# Patient Record
Sex: FEMALE | Race: WHITE | NOT HISPANIC OR LATINO | Employment: OTHER | URBAN - METROPOLITAN AREA
[De-identification: names, ages, dates, MRNs, and addresses within clinical notes are randomized per-mention and may not be internally consistent; named-entity substitution may affect disease eponyms.]

---

## 2017-05-26 ENCOUNTER — HOSPITAL ENCOUNTER (EMERGENCY)
Facility: HOSPITAL | Age: 57
Discharge: HOME | End: 2017-05-27
Attending: EMERGENCY MEDICINE | Admitting: EMERGENCY MEDICINE
Payer: COMMERCIAL

## 2017-05-26 DIAGNOSIS — F32.A DEPRESSION: ICD-10-CM

## 2017-05-26 DIAGNOSIS — F10.929 ALCOHOL INTOXICATION (HCC): Primary | ICD-10-CM

## 2017-05-26 LAB
ALBUMIN SERPL BCP-MCNC: 4.1 G/DL (ref 3.5–5)
ALP SERPL-CCNC: 157 U/L (ref 46–116)
ALT SERPL W P-5'-P-CCNC: 95 U/L (ref 12–78)
AMPHETAMINES SERPL QL SCN: NEGATIVE
ANION GAP SERPL CALCULATED.3IONS-SCNC: 13 MMOL/L (ref 4–13)
AST SERPL W P-5'-P-CCNC: 158 U/L (ref 5–45)
ATRIAL RATE: 108 BPM
BACTERIA UR QL AUTO: ABNORMAL /HPF
BARBITURATES UR QL: NEGATIVE
BASOPHILS # BLD AUTO: 0.06 THOUSANDS/ΜL (ref 0–0.1)
BASOPHILS NFR BLD AUTO: 1 % (ref 0–1)
BENZODIAZ UR QL: NEGATIVE
BILIRUB SERPL-MCNC: 0.63 MG/DL (ref 0.2–1)
BILIRUB UR QL STRIP: NEGATIVE
BUN SERPL-MCNC: 9 MG/DL (ref 5–25)
CALCIUM SERPL-MCNC: 9.5 MG/DL (ref 8.3–10.1)
CHLORIDE SERPL-SCNC: 102 MMOL/L (ref 100–108)
CLARITY UR: CLEAR
CO2 SERPL-SCNC: 27 MMOL/L (ref 21–32)
COCAINE UR QL: NEGATIVE
COLOR UR: YELLOW
COLOR, POC: YELLOW
CREAT SERPL-MCNC: 0.8 MG/DL (ref 0.6–1.3)
EOSINOPHIL # BLD AUTO: 0.03 THOUSAND/ΜL (ref 0–0.61)
EOSINOPHIL NFR BLD AUTO: 1 % (ref 0–6)
ERYTHROCYTE [DISTWIDTH] IN BLOOD BY AUTOMATED COUNT: 13.9 % (ref 11.6–15.1)
ETHANOL EXG-MCNC: NORMAL MG/DL
ETHANOL SERPL-MCNC: 422 MG/DL (ref 0–3)
GFR SERPL CREATININE-BSD FRML MDRD: >60 ML/MIN/1.73SQ M
GLUCOSE SERPL-MCNC: 72 MG/DL (ref 65–140)
GLUCOSE UR STRIP-MCNC: NEGATIVE MG/DL
HCT VFR BLD AUTO: 41.7 % (ref 34.8–46.1)
HGB BLD-MCNC: 14.5 G/DL (ref 11.5–15.4)
HGB UR QL STRIP.AUTO: ABNORMAL
KETONES UR STRIP-MCNC: NEGATIVE MG/DL
LEUKOCYTE ESTERASE UR QL STRIP: ABNORMAL
LYMPHOCYTES # BLD AUTO: 2.01 THOUSANDS/ΜL (ref 0.6–4.47)
LYMPHOCYTES NFR BLD AUTO: 31 % (ref 14–44)
MCH RBC QN AUTO: 34.4 PG (ref 26.8–34.3)
MCHC RBC AUTO-ENTMCNC: 34.8 G/DL (ref 31.4–37.4)
MCV RBC AUTO: 99 FL (ref 82–98)
METHADONE UR QL: NEGATIVE
MONOCYTES # BLD AUTO: 0.25 THOUSAND/ΜL (ref 0.17–1.22)
MONOCYTES NFR BLD AUTO: 4 % (ref 4–12)
NEUTROPHILS # BLD AUTO: 4.08 THOUSANDS/ΜL (ref 1.85–7.62)
NEUTS SEG NFR BLD AUTO: 63 % (ref 43–75)
NITRITE UR QL STRIP: POSITIVE
NON-SQ EPI CELLS URNS QL MICRO: ABNORMAL /HPF
NRBC BLD AUTO-RTO: 0 /100 WBCS
OPIATES UR QL SCN: NEGATIVE
P AXIS: 23 DEGREES
PCP UR QL: NEGATIVE
PH UR STRIP.AUTO: 5.5 [PH] (ref 4.5–8)
PLATELET # BLD AUTO: 246 THOUSANDS/UL (ref 149–390)
PMV BLD AUTO: 9.2 FL (ref 8.9–12.7)
POTASSIUM SERPL-SCNC: 3.4 MMOL/L (ref 3.5–5.3)
PR INTERVAL: 128 MS
PROT SERPL-MCNC: 8.1 G/DL (ref 6.4–8.2)
PROT UR STRIP-MCNC: NEGATIVE MG/DL
QRS AXIS: 34 DEGREES
QRSD INTERVAL: 68 MS
QT INTERVAL: 326 MS
QTC INTERVAL: 436 MS
RBC # BLD AUTO: 4.22 MILLION/UL (ref 3.81–5.12)
RBC #/AREA URNS AUTO: ABNORMAL /HPF
SODIUM SERPL-SCNC: 142 MMOL/L (ref 136–145)
SP GR UR STRIP.AUTO: 1.01 (ref 1–1.03)
T WAVE AXIS: -1 DEGREES
THC UR QL: NEGATIVE
TSH SERPL DL<=0.05 MIU/L-ACNC: 1.23 UIU/ML (ref 0.36–3.74)
UROBILINOGEN UR QL STRIP.AUTO: 0.2 E.U./DL
VENTRICULAR RATE: 108 BPM
WBC # BLD AUTO: 6.43 THOUSAND/UL (ref 4.31–10.16)
WBC #/AREA URNS AUTO: ABNORMAL /HPF

## 2017-05-26 PROCEDURE — 80053 COMPREHEN METABOLIC PANEL: CPT | Performed by: EMERGENCY MEDICINE

## 2017-05-26 PROCEDURE — 81001 URINALYSIS AUTO W/SCOPE: CPT

## 2017-05-26 PROCEDURE — 87077 CULTURE AEROBIC IDENTIFY: CPT | Performed by: EMERGENCY MEDICINE

## 2017-05-26 PROCEDURE — 80320 DRUG SCREEN QUANTALCOHOLS: CPT | Performed by: EMERGENCY MEDICINE

## 2017-05-26 PROCEDURE — 84443 ASSAY THYROID STIM HORMONE: CPT | Performed by: EMERGENCY MEDICINE

## 2017-05-26 PROCEDURE — 87086 URINE CULTURE/COLONY COUNT: CPT | Performed by: EMERGENCY MEDICINE

## 2017-05-26 PROCEDURE — G0480 DRUG TEST DEF 1-7 CLASSES: HCPCS | Performed by: EMERGENCY MEDICINE

## 2017-05-26 PROCEDURE — 85025 COMPLETE CBC W/AUTO DIFF WBC: CPT | Performed by: EMERGENCY MEDICINE

## 2017-05-26 PROCEDURE — 82075 ASSAY OF BREATH ETHANOL: CPT | Performed by: EMERGENCY MEDICINE

## 2017-05-26 PROCEDURE — 87186 SC STD MICRODIL/AGAR DIL: CPT | Performed by: EMERGENCY MEDICINE

## 2017-05-26 PROCEDURE — 80307 DRUG TEST PRSMV CHEM ANLYZR: CPT | Performed by: EMERGENCY MEDICINE

## 2017-05-26 PROCEDURE — 36415 COLL VENOUS BLD VENIPUNCTURE: CPT | Performed by: EMERGENCY MEDICINE

## 2017-05-26 PROCEDURE — 81002 URINALYSIS NONAUTO W/O SCOPE: CPT | Performed by: EMERGENCY MEDICINE

## 2017-05-26 PROCEDURE — A9270 NON-COVERED ITEM OR SERVICE: HCPCS | Performed by: EMERGENCY MEDICINE

## 2017-05-26 PROCEDURE — 93005 ELECTROCARDIOGRAM TRACING: CPT | Performed by: EMERGENCY MEDICINE

## 2017-05-26 RX ORDER — METOPROLOL TARTRATE 50 MG/1
50 TABLET, FILM COATED ORAL ONCE
Status: DISCONTINUED | OUTPATIENT
Start: 2017-05-26 | End: 2017-05-26

## 2017-05-26 RX ORDER — METOPROLOL SUCCINATE 50 MG/1
50 TABLET, EXTENDED RELEASE ORAL DAILY
COMMUNITY
End: 2018-05-09 | Stop reason: SDUPTHER

## 2017-05-26 RX ORDER — LORAZEPAM 1 MG/1
1 TABLET ORAL ONCE
Status: COMPLETED | OUTPATIENT
Start: 2017-05-26 | End: 2017-05-26

## 2017-05-26 RX ORDER — SPIRONOLACTONE 25 MG/1
25 TABLET ORAL DAILY
COMMUNITY
End: 2018-05-09 | Stop reason: SDUPTHER

## 2017-05-26 RX ADMIN — LORAZEPAM 1 MG: 1 TABLET ORAL at 19:31

## 2017-05-27 VITALS
OXYGEN SATURATION: 98 % | HEART RATE: 88 BPM | SYSTOLIC BLOOD PRESSURE: 136 MMHG | RESPIRATION RATE: 16 BRPM | TEMPERATURE: 98 F | DIASTOLIC BLOOD PRESSURE: 82 MMHG | WEIGHT: 176.37 LBS

## 2017-05-27 LAB — ETHANOL EXG-MCNC: 0.05 MG/DL

## 2017-05-27 PROCEDURE — 82075 ASSAY OF BREATH ETHANOL: CPT | Performed by: EMERGENCY MEDICINE

## 2017-05-27 PROCEDURE — 99284 EMERGENCY DEPT VISIT MOD MDM: CPT

## 2017-05-29 LAB — BACTERIA UR CULT: NORMAL

## 2018-05-09 ENCOUNTER — APPOINTMENT (OUTPATIENT)
Dept: LAB | Facility: CLINIC | Age: 58
End: 2018-05-09
Payer: COMMERCIAL

## 2018-05-09 ENCOUNTER — TRANSCRIBE ORDERS (OUTPATIENT)
Dept: LAB | Facility: CLINIC | Age: 58
End: 2018-05-09

## 2018-05-09 ENCOUNTER — OFFICE VISIT (OUTPATIENT)
Dept: FAMILY MEDICINE CLINIC | Facility: CLINIC | Age: 58
End: 2018-05-09
Payer: COMMERCIAL

## 2018-05-09 VITALS
BODY MASS INDEX: 41.43 KG/M2 | SYSTOLIC BLOOD PRESSURE: 140 MMHG | RESPIRATION RATE: 18 BRPM | OXYGEN SATURATION: 97 % | DIASTOLIC BLOOD PRESSURE: 84 MMHG | HEIGHT: 62 IN | TEMPERATURE: 97.3 F | HEART RATE: 116 BPM | WEIGHT: 225.13 LBS

## 2018-05-09 DIAGNOSIS — I10 BENIGN ESSENTIAL HYPERTENSION: ICD-10-CM

## 2018-05-09 DIAGNOSIS — F41.9 ANXIETY: ICD-10-CM

## 2018-05-09 DIAGNOSIS — K70.30 ALCOHOLIC CIRRHOSIS OF LIVER WITHOUT ASCITES (HCC): Primary | ICD-10-CM

## 2018-05-09 DIAGNOSIS — E11.9 TYPE 2 DIABETES MELLITUS WITHOUT COMPLICATION, WITHOUT LONG-TERM CURRENT USE OF INSULIN (HCC): ICD-10-CM

## 2018-05-09 LAB
ALBUMIN SERPL BCP-MCNC: 3.8 G/DL (ref 3.5–5)
ALP SERPL-CCNC: 104 U/L (ref 46–116)
ALT SERPL W P-5'-P-CCNC: 17 U/L (ref 12–78)
ANION GAP SERPL CALCULATED.3IONS-SCNC: 7 MMOL/L (ref 4–13)
AST SERPL W P-5'-P-CCNC: 12 U/L (ref 5–45)
BASOPHILS # BLD AUTO: 0.04 THOUSANDS/ΜL (ref 0–0.1)
BASOPHILS NFR BLD AUTO: 0 % (ref 0–1)
BILIRUB SERPL-MCNC: 1.05 MG/DL (ref 0.2–1)
BUN SERPL-MCNC: 14 MG/DL (ref 5–25)
CALCIUM SERPL-MCNC: 9.4 MG/DL (ref 8.3–10.1)
CHLORIDE SERPL-SCNC: 103 MMOL/L (ref 100–108)
CHOLEST SERPL-MCNC: 202 MG/DL (ref 50–200)
CO2 SERPL-SCNC: 26 MMOL/L (ref 21–32)
CREAT SERPL-MCNC: 0.83 MG/DL (ref 0.6–1.3)
CREAT UR-MCNC: 428 MG/DL
EOSINOPHIL # BLD AUTO: 0.05 THOUSAND/ΜL (ref 0–0.61)
EOSINOPHIL NFR BLD AUTO: 1 % (ref 0–6)
ERYTHROCYTE [DISTWIDTH] IN BLOOD BY AUTOMATED COUNT: 12.9 % (ref 11.6–15.1)
EST. AVERAGE GLUCOSE BLD GHB EST-MCNC: 154 MG/DL
GFR SERPL CREATININE-BSD FRML MDRD: 79 ML/MIN/1.73SQ M
GLUCOSE P FAST SERPL-MCNC: 128 MG/DL (ref 65–99)
HBA1C MFR BLD: 7 % (ref 4.2–6.3)
HCT VFR BLD AUTO: 40.6 % (ref 34.8–46.1)
HDLC SERPL-MCNC: 56 MG/DL (ref 40–60)
HGB BLD-MCNC: 13.1 G/DL (ref 11.5–15.4)
LDLC SERPL CALC-MCNC: 125 MG/DL (ref 0–100)
LYMPHOCYTES # BLD AUTO: 1.77 THOUSANDS/ΜL (ref 0.6–4.47)
LYMPHOCYTES NFR BLD AUTO: 17 % (ref 14–44)
MCH RBC QN AUTO: 28.8 PG (ref 26.8–34.3)
MCHC RBC AUTO-ENTMCNC: 32.3 G/DL (ref 31.4–37.4)
MCV RBC AUTO: 89 FL (ref 82–98)
MICROALBUMIN UR-MCNC: 554 MG/L (ref 0–20)
MICROALBUMIN/CREAT 24H UR: 129 MG/G CREATININE (ref 0–30)
MONOCYTES # BLD AUTO: 0.61 THOUSAND/ΜL (ref 0.17–1.22)
MONOCYTES NFR BLD AUTO: 6 % (ref 4–12)
NEUTROPHILS # BLD AUTO: 8.09 THOUSANDS/ΜL (ref 1.85–7.62)
NEUTS SEG NFR BLD AUTO: 76 % (ref 43–75)
NONHDLC SERPL-MCNC: 146 MG/DL
NRBC BLD AUTO-RTO: 0 /100 WBCS
PLATELET # BLD AUTO: 277 THOUSANDS/UL (ref 149–390)
PMV BLD AUTO: 10.4 FL (ref 8.9–12.7)
POTASSIUM SERPL-SCNC: 4.4 MMOL/L (ref 3.5–5.3)
PROT SERPL-MCNC: 7.1 G/DL (ref 6.4–8.2)
RBC # BLD AUTO: 4.55 MILLION/UL (ref 3.81–5.12)
SODIUM SERPL-SCNC: 136 MMOL/L (ref 136–145)
TRIGL SERPL-MCNC: 103 MG/DL
TSH SERPL DL<=0.05 MIU/L-ACNC: 1.54 UIU/ML (ref 0.36–3.74)
WBC # BLD AUTO: 10.59 THOUSAND/UL (ref 4.31–10.16)

## 2018-05-09 PROCEDURE — 85025 COMPLETE CBC W/AUTO DIFF WBC: CPT | Performed by: FAMILY MEDICINE

## 2018-05-09 PROCEDURE — 84443 ASSAY THYROID STIM HORMONE: CPT | Performed by: FAMILY MEDICINE

## 2018-05-09 PROCEDURE — 83036 HEMOGLOBIN GLYCOSYLATED A1C: CPT | Performed by: FAMILY MEDICINE

## 2018-05-09 PROCEDURE — 82043 UR ALBUMIN QUANTITATIVE: CPT | Performed by: FAMILY MEDICINE

## 2018-05-09 PROCEDURE — 36415 COLL VENOUS BLD VENIPUNCTURE: CPT | Performed by: FAMILY MEDICINE

## 2018-05-09 PROCEDURE — 80061 LIPID PANEL: CPT | Performed by: FAMILY MEDICINE

## 2018-05-09 PROCEDURE — 82570 ASSAY OF URINE CREATININE: CPT | Performed by: FAMILY MEDICINE

## 2018-05-09 PROCEDURE — 3060F POS MICROALBUMINURIA REV: CPT | Performed by: FAMILY MEDICINE

## 2018-05-09 PROCEDURE — 99204 OFFICE O/P NEW MOD 45 MIN: CPT | Performed by: FAMILY MEDICINE

## 2018-05-09 PROCEDURE — 80053 COMPREHEN METABOLIC PANEL: CPT | Performed by: FAMILY MEDICINE

## 2018-05-09 RX ORDER — SPIRONOLACTONE 25 MG/1
25 TABLET ORAL DAILY
Qty: 90 TABLET | Refills: 0 | Status: SHIPPED | OUTPATIENT
Start: 2018-05-09 | End: 2018-08-24 | Stop reason: SDUPTHER

## 2018-05-09 RX ORDER — TRAZODONE HYDROCHLORIDE 150 MG/1
150 TABLET ORAL
Qty: 90 TABLET | Refills: 1 | Status: SHIPPED | OUTPATIENT
Start: 2018-05-09 | End: 2018-07-20 | Stop reason: HOSPADM

## 2018-05-09 RX ORDER — TRAZODONE HYDROCHLORIDE 150 MG/1
TABLET ORAL
COMMUNITY
Start: 2018-03-06 | End: 2018-05-09 | Stop reason: SDUPTHER

## 2018-05-09 RX ORDER — METOPROLOL SUCCINATE 50 MG/1
50 TABLET, EXTENDED RELEASE ORAL DAILY
Qty: 90 TABLET | Refills: 1 | Status: SHIPPED | OUTPATIENT
Start: 2018-05-09 | End: 2018-07-20 | Stop reason: HOSPADM

## 2018-05-09 RX ORDER — BUPROPION HYDROCHLORIDE 75 MG/1
75 TABLET ORAL 2 TIMES DAILY
Qty: 180 TABLET | Refills: 0 | Status: SHIPPED | OUTPATIENT
Start: 2018-05-09 | End: 2018-09-05 | Stop reason: SDUPTHER

## 2018-05-09 NOTE — PROGRESS NOTES
Cirrhosis  Patient presents with abdominal pain  Onset of symptoms was gradual starting a few years ago with stable course since that time  Patient also notes mild, non-existent epigastric discomfort  Symptoms improve with none  Symptoms worsen with pressure and recumbency  There is history of history of alcohol abuse quit 6 years ago   There is no history of alcohol use  Past history includes ascites, cirrhosis, gallstones and pancreatitis  Previous studies include none available at time of encounter  Assessment/Plan:    No problem-specific Assessment & Plan notes found for this encounter  Problem List Items Addressed This Visit        Digestive    Alcoholic cirrhosis ([address] ) - Primary    Relevant Medications    spironolactone (ALDACTONE) 25 mg tablet    Other Relevant Orders    CBC and differential    Comprehensive metabolic panel    Lipid panel    TSH, 3rd generation with T4 reflex    Microalbumin / creatinine urine ratio    Ammonia    HEMOGLOBIN A1C W/ EAG ESTIMATION       Endocrine    Type II diabetes mellitus (HCC)    Relevant Orders    CBC and differential    Comprehensive metabolic panel    Lipid panel    TSH, 3rd generation with T4 reflex    Microalbumin / creatinine urine ratio    Ammonia    HEMOGLOBIN A1C W/ EAG ESTIMATION       Cardiovascular and Mediastinum    Benign essential hypertension    Relevant Medications    spironolactone (ALDACTONE) 25 mg tablet    Other Relevant Orders    CBC and differential    Comprehensive metabolic panel    Lipid panel    TSH, 3rd generation with T4 reflex    Microalbumin / creatinine urine ratio    Ammonia    HEMOGLOBIN A1C W/ EAG ESTIMATION      Other Visit Diagnoses     Anxiety        Relevant Medications    buPROPion (WELLBUTRIN) 75 mg tablet            Subjective:      Patient ID: Daniella Yung is a 66 y o  female  65 yo female with known HTN, DM and acholic cirrhosis diagnosed in 2012 here as a new patient today   Patient states she has not seen a physician since her diagnosis  She currently complains of difficulty falling and staying asleep, feels anxious all the time and epigastric pain described as a cramp anytime she bends down  Over the last month she has been having increasing LE nocturnal cramps  She denies LE edema, change in appetite, constipation, diarrhea, SOB, fatigue  She currently attends a support group at [employer]  Not drinking ETOH          The following portions of the patient's history were reviewed and updated as appropriate:   She  has a past medical history of Cirrhosis of liver ([place] [address] ); Hypertension; and Psychiatric disorder  She   Patient Active Problem List    Diagnosis Date Noted    Pancreatitis 08/24/2015    Alcohol dependence ([place] [address] ) 06/17/2014    Hypothyroidism 58/11/2353    Alcoholic cirrhosis ([place] [address] ) 32/25/6854    Benign essential hypertension 08/13/2012    Hyperlipidemia 08/13/2012    Type II diabetes mellitus ([place] [address] ) 08/13/2012     She  has no past surgical history on file  Her family history includes Diabetes in her brother, father, and mother; Hypertension in her brother  She  reports that she has been smoking Cigarettes  She has been smoking about 1 00 pack per day  She has never used smokeless tobacco  She reports that she drinks alcohol  She reports that she does not use drugs  Current Outpatient Prescriptions   Medication Sig Dispense Refill    buPROPion (WELLBUTRIN) 75 mg tablet Take 1 tablet (75 mg total) by mouth 2 (two) times a day 180 tablet 0    metoprolol succinate (TOPROL-XL) 50 mg 24 hr tablet Take 50 mg by mouth daily      spironolactone (ALDACTONE) 25 mg tablet Take 1 tablet (25 mg total) by mouth daily 90 tablet 0    traZODone (DESYREL) 150 mg tablet        No current facility-administered medications for this visit       Review of Systems   Psychiatric/Behavioral: Positive for sleep disturbance  The patient is nervous/anxious  All other systems reviewed and are negative  Objective:      /84 (BP Location: Right arm, Patient Position: Sitting, Cuff Size: Standard)   Pulse (!) 116   Temp (!) 97 3 °F (36 3 °C) (Tympanic)   Resp 18   Ht 5' 2" (1 575 m)   Wt 102 kg (225 lb 2 oz)   SpO2 97%   BMI 41 18 kg/m²          Physical Exam   Constitutional: She is oriented to person, place, and time  She appears well-developed  HENT:   Head: Normocephalic  Right Ear: External ear normal    Left Ear: External ear normal    Nose: Nose normal    Mouth/Throat: Oropharynx is clear and moist    Eyes: Conjunctivae and EOM are normal  Pupils are equal, round, and reactive to light  Neck: Normal range of motion  Neck supple  No thyromegaly present  Cardiovascular: Normal rate, regular rhythm and normal heart sounds  Pulmonary/Chest: Effort normal and breath sounds normal    Abdominal: Soft  There is no tenderness  There is no rebound and no guarding  Musculoskeletal: Normal range of motion  Neurological: She is alert and oriented to person, place, and time  She has normal reflexes  Skin: Skin is dry  Psychiatric: She has a normal mood and affect  Nursing note and vitals reviewed

## 2018-07-17 ENCOUNTER — HOSPITAL ENCOUNTER (INPATIENT)
Facility: HOSPITAL | Age: 58
LOS: 3 days | Discharge: HOME | DRG: 309 | End: 2018-07-20
Attending: EMERGENCY MEDICINE | Admitting: FAMILY MEDICINE
Payer: COMMERCIAL

## 2018-07-17 ENCOUNTER — APPOINTMENT (EMERGENCY)
Dept: RADIOLOGY | Facility: HOSPITAL | Age: 58
DRG: 309 | End: 2018-07-17
Payer: COMMERCIAL

## 2018-07-17 DIAGNOSIS — I10 BENIGN ESSENTIAL HYPERTENSION: ICD-10-CM

## 2018-07-17 DIAGNOSIS — I48.91 NEW ONSET ATRIAL FIBRILLATION (HCC): ICD-10-CM

## 2018-07-17 DIAGNOSIS — F10.20 ALCOHOL DEPENDENCE (HCC): ICD-10-CM

## 2018-07-17 DIAGNOSIS — I48.91 ATRIAL FIBRILLATION WITH RVR (HCC): Primary | ICD-10-CM

## 2018-07-17 PROBLEM — E87.2 HIGH ANION GAP METABOLIC ACIDOSIS: Status: ACTIVE | Noted: 2018-07-17

## 2018-07-17 PROBLEM — D72.829 LEUKOCYTOSIS: Status: ACTIVE | Noted: 2018-07-17

## 2018-07-17 PROBLEM — I24.89 DEMAND ISCHEMIA OF MYOCARDIUM: Status: ACTIVE | Noted: 2018-07-17

## 2018-07-17 PROBLEM — I24.8 DEMAND ISCHEMIA OF MYOCARDIUM (HCC): Status: ACTIVE | Noted: 2018-07-17

## 2018-07-17 PROBLEM — E87.29 HIGH ANION GAP METABOLIC ACIDOSIS: Status: ACTIVE | Noted: 2018-07-17

## 2018-07-17 LAB
ALBUMIN SERPL BCP-MCNC: 4.4 G/DL (ref 3–5.2)
ALP SERPL-CCNC: 97 U/L (ref 43–122)
ALT SERPL W P-5'-P-CCNC: 31 U/L (ref 9–52)
ANION GAP SERPL CALCULATED.3IONS-SCNC: 21 MMOL/L (ref 5–14)
APTT PPP: 25 SECONDS (ref 23–34)
APTT PPP: 40 SECONDS (ref 23–34)
AST SERPL W P-5'-P-CCNC: 29 U/L (ref 14–36)
ATRIAL RATE: 214 BPM
BASOPHILS # BLD AUTO: 0 THOUSANDS/ΜL (ref 0–0.1)
BASOPHILS NFR BLD AUTO: 0 % (ref 0–1)
BILIRUB SERPL-MCNC: 2.5 MG/DL
BUN SERPL-MCNC: 20 MG/DL (ref 5–25)
CALCIUM SERPL-MCNC: 9.2 MG/DL (ref 8.4–10.2)
CHLORIDE SERPL-SCNC: 100 MMOL/L (ref 97–108)
CK SERPL-CCNC: 54 U/L (ref 30–135)
CK SERPL-CCNC: 58 U/L (ref 30–135)
CO2 SERPL-SCNC: 18 MMOL/L (ref 22–30)
CREAT SERPL-MCNC: 0.91 MG/DL (ref 0.6–1.2)
EOSINOPHIL # BLD AUTO: 0 THOUSAND/ΜL (ref 0–0.4)
EOSINOPHIL NFR BLD AUTO: 0 % (ref 0–6)
ERYTHROCYTE [DISTWIDTH] IN BLOOD BY AUTOMATED COUNT: 13.9 %
ETHANOL SERPL-MCNC: <10 MG/DL (ref 0–10)
GFR SERPL CREATININE-BSD FRML MDRD: 71 ML/MIN/1.73SQ M
GLUCOSE SERPL-MCNC: 139 MG/DL (ref 70–99)
GLUCOSE SERPL-MCNC: 219 MG/DL (ref 70–99)
HCT VFR BLD AUTO: 47.6 % (ref 36–46)
HGB BLD-MCNC: 16 G/DL (ref 12–16)
INR PPP: 1.08 (ref 0.89–1.1)
LACTATE SERPL-SCNC: 1.3 MMOL/L (ref 0.7–2)
LYMPHOCYTES # BLD AUTO: 1 THOUSANDS/ΜL (ref 0.5–4)
LYMPHOCYTES NFR BLD AUTO: 7 % (ref 20–50)
MCH RBC QN AUTO: 29.3 PG (ref 26–34)
MCHC RBC AUTO-ENTMCNC: 33.6 G/DL (ref 31–36)
MCV RBC AUTO: 87 FL (ref 80–100)
MONOCYTES # BLD AUTO: 0.5 THOUSAND/ΜL (ref 0.2–0.9)
MONOCYTES NFR BLD AUTO: 4 % (ref 1–10)
NEUTROPHILS # BLD AUTO: 12.7 THOUSANDS/ΜL (ref 1.8–7.8)
NEUTS SEG NFR BLD AUTO: 89 % (ref 45–65)
PLATELET # BLD AUTO: 323 THOUSANDS/UL (ref 150–450)
PMV BLD AUTO: 8.1 FL (ref 8.9–12.7)
POTASSIUM SERPL-SCNC: 4 MMOL/L (ref 3.6–5)
PROT SERPL-MCNC: 7.7 G/DL (ref 5.9–8.4)
PROTHROMBIN TIME: 11.4 SECONDS (ref 9.5–11.6)
QRS AXIS: 51 DEGREES
QRSD INTERVAL: 68 MS
QT INTERVAL: 326 MS
QTC INTERVAL: 477 MS
RBC # BLD AUTO: 5.45 MILLION/UL (ref 4–5.2)
SALICYLATES SERPL-MCNC: <1 MG/DL (ref 10–30)
SODIUM SERPL-SCNC: 139 MMOL/L (ref 137–147)
T WAVE AXIS: 18 DEGREES
TROPONIN I SERPL-MCNC: 0.07 NG/ML (ref 0–0.03)
TSH SERPL DL<=0.05 MIU/L-ACNC: 2.25 UIU/ML (ref 0.47–4.68)
VENTRICULAR RATE: 129 BPM
WBC # BLD AUTO: 14.3 THOUSAND/UL (ref 4.5–11)

## 2018-07-17 PROCEDURE — C9113 INJ PANTOPRAZOLE SODIUM, VIA: HCPCS | Performed by: FAMILY MEDICINE

## 2018-07-17 PROCEDURE — 36415 COLL VENOUS BLD VENIPUNCTURE: CPT | Performed by: EMERGENCY MEDICINE

## 2018-07-17 PROCEDURE — 82550 ASSAY OF CK (CPK): CPT | Performed by: FAMILY MEDICINE

## 2018-07-17 PROCEDURE — 80053 COMPREHEN METABOLIC PANEL: CPT | Performed by: EMERGENCY MEDICINE

## 2018-07-17 PROCEDURE — 80329 ANALGESICS NON-OPIOID 1 OR 2: CPT | Performed by: FAMILY MEDICINE

## 2018-07-17 PROCEDURE — 83605 ASSAY OF LACTIC ACID: CPT | Performed by: FAMILY MEDICINE

## 2018-07-17 PROCEDURE — G0480 DRUG TEST DEF 1-7 CLASSES: HCPCS | Performed by: FAMILY MEDICINE

## 2018-07-17 PROCEDURE — 93010 ELECTROCARDIOGRAM REPORT: CPT | Performed by: INTERNAL MEDICINE

## 2018-07-17 PROCEDURE — 80320 DRUG SCREEN QUANTALCOHOLS: CPT | Performed by: EMERGENCY MEDICINE

## 2018-07-17 PROCEDURE — 96365 THER/PROPH/DIAG IV INF INIT: CPT

## 2018-07-17 PROCEDURE — 96366 THER/PROPH/DIAG IV INF ADDON: CPT

## 2018-07-17 PROCEDURE — 71045 X-RAY EXAM CHEST 1 VIEW: CPT

## 2018-07-17 PROCEDURE — G0480 DRUG TEST DEF 1-7 CLASSES: HCPCS | Performed by: EMERGENCY MEDICINE

## 2018-07-17 PROCEDURE — 99223 1ST HOSP IP/OBS HIGH 75: CPT | Performed by: FAMILY MEDICINE

## 2018-07-17 PROCEDURE — 85610 PROTHROMBIN TIME: CPT | Performed by: EMERGENCY MEDICINE

## 2018-07-17 PROCEDURE — 85025 COMPLETE CBC W/AUTO DIFF WBC: CPT | Performed by: EMERGENCY MEDICINE

## 2018-07-17 PROCEDURE — 85730 THROMBOPLASTIN TIME PARTIAL: CPT | Performed by: EMERGENCY MEDICINE

## 2018-07-17 PROCEDURE — 93005 ELECTROCARDIOGRAM TRACING: CPT

## 2018-07-17 PROCEDURE — 84484 ASSAY OF TROPONIN QUANT: CPT | Performed by: FAMILY MEDICINE

## 2018-07-17 PROCEDURE — 99285 EMERGENCY DEPT VISIT HI MDM: CPT

## 2018-07-17 PROCEDURE — 84443 ASSAY THYROID STIM HORMONE: CPT | Performed by: EMERGENCY MEDICINE

## 2018-07-17 PROCEDURE — 96374 THER/PROPH/DIAG INJ IV PUSH: CPT

## 2018-07-17 PROCEDURE — 96361 HYDRATE IV INFUSION ADD-ON: CPT

## 2018-07-17 PROCEDURE — 84484 ASSAY OF TROPONIN QUANT: CPT | Performed by: EMERGENCY MEDICINE

## 2018-07-17 PROCEDURE — 82948 REAGENT STRIP/BLOOD GLUCOSE: CPT

## 2018-07-17 RX ORDER — LIDOCAINE 50 MG/G
1 PATCH TOPICAL DAILY
Status: DISCONTINUED | OUTPATIENT
Start: 2018-07-18 | End: 2018-07-17

## 2018-07-17 RX ORDER — HEPARIN SODIUM 5000 [USP'U]/ML
INJECTION, SOLUTION INTRAVENOUS; SUBCUTANEOUS
Status: COMPLETED
Start: 2018-07-17 | End: 2018-07-17

## 2018-07-17 RX ORDER — HEPARIN SODIUM 1000 [USP'U]/ML
4000 INJECTION, SOLUTION INTRAVENOUS; SUBCUTANEOUS AS NEEDED
Status: DISCONTINUED | OUTPATIENT
Start: 2018-07-17 | End: 2018-07-17

## 2018-07-17 RX ORDER — DILTIAZEM HYDROCHLORIDE 5 MG/ML
INJECTION INTRAVENOUS
Status: COMPLETED
Start: 2018-07-17 | End: 2018-07-17

## 2018-07-17 RX ORDER — HEPARIN SODIUM 1000 [USP'U]/ML
4000 INJECTION, SOLUTION INTRAVENOUS; SUBCUTANEOUS ONCE
Status: COMPLETED | OUTPATIENT
Start: 2018-07-17 | End: 2018-07-17

## 2018-07-17 RX ORDER — PANTOPRAZOLE SODIUM 40 MG/1
40 INJECTION, POWDER, FOR SOLUTION INTRAVENOUS
Status: DISCONTINUED | OUTPATIENT
Start: 2018-07-17 | End: 2018-07-19

## 2018-07-17 RX ORDER — HEPARIN SODIUM 10000 [USP'U]/100ML
3-20 INJECTION, SOLUTION INTRAVENOUS
Status: DISCONTINUED | OUTPATIENT
Start: 2018-07-17 | End: 2018-07-17

## 2018-07-17 RX ORDER — NICOTINE 21 MG/24HR
1 PATCH, TRANSDERMAL 24 HOURS TRANSDERMAL DAILY
Status: DISCONTINUED | OUTPATIENT
Start: 2018-07-17 | End: 2018-07-20 | Stop reason: HOSPADM

## 2018-07-17 RX ORDER — BUPROPION HYDROCHLORIDE 75 MG/1
75 TABLET ORAL 2 TIMES DAILY
Status: DISCONTINUED | OUTPATIENT
Start: 2018-07-17 | End: 2018-07-20 | Stop reason: HOSPADM

## 2018-07-17 RX ORDER — HEPARIN SODIUM 1000 [USP'U]/ML
2000 INJECTION, SOLUTION INTRAVENOUS; SUBCUTANEOUS AS NEEDED
Status: DISCONTINUED | OUTPATIENT
Start: 2018-07-17 | End: 2018-07-17

## 2018-07-17 RX ORDER — ACETAMINOPHEN 325 MG/1
650 TABLET ORAL EVERY 6 HOURS PRN
Status: DISCONTINUED | OUTPATIENT
Start: 2018-07-17 | End: 2018-07-20 | Stop reason: HOSPADM

## 2018-07-17 RX ORDER — METOPROLOL TARTRATE 5 MG/5ML
INJECTION INTRAVENOUS
Status: COMPLETED
Start: 2018-07-17 | End: 2018-07-17

## 2018-07-17 RX ORDER — SODIUM CHLORIDE 9 MG/ML
125 INJECTION, SOLUTION INTRAVENOUS CONTINUOUS
Status: DISCONTINUED | OUTPATIENT
Start: 2018-07-17 | End: 2018-07-18 | Stop reason: SDUPTHER

## 2018-07-17 RX ORDER — DILTIAZEM HYDROCHLORIDE 5 MG/ML
10 INJECTION INTRAVENOUS ONCE
Status: COMPLETED | OUTPATIENT
Start: 2018-07-17 | End: 2018-07-17

## 2018-07-17 RX ORDER — HEPARIN SODIUM 10000 [USP'U]/100ML
INJECTION, SOLUTION INTRAVENOUS
Status: COMPLETED
Start: 2018-07-17 | End: 2018-07-17

## 2018-07-17 RX ORDER — SODIUM CHLORIDE 9 MG/ML
125 INJECTION, SOLUTION INTRAVENOUS CONTINUOUS
Status: DISCONTINUED | OUTPATIENT
Start: 2018-07-17 | End: 2018-07-18

## 2018-07-17 RX ORDER — METOPROLOL TARTRATE 5 MG/5ML
5 INJECTION INTRAVENOUS ONCE
Status: COMPLETED | OUTPATIENT
Start: 2018-07-17 | End: 2018-07-17

## 2018-07-17 RX ORDER — LIDOCAINE 50 MG/G
1 PATCH TOPICAL DAILY
Status: DISCONTINUED | OUTPATIENT
Start: 2018-07-17 | End: 2018-07-20 | Stop reason: HOSPADM

## 2018-07-17 RX ORDER — METOPROLOL TARTRATE 50 MG/1
50 TABLET, FILM COATED ORAL EVERY 12 HOURS SCHEDULED
Status: DISCONTINUED | OUTPATIENT
Start: 2018-07-17 | End: 2018-07-19

## 2018-07-17 RX ADMIN — DILTIAZEM HYDROCHLORIDE 10 MG: 5 INJECTION INTRAVENOUS at 10:35

## 2018-07-17 RX ADMIN — DILTIAZEM HYDROCHLORIDE 10 MG: 5 INJECTION INTRAVENOUS at 10:20

## 2018-07-17 RX ADMIN — SODIUM CHLORIDE 125 ML/HR: 9 INJECTION, SOLUTION INTRAVENOUS at 18:29

## 2018-07-17 RX ADMIN — HEPARIN SODIUM 25000 UNITS: 10000 INJECTION, SOLUTION INTRAVENOUS at 13:11

## 2018-07-17 RX ADMIN — HEPARIN SODIUM AND DEXTROSE 25000 UNITS: 10000; 5 INJECTION INTRAVENOUS at 13:11

## 2018-07-17 RX ADMIN — METOPROLOL TARTRATE 50 MG: 50 TABLET ORAL at 15:55

## 2018-07-17 RX ADMIN — DILTIAZEM HYDROCHLORIDE 5 MG/HR: 5 INJECTION INTRAVENOUS at 11:00

## 2018-07-17 RX ADMIN — SODIUM CHLORIDE 125 ML/HR: 9 INJECTION, SOLUTION INTRAVENOUS at 11:04

## 2018-07-17 RX ADMIN — BUPROPION HYDROCHLORIDE 75 MG: 75 TABLET, FILM COATED ORAL at 17:39

## 2018-07-17 RX ADMIN — METOPROLOL TARTRATE 5 MG: 5 INJECTION, SOLUTION INTRAVENOUS at 13:10

## 2018-07-17 RX ADMIN — HEPARIN SODIUM 2000 UNITS: 1000 INJECTION, SOLUTION INTRAVENOUS; SUBCUTANEOUS at 20:56

## 2018-07-17 RX ADMIN — LIDOCAINE 1 PATCH: 50 PATCH CUTANEOUS at 16:52

## 2018-07-17 RX ADMIN — PANTOPRAZOLE SODIUM 40 MG: 40 INJECTION, POWDER, FOR SOLUTION INTRAVENOUS at 16:24

## 2018-07-17 RX ADMIN — HEPARIN SODIUM 4000 UNITS: 5000 INJECTION, SOLUTION INTRAVENOUS; SUBCUTANEOUS at 13:11

## 2018-07-17 RX ADMIN — SODIUM CHLORIDE 500 ML: 9 INJECTION, SOLUTION INTRAVENOUS at 15:41

## 2018-07-17 RX ADMIN — NICOTINE 1 PATCH: 21 PATCH, EXTENDED RELEASE TRANSDERMAL at 15:54

## 2018-07-17 RX ADMIN — Medication 5 MG/HR: at 21:02

## 2018-07-17 RX ADMIN — ACETAMINOPHEN 650 MG: 325 TABLET ORAL at 16:22

## 2018-07-17 RX ADMIN — METOPROLOL TARTRATE 5 MG: 5 INJECTION INTRAVENOUS at 13:10

## 2018-07-17 RX ADMIN — TRAZODONE HYDROCHLORIDE 150 MG: 100 TABLET ORAL at 22:44

## 2018-07-17 RX ADMIN — HEPARIN SODIUM 4000 UNITS: 1000 INJECTION, SOLUTION INTRAVENOUS; SUBCUTANEOUS at 15:34

## 2018-07-17 NOTE — ASSESSMENT & PLAN NOTE
· Patient was previously in sinus rhythm  · Patient did not take her metoprolol today  · She has been drinking alcohol since Friday about a bottle of vodka last drink was yesterday  · Started on heparin drip  · Consult Cardiology discussed case with Dr Jorge Turpin start on the Toprol tartrate 50 mg p o  b i d  titrate down the Cardizem to control her rate also start Eliquis tonight 5 mg p  o  b i d  and heparin drip at the same time  Watch for spontaneous cardioversion    · The UDS is pending  · Patient is dehydrated also placed on normal saline 125 mL/hour  · 2D echo is pending

## 2018-07-17 NOTE — ASSESSMENT & PLAN NOTE
· Will place on vitamin B1 592 mg daily folic acid and MVI  · Last drink yesterday monitor for withdrawals will also add Ativan p r n

## 2018-07-17 NOTE — ED PROVIDER NOTES
History  Chief Complaint   Patient presents with    Rapid Heart Rate     pt c/o palpitations and weakness, cp/sob started this am while walking to bus stop  This is a 61-year-old female presents emergency department with sensation of lightheadedness  EMS was called and noted patient heart rate to be over 200  Patient notes dyspnea on exertion today  Positive lightheadedness  Patient states that she has not had a drink of alcohol in over a year  This weekend she did drink alcohol heavily  This morning noted shortness of breath and lightheadedness  No chest pressure  Symptoms moderate to severe in nature  Worse with exertion  Better with rest   No radiation of symptoms  Differential diagnosis includes arrhythmia, hypotension, ACS, electrolyte abnormality  Prior to Admission Medications   Prescriptions Last Dose Informant Patient Reported? Taking?    buPROPion (WELLBUTRIN) 75 mg tablet 7/16/2018 at Unknown time  No Yes   Sig: Take 1 tablet (75 mg total) by mouth 2 (two) times a day   metoprolol succinate (TOPROL-XL) 50 mg 24 hr tablet 7/16/2018 at Unknown time  No Yes   Sig: Take 1 tablet (50 mg total) by mouth daily   spironolactone (ALDACTONE) 25 mg tablet 7/16/2018 at Unknown time  No Yes   Sig: Take 1 tablet (25 mg total) by mouth daily   traZODone (DESYREL) 150 mg tablet 7/16/2018 at Unknown time  No Yes   Sig: Take 1 tablet (150 mg total) by mouth daily at bedtime      Facility-Administered Medications: None       Past Medical History:   Diagnosis Date    [facility]     [facility]     Disease of thyroid gland     hypothyrodism    HLD (hyperlipidemia)     Hypertension     Psychiatric disorder     Depression    Tobacco dependence        Past Surgical History:   Procedure Laterality Date    APPENDECTOMY      CARPAL TUNNEL RELEASE         Family History   Problem Relation Age of Onset    Diabetes Mother     Diabetes Father     Diabetes Brother    Jeyson Vaughan Hypertension Brother      I have reviewed and agree with the history as documented  Social History   Substance Use Topics    Smoking status: Current Every Day Smoker     Packs/day: 1 00     Types: Cigarettes    Smokeless tobacco: Never Used    Alcohol use Yes      Comment: 1 botttle of vodka over 4 days        Review of Systems   Constitutional: Negative for activity change, appetite change and fever  HENT: Negative for congestion, ear pain, rhinorrhea and sore throat  Eyes: Negative for pain, discharge and redness  Respiratory: Positive for shortness of breath  Negative for cough, chest tightness and wheezing  Cardiovascular: Negative for chest pain and palpitations  Gastrointestinal: Negative for abdominal pain, diarrhea, nausea and vomiting  Endocrine: Negative for polyuria  Genitourinary: Negative for difficulty urinating, dysuria, frequency and urgency  Musculoskeletal: Negative for arthralgias and myalgias  Skin: Negative for color change and rash  Allergic/Immunologic: Negative for immunocompromised state  Neurological: Positive for light-headedness  Negative for dizziness and syncope  Hematological: Does not bruise/bleed easily  Psychiatric/Behavioral: Negative for confusion  All other systems reviewed and are negative  Physical Exam  Physical Exam   Constitutional: She is oriented to person, place, and time  She appears well-developed  No distress  HENT:   Head: Normocephalic and atraumatic  Nose: Nose normal    Eyes: Conjunctivae are normal  No scleral icterus  Neck: Normal range of motion  Neck supple  Cardiovascular: Normal heart sounds and intact distal pulses  Irregularly irregular heart rate, tachycardic  Pulmonary/Chest: Effort normal and breath sounds normal  No stridor  No respiratory distress  She has no wheezes  Abdominal: Soft  She exhibits no distension  There is no tenderness  There is no rebound and no guarding     Musculoskeletal: She exhibits no edema or deformity  Neurological: She is alert and oriented to person, place, and time  Skin: Skin is warm and dry  No rash noted  Psychiatric: She has a normal mood and affect  Thought content normal    Nursing note and vitals reviewed        Vital Signs  ED Triage Vitals   Temperature Pulse Respirations Blood Pressure SpO2   07/17/18 1115 07/17/18 1023 07/17/18 1023 07/17/18 1023 07/17/18 1023   (!) 97 2 °F (36 2 °C) (!) 182 22 125/82 94 %      Temp Source Heart Rate Source Patient Position - Orthostatic VS BP Location FiO2 (%)   07/17/18 1115 07/17/18 1023 07/17/18 1023 07/17/18 1023 --   Oral Monitor Lying Left arm       Pain Score       07/17/18 1508       3           Vitals:    07/20/18 0514 07/20/18 0816 07/20/18 0820 07/20/18 1020   BP: 161/94 (!) 165/103 (!) 165/103 147/93   Pulse:  99 89 80   Patient Position - Orthostatic VS: Lying  Lying        Visual Acuity      ED Medications  Medications   diltiazem (CARDIZEM) 125 mg in sodium chloride 0 9 % 125 mL infusion (0 mg/hr Intravenous Stopped 7/17/18 2116)   diltiazem (CARDIZEM) injection 10 mg (10 mg Intravenous Given 7/17/18 1020)   diltiazem (CARDIZEM) injection 10 mg (10 mg Intravenous Given 7/17/18 1035)   metoprolol (LOPRESSOR) injection 5 mg (5 mg Intravenous Given 7/17/18 1310)   heparin (porcine) injection 4,000 Units (4,000 Units Intravenous Given 7/17/18 1534)   heparin (porcine) 5,000 units/mL subcutaneous injection **AcuDose Override Pull** (4,000 Units  Given 7/17/18 1311)   sodium chloride 0 9 % bolus 500 mL (0 mL Intravenous Stopped 7/17/18 1741)   levalbuterol (XOPENEX) inhalation solution 0 63 mg (0 63 mg Nebulization Given 7/18/18 1431)   oxyCODONE (ROXICODONE) IR tablet 5 mg (5 mg Oral Given 7/18/18 1644)   traMADol (ULTRAM) tablet 50 mg (50 mg Oral Given 7/18/18 2132)   magnesium sulfate 2 g/50 mL IVPB (premix) 2 g (0 g Intravenous Stopped 7/19/18 1047)   potassium chloride (K-DUR,KLOR-CON) CR tablet 40 mEq (40 mEq Oral Given 7/19/18 0848)       Diagnostic Studies  Results Reviewed     Procedure Component Value Units Date/Time    APTT six (6) hours after Heparin bolus/drip initiation or dosing change [[id number]]  (Abnormal) Collected:  07/17/18 1851    Lab Status:  Final result Specimen:  Blood from Arm, Left Updated:  07/17/18 1920     PTT 40 (H) seconds     Narrative:       PTT:      TSH [[id number]]  (Normal) Collected:  07/17/18 1035    Lab Status:  Final result Specimen:  Blood from Arm, Left Updated:  07/17/18 1203     TSH 3RD GENERATON 2 250 uIU/mL     Narrative:         Patients undergoing fluorescein dye angiography may retain small amounts of fluorescein in the body for 48-72 hours post procedure  Samples containing fluorescein can produce falsely depressed TSH values  If the patient had this procedure,a specimen should be resubmitted post fluorescein clearance  The recommended reference ranges for TSH during pregnancy are as follows:  First trimester 0 1 to 2 5 uIU/mL  Second trimester  0 2 to 3 0 uIU/mL  Third trimester 0 3 to 3 0 uIU/m      Comprehensive metabolic panel [[id number]]  (Abnormal) Collected:  07/17/18 1035    Lab Status:  Final result Specimen:  Blood from Arm, Left Updated:  07/17/18 1139     Sodium 139 mmol/L      Potassium 4 0 mmol/L      Chloride 100 mmol/L      CO2 18 (L) mmol/L      Anion Gap 21 (H) mmol/L      BUN 20 mg/dL      Creatinine 0 91 mg/dL      Glucose 219 (H) mg/dL      Calcium 9 2 mg/dL      AST 29 U/L      ALT 31 U/L      Alkaline Phosphatase 97 U/L      Total Protein 7 7 g/dL      Albumin 4 4 g/dL      Total Bilirubin 2 50 (H) mg/dL      eGFR 71 ml/min/1 73sq m     Narrative:       Hemolysis  National Kidney Disease Education Program recommendations are as follows:  GFR calculation is accurate only with a steady state creatinine  Chronic Kidney disease less than 60 ml/min/1 73 sq  meters  Kidney failure less than 15 ml/min/1 73 sq  meters      Troponin I [[id number]]  (Abnormal) Collected:  07/17/18 1035    Lab Status:  Final result Specimen:  Blood from Arm, Left Updated:  07/17/18 1119     Troponin I 0 07 (H) ng/mL     Ethanol [[id number]]  (Normal) Collected:  07/17/18 1035    Lab Status:  Final result Specimen:  Blood from Arm, Left Updated:  07/17/18 1109     Ethanol Lvl <10 mg/dL     APTT [[id number]]  (Normal) Collected:  07/17/18 1035    Lab Status:  Final result Specimen:  Blood from Arm, Left Updated:  07/17/18 1102     PTT 25 seconds     Narrative:       PTT:      Protime-INR [[id number]]  (Normal) Collected:  07/17/18 1035    Lab Status:  Final result Specimen:  Blood from Arm, Left Updated:  07/17/18 1102     Protime 11 4 seconds      INR 1 08    Narrative:       INR:  ,PROTIME:      CBC and differential [[id number]]  (Abnormal) Collected:  07/17/18 1035    Lab Status:  Final result Specimen:  Blood from Arm, Left Updated:  07/17/18 1046     WBC 14 30 (H) Thousand/uL      RBC 5 45 (H) Million/uL      Hemoglobin 16 0 g/dL      Hematocrit 47 6 (H) %      MCV 87 fL      MCH 29 3 pg      MCHC 33 6 g/dL      RDW 13 9 %      MPV 8 1 (L) fL      Platelets 321 Thousands/uL      Neutrophils Relative 89 (H) %      Lymphocytes Relative 7 (L) %      Monocytes Relative 4 %      Eosinophils Relative 0 %      Basophils Relative 0 %      Neutrophils Absolute 12 70 (H) Thousands/µL      Lymphocytes Absolute 1 00 Thousands/µL      Monocytes Absolute 0 50 Thousand/µL      Eosinophils Absolute 0 00 Thousand/µL      Basophils Absolute 0 00 Thousands/µL                  XR chest portable   Final Result by Alyse Macias DO (07/18 1043)      No acute cardiopulmonary disease  Workstation performed: [id number]         US abdomen limited   Final Result by Senia Ochoa MD (07/18 0912)      1  Liver size upper range of normal with somewhat coarsened echotexture in keeping with hepatocellular disease  Portal vein is patent   2  Status post cholecystectomy    No significant biliary ductal dilatation 3  No ascites               Workstation performed: [id number]         XR chest 1 view portable   ED Interpretation by Qi Omer MD (07/17 1224)   Cardiomegaly  No infiltrate  No cephalization  Final Result by Adrian Champagne DO (07/17 1355)   Cardiomegaly without pulmonary edema  No consolidation or effusion              Workstation performed: [id number]                    Procedures  CriticalCare Time  Performed by: Merari Chao by: Constanza Gaming     Critical care provider statement:     Critical care time (minutes):  45    Critical care time was exclusive of:  Separately billable procedures and treating other patients    Critical care was necessary to treat or prevent imminent or life-threatening deterioration of the following conditions:  Cardiac failure    Critical care was time spent personally by me on the following activities:  Obtaining history from patient or surrogate, development of treatment plan with patient or surrogate, discussions with consultants, evaluation of patient's response to treatment, interpretation of cardiac output measurements, ordering and performing treatments and interventions, ordering and review of laboratory studies, ordering and review of radiographic studies, re-evaluation of patient's condition and examination of patient    ECG 12 Lead Documentation  Date/Time: 7/17/2018 10:25 AM  Performed by: Merari Chao by: Constanza Gaming     Indications / Diagnosis:  Tachycardia  ECG reviewed by me, the ED Provider: yes    Patient location:  ED  Rate:     ECG rate assessment: tachycardic    Rhythm:     Rhythm: atrial fibrillation    Ectopy:     Ectopy: none    QRS:     QRS axis:  Normal  Conduction:     Conduction: normal    ST segments:     ST segments:  Non-specific  T waves:     T waves: non-specific             Phone Contacts  ED Phone Contact    ED Course                               MDM  Number of Diagnoses or Management Options  Atrial fibrillation with RVR [facility]):   Diagnosis management comments: Patient with atrial fibrillation with rapid ventricular response  Required Cardizem in the emergency department Cardizem drip  Amount and/or Complexity of Data Reviewed  Clinical lab tests: ordered and reviewed  Tests in the radiology section of CPT®: ordered and reviewed  Discuss the patient with other providers: yes  Independent visualization of images, tracings, or specimens: yes      CritCare Time    Disposition  Final diagnoses:   At[facility] fibrillation [facility] ([facility]     Time reflects when diagnosis was documented in both MDM as applicable and the Disposition within this note     Time User Action Codes Description Comment    7/17/2018 12:32 PM Dallin Nolen Add [I48 91] Atrial fibrillation with RVR ([facility] )     7/20/2018 11:46 AM Carmella Sages Add [F10 20] Alcohol dependence ([facility] )     7/20/2018 11:46 AM Carmella Sages Add [I10] Benign essential hypertension     7/20/2018 11:46 AM Carmella Sages Add [I48 91] New onset atrial fibrillation [facility])       ED Disposition     ED Disposition Condition Comment    Admit  Case was discussed with Dr Tiffany Mast and the patient's admission status was agreed to be Admission Status: inpatient status to the service of Dr Tiffany Mast           Follow-up Information     Follow up With Specialties Details Why Contact Info    [facility]  Follow up HOST Program will contact you regarding arranging out-patient D&A Treatment Program  [address]  [phone]    John President, MD Family Medicine Call 1-2 days [address]      Killian Hinson MD Cardiology Follow up when scheduled [facility] [address]            Discharge Medication List as of 7/20/2018 12:28 PM      START taking these medications    Details   apixaban (ELIQUIS) 5 mg Take 1 tablet (5 mg total) by mouth 2 (two) times a day, Starting Fri 7/20/2018, Print      lisinopril (ZESTRIL) 20 mg tablet Take 1 tablet (20 mg total) by mouth daily, Starting Sat 7/21/2018, Print      metoprolol tartrate (LOPRESSOR) 100 mg tablet Take 1 tablet (100 mg total) by mouth every 12 (twelve) hours, Starting Fri 7/20/2018, Print      thiamine 100 MG tablet Take 1 tablet (100 mg total) by mouth daily, Starting Sat 7/21/2018, Print         CONTINUE these medications which have NOT CHANGED    Details   buPROPion (WELLBUTRIN) 75 mg tablet Take 1 tablet (75 mg total) by mouth 2 (two) times a day, Starting Wed 5/9/2018, Normal      spironolactone (ALDACTONE) 25 mg tablet Take 1 tablet (25 mg total) by mouth daily, Starting Wed 5/9/2018, Normal         STOP taking these medications       metoprolol succinate (TOPROL-XL) 50 mg 24 hr tablet Comments:   Reason for Stopping:         traZODone (DESYREL) 150 mg tablet Comments:   Reason for Stopping:               Outpatient Discharge Orders  Basic metabolic panel   Standing Status: Future Number of Occurrences: 1 Standing Exp   Date: 07/24/19     Discharge Diet         ED Provider  Electronically Signed by           Natasha Barrera MD  07/26/18 4108

## 2018-07-17 NOTE — ASSESSMENT & PLAN NOTE
· Troponins times 2 q 3 hours with a CK MB as well  · Echo  · Hydration and AFib control  · Cardiology consulted

## 2018-07-17 NOTE — ASSESSMENT & PLAN NOTE
· Lab Results   Component Value Date    HGBA1C 7 0 (H) 05/09/2018       No results for input(s): POCGLU in the last 72 hours      Blood Sugar Average: Last 72 hrs:  · Diet controlled will place on sliding scale and Accu-Cheks to monitor here

## 2018-07-17 NOTE — ASSESSMENT & PLAN NOTE
· Secondary to alcohol abuse  · Bicarb is 18 ,hydration with normal saline at 125 mL/hour  · Check lactic acid /salicylate    · Labs in the morning

## 2018-07-17 NOTE — H&P
H&P- Dirk Dean 1960, 66 y o  female MRN: [id number]    Unit/Bed#:  Encounter: [id number]    Primary Care Provider: Cody Teixeira MD   Date and time admitted to hospital: 7/17/2018 10:16 AM        * New onset atrial fibrillation [facility])   Assessment & Plan    · Patient was previously in sinus rhythm  · Patient did not take her metoprolol today  · She has been drinking alcohol since Friday about a bottle of vodka last drink was yesterday  · Started on heparin drip  · Consult Cardiology discussed case with Dr Ham Hubbard start on the Toprol tartrate 50 mg p o  b i d  titrate down the Cardizem to control her rate also start Eliquis tonight 5 mg p  o  b i d  and heparin drip at the same time  Watch for spontaneous cardioversion  · The UDS is pending  · Patient is dehydrated also placed on normal saline 125 mL/hour  · 2D echo is pending        High anion gap metabolic acidosis   Assessment & Plan    · Secondary to alcohol abuse  · Bicarb is 18 ,hydration with normal saline at 125 mL/hour  · Check lactic acid /salicylate  · Labs in the morning        Demand ischemia of myocardium (HCC)   Assessment & Plan    · Troponins times 2 q 3 hours with a CK MB as well  · Echo  · Hydration and AFib control  · Cardiology consulted        Leukocytosis   Assessment & Plan    · Chest x-ray is negative for infiltrate  · Abdomen is benign for infection no gross infection seen  · UA to be obtained  · No antibiotic indication at this time is afebrile  · Repeat labs tomorrow        Alcoholic cirrhosis ([facility] [address] )   Assessment & Plan    · Maddreys score is 1 6 less than 32 need for steroids  · Bilirubin at 2 5 will check ultrasound of the abdomen no pain now  · INR is normal  · Counseled on alcohol cessation        Alcohol dependence ([facility] [address] )   Assessment & Plan    · Will place on vitamin B1 592 mg daily folic acid and MVI  · Last drink yesterday monitor for withdrawals will also add Ativan p r n          Benign essential hypertension   Assessment & Plan    · Uncontrolled  · Will be on the Toprol and also current on Cardizem for atrial fibrillation which will be weaned down monitor blood pressure        Hypothyroidism   Assessment & Plan    · TSH normal is not on any medications        Type II diabetes mellitus ([facility] [address] )   Assessment & Plan    · Lab Results   Component Value Date    HGBA1C 7 0 (H) 05/09/2018       No results for input(s): POCGLU in the last 72 hours  Blood Sugar Average: Last 72 hrs:  · Diet controlled will place on sliding scale and Accu-Cheks to monitor here           Hyperlipidemia   Assessment & Plan    · Not on any medications at home            VTE Prophylaxis: Heparin Drip  / sequential compression device   Code Status: dnr/dni  POLST: There is no POLST form on file for this patient (pre-hospital)  Discussion with family: no    Anticipated Length of Stay:  Patient will be admitted on an Inpatient basis with an anticipated length of stay of   2 midnights  Justification for Hospital Stay:  RVR new onset Atrial fibrillation with    Total Time for Visit, including Counseling / Coordination of Care: 30 minutes  Greater than 50% of this total time spent on direct patient counseling and coordination of care  Chief Complaint:     Dizziness  History of Present Illness:    Dirk Dean is a 66 y o  female who presents with a dizziness and palpitations  She states was hot outside she does she was about to pass out  Apparently EMS came a heart rate was above 200  Patient has a history of alcoholism she stopped drinking a year ago and used to drink heavily not restarted again secondary to depression drink a bottle of vodka over 40  Since Friday  Low drink was yesterday  She had an episode of 1 will be diarrhea today  No chest pain or shortness of breath no abdominal pain no dysuria hematuria urgency or frequency    She denies having a history of atrial fibrillation in the past   She denies using any drugs     Review of Systems:    Review of Systems   Constitutional: Negative for fatigue and fever  HENT: Negative  Negative for ear pain, rhinorrhea and sore throat  Eyes: Negative  Negative for pain and itching  Respiratory: Negative  Negative for cough, chest tightness, shortness of breath and wheezing  Cardiovascular: Positive for palpitations  Negative for chest pain and leg swelling  Gastrointestinal: Positive for diarrhea  Negative for abdominal pain, nausea and vomiting  Endocrine: Negative for polydipsia, polyphagia and polyuria  Genitourinary: Negative for dysuria and flank pain  Musculoskeletal: Negative  Negative for back pain and myalgias  Skin: Negative  Negative for rash and wound  Neurological: Positive for dizziness and light-headedness  Negative for syncope, speech difficulty, weakness, numbness and headaches  Psychiatric/Behavioral: Negative for agitation, confusion and decreased concentration  All other systems reviewed and are negative  Past Medical and Surgical History:     Past Medical History:   Diagnosis Date    Cirrhosis of liver ([facility] )     Diabetes mellitus ([facility] )     Disease of thyroid gland     hypothyrodism    HLD (hyperlipidemia)     Hypertension     Psychiatric disorder     Depression       Past Surgical History:   Procedure Laterality Date    APPENDECTOMY      CARPAL TUNNEL RELEASE         Meds/Allergies:    Prior to Admission medications    Medication Sig Start Date End Date Taking?  Authorizing Provider   buPROPion [facility]) 75 mg tablet Take 1 tablet (75 mg total) by mouth 2 (two) times a day 5/9/18  Yes Rome Bravo MD   metoprolol succinate (TOPROL-XL) 50 mg 24 hr tablet Take 1 tablet (50 mg total) by mouth daily 5/9/18  Yes Rome Bravo MD   spironolactone (ALDACTONE) 25 mg tablet Take 1 tablet (25 mg total) by mouth daily 5/9/18  Yes Rome Bravo MD   traZODone (DESYREL) 150 mg tablet Take 1 tablet (150 mg total) by mouth daily at bedtime 5/9/18  Yes Lorrain Closs, MD     I have reviewed home medications using allscripts  Allergies: No Known Allergies    Social History:     Marital Status: Single   Substance Use History:   History   Alcohol Use    Yes     Comment: 1 botttle of vodka over 4 days     History   Smoking Status    Current Every Day Smoker    Packs/day: 1 00    Types: Cigarettes   Smokeless Tobacco    Never Used     History   Drug Use No       Family History:    Family History   Problem Relation Age of Onset    Diabetes Mother     Diabetes Father     Diabetes Brother     Hypertension Brother        Physical Exam:     Vitals:   Blood Pressure: 166/89 (07/17/18 1554)  Pulse: 92 (07/17/18 1554)  Temperature: 97 6 °F (36 4 °C) (07/17/18 1508)  Temp Source: Temporal (07/17/18 1508)  Respirations: 21 (07/17/18 1508)  Height: 5' 2" (157 5 cm) (07/17/18 1508)  Weight - Scale: 97 6 kg (215 lb 2 7 oz) (07/17/18 1542)  SpO2: 98 % (07/17/18 1508)    Physical Exam   Constitutional: She is oriented to person, place, and time  She appears well-developed and well-nourished  HENT:   Head: Normocephalic and atraumatic  Eyes: EOM are normal  Pupils are equal, round, and reactive to light  Neck: Normal range of motion  Neck supple  Cardiovascular: Normal rate and normal heart sounds  An irregularly irregular rhythm present  Pulses:       Dorsalis pedis pulses are 2+ on the right side, and 2+ on the left side  Posterior tibial pulses are 2+ on the right side, and 2+ on the left side  Pulmonary/Chest: Effort normal and breath sounds normal    Abdominal: Soft  Bowel sounds are normal    Obese   Musculoskeletal: Normal range of motion  Neurological: She is alert and oriented to person, place, and time  She has normal reflexes  No hand tremors   Skin: Skin is warm  Psychiatric: She has a normal mood and affect           Additional Data:     Lab Results: I have personally reviewed pertinent films in PACS      Results from last 7 days  Lab Units 07/17/18  1035   WBC Thousand/uL 14 30*   HEMOGLOBIN g/dL 16 0   HEMATOCRIT % 47 6*   PLATELETS Thousands/uL 323   NEUTROS PCT % 89*   LYMPHS PCT % 7*   MONOS PCT % 4   EOS PCT % 0       Results from last 7 days  Lab Units 07/17/18  1035   SODIUM mmol/L 139   POTASSIUM mmol/L 4 0   CHLORIDE mmol/L 100   CO2 mmol/L 18*   BUN mg/dL 20   CREATININE mg/dL 0 91   CALCIUM mg/dL 9 2   TOTAL PROTEIN g/dL 7 7   BILIRUBIN TOTAL mg/dL 2 50*   ALK PHOS U/L 97   ALT U/L 31   AST U/L 29   GLUCOSE RANDOM mg/dL 219*       Results from last 7 days  Lab Units 07/17/18  1035   INR  1 08               Imaging: I have personally reviewed pertinent films in PACS    XR chest 1 view portable   ED Interpretation by Masood Rosales MD (07/17 1224)   Cardiomegaly  No infiltrate  No cephalization  Final Result by Jack Damon DO (07/17 1355)   Cardiomegaly without pulmonary edema  No consolidation or effusion  Workstation performed: [id number]         US abdomen complete    (Results Pending)       EKG, Pathology, and Other Studies Reviewed on Admission:   · EKG:  Atrial fibrillation    Allscripts / Epic Records Reviewed: Yes     ** Please Note: This note has been constructed using a voice recognition system   **

## 2018-07-17 NOTE — ASSESSMENT & PLAN NOTE
· Chest x-ray is negative for infiltrate  · Abdomen is benign for infection no gross infection seen  · UA to be obtained  · No antibiotic indication at this time is afebrile  · Repeat labs tomorrow

## 2018-07-17 NOTE — ASSESSMENT & PLAN NOTE
· Uncontrolled  · Will be on the Toprol and also current on Cardizem for atrial fibrillation which will be weaned down monitor blood pressure

## 2018-07-17 NOTE — NURSING NOTE
Received pt from ER on stretcher to room 408 without problem, pt alert and oriented x3, Afib on monitor with heparin gtt at 10 ml/hr and cardizem gtt at 15 mg/hr, denies cp or sob, ivf infusing, pt oriented to room and unit, call bell within reach

## 2018-07-18 ENCOUNTER — APPOINTMENT (INPATIENT)
Dept: NON INVASIVE DIAGNOSTICS | Facility: HOSPITAL | Age: 58
DRG: 309 | End: 2018-07-18
Payer: COMMERCIAL

## 2018-07-18 ENCOUNTER — APPOINTMENT (INPATIENT)
Dept: RADIOLOGY | Facility: HOSPITAL | Age: 58
DRG: 309 | End: 2018-07-18
Payer: COMMERCIAL

## 2018-07-18 ENCOUNTER — APPOINTMENT (INPATIENT)
Dept: ULTRASOUND IMAGING | Facility: HOSPITAL | Age: 58
DRG: 309 | End: 2018-07-18
Payer: COMMERCIAL

## 2018-07-18 LAB
ALBUMIN SERPL BCP-MCNC: 3.6 G/DL (ref 3–5.2)
ALP SERPL-CCNC: 82 U/L (ref 43–122)
ALT SERPL W P-5'-P-CCNC: 33 U/L (ref 9–52)
ANION GAP SERPL CALCULATED.3IONS-SCNC: 5 MMOL/L (ref 5–14)
AST SERPL W P-5'-P-CCNC: 35 U/L (ref 14–36)
ATRIAL RATE: 127 BPM
BASOPHILS # BLD AUTO: 0.1 THOUSANDS/ΜL (ref 0–0.1)
BASOPHILS NFR BLD AUTO: 1 % (ref 0–1)
BILIRUB SERPL-MCNC: 2.5 MG/DL
BILIRUB UR QL STRIP: NEGATIVE
BUN SERPL-MCNC: 15 MG/DL (ref 5–25)
CALCIUM SERPL-MCNC: 8.4 MG/DL (ref 8.4–10.2)
CHLORIDE SERPL-SCNC: 105 MMOL/L (ref 97–108)
CLARITY UR: CLEAR
CO2 SERPL-SCNC: 25 MMOL/L (ref 22–30)
COLOR UR: NORMAL
CREAT SERPL-MCNC: 0.65 MG/DL (ref 0.6–1.2)
EOSINOPHIL # BLD AUTO: 0 THOUSAND/ΜL (ref 0–0.4)
EOSINOPHIL NFR BLD AUTO: 0 % (ref 0–6)
ERYTHROCYTE [DISTWIDTH] IN BLOOD BY AUTOMATED COUNT: 14.1 %
GFR SERPL CREATININE-BSD FRML MDRD: 100 ML/MIN/1.73SQ M
GLUCOSE SERPL-MCNC: 113 MG/DL (ref 70–99)
GLUCOSE SERPL-MCNC: 127 MG/DL (ref 70–99)
GLUCOSE SERPL-MCNC: 162 MG/DL (ref 70–99)
GLUCOSE UR STRIP-MCNC: NEGATIVE MG/DL
HCT VFR BLD AUTO: 39.7 % (ref 36–46)
HGB BLD-MCNC: 13.5 G/DL (ref 12–16)
HGB UR QL STRIP.AUTO: NEGATIVE
KETONES UR STRIP-MCNC: NEGATIVE MG/DL
LEUKOCYTE ESTERASE UR QL STRIP: NEGATIVE
LYMPHOCYTES # BLD AUTO: 2.5 THOUSANDS/ΜL (ref 0.5–4)
LYMPHOCYTES NFR BLD AUTO: 25 % (ref 20–50)
MCH RBC QN AUTO: 29.9 PG (ref 26–34)
MCHC RBC AUTO-ENTMCNC: 34.1 G/DL (ref 31–36)
MCV RBC AUTO: 88 FL (ref 80–100)
MONOCYTES # BLD AUTO: 0.7 THOUSAND/ΜL (ref 0.2–0.9)
MONOCYTES NFR BLD AUTO: 7 % (ref 1–10)
NEUTROPHILS # BLD AUTO: 6.4 THOUSANDS/ΜL (ref 1.8–7.8)
NEUTS SEG NFR BLD AUTO: 67 % (ref 45–65)
NITRITE UR QL STRIP: NEGATIVE
PH UR STRIP.AUTO: 6.5 [PH] (ref 4.5–8)
PLATELET # BLD AUTO: 208 THOUSANDS/UL (ref 150–450)
PMV BLD AUTO: 7.7 FL (ref 8.9–12.7)
POTASSIUM SERPL-SCNC: 3.7 MMOL/L (ref 3.6–5)
PROT SERPL-MCNC: 6.6 G/DL (ref 5.9–8.4)
PROT UR STRIP-MCNC: NEGATIVE MG/DL
QRS AXIS: 56 DEGREES
QRSD INTERVAL: 66 MS
QT INTERVAL: 412 MS
QTC INTERVAL: 501 MS
RBC # BLD AUTO: 4.53 MILLION/UL (ref 4–5.2)
SODIUM SERPL-SCNC: 135 MMOL/L (ref 137–147)
SP GR UR STRIP.AUTO: 1.01 (ref 1–1.04)
T WAVE AXIS: 27 DEGREES
UROBILINOGEN UA: NEGATIVE MG/DL
VENTRICULAR RATE: 89 BPM
WBC # BLD AUTO: 9.7 THOUSAND/UL (ref 4.5–11)

## 2018-07-18 PROCEDURE — 99254 IP/OBS CNSLTJ NEW/EST MOD 60: CPT | Performed by: INTERNAL MEDICINE

## 2018-07-18 PROCEDURE — 93010 ELECTROCARDIOGRAM REPORT: CPT | Performed by: INTERNAL MEDICINE

## 2018-07-18 PROCEDURE — 82948 REAGENT STRIP/BLOOD GLUCOSE: CPT

## 2018-07-18 PROCEDURE — 94760 N-INVAS EAR/PLS OXIMETRY 1: CPT

## 2018-07-18 PROCEDURE — 85025 COMPLETE CBC W/AUTO DIFF WBC: CPT | Performed by: FAMILY MEDICINE

## 2018-07-18 PROCEDURE — C9113 INJ PANTOPRAZOLE SODIUM, VIA: HCPCS | Performed by: FAMILY MEDICINE

## 2018-07-18 PROCEDURE — 80053 COMPREHEN METABOLIC PANEL: CPT | Performed by: FAMILY MEDICINE

## 2018-07-18 PROCEDURE — 94664 DEMO&/EVAL PT USE INHALER: CPT

## 2018-07-18 PROCEDURE — 99232 SBSQ HOSP IP/OBS MODERATE 35: CPT | Performed by: FAMILY MEDICINE

## 2018-07-18 PROCEDURE — 94640 AIRWAY INHALATION TREATMENT: CPT

## 2018-07-18 PROCEDURE — 93306 TTE W/DOPPLER COMPLETE: CPT

## 2018-07-18 PROCEDURE — 71045 X-RAY EXAM CHEST 1 VIEW: CPT

## 2018-07-18 PROCEDURE — 93005 ELECTROCARDIOGRAM TRACING: CPT

## 2018-07-18 PROCEDURE — 81003 URINALYSIS AUTO W/O SCOPE: CPT | Performed by: FAMILY MEDICINE

## 2018-07-18 PROCEDURE — 76705 ECHO EXAM OF ABDOMEN: CPT

## 2018-07-18 RX ORDER — TRAMADOL HYDROCHLORIDE 50 MG/1
50 TABLET ORAL ONCE
Status: COMPLETED | OUTPATIENT
Start: 2018-07-18 | End: 2018-07-18

## 2018-07-18 RX ORDER — TRAMADOL HYDROCHLORIDE 50 MG/1
50 TABLET ORAL EVERY 12 HOURS PRN
Status: DISCONTINUED | OUTPATIENT
Start: 2018-07-18 | End: 2018-07-18

## 2018-07-18 RX ORDER — SPIRONOLACTONE 25 MG/1
25 TABLET ORAL DAILY
Status: DISCONTINUED | OUTPATIENT
Start: 2018-07-18 | End: 2018-07-20 | Stop reason: HOSPADM

## 2018-07-18 RX ORDER — FOLIC ACID 1 MG/1
1 TABLET ORAL DAILY
Status: DISCONTINUED | OUTPATIENT
Start: 2018-07-18 | End: 2018-07-20 | Stop reason: HOSPADM

## 2018-07-18 RX ORDER — THIAMINE MONONITRATE (VIT B1) 100 MG
100 TABLET ORAL DAILY
Status: DISCONTINUED | OUTPATIENT
Start: 2018-07-18 | End: 2018-07-20 | Stop reason: HOSPADM

## 2018-07-18 RX ORDER — LEVALBUTEROL 1.25 MG/.5ML
0.63 SOLUTION, CONCENTRATE RESPIRATORY (INHALATION) ONCE
Status: COMPLETED | OUTPATIENT
Start: 2018-07-18 | End: 2018-07-18

## 2018-07-18 RX ORDER — OXYCODONE HYDROCHLORIDE 5 MG/1
5 TABLET ORAL ONCE
Status: COMPLETED | OUTPATIENT
Start: 2018-07-18 | End: 2018-07-18

## 2018-07-18 RX ADMIN — SODIUM CHLORIDE 125 ML/HR: 9 INJECTION, SOLUTION INTRAVENOUS at 02:29

## 2018-07-18 RX ADMIN — OXYCODONE HYDROCHLORIDE 5 MG: 5 TABLET ORAL at 16:44

## 2018-07-18 RX ADMIN — ACETAMINOPHEN 650 MG: 325 TABLET ORAL at 08:38

## 2018-07-18 RX ADMIN — ACETAMINOPHEN 650 MG: 325 TABLET ORAL at 00:08

## 2018-07-18 RX ADMIN — APIXABAN 5 MG: 5 TABLET, FILM COATED ORAL at 21:33

## 2018-07-18 RX ADMIN — FOLIC ACID 1 MG: 1 TABLET ORAL at 11:12

## 2018-07-18 RX ADMIN — APIXABAN 5 MG: 5 TABLET, FILM COATED ORAL at 00:07

## 2018-07-18 RX ADMIN — APIXABAN 5 MG: 5 TABLET, FILM COATED ORAL at 08:19

## 2018-07-18 RX ADMIN — LEVALBUTEROL HYDROCHLORIDE 0.63 MG: 1.25 SOLUTION, CONCENTRATE RESPIRATORY (INHALATION) at 14:31

## 2018-07-18 RX ADMIN — BUPROPION HYDROCHLORIDE 75 MG: 75 TABLET, FILM COATED ORAL at 08:19

## 2018-07-18 RX ADMIN — SPIRONOLACTONE 25 MG: 25 TABLET ORAL at 11:12

## 2018-07-18 RX ADMIN — TRAMADOL HYDROCHLORIDE 50 MG: 50 TABLET, COATED ORAL at 21:34

## 2018-07-18 RX ADMIN — METOPROLOL TARTRATE 50 MG: 50 TABLET ORAL at 08:19

## 2018-07-18 RX ADMIN — Medication 100 MG: at 11:11

## 2018-07-18 RX ADMIN — Medication 1 TABLET: at 11:11

## 2018-07-18 RX ADMIN — LIDOCAINE 1 PATCH: 50 PATCH CUTANEOUS at 08:26

## 2018-07-18 RX ADMIN — PANTOPRAZOLE SODIUM 40 MG: 40 INJECTION, POWDER, FOR SOLUTION INTRAVENOUS at 08:19

## 2018-07-18 RX ADMIN — NICOTINE 1 PATCH: 21 PATCH, EXTENDED RELEASE TRANSDERMAL at 08:22

## 2018-07-18 RX ADMIN — METOPROLOL TARTRATE 50 MG: 50 TABLET ORAL at 22:17

## 2018-07-18 RX ADMIN — BUPROPION HYDROCHLORIDE 75 MG: 75 TABLET, FILM COATED ORAL at 17:23

## 2018-07-18 NOTE — ASSESSMENT & PLAN NOTE
· Will place on vitamin B1 100 mg daily folic acid and MVI  · Last drink yesterday monitor for withdrawals will also add Ativan p r n-not currently in withdrawal

## 2018-07-18 NOTE — NURSING NOTE
Eventually settled down and slept, snores with sleeping  No acute change, heparin drip off, cardizem drip continues at 5mg/hr

## 2018-07-18 NOTE — NURSING NOTE
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

## 2018-07-18 NOTE — PROGRESS NOTES
Progress Note - Sada Reaves 1960, 66 y o  female MRN: [id number]    Unit/Bed#:  Encounter: [id number]    Primary Care Provider: Dima Torres MD   Date and time admitted to hospital: 7/17/2018 10:16 AM        * New onset atrial fibrillation [facility])   Assessment & Plan    · Patient was previously in sinus rhythm  · Rate has improved Cardizem drip has resolved this morning just recheck the rate at 70s metoprolol tartrate was increased to 50 mg p  o  b i d  will continue now     Has been cardioverted yet   · She has been drinking alcohol since Friday about a bottle of vodka last drink was yesterday    · The UDS is pending  · DC normal saline as elevated blood pressure dehydration has resolved she is eating well  · 2D echo is pending  · Discussed case with Cardiology yesterday  · TSH is normal  · Marisakelsy Florian is 3 for now to evaluation of the systolic diastolic dysfunction  Some wheezing in the lungs expiratory check portable chest x-ray  IV fluids I will restart her aldactone if needed will give Lasix  · Continue Eliquis 5 mg p o  b i d  High anion gap metabolic acidosis   Assessment & Plan    · Secondary to alcohol abuse-resolved DC fluids  · Check lactic acid /salicylate -negative        Demand ischemia of myocardium (HCC)   Assessment & Plan    · Troponins times 2 q 3 hours with a CK MB as well-peek 0 07  · Echo  · Hydration and AFib control  · Cardiology consulted        Leukocytosis   Assessment & Plan    · Chest x-ray is negative for infiltrate  · Abdomen is benign for infection no gross infection seen  · UA to be obtained-negative  · No antibiotic indication at this time is afebrile  · Normal white blood cell count        Alcoholic cirrhosis (HCC)   Assessment & Plan    · Maddreys score is 1 6 less than 32 need for steroids  · Bilirubin at 2 5 will check ultrasound of the abdomen no pain now-ultrasound evaluated there is no biliary obstruction there is no ascites    · INR is normal  · Counseled on alcohol cessation        Alcohol dependence ([facility] )   Assessment & Plan    · Will place on vitamin B1 884 mg daily folic acid and MVI  · Last drink yesterday monitor for withdrawals will also add Ativan p r n-not currently in withdrawal         Benign essential hypertension   Assessment & Plan    · Uncontrolled  · DC IV fluids post DC IV fluids were checked as BP is 136  · DC Cardizem drip continue metoprolol tartrate 50 mg p o  b i d  Hypothyroidism   Assessment & Plan    · TSH normal is not on any medications        Type II diabetes mellitus ([facility] )   Assessment & Plan    Lab Results   Component Value Date    HGBA1C 7 0 (H) 2018       Recent Labs      18   1557  18   0654   POCGLU  139*  127*       Blood Sugar Average: Last 72 hrs:  · Diet controlled -DC Accu-Cheks and sliding scales  (P) 133        Hyperlipidemia   Assessment & Plan    · Not on any medications at home          VTE Pharmacologic Prophylaxis:   Pharmacologic: Apixaban (Eliquis)  Mechanical VTE Prophylaxis in Place: Yes    Patient Centered Rounds: I have performed bedside rounds with nursing staff today  Discussions with Specialists or Other Care Team Provider: yes    Education and Discussions with Family / Patient: yes    Time Spent for Care: 30 minutes  More than 50% of total time spent on counseling and coordination of care as described above  Current Length of Stay: 1 day(s)    Current Patient Status: Inpatient   Certification Statement: The patient will continue to require additional inpatient hospital stay due to Better rate controlled    Discharge Plan:   As aboveDischarge Plan:    Code Status: Level 3 - DNAR and DNI      Subjective:   Patient is seen and examined 1 CT of the still feel a little dizzy but as per nursing the blood pressure does not drop  Denies any palpitation chest pain or shortness of breath no abdominal pain eating      Objective:     Vitals:   Temp (24hrs), Av 6 °F (36 4 °C), Min:97 2 °F (36 2 °C), Max:98 4 °F (36 9 °C)    HR:  [1-182] 88  Resp:  [15-32] 24  BP: (112-172)/() 146/93  SpO2:  [91 %-100 %] 97 %  Body mass index is 40 04 kg/m²  Input and Output Summary (last 24 hours): Intake/Output Summary (Last 24 hours) at 18 0944  Last data filed at 18 0901   Gross per 24 hour   Intake          4657 57 ml   Output              830 ml   Net          3827 57 ml       Physical Exam:     Physical Exam   Constitutional: She is oriented to person, place, and time  She appears well-developed and well-nourished  HENT:   Head: Normocephalic and atraumatic  Eyes: EOM are normal  Pupils are equal, round, and reactive to light  Neck: Normal range of motion  Cardiovascular: Normal rate and normal heart sounds  An irregularly irregular rhythm present  Pulmonary/Chest: Effort normal  She has wheezes (Bilateral  mild)  Abdominal: Soft  Bowel sounds are normal    Musculoskeletal: Normal range of motion  Neurological: She is alert and oriented to person, place, and time  She has normal reflexes  No tremors   Skin: Skin is warm  Psychiatric: She has a normal mood and affect         Additional Data:     Labs:      Results from last 7 days  Lab Units 18  0508   WBC Thousand/uL 9 70   HEMOGLOBIN g/dL 13 5   HEMATOCRIT % 39 7   PLATELETS Thousands/uL 208   NEUTROS PCT % 67*   LYMPHS PCT % 25   MONOS PCT % 7   EOS PCT % 0       Results from last 7 days  Lab Units 18  0508   SODIUM mmol/L 135*   POTASSIUM mmol/L 3 7   CHLORIDE mmol/L 105   CO2 mmol/L 25   BUN mg/dL 15   CREATININE mg/dL 0 65   CALCIUM mg/dL 8 4   TOTAL PROTEIN g/dL 6 6   BILIRUBIN TOTAL mg/dL 2 50*   ALK PHOS U/L 82   ALT U/L 33   AST U/L 35   GLUCOSE RANDOM mg/dL 113*       Results from last 7 days  Lab Units 18  1035   INR  1 08       Results from last 7 days  Lab Units 18  0654 18  1557   POC GLUCOSE mg/dl 127* 139*             * I Have Reviewed All Lab Data Listed Above  * Additional Pertinent Lab Tests Reviewed: Romaineingaxel 66 Admission Reviewed    Imaging:    Imaging Reports Reviewed Today Include: yes  Imaging Personally Reviewed by Myself Includes:  no    Recent Cultures (last 7 days):           Last 24 Hours Medication List:     Current Facility-Administered Medications:  acetaminophen 650 mg Oral Q6H PRN Tomeka Bradley MD    apixaban 5 mg Oral BID Tomeka Bradley MD    buPROPion 75 mg Oral BID Tomeka Bradley MD    diltiazem 1-15 mg/hr Intravenous Titrated Tomeka Bradley MD Last Rate: Stopped (52/52/59 7195)   folic acid 352 mcg Oral Daily Tomeka Bradley MD    lidocaine 1 patch Transdermal Daily Tomeka Bradley MD    metoprolol tartrate 50 mg Oral Q12H [address] Tomeka Bradley MD    multivitamin-minerals 1 tablet Oral Daily Tomeka Bradley MD    nicotine 1 patch Transdermal Daily Tomeka Bradley MD    pantoprazole 40 mg Intravenous Q24H [address] Tomeka Bradley MD    spironolactone 25 mg Oral Daily Tomeka Bradley MD    thiamine 100 mg Oral Daily Tomeka Bradley MD         Today, Patient Was Seen By: Tomeka Bradley MD    ** Please Note: Dictation voice to text software may have been used in the creation of this document   **

## 2018-07-18 NOTE — ASSESSMENT & PLAN NOTE
Lab Results   Component Value Date    HGBA1C 7 0 (H) 05/09/2018       Recent Labs      07/17/18   1557  07/18/18   0654   POCGLU  139*  127*       Blood Sugar Average: Last 72 hrs:  · Diet controlled -DC Accu-Cheks and sliding scales  (P) 133

## 2018-07-18 NOTE — PLAN OF CARE
CARDIOVASCULAR - ADULT     Maintains optimal cardiac output and hemodynamic stability Progressing     Absence of cardiac dysrhythmias or at baseline rhythm Progressing        DISCHARGE PLANNING     Discharge to home or other facility with appropriate resources Progressing        INFECTION - ADULT     Absence or prevention of progression during hospitalization Progressing     Absence of fever/infection during neutropenic period Progressing        Knowledge Deficit     Patient/family/caregiver demonstrates understanding of disease process, treatment plan, medications, and discharge instructions Progressing        METABOLIC, FLUID AND ELECTROLYTES - ADULT     Glucose maintained within target range Progressing        PAIN - ADULT     Verbalizes/displays adequate comfort level or baseline comfort level Progressing        Potential for Falls     Patient will remain free of falls Progressing        Prexisting or High Potential for Compromised Skin Integrity     Skin integrity is maintained or improved Progressing        RESPIRATORY - ADULT     Achieves optimal ventilation and oxygenation Progressing        SAFETY ADULT     Maintain or return to baseline ADL function Progressing     Maintain or return mobility status to optimal level Progressing        SKIN/TISSUE INTEGRITY - ADULT     Skin integrity remains intact Progressing

## 2018-07-18 NOTE — ASSESSMENT & PLAN NOTE
· Patient was previously in sinus rhythm  · Rate has improved Cardizem drip has resolved this morning just recheck the rate at 70s metoprolol tartrate was increased to 50 mg p  o  b i d  will continue now     Has been cardioverted yet   · She has been drinking alcohol since Friday about a bottle of vodka last drink was yesterday    · The UDS is pending  · DC normal saline as elevated blood pressure dehydration has resolved she is eating well  · 2D echo is pending  · Discussed case with Cardiology yesterday  · TSH is normal  · Krysten David is 3 for now to evaluation of the systolic diastolic dysfunction  Some wheezing in the lungs expiratory check portable chest x-ray  IV fluids I will restart her aldactone if needed will give Lasix  · Continue Eliquis 5 mg p o  b i d

## 2018-07-18 NOTE — ASSESSMENT & PLAN NOTE
· Troponins times 2 q 3 hours with a CK MB as well-peek 0 07  · Echo  · Hydration and AFib control  · Cardiology consulted

## 2018-07-18 NOTE — ASSESSMENT & PLAN NOTE
· Maddreys score is 1 6 less than 32 need for steroids  · Bilirubin at 2 5 will check ultrasound of the abdomen no pain now-ultrasound evaluated there is no biliary obstruction there is no ascites    · INR is normal  · Counseled on alcohol cessation

## 2018-07-18 NOTE — SOCIAL WORK
Cm met with pt who was admitted in AF w/ RVR- admits to being on parole for 2nd DUI- is [employer] at [employer] ([phone])- CM called P O  In presence of pt as per her verbal consent leaving  for return call as well as giving notification of pt's admission  ETOH level negative on admission however admits to drinking Vodka day PTA- states had been sober for almost 1 yr prior to that point  Pt states she had previously graduated from [employer] and would be interested in re-enrolling- explained HOST program to her and that a referral was made which she was thankful for  While no IP behavioral health admissions reported, it was stated she had been seen at [facility] for Behavioral Health issues (Depression) however she had stopped going due to not being able to understand the physician has recently changed her PCP to Dr Henry Fournier and obtains all  medications through her  Pt uses [phone]  at [employer] and would like new prescriptions sent on d/c  Pt currently on Eliquis with no prior authorization required per [facility] medication formulary-discount coupons given to patient  Pt lives alone in an apartment previously independent with all care/household chores using public transportation or obtaining rides from brother/S-I-L as pt does not drive  States brother and S-I-L are main source of support  Per pt,her brother or S-I-L will transport home when medically cleared     Will continue to follow for d/c needs

## 2018-07-18 NOTE — SOCIAL WORK
CM received call from Rosa Maria Liang who stated pt was to report on Monday for an appointment and didn't show- she was then to report yesterday however was admitted to hospital   He has requested that a call be made to him when pt is discharged so that he can follow up ([phone]) as well as informing the pt of her need to follow up on d/c to be seen  CM relayed information to pt

## 2018-07-18 NOTE — ASSESSMENT & PLAN NOTE
· Uncontrolled  · DC IV fluids post DC IV fluids were checked as BP is 136  · DC Cardizem drip continue metoprolol tartrate 50 mg p o  b i d

## 2018-07-18 NOTE — NURSING NOTE
No change in assessment from earlier today, remains afib on monitor, pt asking "is there anything stronger for my back, the tylenol and lidoderm patch not helping", Dr Dasia Morse paged with pt request, will continue to monitor pt

## 2018-07-18 NOTE — CONSULTS
Consultation - Cardiology   Rodolfo Hussein 66 y o  female MRN: [id number]  Unit/Bed#:  Encounter: [id number]    Assessment/Plan     Physician Requesting Consult: Maurice Marie MD  Reason for Consult / Principal Problem:  Atrial fibrillation    Inpatient consult to Cardiology  Consult performed by: Orlin Kilpatrick ordered by: Reymundo Moreira      Assessment:  1  Atrial fibrillation with a rapid ventricular response  2  History of alcoholism  3  Hypertension    Plan:  1  Continue metoprolol 50 mg b i d  May need to increase dose  2  Continue full anticoagulation, Eliquis 5 mg b i d  adequate  3  Will arrange to follow patient in office following discharge  4  Will arrange electrical cardioversion in 4-6 weeks  5  Control blood pressure      History of Present Illness:  Patient with no prior history of atrial fibrillation presented with new onset atrial fibrillation with a rapid ventricular response following excess alcoholic intake  Initially patient's rate was controlled with IV Cardizem  She now appears to be reasonably well controlled with metoprolol 50 mg b i d  Echocardiogram to be performed today  Review of Systems   Constitutional: Negative  HENT: Negative  Respiratory: Negative for chest tightness and shortness of breath  Cardiovascular: Negative for chest pain and leg swelling  Gastrointestinal: Negative  Endocrine: Negative  Genitourinary: Negative  Musculoskeletal: Negative  Skin: Negative  Allergic/Immunologic: Negative  Neurological: Negative  Hematological: Negative  Psychiatric/Behavioral: Negative          Historical Information   Past Medical History:   Diagnosis Date    [facility]     Diabetes [facility]     Disease of thyroid gland     hypothyrodism    HLD (hyperlipidemia)     Hypertension     Psychiatric disorder     Depression    Tobacco dependence      Past Surgical History:   Procedure Laterality Date  APPENDECTOMY      CARPAL TUNNEL RELEASE       History   Alcohol Use    Yes     Comment: 1 botttle of vodka over 4 days     History   Drug Use No     History   Smoking Status    Current Every Day Smoker    Packs/day: 1 00    Types: Cigarettes   Smokeless Tobacco    Never Used     Family History:   Family History   Problem Relation Age of Onset    Diabetes Mother     Diabetes Father     Diabetes Brother     Hypertension Brother        Meds/Allergies   current meds:   Current Facility-Administered Medications   Medication Dose Route Frequency    acetaminophen (TYLENOL) tablet 650 mg  650 mg Oral Q6H PRN    apixaban (ELIQUIS) tablet 5 mg  5 mg Oral BID    buPROPion (WELLBUTRIN) tablet 75 mg  75 mg Oral BID    diltiazem (CARDIZEM) 125 mg in sodium chloride 0 9 % 125 mL infusion  1-15 mg/hr Intravenous Titrated    folic acid (FOLVITE) tablet 1 mg  1 mg Oral Daily    lidocaine (LIDODERM) 5 % patch 1 patch  1 patch Transdermal Daily    metoprolol tartrate (LOPRESSOR) tablet 50 mg  50 mg Oral Q12H [address]    multivitamin-minerals (CENTRUM) tablet 1 tablet  1 tablet Oral Daily    nicotine (NICODERM CQ) 21 mg/24 hr TD 24 hr patch 1 patch  1 patch Transdermal Daily    pantoprazole (PROTONIX) injection 40 mg  40 mg Intravenous Q24H ERICK    spironolactone (ALDACTONE) tablet 25 mg  25 mg Oral Daily    thiamine (VITAMIN B1) tablet 100 mg  100 mg Oral Daily     No Known Allergies    Objective   Vitals: Blood pressure 132/85, pulse 78, temperature (!) 97 3 °F (36 3 °C), temperature source Temporal, resp  rate 17, height 5' 2" (1 575 m), weight 99 3 kg (218 lb 14 7 oz), SpO2 96 %    Orthostatic Blood Pressures      Most Recent Value   Blood Pressure  132/85 filed at 07/18/2018 1200   Patient Position - Orthostatic VS  Sitting filed at 07/18/2018 1200            Intake/Output Summary (Last 24 hours) at 07/18/18 1219  Last data filed at 07/18/18 0901   Gross per 24 hour   Intake          3177 57 ml   Output 830 ml   Net          2347 57 ml       Invasive Devices     Peripheral Intravenous Line            Peripheral IV 07/17/18 Left Antecubital 1 day                Physical Exam   Constitutional: She is oriented to person, place, and time  She appears well-developed and well-nourished  Morbidly obese  HENT:   Head: Normocephalic and atraumatic  Eyes: Conjunctivae and EOM are normal  Pupils are equal, round, and reactive to light  Neck: Normal range of motion  Neck supple  No JVD present  No tracheal deviation present  No thyromegaly present  Cardiovascular: Normal rate, normal heart sounds and intact distal pulses  Exam reveals no gallop and no friction rub  No murmur heard  Pulmonary/Chest: Effort normal  No respiratory distress  She has no rales  Abdominal: Soft  Bowel sounds are normal    Musculoskeletal: Normal range of motion  She exhibits no edema  Neurological: She is alert and oriented to person, place, and time  Skin: Skin is warm and dry  Psychiatric: She has a normal mood and affect   Her behavior is normal        Lab Results:   CBC with diff:   Results from last 7 days  Lab Units 07/18/18  0508   WBC Thousand/uL 9 70   RBC Million/uL 4 53   HEMOGLOBIN g/dL 13 5   HEMATOCRIT % 39 7   MCV fL 88   MCH pg 29 9   MCHC g/dL 34 1   RDW % 14 1   MPV fL 7 7*   PLATELETS Thousands/uL 208     CMP:   Results from last 7 days  Lab Units 07/18/18  0508   SODIUM mmol/L 135*   POTASSIUM mmol/L 3 7   CHLORIDE mmol/L 105   CO2 mmol/L 25   ANION GAP mmol/L 5   BUN mg/dL 15   CREATININE mg/dL 0 65   GLUCOSE RANDOM mg/dL 113*   CALCIUM mg/dL 8 4   AST U/L 35   ALT U/L 33   ALK PHOS U/L 82   TOTAL PROTEIN g/dL 6 6   BILIRUBIN TOTAL mg/dL 2 50*   EGFR ml/min/1 73sq m 100     Troponin:   0  Lab Value Date/Time   TROPONINI 0 07 (H) 07/17/2018 1851   TROPONINI 0 07 (H) 07/17/2018 1607   TROPONINI 0 07 (H) 07/17/2018 1035     BNP:   Results from last 7 days  Lab Units 07/18/18  0508   SODIUM mmol/L 135* POTASSIUM mmol/L 3 7   CHLORIDE mmol/L 105   CO2 mmol/L 25   ANION GAP mmol/L 5   BUN mg/dL 15   CREATININE mg/dL 0 65   GLUCOSE RANDOM mg/dL 113*   CALCIUM mg/dL 8 4   EGFR ml/min/1 73sq m 100     Coags:   Results from last 7 days  Lab Units 07/17/18  1851 07/17/18  1035   PTT seconds 40* 25   INR   --  1 08     TSH:   Results from last 7 days  Lab Units 07/17/18  1035   TSH 3RD GENERATON uIU/mL 2 250     Magnesium:     Lipid Profile:     Imaging: I have personally reviewed pertinent reports  EKG:  Atrial fibrillation with a tendency towards a rapid ventricular response        Code Status: Level 3 - DNAR and DNI  Advance Directive and Living Will:      Power of :    POLST:

## 2018-07-18 NOTE — NURSING NOTE
Pt bathed herself with minimal assistance, no change in assessment from earlier today, remains in Afib, pt dangling at bedside without problem

## 2018-07-18 NOTE — ASSESSMENT & PLAN NOTE
· Chest x-ray is negative for infiltrate  · Abdomen is benign for infection no gross infection seen  · UA to be obtained-negative  · No antibiotic indication at this time is afebrile  · Normal white blood cell count

## 2018-07-18 NOTE — NURSING NOTE
Pt remains in afib on monitor, denies cp or sob, cardizem gtt continues at 5 mg/hr, states she still has her chronic back pain 3/10, assisted oob to commode, voided then back to bed, pt stated she still felt a little dizzy with oob, once back to bed ultrasound tech here for us abd, will continue to monitor pt

## 2018-07-18 NOTE — NURSING NOTE
Resting in bed comfortably and watching TV, no complaints except for chronic lower back pain, lidoderm patch on and patient states, "doing better, now 2/10"  Refused a bath when offered, "am good, will get it in the morning"   Toasts given, per request

## 2018-07-18 NOTE — NURSING NOTE
Spoke with Dr Mj Menon regarding giving Eliquis and patient on heparin drip  Order received to discontinue heparin drip and give the first dose of Eliquis three hours after which will be at midnight  Heparin drip stopped at 2100

## 2018-07-18 NOTE — PROGRESS NOTES
In addition to the plantar previous note patient does have QT prolongation will 500 today I will stop her trazodone  Keep her on a monitor and repeat EKG tomorrow no other medication cause QT prolongation

## 2018-07-18 NOTE — CASE MANAGEMENT
Initial Clinical Review    Admission: Date/Time/Statement: Inpatient 7/17/18 @ 1306     Orders Placed This Encounter   Procedures    Inpatient Admission (expected length of stay for this patient is greater than two midnights)     Standing Status:   Standing     Number of Occurrences:   1     Order Specific Question:   Admitting Physician     Answer:   Fanta Ashwin [[id number]]     Order Specific Question:   Level of Care     Answer:   Level 1 Stepdown [13]     Order Specific Question:   Estimated length of stay     Answer:   More than 2 Midnights     Order Specific Question:   Certification     Answer:   I certify that inpatient services are medically necessary for this patient for a duration of greater than two midnights  See H&P and MD Progress Notes for additional information about the patient's course of treatment  ED: Date/Time/Mode of Arrival:   ED Arrival Information     Expected Arrival Acuity Means of Arrival Escorted By Service Admission Type    - 7/17/2018 10:15 Emergent [facility] Emergency    Arrival Complaint    rapid heartbeat          Chief Complaint:   Chief Complaint   Patient presents with    Rapid Heart Rate     pt c/o palpitations and weakness, cp/sob started this am while walking to bus stop  History of Illness: 66 yr old female pt c/o palpitations and weakness, cp/sob started this am while walking to bus stop  ED Vital Signs:   07/17/18 1115 07/17/18 1023 07/17/18 1023 07/17/18 1023 07/17/18 1023   (!) 97 2  (!) 182 22 125/82 94 %    Wt Readings from Last 1 Encounters:   07/18/18 99 3 kg (218 lb 14 7 oz)       Abnormal Labs/Diagnostic Test Results:   EKG Atrial fibrillation with rapid ventricular response with premature ventricular or aberrantly conducted complexes  Abnormal ECG     troponin 0 07 > 0 07 > 0 07   Wbc 14 30   T bili 2 50   CO2 18   Alcohol < 10     7/18 EKG Atrial fibrillation  Nonspecific ST-t wave changes  Prolonged QT  Abnormal ECG  When compared with ECG of 17-JUL-2018 10:25,  Nonspecific T wave abnormality now evident in Anterior leads    ED Treatment:      Date/Time Order Dose Route Action     07/17/2018 1254 diltiazem (CARDIZEM) 125 mg in sodium chloride 0 9 % 125 mL infusion 15 mg/hr Intravenous Rate/Dose Change     07/17/2018 1215 diltiazem (CARDIZEM) 125 mg in sodium chloride 0 9 % 125 mL infusion 12 5 mg/hr Intravenous Rate/Dose Change     07/17/2018 1203 diltiazem (CARDIZEM) 125 mg in sodium chloride 0 9 % 125 mL infusion 10 mg/hr Intravenous Rate/Dose Change     07/17/2018 1130 diltiazem (CARDIZEM) 125 mg in sodium chloride 0 9 % 125 mL infusion 7 5 mg/hr Intravenous Rate/Dose Change     07/17/2018 1100 diltiazem (CARDIZEM) 125 mg in sodium chloride 0 9 % 125 mL infusion 5 mg/hr Intravenous New Bag     07/17/2018 1020 diltiazem (CARDIZEM) injection 10 mg 10 mg Intravenous Given     07/17/2018 1104 sodium chloride 0 9 % infusion 125 mL/hr Intravenous New Bag     07/17/2018 1310 metoprolol (LOPRESSOR) injection 5 mg 5 mg Intravenous Given     07/17/2018 1311 heparin (porcine) 25,000 units in 250 mL infusion (premix) 25,000 Units Intravenous New Bag          Past Medical/Surgical History: Active Ambulatory Problems     Diagnosis Date Noted    Alcohol dependence ([place] [address] ) 92/81/3518    Alcoholic cirrhosis ([place] [address] ) 05/55/0024    Benign essential hypertension 08/13/2012    Hyperlipidemia 08/13/2012    Hypothyroidism 08/17/2012    Pancreatitis 08/24/2015    Type II diabetes mellitus ([facility] ) 08/13/2012       Admitting Diagnosis: Rapid heart rate [R00 0]  Atrial fibrillation with RVR (HCC) [I48 91]    Age/Sex: 66 y o  female    Assessment/Plan New onset Afib  Rate has improved  Cardizem drip dc'd this am  She has been drinking alcohol since Friday, 7/13  Approx 1 bottle of vodka q day  Demand ischemia -Troponins times 2 q 3 hours  Troponin peak 0 07  Echo  Hydration and AFib control   Cardiology consulted       Admission Orders:  Scheduled Meds:   Current Facility-Administered Medications:  acetaminophen 650 mg Oral Q6H PRN   apixaban 5 mg Oral BID   buPROPion 75 mg Oral BID   folic acid 1 mg Oral Daily   lidocaine 1 patch Transdermal Daily   metoprolol tartrate 50 mg Oral Q12H [address]   multivitamin-minerals 1 tablet Oral Daily   nicotine 1 patch Transdermal Daily   pantoprazole 40 mg Intravenous Q24H [address]   spironolactone 25 mg Oral Daily   thiamine 100 mg Oral Daily   Consult cardiology   Echocardiogram   Telemetry   Cardiac enzymes q 6 hrs x 3   BMP, CBC in am   EKG in am   I's&O's   Daily weight     Per cardiology Atrial fibrillation with a rapid ventricular response  History of alcoholism  Hypertension   Plan: Continue metoprolol 50 mg b i d  May need to increase dose  Continue full anticoagulation, Eliquis 5 mg b i d  Adequate  Will arrange to follow patient in office following discharge  Will arrange electrical cardioversion in 4-6 weeks  Control blood pressure  Patient with no prior history of atrial fibrillation presented with new onset atrial fibrillation with a rapid ventricular response following excess alcoholic intake  Initially patient's rate was controlled with IV Cardizem  She now appears to be reasonably well controlled with metoprolol 50 mg b i d  Echocardiogram to be performed today

## 2018-07-19 PROBLEM — R94.31 QT PROLONGATION: Status: ACTIVE | Noted: 2018-07-19

## 2018-07-19 LAB
ANION GAP SERPL CALCULATED.3IONS-SCNC: 5 MMOL/L (ref 5–14)
ATRIAL RATE: 187 BPM
ATRIAL RATE: 288 BPM
BUN SERPL-MCNC: 12 MG/DL (ref 5–25)
CALCIUM SERPL-MCNC: 8.9 MG/DL (ref 8.4–10.2)
CHLORIDE SERPL-SCNC: 102 MMOL/L (ref 97–108)
CO2 SERPL-SCNC: 28 MMOL/L (ref 22–30)
CREAT SERPL-MCNC: 0.62 MG/DL (ref 0.6–1.2)
GFR SERPL CREATININE-BSD FRML MDRD: 101 ML/MIN/1.73SQ M
GLUCOSE SERPL-MCNC: 118 MG/DL (ref 70–99)
MAGNESIUM SERPL-MCNC: 1.4 MG/DL (ref 1.6–2.3)
POTASSIUM SERPL-SCNC: 3.6 MMOL/L (ref 3.6–5)
QRS AXIS: 60 DEGREES
QRS AXIS: 63 DEGREES
QRSD INTERVAL: 62 MS
QRSD INTERVAL: 70 MS
QT INTERVAL: 374 MS
QT INTERVAL: 412 MS
QTC INTERVAL: 472 MS
QTC INTERVAL: 474 MS
SODIUM SERPL-SCNC: 135 MMOL/L (ref 137–147)
T WAVE AXIS: 33 DEGREES
T WAVE AXIS: 34 DEGREES
VENTRICULAR RATE: 79 BPM
VENTRICULAR RATE: 97 BPM

## 2018-07-19 PROCEDURE — 80048 BASIC METABOLIC PNL TOTAL CA: CPT | Performed by: FAMILY MEDICINE

## 2018-07-19 PROCEDURE — 93306 TTE W/DOPPLER COMPLETE: CPT | Performed by: INTERNAL MEDICINE

## 2018-07-19 PROCEDURE — 99232 SBSQ HOSP IP/OBS MODERATE 35: CPT | Performed by: FAMILY MEDICINE

## 2018-07-19 PROCEDURE — 93005 ELECTROCARDIOGRAM TRACING: CPT

## 2018-07-19 PROCEDURE — 93010 ELECTROCARDIOGRAM REPORT: CPT | Performed by: INTERNAL MEDICINE

## 2018-07-19 PROCEDURE — 83735 ASSAY OF MAGNESIUM: CPT | Performed by: FAMILY MEDICINE

## 2018-07-19 RX ORDER — LISINOPRIL 20 MG/1
20 TABLET ORAL DAILY
Status: DISCONTINUED | OUTPATIENT
Start: 2018-07-19 | End: 2018-07-20 | Stop reason: HOSPADM

## 2018-07-19 RX ORDER — PANTOPRAZOLE SODIUM 40 MG/1
40 TABLET, DELAYED RELEASE ORAL
Status: DISCONTINUED | OUTPATIENT
Start: 2018-07-19 | End: 2018-07-20 | Stop reason: HOSPADM

## 2018-07-19 RX ORDER — TRAZODONE HYDROCHLORIDE 50 MG/1
50 TABLET ORAL
Status: DISCONTINUED | OUTPATIENT
Start: 2018-07-19 | End: 2018-07-20 | Stop reason: HOSPADM

## 2018-07-19 RX ORDER — POTASSIUM CHLORIDE 750 MG/1
40 TABLET, EXTENDED RELEASE ORAL ONCE
Status: COMPLETED | OUTPATIENT
Start: 2018-07-19 | End: 2018-07-19

## 2018-07-19 RX ORDER — MAGNESIUM SULFATE HEPTAHYDRATE 40 MG/ML
2 INJECTION, SOLUTION INTRAVENOUS ONCE
Status: COMPLETED | OUTPATIENT
Start: 2018-07-19 | End: 2018-07-19

## 2018-07-19 RX ORDER — LORAZEPAM 0.5 MG/1
0.5 TABLET ORAL EVERY 12 HOURS PRN
Status: DISCONTINUED | OUTPATIENT
Start: 2018-07-19 | End: 2018-07-20 | Stop reason: HOSPADM

## 2018-07-19 RX ADMIN — TRAZODONE HYDROCHLORIDE 50 MG: 50 TABLET ORAL at 22:04

## 2018-07-19 RX ADMIN — SPIRONOLACTONE 25 MG: 25 TABLET ORAL at 08:49

## 2018-07-19 RX ADMIN — NICOTINE 1 PATCH: 21 PATCH, EXTENDED RELEASE TRANSDERMAL at 08:56

## 2018-07-19 RX ADMIN — FOLIC ACID 1 MG: 1 TABLET ORAL at 08:48

## 2018-07-19 RX ADMIN — PANTOPRAZOLE SODIUM 40 MG: 40 TABLET, DELAYED RELEASE ORAL at 08:49

## 2018-07-19 RX ADMIN — MAGNESIUM SULFATE HEPTAHYDRATE 2 G: 40 INJECTION, SOLUTION INTRAVENOUS at 08:47

## 2018-07-19 RX ADMIN — METOPROLOL TARTRATE 75 MG: 25 TABLET, FILM COATED ORAL at 22:04

## 2018-07-19 RX ADMIN — Medication 100 MG: at 08:48

## 2018-07-19 RX ADMIN — ACETAMINOPHEN 650 MG: 325 TABLET ORAL at 07:07

## 2018-07-19 RX ADMIN — LISINOPRIL 20 MG: 20 TABLET ORAL at 10:50

## 2018-07-19 RX ADMIN — APIXABAN 5 MG: 5 TABLET, FILM COATED ORAL at 22:03

## 2018-07-19 RX ADMIN — APIXABAN 5 MG: 5 TABLET, FILM COATED ORAL at 08:48

## 2018-07-19 RX ADMIN — LORAZEPAM 0.5 MG: 0.5 TABLET ORAL at 15:47

## 2018-07-19 RX ADMIN — LIDOCAINE 1 PATCH: 50 PATCH CUTANEOUS at 08:47

## 2018-07-19 RX ADMIN — METOPROLOL TARTRATE 75 MG: 25 TABLET, FILM COATED ORAL at 08:48

## 2018-07-19 RX ADMIN — Medication 1 TABLET: at 08:48

## 2018-07-19 RX ADMIN — BUPROPION HYDROCHLORIDE 75 MG: 75 TABLET, FILM COATED ORAL at 17:52

## 2018-07-19 RX ADMIN — BUPROPION HYDROCHLORIDE 75 MG: 75 TABLET, FILM COATED ORAL at 08:48

## 2018-07-19 RX ADMIN — POTASSIUM CHLORIDE 40 MEQ: 10 TABLET, EXTENDED RELEASE ORAL at 08:48

## 2018-07-19 NOTE — ASSESSMENT & PLAN NOTE
· Troponins times 2 q 3 hours with a CK MB as well-peek 0 07  · Echo-pending  ·  AFib control  · Cardiology consulted-discussed and reviewed consult

## 2018-07-19 NOTE — ASSESSMENT & PLAN NOTE
· Will continue on vitamin B1 064 mg daily folic acid and MVI  · Last drink yesterday monitor for withdrawals will also add Ativan p r n-not currently in withdrawal

## 2018-07-19 NOTE — PROGRESS NOTES
Progress Note - Ayala Faulkner 1960, 66 y o  female MRN: [id number]    Unit/Bed#:  Encounter: [id number]    Primary Care Provider: Leroy Llanos MD   Date and time admitted to hospital: 7/17/2018 10:16 AM        * New onset atrial fibrillation [facility])   Assessment & Plan    · Patient was previously in sinus rhythm  · Patient is not cardioverted and will be assess full external cardioversion as an outpatient  · Rate still uncontrolled with blood pressure as well will increase metoprolol tartrate 75 mg p o  b i d  and observe on telemetry monitor after the adjustment  · She has been drinking alcohol since Friday about a bottle of vodka last drink was yesterday-not visual    · The UDS was never done    · 2D echo is pending  · Discussed case with Cardiology   · TSH is normal  · Continue Eliquis 5 mg p o  b i d  High anion gap metabolic acidosis   Assessment & Plan    · Secondary to alcohol abuse-resolved fluids were DC'd on 07/18  · Check lactic acid /salicylate -negative        Demand ischemia of myocardium (HCC)   Assessment & Plan    · Troponins times 2 q 3 hours with a CK MB as well-peek 0 07  · Echo-pending  ·  AFib control  · Cardiology consulted-discussed and reviewed consult        Leukocytosis   Assessment & Plan    · Chest x-ray is negative for infiltrate  · Abdomen is benign for infection no gross infection seen  · UA to be obtained-negative  · No antibiotic indication at this time is afebrile  · Normal white blood cell count  · Resolved         Alcoholic cirrhosis (HCC)   Assessment & Plan    · Maddreys score is 1 6 less than 32 need for steroids  · Bilirubin at 2 5 will check ultrasound of the abdomen no pain now-ultrasound evaluated there is no biliary obstruction there is no ascites    · INR is normal  · Counseled on alcohol cessation  · Restart her outpatient aldactone         Al[facility] dependence ([facility] )   Assessment & Plan    · Will continue on vitamin B1 028 mg daily folic acid and MVI  · Last drink yesterday monitor for withdrawals will also add Ativan p r n-not currently in withdrawal         Benign essential hypertension   Assessment & Plan    · Uncontrolled  · Increased metoprolol tartrate 75 mg p o  b i d  Hypothyroidism   Assessment & Plan    · TSH normal is not on any medications        Type II diabetes mellitus ([facility] )   Assessment & Plan    Lab Results   Component Value Date    HGBA1C 7 0 (H) 2018       Recent Labs      18   1557  18   0654  18   1116   POCGLU  139*  127*  162*       Blood Sugar Average: Last 72 hrs:  · Diet controlled -without any need of sliding scale or diabetic medications  (P) 122 [id number]        Hyperlipidemia   Assessment & Plan    · Not on any medications at home            VTE Pharmacologic Prophylaxis:   Pharmacologic: Apixaban (Eliquis)  Mechanical VTE Prophylaxis in Place: Yes    Patient Centered Rounds: I have performed bedside rounds with nursing staff today  Discussions with Specialists or Other Care Team Provider:   [facility] Care Team Provider:yes    Education and Discussions with Family / Patient: yes    Time Spent for Care: 30 minutes  More than 50% of total time spent on counseling and coordination of care as described above  Current Length of Stay: 2 day(s)    Current Patient Status: Inpatient   Certification Statement: The patient will continue to require additional inpatient hospital stay due to Heart rate and blood pressure control    Discharge Plan:  when blood pressure and heart rate is more controlled  Code Status: Level 3 - DNAR and DNI      Subjective:     patient seen and examined complaining of a headache also short of breath when she is ambulatory  Apparently she was supposed to see her  today and she does not want ago  No chest pain she coughed up some sputum yesterday  No abdominal pain and nausea vomiting diarrhea        Objective:     Vitals:   Temp (24hrs), Av 5 °F (36 4 °C), Min:97 3 °F (36 3 °C), Max:97 8 °F (36 6 °C)    HR:  [68-97] 91  Resp:  [14-24] 21  BP: (132-173)/() 173/120  SpO2:  [92 %-99 %] 96 %  Body mass index is 40 16 kg/m²  Input and Output Summary (last 24 hours): Intake/Output Summary (Last 24 hours) at 07/19/18 0818  Last data filed at 07/19/18 0401   Gross per 24 hour   Intake          1153 08 ml   Output             1500 ml   Net          -346 92 ml       Physical Exam:     Physical Exam   Constitutional: She is oriented to person, place, and time  She appears well-developed and well-nourished  HENT:   Head: Normocephalic and atraumatic  Eyes: EOM are normal  Pupils are equal, round, and reactive to light  Neck: Normal range of motion  Cardiovascular: Normal rate and normal heart sounds  An irregularly irregular rhythm present  Pulmonary/Chest: Effort normal and breath sounds normal    Abdominal: Soft  Bowel sounds are normal    Musculoskeletal: Normal range of motion  Neurological: She is alert and oriented to person, place, and time  She has normal reflexes  Skin: Skin is warm  Psychiatric: She has a normal mood and affect           Additional Data:     Labs:      Results from last 7 days  Lab Units 07/18/18  0508   WBC Thousand/uL 9 70   HEMOGLOBIN g/dL 13 5   HEMATOCRIT % 39 7   PLATELETS Thousands/uL 208   NEUTROS PCT % 67*   LYMPHS PCT % 25   MONOS PCT % 7   EOS PCT % 0       Results from last 7 days  Lab Units 07/19/18  0516 07/18/18  0508   SODIUM mmol/L 135* 135*   POTASSIUM mmol/L 3 6 3 7   CHLORIDE mmol/L 102 105   CO2 mmol/L 28 25   BUN mg/dL 12 15   CREATININE mg/dL 0 62 0 65   CALCIUM mg/dL 8 9 8 4   TOTAL PROTEIN g/dL  --  6 6   BILIRUBIN TOTAL mg/dL  --  2 50*   ALK PHOS U/L  --  82   ALT U/L  --  33   AST U/L  --  35   GLUCOSE RANDOM mg/dL 118* 113*       Results from last 7 days  Lab Units 07/17/18  1035   INR  1 08       Results from last 7 days  Lab Units 07/18/18  1116 07/18/18  0654 07/17/18  1557   POC GLUCOSE mg/dl 162* 127* 139*             * I Have Reviewed All Lab Data Listed Above  * Additional Pertinent Lab Tests Reviewed: All Labs Within Last 24 Hours Reviewed    Imaging:    Imaging Reports Reviewed Today Include: yes  Imaging Personally Reviewed by Myself Includes:  no    Recent Cultures (last 7 days):           Last 24 Hours Medication List:     Current Facility-Administered Medications:  acetaminophen 650 mg Oral Q6H PRN Freeman Whitney MD   apixaban 5 mg Oral BID Freeman Whitney MD   buPROPion 75 mg Oral BID Freeman Whitney MD   folic acid 1 mg Oral Daily Freeman Whitney MD   lidocaine 1 patch Transdermal Daily Freeman Whitney MD   magnesium sulfate 2 g Intravenous Once Freeman Whitney MD   metoprolol tartrate 75 mg Oral Q12H [address] Freeman Whitney MD   multivitamin-minerals 1 tablet Oral Daily Freeman Whitney MD   nicotine 1 patch Transdermal Daily Freeman Whitney MD   pantoprazole 40 mg Oral Early Morning Freeman Whitney MD   potassium chloride 40 mEq Oral Once Freeman Whitney MD   spironolactone 25 mg Oral Daily Freeman Whitney MD   thiamine 100 mg Oral Daily Freeman Whitney MD        Today, Patient Was Seen By: Freeman Whitney MD    ** Please Note: Dictation voice to text software may have been used in the creation of this document   **

## 2018-07-19 NOTE — NURSING NOTE
Slept well, better than the previous night, had no complaints  She said she is not looking forward to leaving today and hoping she could spend another night, "don't want to see the "

## 2018-07-19 NOTE — ASSESSMENT & PLAN NOTE
· Patient was previously in sinus rhythm  · Patient is not cardioverted and will be assess full external cardioversion as an outpatient  · Rate still uncontrolled with blood pressure as well will increase metoprolol tartrate 75 mg p o  b i d  and observe on telemetry monitor after the adjustment  · She has been drinking alcohol since Friday about a bottle of vodka last drink was yesterday-not visual    · The UDS was never done    · 2D echo is pending  · Discussed case with Cardiology   · TSH is normal  · Continue Eliquis 5 mg p o  b i d

## 2018-07-19 NOTE — PROGRESS NOTES
Progress Note - Cardiology   Casimir Holter 66 y o  female MRN: [id number]  Unit/Bed#:  Encounter: [id number]    Assessment:  1  Atrial fibrillation with a controlled ventricular response  2  Hypertension  3  History of alcoholism  4  Anticoagulation on Eliquis    Plan:  1  Begin lisinopril 20 mg daily for better blood pressure control  2   Following discharge will arrange for patient to see me in the office  When she is 4-6 weeks post onset of anticoagulation, will arrange for electrical cardioversion  Vitals: /95 (BP Location: Right arm)   Pulse 86   Temp (!) 96 4 °F (35 8 °C) (Temporal)   Resp 19   Ht 5' 2" (1 575 m)   Wt 99 6 kg (219 lb 9 3 oz)   SpO2 96%   BMI 40 16 kg/m²   Vitals:    07/18/18 0600 07/19/18 0600   Weight: 99 3 kg (218 lb 14 7 oz) 99 6 kg (219 lb 9 3 oz)     Orthostatic Blood Pressures      Most Recent Value   Blood Pressure  150/95 filed at 07/19/2018 1043   Patient Position - Orthostatic VS  Lying filed at 07/19/2018 1043            Intake/Output Summary (Last 24 hours) at 07/19/18 1143  Last data filed at 07/19/18 1047   Gross per 24 hour   Intake              760 ml   Output             1500 ml   Net             -740 ml       Invasive Devices     Peripheral Intravenous Line            Peripheral IV 07/17/18 Left Antecubital 2 days                Review of Systems   Constitutional: Negative  HENT: Negative  Respiratory: Negative for chest tightness and shortness of breath  Cardiovascular: Negative for chest pain and leg swelling  Gastrointestinal: Negative  Endocrine: Negative  Genitourinary: Negative  Musculoskeletal: Negative  Skin: Negative  Allergic/Immunologic: Negative  Neurological: Negative  Hematological: Negative  Psychiatric/Behavioral: Negative  Physical Exam   Constitutional: She is oriented to person, place, and time  She appears well-developed and well-nourished     HENT:   Head: Normocephalic and atraumatic  Eyes: Conjunctivae and EOM are normal  Pupils are equal, round, and reactive to light  Neck: Normal range of motion  Neck supple  No JVD present  No tracheal deviation present  No thyromegaly present  Cardiovascular: Normal rate, regular rhythm, normal heart sounds and intact distal pulses  Exam reveals no gallop and no friction rub  No murmur heard  Pulmonary/Chest: Effort normal  No respiratory distress  She has no rales  Abdominal: Soft  Bowel sounds are normal    Musculoskeletal: Normal range of motion  She exhibits no edema  Neurological: She is alert and oriented to person, place, and time  Skin: Skin is warm and dry  Psychiatric: She has a normal mood and affect  Her behavior is normal          Lab Results: I have personally reviewed pertinent lab results  Imaging: I have personally reviewed pertinent reports

## 2018-07-19 NOTE — ASSESSMENT & PLAN NOTE
· Secondary to alcohol abuse-resolved fluids were DC'd on 07/18  · Check lactic acid /salicylate -negative

## 2018-07-19 NOTE — NURSING NOTE
Pt requesting antianxiety medicaiton, Dr Lana Thorpe aware of same and ativan 0 5 mg po given at [phone] per request, pt remains in afib on monitor, pt laying in bed, watching tv, pleasant, will continue to monitor pt

## 2018-07-19 NOTE — ASSESSMENT & PLAN NOTE
Lab Results   Component Value Date    HGBA1C 7 0 (H) 05/09/2018       Recent Labs      07/17/18   1557  07/18/18   0654  07/18/18   1116   POCGLU  139*  127*  162*       Blood Sugar Average: Last 72 hrs:  · Diet controlled -without any need of sliding scale or diabetic medications  (P) [phone]

## 2018-07-19 NOTE — ASSESSMENT & PLAN NOTE
· Her trazodone was stopped also replace magnesium q 12 to and give her potassium keep above 4 check Mag and BMP tomorrow

## 2018-07-19 NOTE — ASSESSMENT & PLAN NOTE
· Chest x-ray is negative for infiltrate  · Abdomen is benign for infection no gross infection seen  · UA to be obtained-negative  · No antibiotic indication at this time is afebrile  · Normal white blood cell count  · Resolved

## 2018-07-19 NOTE — SOCIAL WORK
Pt remains in ICU today continuing treatment for afib  Pt not yet cleared for discharge, but seen by HOST this afternoon  HOST asking when/if pt is cleared and informed she is not yet cleared  SW to follow along for discharge plans

## 2018-07-19 NOTE — ASSESSMENT & PLAN NOTE
· Maddreys score is 1 6 less than 32 need for steroids  · Bilirubin at 2 5 will check ultrasound of the abdomen no pain now-ultrasound evaluated there is no biliary obstruction there is no ascites    · INR is normal  · Counseled on alcohol cessation  · Restart her outpatient aldactone

## 2018-07-19 NOTE — NURSING NOTE
Pt remains in afib rhythm, still c/o of headache but lessen in strength of pain, will continue to monitor pt, Dr Darryle Herrlich here and aware of elevated bp and am lab results, pt states, "I think I am stressed today"

## 2018-07-20 VITALS
WEIGHT: 215.61 LBS | HEART RATE: 80 BPM | RESPIRATION RATE: 55 BRPM | SYSTOLIC BLOOD PRESSURE: 147 MMHG | TEMPERATURE: 97.8 F | DIASTOLIC BLOOD PRESSURE: 93 MMHG | OXYGEN SATURATION: 97 % | HEIGHT: 62 IN | BODY MASS INDEX: 39.68 KG/M2

## 2018-07-20 PROBLEM — E87.29 HIGH ANION GAP METABOLIC ACIDOSIS: Status: RESOLVED | Noted: 2018-07-17 | Resolved: 2018-07-20

## 2018-07-20 PROBLEM — D72.829 LEUKOCYTOSIS: Status: RESOLVED | Noted: 2018-07-17 | Resolved: 2018-07-20

## 2018-07-20 PROBLEM — E87.2 HIGH ANION GAP METABOLIC ACIDOSIS: Status: RESOLVED | Noted: 2018-07-17 | Resolved: 2018-07-20

## 2018-07-20 LAB
ANION GAP SERPL CALCULATED.3IONS-SCNC: 4 MMOL/L (ref 5–14)
ATRIAL RATE: 277 BPM
BUN SERPL-MCNC: 15 MG/DL (ref 5–25)
CALCIUM SERPL-MCNC: 9.3 MG/DL (ref 8.4–10.2)
CHLORIDE SERPL-SCNC: 101 MMOL/L (ref 97–108)
CO2 SERPL-SCNC: 29 MMOL/L (ref 22–30)
CREAT SERPL-MCNC: 0.77 MG/DL (ref 0.6–1.2)
GFR SERPL CREATININE-BSD FRML MDRD: 87 ML/MIN/1.73SQ M
GLUCOSE SERPL-MCNC: 126 MG/DL (ref 70–99)
MAGNESIUM SERPL-MCNC: 1.7 MG/DL (ref 1.6–2.3)
POTASSIUM SERPL-SCNC: 4.3 MMOL/L (ref 3.6–5)
QRS AXIS: 51 DEGREES
QRSD INTERVAL: 68 MS
QT INTERVAL: 374 MS
QTC INTERVAL: 489 MS
SODIUM SERPL-SCNC: 134 MMOL/L (ref 137–147)
T WAVE AXIS: 26 DEGREES
VENTRICULAR RATE: 103 BPM

## 2018-07-20 PROCEDURE — 83735 ASSAY OF MAGNESIUM: CPT | Performed by: FAMILY MEDICINE

## 2018-07-20 PROCEDURE — 97162 PT EVAL MOD COMPLEX 30 MIN: CPT

## 2018-07-20 PROCEDURE — G8982 BODY POS GOAL STATUS: HCPCS

## 2018-07-20 PROCEDURE — 93005 ELECTROCARDIOGRAM TRACING: CPT

## 2018-07-20 PROCEDURE — 97165 OT EVAL LOW COMPLEX 30 MIN: CPT

## 2018-07-20 PROCEDURE — G8989 SELF CARE D/C STATUS: HCPCS

## 2018-07-20 PROCEDURE — 80048 BASIC METABOLIC PNL TOTAL CA: CPT | Performed by: FAMILY MEDICINE

## 2018-07-20 PROCEDURE — 93010 ELECTROCARDIOGRAM REPORT: CPT | Performed by: INTERNAL MEDICINE

## 2018-07-20 PROCEDURE — 99239 HOSP IP/OBS DSCHRG MGMT >30: CPT | Performed by: FAMILY MEDICINE

## 2018-07-20 PROCEDURE — G8981 BODY POS CURRENT STATUS: HCPCS

## 2018-07-20 PROCEDURE — G8988 SELF CARE GOAL STATUS: HCPCS

## 2018-07-20 PROCEDURE — G8987 SELF CARE CURRENT STATUS: HCPCS

## 2018-07-20 RX ORDER — LANOLIN ALCOHOL/MO/W.PET/CERES
100 CREAM (GRAM) TOPICAL DAILY
Qty: 30 TABLET | Refills: 0 | Status: SHIPPED | OUTPATIENT
Start: 2018-07-21 | End: 2018-08-23 | Stop reason: ALTCHOICE

## 2018-07-20 RX ORDER — LISINOPRIL 20 MG/1
20 TABLET ORAL DAILY
Qty: 30 TABLET | Refills: 0 | Status: SHIPPED | OUTPATIENT
Start: 2018-07-21 | End: 2018-08-15 | Stop reason: SDUPTHER

## 2018-07-20 RX ORDER — METOPROLOL TARTRATE 50 MG/1
100 TABLET, FILM COATED ORAL EVERY 12 HOURS SCHEDULED
Status: DISCONTINUED | OUTPATIENT
Start: 2018-07-20 | End: 2018-07-20 | Stop reason: HOSPADM

## 2018-07-20 RX ORDER — METOPROLOL TARTRATE 100 MG/1
100 TABLET ORAL EVERY 12 HOURS SCHEDULED
Qty: 60 TABLET | Refills: 0 | Status: SHIPPED | OUTPATIENT
Start: 2018-07-20 | End: 2018-08-15 | Stop reason: SDUPTHER

## 2018-07-20 RX ADMIN — FOLIC ACID 1 MG: 1 TABLET ORAL at 08:16

## 2018-07-20 RX ADMIN — PANTOPRAZOLE SODIUM 40 MG: 40 TABLET, DELAYED RELEASE ORAL at 06:19

## 2018-07-20 RX ADMIN — SPIRONOLACTONE 25 MG: 25 TABLET ORAL at 08:16

## 2018-07-20 RX ADMIN — BUPROPION HYDROCHLORIDE 75 MG: 75 TABLET, FILM COATED ORAL at 08:16

## 2018-07-20 RX ADMIN — Medication 100 MG: at 08:16

## 2018-07-20 RX ADMIN — LORAZEPAM 0.5 MG: 0.5 TABLET ORAL at 05:32

## 2018-07-20 RX ADMIN — METOPROLOL TARTRATE 100 MG: 50 TABLET ORAL at 08:16

## 2018-07-20 RX ADMIN — APIXABAN 5 MG: 5 TABLET, FILM COATED ORAL at 08:16

## 2018-07-20 RX ADMIN — NICOTINE 1 PATCH: 21 PATCH, EXTENDED RELEASE TRANSDERMAL at 08:17

## 2018-07-20 RX ADMIN — Medication 1 TABLET: at 08:16

## 2018-07-20 RX ADMIN — LISINOPRIL 20 MG: 20 TABLET ORAL at 08:16

## 2018-07-20 NOTE — DISCHARGE SUMMARY
Discharge- Cherylene Flor 1960, 66 y o  female MRN: [id number]    Unit/Bed#:  Encounter: [id number]    Primary Care Provider: Juanjose Gorman MD   Date and time admitted to hospital: 7/17/2018 10:16 AM        * New onset atrial fibrillation [facility])   Assessment & Plan    · Patient was previously in sinus rhythm  · Patient is not cardioverted and will be assess full external cardioversion as an outpatient  · Rate controlled the blood pressure is uncontrolled actually increased her metoprolol tartrate 100 mg p o  B i d  Mars Maricarmen Also lisinopril has been started 20 mg p  o  daily yesterday by Cardiology  Discharge blood pressure has improved  · She has been drinking alcohol since Friday about a bottle of vodka last drink was yesterday-not visual    · The UDS was never done    · 2D echo is EF 91% and diastolic dysfunction was not assessed but there is some mild left ventricular hypertrophy  Systolic function is normal   · Discussed case with Cardiology   · TSH is normal  · Continue Eliquis 5 mg p o  b i d  -checked by social Work is covered by insurance  · Has appointment Dr eBlla Dumont outpatient for cardioversion about 4-6 weeks  Demand ischemia of myocardium (HCC)   Assessment & Plan    · Troponins times 2 q 3 hours with a CK MB as well-peek 0 07  · Echo-normal EF  ·  AFib control  · Cardiology consulted-discussed and reviewed consult        Alcoholic cirrhosis ([facility] )   Assessment & Plan    · Maddreys score is 1 6 less than 32 need for steroids  · Bilirubin at 2 5 will check ultrasound of the abdomen no pain now-ultrasound evaluated there is no biliary obstruction there is no ascites    · INR is normal  · Counseled on alcohol cessation  · Restart her outpatient aldactone         Alcohol dependence ([facility] )   Assessment & Plan    · Will continue on vitamin B1 974 mg daily folic acid and MVI  · Last drink yesterday monitor for withdrawals will also add Ativan p r n-not currently in withdrawal   · She has been counseled of 4 drinking cessation as she is going to be on Eliquis and if she falls could increase her chance of intracranial bleeding or bruising  Benign essential hypertension   Assessment & Plan    · Uncontrolled  · The Toprol has been increased to 100 mg p  o  b i d  and lisinopril 20 mg p o  daily was added today prior to discharge her blood pressure after the medications have improved to systolic of 401         Hypothyroidism   Assessment & Plan    · TSH normal is not on any medications        Type II diabetes mellitus ([facility] )   Assessment & Plan    Lab Results   Component Value Date    HGBA1C 7 0 (H) 05/09/2018       Recent Labs      07/17/18   1557  07/18/18   0654  07/18/18   1116   POCGLU  139*  127*  162*       Blood Sugar Average: Last 72 hrs:  · Diet controlled -without any need of sliding scale or diabetic medications  (P) 102 [id number]        Hyperlipidemia   Assessment & Plan    · Not on any medications at home        QT prolongation   Assessment & Plan    · Her trazodone was stopped also replace magnesium q 12 to and give her potassium keep above 4 check Mag and BMP tomorrow-it is QTC is 480s but less than 500 transfer has been decreased  Will follow up with Cardiology                Discharging Physician / Practitioner: Marce Camejo MD  PCP: Bi Aquino MD  Admission Date:   Admission Orders     Ordered        07/17/18 1307  Inpatient Admission (expected length of stay for this patient is greater than two midnights)  Once             Discharge Date: 07/20/18    Resolved Problems  Date Reviewed: 7/20/2018          Resolved    High anion gap metabolic acidosis 8/00/3650     Resolved by  Marce Camejo MD    Leukocytosis 7/20/2018     Resolved by  Marce Camejo MD          Consultations During Hospital Stay:  · Cardiology    Procedures Performed:     · None    Significant Findings / Test Results:     · Chest x-ray done on 07/17 revealed cardiomegaly but no consolidation no pleural effusion  · Ultrasound of the abdomen revealed liver size upper range of normal with somewhat coarse echotexture in keeping with hepatocellular disease  Portal vein is patent  Status post cholecystectomy  No significant biliary ductal dilatation no ascites  · Chest x-ray repeated on 07/18 no acute cardiopulmonary disease  · EKG is remained in atrial fibrillation  Also revealed a QTC at 1 point greater than 500 on day of discharge it has improved to 484  · She had a 2D echo done on 07/18 which revealed EF estimated to be 60-65%  Normal left ventricular cavity size  Mild concentric left ventricular hypertrophy  Normal left ventricular wall motion without regional wall motion abnormalities  Left ventricular diastolic disc function was not assessed  Right ventricle normal systolic function and cavity size  Mild pulmonary hypertension a pulmonary systolic pressure 41 mm hg     Incidental Findings:   · None     Test Results Pending at Discharge (will require follow up): · None     Outpatient Tests Requested:  · None    Complications:  None    Reason for Admission:  Dizziness    Hospital Course:     Adrienne Perez is a 66 y o  female patient who originally presented to the hospital on 7/17/2018 due to dizziness and palpitations  She was found to have new onset atrial fibrillation with a rapid ventricular response  Started on Cardizem drip  Admitted for evaluation by Cardiology 2D echo cardiac enzymes  She was also started on Eliquis  Her chads Vasc was 3  Her symptoms has improved  Cardiology has evaluated the patient she was switched to metoprolol her metoprolol was adjusted to 100 mg p  o  b i d  lisinopril was also added for blood pressure control  Her rate became more controlled  But she did not cardiovert in 48 hours being here she will follow up with Cardiology for electrical cardioversion in 4-6 weeks  She will continue Eliquis which is covered by her insurance    Echo revealed normal systolic function 0-97% a diastolic dysfunction of the left was not assessed right ventricle has no dysfunction there is mild pulmonary hypertension  Diabetes was diet controlled  The rest of the labs were normal   She was evaluated by PT clear to go home per them  Patient the medical her to be discharged home to follow up with Dr Mitchel Morton and her PCP  Her blood pressure improved to 965 systolic  Please see above list of diagnoses and related plan for additional information  Condition at Discharge: stable     Discharge Day Visit / Exam:     Subjective:  Patient seen and examined denies any chest pain or shortness of breath feeling better no palpitations  Asking to go home med of to note that when she is going to have a ride  Vitals: Blood Pressure: 147/93 (07/20/18 1020)  Pulse: 80 (07/20/18 1020)  Temperature: 97 8 °F (36 6 °C) (07/20/18 0820)  Temp Source: Temporal (07/20/18 0820)  Respirations: (!) 55 (07/20/18 1020)  Height: 5' 2" (157 5 cm) (07/17/18 1508)  Weight - Scale: 97 8 kg (215 lb 9 8 oz) (07/20/18 0531)  SpO2: 97 % (07/20/18 0830)  Exam:   Physical Exam   Constitutional: She is oriented to person, place, and time  She appears well-developed and well-nourished  HENT:   Head: Normocephalic and atraumatic  Eyes: EOM are normal  Pupils are equal, round, and reactive to light  Neck: Normal range of motion  Cardiovascular: Normal rate and normal heart sounds  An irregularly irregular rhythm present  Pulmonary/Chest: Effort normal and breath sounds normal    Abdominal: Soft  Bowel sounds are normal    Musculoskeletal: Normal range of motion  Neurological: She is alert and oriented to person, place, and time  She has normal reflexes  Skin: Skin is warm  Psychiatric: She has a normal mood and affect  Discussion with Family: no    Discharge instructions/Information to patient and family:   See after visit summary for information provided to patient and family        Provisions for Follow-Up Care:  See after visit summary for information related to follow-up care and any pertinent home health orders  Disposition:     Home    For Discharges to [facility] SNF:   · Not Applicable to this Patient - Not Applicable to this Patient    Planned Readmission: no     Discharge Statement:  I spent greater than 35 minutes discharging the patient  This time was spent on the day of discharge  I had direct contact with the patient on the day of discharge  Greater than 50% of the total time was spent examining patient, answering all patient questions, arranging and discussing plan of care with patient as well as directly providing post-discharge instructions  Additional time then spent on discharge activities  Discharge Medications:  See after visit summary for reconciled discharge medications provided to patient and family        ** Please Note: This note has been constructed using a voice recognition system **

## 2018-07-20 NOTE — OCCUPATIONAL THERAPY NOTE
Occupational Therapy Evaluation      Elena Butt    7/20/2018    Patient Active Problem List   Diagnosis    Alcohol dependence ([facility] )    Alcoholic cirrhosis ([place] [address] )    Benign essential hypertension    Hyperlipidemia    Hypothyroidism    Pancreatitis    Type II diabetes mellitus ([place] [address] )    New onset atrial fibrillation (HCC)    High anion gap metabolic acidosis    Demand ischemia of myocardium (HCC)    Leukocytosis    QT prolongation       Past Medical History:   Diagnosis Date    Cirrhosis of liver ([facility] )     Diabetes mellitus ([place] [address] )     Disease of thyroid gland     hypothyrodism    HLD (hyperlipidemia)     Hypertension     Psychiatric disorder     Depression    Tobacco dependence        Past Surgical History:   Procedure Laterality Date    APPENDECTOMY      CARPAL TUNNEL RELEASE          07/20/18 1002   Note Type   Note type Eval only   Pain Assessment   Pain Assessment No/denies pain   Pain Score No Pain   Home Living   Type of Home Apartment  (4 steps up and 5 steps down with hand rails )   Home Layout Performs ADLs on one level   Bathroom Shower/Tub Tub/shower unit   Bathroom Toilet Standard   Bathroom Equipment (none)   Prior Function   Level of [place] Independent with ADLs and functional mobility   Lives With Alone   ADL Assistance Independent   IADLs Independent   Falls in the last 6 months 0   Vocational Retired   Lifestyle   Autonomy Pt reports being independent for self care, home mgmt and functional mobility  Pt does not drive    Reciprocal Relationships friends that are able to help if needs, brother and sister in law    Service to Others Retired CNA    Intrinsic Gratification going on walks, playing computer games      ADL   Where Assessed Edge of bed   Eating Assistance 7  Independent   Grooming Assistance 7  Independent   LB Dressing Assistance 7  Independent   Toileting Assistance  7  Independent   Bed Mobility   Supine to Sit 7  Independent   Sit to Supine 7  Independent Transfers   Sit to Stand 7  Independent   Stand to Sit 7  Independent   Stand pivot 7  Independent   Toilet transfer 7  Independent   Additional items Commode   Functional Mobility   Functional Mobility 7  Independent   Balance   Static Sitting Good   Dynamic Sitting Good   Static Standing Good   Dynamic Standing Good   Ambulatory Fair +   Activity Tolerance   Activity Tolerance Patient tolerated treatment well   RUE Assessment   RUE Assessment WNL   RUE Strength   RUE Overall Strength Within Functional Limits - strength 5/5   LUE Assessment   LUE Assessment WNL   LUE Strength   LUE Overall Strength Within Functional Limits - strength 5/5   Sensation   Additional Comments pt notes she has numbness at times in hands; had carpal tunnel in past     Vision-Basic Assessment   Current Vision Wears glasses only for reading   Cognition   Overall Cognitive Status [facility]   Arousal/Participation Alert; Cooperative   Attention Within functional limits   Orientation Level Oriented X4   Following Commands Follows multistep commands without difficulty   Assessment   Prognosis Good   Assessment Vitals: BP seated /93m HR 80bpm   Pt seen for OT evaluation  Pt appears to be functioning at her baseline level of independence at this time  no further OT required  if any changes occur please reconsult      Goals   Patient Goals " get my drivers license back; find a part time job"    Recommendation   OT Discharge Recommendation Home independent   OT - OK to Discharge Yes   Jose Armando Bryson OT  7/20/2018

## 2018-07-20 NOTE — ASSESSMENT & PLAN NOTE
· Troponins times 2 q 3 hours with a CK MB as well-peek 0 07  · Echo-normal EF    ·  AFib control  · Cardiology consulted-discussed and reviewed consult

## 2018-07-20 NOTE — ASSESSMENT & PLAN NOTE
· Her trazodone was stopped also replace magnesium q 12 to and give her potassium keep above 4 check Mag and BMP tomorrow-it is QTC is 480s but less than 500 transfer has been decreased  Will follow up with Cardiology

## 2018-07-20 NOTE — NURSING NOTE
Patient appears to be resting comfortably in bed on her right side; eyes closed and chest movements noted  Patient woke easily for vital signs and had no complaints of pain or discomfort at this time  Patient is calm, pleasant, and cooperative with care  Patient is awake and oriented times four  Vital signs remain stable with the exception of a continued elevated blood pressure, doctor made aware  She continues in A  Fib on the monitor  Call bell in reach and bed in low position  Assessment other wise unchanged at this time, will continue to monitor

## 2018-07-20 NOTE — NURSING NOTE
Patient appears to be resting comfortably in bed in semi-velazquez's position; eyes closed and chest movements noted  Patient woke easily for vital signs and had no complaints of pain or discomfort at this time  Patient is calm, pleasant, and cooperative with care  Patient is awake and oriented times four  Vital signs remain stable and continues to be in A  Fib on the monitor  Call bell in reach and bed in low position  Assessment other wise unchanged at this time, will continue to monitor

## 2018-07-20 NOTE — ASSESSMENT & PLAN NOTE
Lab Results   Component Value Date    HGBA1C 7 0 (H) 05/09/2018       Recent Labs      07/17/18   1557  07/18/18   0654  07/18/18   1116   POCGLU  139*  127*  162*       Blood Sugar Average: Last 72 hrs:  · Diet controlled -without any need of sliding scale or diabetic medications  (P) 756 [id number]

## 2018-07-20 NOTE — PLAN OF CARE
CARDIOVASCULAR - ADULT     Maintains optimal cardiac output and hemodynamic stability Adequate for Discharge     Absence of cardiac dysrhythmias or at baseline rhythm Adequate for Discharge        DISCHARGE PLANNING     Discharge to home or other facility with appropriate resources Adequate for Discharge        INFECTION - ADULT     Absence or prevention of progression during hospitalization Adequate for Discharge     Absence of fever/infection during neutropenic period Adequate for Discharge        Knowledge Deficit     Patient/family/caregiver demonstrates understanding of disease process, treatment plan, medications, and discharge instructions Adequate for Discharge        METABOLIC, FLUID AND ELECTROLYTES - ADULT     Glucose maintained within target range Adequate for Discharge        PAIN - ADULT     Verbalizes/displays adequate comfort level or baseline comfort level Adequate for Discharge        Potential for Falls     Patient will remain free of falls Adequate for Discharge        Prexisting or High Potential for Compromised Skin Integrity     Skin integrity is maintained or improved Adequate for Discharge        RESPIRATORY - ADULT     Achieves optimal ventilation and oxygenation Adequate for Discharge        SAFETY ADULT     Maintain or return to baseline ADL function Adequate for Discharge     Maintain or return mobility status to optimal level Adequate for Discharge     Patient will remain free of falls Adequate for Discharge        SKIN/TISSUE INTEGRITY - ADULT     Skin integrity remains intact Adequate for Discharge

## 2018-07-20 NOTE — ASSESSMENT & PLAN NOTE
· Will continue on vitamin B1 619 mg daily folic acid and MVI  · Last drink yesterday monitor for withdrawals will also add Ativan p r n-not currently in withdrawal   · She has been counseled of 4 drinking cessation as she is going to be on Eliquis and if she falls could increase her chance of intracranial bleeding or bruising

## 2018-07-20 NOTE — NURSING NOTE
Patient given discharge instructions and scripts  Patient stated her sister was waiting outside and volunteer walked out  the patient  Patient verbalized understanding  Patient encouraged to call her PO officer and patient answered she will  I did call Tito Chaney at  and left a voice message about the patient being discharge  No call back when this note is entered

## 2018-07-20 NOTE — DISCHARGE INSTRUCTIONS
A-fib (Atrial Fibrillation)   WHAT YOU NEED TO KNOW:   A-fib may come and go, or it may be a long-term condition  A-fib can cause blood clots, stroke, or heart failure  These conditions may become life-threatening  It is important to treat and manage a-fib to help prevent a blood clot, stroke, or heart failure  DISCHARGE INSTRUCTIONS:   Call 911 for any of the following:   · You have any of the following signs of a heart attack:      ¨ Squeezing, pressure, or pain in your chest that lasts longer than 5 minutes or returns    ¨ Discomfort or pain in your back, neck, jaw, stomach, or arm     ¨ Trouble breathing    ¨ Nausea or vomiting    ¨ Lightheadedness or a sudden cold sweat, especially with chest pain or trouble breathing    · You have any of the following signs of a stroke:      ¨ Numbness or drooping on one side of your face     ¨ Weakness in an arm or leg    ¨ Confusion or difficulty speaking    ¨ Dizziness, a severe headache, or vision loss  Seek care immediately if:  You have any of the following signs of a blood clot:  · You feel lightheaded, are short of breath, and have chest pain  · You cough up blood  · You have swelling, redness, pain, or warmth in your arm or leg  Contact your cardiologist or healthcare provider if:   · Your heart rate is higher than your healthcare provider said it should be  · You have new or worsening swelling in your legs, feet, ankles, or abdomen  · You are short of breath, even at rest      · You have questions or concerns about your condition or care  Medicines: You may need any of the following:  · Heart medicines  help control your heart rate and rhythm  You may need more than one medicine to treat your symptoms  · Blood thinners    help prevent blood clots  Examples of blood thinners include heparin and warfarin  Clots can cause strokes, heart attacks, and death   The following are general safety guidelines to follow while you are taking a blood thinner:    ¨ Watch for bleeding and bruising while you take blood thinners  Watch for bleeding from your gums or nose  Watch for blood in your urine and bowel movements  Use a soft washcloth on your skin, and a soft toothbrush to brush your teeth  This can keep your skin and gums from bleeding  If you shave, use an electric shaver  Do not play contact sports  ¨ Tell your dentist and other healthcare providers that you take anticoagulants  Wear a bracelet or necklace that says you take this medicine  ¨ Do not start or stop any medicines unless your healthcare provider tells you to  Many medicines cannot be used with blood thinners  ¨ Tell your healthcare provider right away if you forget to take the medicine, or if you take too much  ¨ Warfarin  is a blood thinner that you may need to take  The following are things you should be aware of if you take warfarin  § Foods and medicines can affect the amount of warfarin in your blood  Do not make major changes to your diet while you take warfarin  Warfarin works best when you eat about the same amount of vitamin K every day  Vitamin K is found in green leafy vegetables and certain other foods  Ask for more information about what to eat when you are taking warfarin  § You will need to see your healthcare provider for follow-up visits when you are on warfarin  You will need regular blood tests  These tests are used to decide how much medicine you need  · Antiplatelets , such as aspirin, help prevent blood clots  Take your antiplatelet medicine exactly as directed  These medicines make it more likely for you to bleed or bruise  If you are told to take aspirin, do not take acetaminophen or ibuprofen instead  · Take your medicine as directed  Contact your healthcare provider if you think your medicine is not helping or if you have side effects  Tell him or her if you are allergic to any medicine   Keep a list of the medicines, vitamins, and herbs you take  Include the amounts, and when and why you take them  Bring the list or the pill bottles to follow-up visits  Carry your medicine list with you in case of an emergency  Follow up with your cardiologist as directed: You will need regular blood tests and monitoring  Write down your questions so you remember to ask them during your visits  Manage A-fib:   · Know your target heart rate  Learn how to take your pulse and monitor your heart rate  · Manage other health conditions  This includes high blood pressure, sleep apnea, thyroid disease, diabetes, and other heart conditions  Take medicine as directed and follow your treatment plan  · Limit or do not drink alcohol  Alcohol can make a-fib hard to manage  Ask your healthcare provider if it is safe for you to drink alcohol  A drink of alcohol is 12 ounces of beer, 5 ounces of wine, or 1½ ounces of liquor  · Do not smoke  Nicotine and other chemicals in cigarettes and cigars can cause heart and lung damage  Ask your healthcare provider for information if you currently smoke and need help to quit  E-cigarettes or smokeless tobacco still contain nicotine  Talk to your healthcare provider before you use these products  · Eat heart-healthy foods  Heart healthy foods will help keep your cholesterol low  These include fruits, vegetables, whole-grain breads, low-fat dairy products, beans, lean meats, and fish  Replace butter and margarine with heart-healthy oils such as olive oil and canola oil  · Maintain a healthy weight  Ask your healthcare provider how much you should weigh  Ask him to help you create a weight loss plan if you are overweight  · Exercise for 30 minutes  most days of the week  Ask your healthcare provider about the best exercise plan for you  © 2017 [address] [employer] [address] Information is for End User's use only and may not be sold, redistributed or otherwise used for commercial purposes   All illustrations and images included in CareNotes® are the copyrighted property of [employer]  or [employer]  The above information is an  only  It is not intended as medical advice for individual conditions or treatments  Talk to your doctor, nurse or pharmacist before following any medical regimen to see if it is safe and effective for you  Apixaban (By mouth)   Apixaban (a-PIX-a-ban)  Treats and prevents blood clots  This medicine is a blood thinner  Brand Name(s): Eliquis   There may be other brand names for this medicine  When This Medicine Should Not Be Used: This medicine is not right for everyone  Do not use it if you had an allergic reaction to apixaban or you have active bleeding  How to Use This Medicine:   Tablet  · Your doctor will tell you how much medicine to use  Do not use more than directed  · If you are not able to swallow the tablets whole, they may be crushed and mixed in water, 5% dextrose in water (D5W), apple juice, or applesauce  The crushed tablets may be mixed with 60 mL of water or D5W dose and given through a nasogastric tube (NGT)  · This medicine should come with a Medication Guide  Ask your pharmacist for a copy if you do not have one  · Missed dose: Take a dose as soon as you remember  If it is almost time for your next dose, wait until then and take a regular dose  Do not take extra medicine to make up for a missed dose  · Store the medicine in a closed container at room temperature, away from heat, moisture, and direct light  Drugs and Foods to Avoid:   Ask your doctor or pharmacist before using any other medicine, including over-the-counter medicines, vitamins, and herbal products  · Some medicines can affect how apixaban works   Tell your doctor if you are using any of the following:   ¨ Carbamazepine, clarithromycin, itraconazole, ketoconazole, phenytoin, rifampin, ritonavir, Janis's wort  ¨ Blood thinner (including clopidogrel, heparin, prasugrel, warfarin)  ¨ Medicine to treat depression  ¨ NSAID pain or arthritis medicine (including aspirin, celecoxib, diclofenac, ibuprofen, naproxen)  Warnings While Using This Medicine:   · Tell your doctor if you are pregnant or breastfeeding, or if you have kidney disease, liver disease, bleeding problems, or an artificial heart valve  · Do not stop using this medicine suddenly without asking your doctor  You might have a higher risk of stroke for a short time after you stop using this medicine  · This medicine increases your risk for bleeding that can become serious if not controlled  You may also bruise easily, and it may take longer than usual for bleeding to stop  · This medicine may increase your risk for blood clots in your spine or back if you undergo an epidural or spinal puncture  This could lead to paralysis  Tell your doctor if you ever had spine problems or back surgery  · Tell any doctor or dentist who treats you that you are using this medicine  With your doctor's supervision, you may need to stop using this medicine several days before you have surgery or medical tests  · Your doctor will do lab tests at regular visits to check on the effects of this medicine  Keep all appointments  · Keep all medicine out of the reach of children  Never share your medicine with anyone    Possible Side Effects While Using This Medicine:   Call your doctor right away if you notice any of these side effects:  · Allergic reaction: Itching or hives, swelling in your face or hands, swelling or tingling in your mouth or throat, chest tightness, trouble breathing  · Change in how much or how often you urinate, red or pink urine  · Chest pain, trouble breathing  · Coughing up blood, vomiting blood or material that looks like coffee grounds  · Numbness, tingling, or muscle weakness in your legs or feet  · Red or black, tarry stools  · Unusual bleeding, bruising, or weakness  If you notice other side effects that you think are caused by this medicine, tell your doctor  Call your doctor for medical advice about side effects  You may report side effects to FDA at 2-393-FDA-9608  © 2017 [address] [employer] Information is for End User's use only and may not be sold, redistributed or otherwise used for commercial purposes  The above information is an  only  It is not intended as medical advice for individual conditions or treatments  Talk to your doctor, nurse or pharmacist before following any medical regimen to see if it is safe and effective for you  Cardioversion   AMBULATORY CARE:   What you need to know about cardioversion:  Cardioversion is a procedure that uses medicine or electrical shocks to correct arrhythmias  An arrhythmias is a heartbeat that is too slow, too fast, or irregular  It may prevent your body from getting the blood and oxygen it needs  Your heart has 4 chambers, called the atria and ventricles  The atria are at the top of your heart, and the ventricles are at the bottom of your heart  Most arrhythmias that need cardioversion start in the atria  How to prepare for cardioversion:  You may need a transesophageal echocardiogram (HELADIO) before your cardioversion  A HELADIO is an ultrasound to check for clots in your heart  You may need to take blood thinner medicine for several weeks before your cardioversion  This will help prevent blood clots  Your healthcare provider will talk to you about how to prepare for cardioversion  He may tell you not to eat or drink anything after midnight on the day of your cardioversion  He will tell you what medicines to take or not take on the day of your cardioversion  Arrange for someone to drive you home and stay with you for 24 hours  This person can call 911 if you have problems after your cardioversion  What will happen during a chemical cardioversion:  You will be placed on a heart monitor   A heart monitor is an EKG that records your heart's electrical activity  Your healthcare provider will inject 1 or more medicines into your IV  The medicines will help change your heartbeat to a normal rhythm  You may feel light headed, dizzy, or nauseous  You may have pain or pressure in your chest, jaw, shoulders, or arms  These symptoms are normal, and usually last for a minute or less  You may need electrical cardioversion if chemical cardioversion does not change your heartbeat to a normal rhythm  What will happen during an electrical cardioversion:  You will be given medicine through your IV to keep you asleep and free from pain  You will be placed on a heart monitor  A heart monitor is an EKG that records your heart's electrical activity  A healthcare provider will also monitor your blood pressure and oxygen levels during the procedure  You may get oxygen through a mask placed over your nose and mouth or through small tubes placed in your nostrils  · In external cardioversion  your healthcare provider will place sticky pads on your chest and back  Your heart will be shocked with electricity through the pads  Your heart may be shocked more than once to help it return to its normal rhythm  · In internal cardioversion  your healthcare provider will insert a catheter through a vein and into your heart  Your heart will be shocked through the catheter  Your heart may be shocked more than once to help it return to its normal rhythm  What will happen after cardioversion:  Healthcare providers will monitor your heartbeat, blood pressure, and oxygen levels  You may feel drowsy from the medicine you were given during the procedure  Your chest may be red or sore where the pads were placed  This should go away in a few days  You may go home when they say it is okay or you may need to stay in the hospital    Risks of cardioversion:  Your skin may be burned from the electrical shocks   Cardioversion may cause a blood clot to travel to your heart or brain and cause a heart attack or stroke  You may develop a life-threatening arrhythmia or low blood pressure during cardioversion  You may need medicine or more electric shocks to treat this  Even with cardioversion, your heartbeat may not change or may not stay regular  Call 911 for any of the following:   · You have any of the following signs of a heart attack:      ¨ Squeezing, pressure, or pain in your chest that lasts longer than 5 minutes or returns    ¨ Discomfort or pain in your back, neck, jaw, stomach, or arm     ¨ Trouble breathing    ¨ Nausea or vomiting    ¨ Lightheadedness or a sudden cold sweat, especially with chest pain or trouble breathing    · You have any of the following signs of a stroke:      ¨ Numbness or drooping on one side of your face     ¨ Weakness in an arm or leg    ¨ Confusion or difficulty speaking    ¨ Dizziness, a severe headache, or vision loss    · You feel lightheaded, short of breath, and have chest pain  · You cough up blood  · You have trouble breathing  Seek care immediately if:   · You feel your heart beating fast or fluttering  · You feel weak or faint  · Your leg or arm is larger than usual, painful, and warm  Contact your healthcare provider if:   · Your skin is itchy, swollen, or you have a rash  · You have questions or concerns about your condition or care  Medicines: You may need any of the following:  · Heart medicines  help control your heart rate and rhythm  · Blood thinners    help prevent blood clots  Examples of blood thinners include heparin and warfarin  Clots can cause strokes, heart attacks, and death  The following are general safety guidelines to follow while you are taking a blood thinner:    ¨ Watch for bleeding and bruising while you take blood thinners  Watch for bleeding from your gums or nose  Watch for blood in your urine and bowel movements  Use a soft washcloth on your skin, and a soft toothbrush to brush your teeth   This can keep your skin and gums from bleeding  If you shave, use an electric shaver  Do not play contact sports  ¨ Tell your dentist and other healthcare providers that you take anticoagulants  Wear a bracelet or necklace that says you take this medicine  ¨ Do not start or stop any medicines unless your healthcare provider tells you to  Many medicines cannot be used with blood thinners  ¨ Tell your healthcare provider right away if you forget to take the medicine, or if you take too much  ¨ Warfarin  is a blood thinner that you may need to take  The following are things you should be aware of if you take warfarin  § Foods and medicines can affect the amount of warfarin in your blood  Do not make major changes to your diet while you take warfarin  Warfarin works best when you eat about the same amount of vitamin K every day  Vitamin K is found in green leafy vegetables and certain other foods  Ask for more information about what to eat when you are taking warfarin  § You will need to see your healthcare provider for follow-up visits when you are on warfarin  You will need regular blood tests  These tests are used to decide how much medicine you need  · Take your medicine as directed  Contact your healthcare provider if you think your medicine is not helping or if you have side effects  Tell him or her if you are allergic to any medicine  Keep a list of the medicines, vitamins, and herbs you take  Include the amounts, and when and why you take them  Bring the list or the pill bottles to follow-up visits  Carry your medicine list with you in case of an emergency  Self-care:   · Rest as directed  Do not drive for at least 24 hours  Ask your healthcare provider when you can return to your normal activities  · Check your heart rate and blood pressure as directed  Ask your healthcare provider what your heart rate and blood pressure should be  · Do not smoke    Nicotine and other chemicals in cigarettes and cigars can cause heart and lung damage  They can also increase your risk for another arrhythmia  Ask your healthcare provider for information if you currently smoke and need help to quit  E-cigarettes or smokeless tobacco still contain nicotine  Talk to your healthcare provider before you use these products  · Eat heart healthy foods  These include fruits, vegetables, whole-grain breads, low-fat dairy products, beans, lean meats, and fish  Replace butter and margarine with heart-healthy oils such as olive oil and canola oil  · Maintain a healthy weight  Ask your healthcare provider how much you should weigh  Ask him to help you create a weight loss plan if you are overweight  Follow up with your healthcare provider as directed:  Write down your questions so you remember to ask them during your visits  © 2017 [employer]  Information is for End User's use only and may not be sold, redistributed or otherwise used for commercial purposes  All illustrations and images included in CareNotes® are the copyrighted property of [employer]  or [address]  The above information is an  only  It is not intended as medical advice for individual conditions or treatments  Talk to your doctor, nurse or pharmacist before following any medical regimen to see if it is safe and effective for you  Cigarette Smoking and Your Health   AMBULATORY CARE:   Risks to your health if you smoke:  Nicotine and other chemicals found in tobacco damage every cell in your body  Even if you are a light smoker, you have an increased risk for cancer, heart disease, and lung disease  If you are pregnant or have diabetes, smoking increases your risk for complications  Benefits to your health if you stop smoking:   · You decrease respiratory symptoms such as coughing, wheezing, and shortness of breath       · You reduce your risk for cancers of the lung, mouth, throat, kidney, bladder, pancreas, stomach, and cervix  If you already have cancer, you increase the benefits of chemotherapy  You also reduce your risk for cancer returning or a second cancer from developing  · You reduce your risk for heart disease, blood clots, heart attack, and stroke  · You reduce your risk for lung infections, and diseases such as pneumonia, asthma, chronic bronchitis, and emphysema  · Your circulation improves  More oxygen can be delivered to your body  If you have diabetes, you lower your risk for complications, such as kidney, artery, and eye diseases  You also lower your risk for nerve damage  Nerve damage can lead to amputations, poor vision, and blindness  · You improve your body's ability to heal and to fight infections  Benefits to the health of others if you stop smoking:  Tobacco is harmful to nonsmokers who breathe in your secondhand smoke  The following are ways the health of others around you may improve when you stop smoking:  · You lower the risks for lung cancer and heart disease in nonsmoking adults  · If you are pregnant, you lower the risk for miscarriage, early delivery, low birth weight, and stillbirth  You also lower your baby's risk for SIDS, obesity, developmental delay, and neurobehavioral problems, such as ADHD  · If you have children, you lower their risk for ear infections, colds, pneumonia, bronchitis, and asthma  For more information and support to stop smoking:   · Smoke[employer]  gov  Phone: 0- 715 - 856-9854  Web Address: www [employer]  gov  Follow up with your healthcare provider as directed:  Write down your questions so you remember to ask them during your visits  © 2017 [address] Information is for End User's use only and may not be sold, redistributed or otherwise used for commercial purposes  All illustrations and images included in CareNotes® are the copyrighted property of [employer]  or [employer]    The above information is an  only  It is not intended as medical advice for individual conditions or treatments  Talk to your doctor, nurse or pharmacist before following any medical regimen to see if it is safe and effective for you  How to Stop Smoking   WHAT YOU NEED TO KNOW:   You will improve your health and the health of others around you if you stop smoking  Your risk for heart and lung disease, cancer, stroke, heart attack, and vision problems will also decrease  You can benefit from quitting no matter how long you have smoked  DISCHARGE INSTRUCTIONS:   Prepare to stop smoking:  Nicotine is a highly addictive drug found in cigarettes  Withdrawal symptoms can happen when you stop smoking and make it hard to quit  These include anxiety, depression, irritability, trouble sleeping, and increased appetite  You increase your chances of success if you prepare to quit  · Set a quit date  [place] a date that is within the next 2 weeks  Do not pick a day that you think may be stressful or busy  Write down the day or [place] it on your calender  · Tell friends and family that you plan to quit  Explain that you may have withdrawal symptoms when you try to quit  Ask them to support you  They may be able to encourage you and help reduce your stress to make it easier for you to quit  · Make a list of your reasons for quitting  Put the list somewhere you will see it every day, such as your refrigerator  You can look at the list when you have a craving  · Remove all tobacco and nicotine products from your home, car, and workplace  Also, remove anything else that will tempt you to smoke, such as lighters, matches, or ashtrays  Clean your car, home, and places at work that smell like smoke  The smell of smoke can trigger a craving  · Identify triggers that make you want to smoke  This may include activities, feelings, or people  Also write down 1 way you can deal with each of your triggers   For example, if you want to smoke as soon as you wake up, plan another activity during this time, such as exercise  · Make a plan for how you will quit  Learn about the tools that can help you quit, such as medicine, counseling, or nicotine replacement therapy  Choose at least 2 options to help you quit  Tools to help you stop smoking:   · Counseling  from a trained healthcare provider can provide you with support and skills to quit smoking  The provider will also teach you to manage your withdrawal symptoms and cravings  You may receive counseling from one counselor, in group therapy, or through phone therapy called a quit line  · Nicotine replacement therapy (NRT)  such as nicotine patches, gum, or lozenges may help reduce your nicotine cravings  You may get these without a doctor's order  Do not use e-cigarettes or smokeless tobacco in place of cigarettes or to help you quit  They still contain nicotine  · Prescription medicines  such as nasal sprays or nicotine inhalers may help reduce your withdrawal symptoms  Other medicines may also be used to reduce your urge to smoke  Ask your healthcare provider about these medicines  You may need to start certain medicines 2 weeks before your quit date for them to work well  · Hypnosis  is a practice that helps guide you through thoughts and feelings  Hypnosis may help decrease your cravings and make you more willing to quit  · Acupuncture therapy  uses very thin needles to balance energy channels in the body  This is thought to help decrease cravings and symptoms of nicotine withdrawal      · Support groups  let you talk to others who are trying to quit or have already quit  It may be helpful to speak with others about how they quit  Manage your cravings:   · Avoid situations, people, and places that tempt you to smoke  Go to nonsmoking places, such as libraries or restaurants  Understand what tempts you and try to avoid these things      · Keep your hands busy  Hold things such as a stress ball or pen  · Put candy or toothpicks in your mouth  Keep lollipops, sugarless gum, or toothpicks with you at all times  · Do not have alcohol or caffeine  These drinks may tempt you to smoke  Drink healthy liquids such as water or juice instead  · Reward yourself when you resist your cravings  Rewards will motivate you and help you stay positive  · Do an activity that distracts you from your craving  Examples include going for a walk, exercising, or cleaning  Prevent weight gain after you quit:  You may gain a few pounds after you quit smoking  It is healthier for you to gain a few pounds than to continue to smoke  The following can help you prevent weight gain:  · Eat healthy foods  These include fruits, vegetables, whole-grain breads, low-fat dairy products, beans, lean meats, and fish  Eat healthy snacks, such as low-fat yogurt, if you get hungry between meals  · Drink water before, during, and between meals  This will make your stomach feel full and help prevent you from overeating  Ask your healthcare provider how much liquid to drink each day and which liquids are best for you  · Exercise  Take a walk or do some kind of exercise every day  Ask your healthcare provider what exercise is right for you  This may help reduce your cravings and reduce stress  For support and more information:   · Smokefree  gov  Phone: 8- 242 - 979-7768  Web Address: www smokefree  gov  © 2017 [address] Information is for End User's use only and may not be sold, redistributed or otherwise used for commercial purposes  All illustrations and images included in CareNotes® are the copyrighted property of [employer]  or [employer]  The above information is an  only  It is not intended as medical advice for individual conditions or treatments   Talk to your doctor, nurse or pharmacist before following any medical regimen to see if it is safe and effective for you

## 2018-07-20 NOTE — PROGRESS NOTES
Addendum to discharge summary as the patient was just put on lisinopril and she is on Aldactone we have to watch for potassium level potassium today was 4 3 I will give her a script for Monday to get her blood work also recommend low potassium diet

## 2018-07-20 NOTE — ASSESSMENT & PLAN NOTE
· Uncontrolled  · The Toprol has been increased to 100 mg p  o  b i d  and lisinopril 20 mg p o  daily was added today prior to discharge her blood pressure after the medications have improved to systolic of 354

## 2018-07-20 NOTE — PHYSICAL THERAPY NOTE
PHYSICAL THERAPY EVALUATION    Time In: 10:00  Time Out: 10:15  Total Time: 15 min  MRN: [id number]    Patient is a 66 y o  female evaluated by Physical Therapy s/p admit to [place] [address] on 7/17/2018 with admitting diagnosis(es): Rapid heart rate [R00 0]  Atrial fibrillation with RVR ([facility] ) [I48 91] and principal problem(s): New onset atrial fibrillation ([facility] )  Patient Active Problem List   Diagnosis    Alcohol dependence ([place] [address] )    Alcoholic cirrhosis ([facility] )    Benign essential hypertension    Hyperlipidemia    Hypothyroidism    Pancreatitis    Type II diabetes mellitus ([place] [address] )    New onset atrial fibrillation (HCC)    High anion gap metabolic acidosis    Demand ischemia of myocardium (HCC)    Leukocytosis    QT prolongation     Please refer to H & P for further details  Past Medical History:   Diagnosis Date    Cirrhosis of liver ([facility] )     Diabetes mellitus ([place] [address] )     Disease of thyroid gland     hypothyrodism    HLD (hyperlipidemia)     Hypertension     Psychiatric disorder     Depression    Tobacco dependence        Past Surgical History:   Procedure Laterality Date    APPENDECTOMY      CARPAL TUNNEL RELEASE         Current Length Of Hospital Stay: 3 day(s)    PT was consulted to assess patient's functional mobility and discharge needs  Ordered are PT Evaluation and treatment  Chart reviewed  RN cleared patient for PT  Comorbidities affecting patient's physical performance at time of assessment include: DM, HTN and alcohol use disorder  Personal factors affecting the patient at time of IE include: step(s) to enter home, visual impairments and tobacco use  Please locate objective findings from PT assessment regarding body systems outlined below:     07/20/18 1015   Note Type   Note type Eval/Treat   Pain Assessment   Pain Assessment No/denies pain   Pain Score No Pain   Home Living   Type of Home Apartment; Other (Comment)  (4 steps up + 5 steps down with bilateral HRs) Home Layout One level;Stairs to enter with rails; Other (Comment)  (1st floor apt)   Bathroom Shower/Tub Tub/shower unit   [address] Other (Comment)  (Reports none)   Prior Function   Level of [place] Independent with ADLs and functional mobility   Lives With Alone   ADL Assistance Independent   IADLs Independent  (Cooks, cleans, does laundry, shops; does not drive currently)   Falls in the last 6 months 0  (Per patient)   Vocational Retired   Comments CNA   Cognition   Overall Cognitive Status WFL   Arousal/Participation Alert   Attention Within functional limits   Orientation Level Oriented X4   Following Commands Follows all commands and directions without difficulty   RLE Assessment   RLE Assessment WFL  (Strength at least 4+/5 throughout)   RLE Overall AROM   R Ankle Dorsiflexion To ~ neutral   LLE Assessment   LLE Assessment WFL  (Strength at least 4+/5 throughout)   Coordination   Movements are Fluid and Coordinated 1   Sensation X  (Reports occasional numbness/tingling of feet (neuropathy))   Bed Mobility   Supine to Sit 7  Independent   Sit to Supine 7  Independent   Transfers   Sit to Stand 7  Independent   Stand to Sit 7  Independent   Stand pivot 7  Independent   Ambulation/Elevation   Gait pattern (Slight bilateral lateral trunk flexion with weightshifting)   Gait Assistance 5  Supervision   Additional items Other (Comment)  (For Evaluation; no LOB observed   Reports slight dizziness )   Assistive Device None   Distance 125 ft x 2  (VCs for pursed lip breathing)   Stair Management Assistance 5  Supervision   Additional items (For safety)   Stair Management Technique One rail R;Reciprocal;Other (Comment)  (VCs for pursed lip breathing)   Number of Stairs (Full flight)   Balance   Static Sitting Normal   Dynamic Sitting Normal   Static Standing Good   Dynamic Standing Good   Endurance Deficit   Endurance Deficit Yes   Endurance Deficit Description (Slight SOB with ambulation; reported slight dizziness)   Activity Tolerance   Activity Tolerance Other (Comment)  (Slight SOB with ambulation; reported slight dizziness)   Nurse Made Aware Yes, Marcia RN   Assessment   Prognosis Fair   Problem List Decreased endurance;Decreased mobility; Impaired vision   Assessment In summary, guiding factors including patient history, examination of body system(s), clinical presentation and clinical decision making were considered  Patient presents with comorbid conditions that impact function, lacking physical support and with living environment deficits  Patient also presents with impaired endurance for activity and gait abilities  Clinical presentation is evolving at this time  The assigned level of complexity is: moderate  Patient would benefit from continued PT treatment to address deficits as defined above and restore or maximize level of functional mobility with consistency  From PT/mobility standpoint, preliminary discharge recommendation would be: home with friend support and home independently, in order to facilitate return to prior/baseline level of function and return to home with CANDACE  Barriers to Discharge Other (Comment)  ([place] apartment, lives alone)   Goals   Patient Goals "Get my 's license back and find a part-time job"  LT Expiration Date 07/22/18   Long Term Goal #1 1  Patient will ambulate with independence x at least 200 ft with no assistive device without c/o dizziness in order to safely return to PLOF  2  Patient will ascend 4 steps with bilateral handrails + descend 5 steps with bilateral handrails with no assistive device with modified independence to enter/exit apartment  Treatment Day 0   Plan   Treatment/Interventions Functional transfer training;Elevations; Therapeutic exercise; Endurance training;Patient/family training;Gait training;Spoke to nursing   PT Frequency Other (Comment)  (In 2 PT treatment sessions)   Recommendation   Recommendation Other (Comment); Home independently  (Home with friend support)   Equipment Recommended Other (Comment)  (None at this time)   PT - OK to Discharge Yes  (When medically cleared)   Barthel Index   Feeding 10   Bathing 5   Grooming Score 5   Dressing Score 10   Bladder Score 10   Bowels Score 10   Toilet Use Score 10   Transfers (Bed/Chair) Score 15   Mobility (Level Surface) Score 10   Stairs Score 5   Barthel Index Score 90     Vitals: Seated post-ambulation and right UE BP = 147/93, Heart Rate = 90 bpm, SpO2 = 93%  on RA  Patient remains seated on side of bed; patient positioned with all needs, including call bell, within reach      Dayanna Vann, PT, DPT

## 2018-07-20 NOTE — ASSESSMENT & PLAN NOTE
· Patient was previously in sinus rhythm  · Patient is not cardioverted and will be assess full external cardioversion as an outpatient  · Rate controlled the blood pressure is uncontrolled actually increased her metoprolol tartrate 100 mg p o  B i d  Dyllan Carmona Also lisinopril has been started 20 mg p  o  daily yesterday by Cardiology  Discharge blood pressure has improved  · She has been drinking alcohol since Friday about a bottle of vodka last drink was yesterday-not visual    · The UDS was never done    · 2D echo is EF 12% and diastolic dysfunction was not assessed but there is some mild left ventricular hypertrophy  Systolic function is normal   · Discussed case with Cardiology   · TSH is normal  · Continue Eliquis 5 mg p o  b i d  -checked by social Work is covered by insurance  · Has appointment Dr Nathaniel Cooney outpatient for cardioversion about 4-6 weeks

## 2018-07-20 NOTE — PLAN OF CARE
Problem: PHYSICAL THERAPY ADULT  Goal: Performs mobility at highest level of function for planned discharge setting  See evaluation for individualized goals  Treatment/Interventions: Functional transfer training, Elevations, Therapeutic exercise, Endurance training, Patient/family training, Gait training, Spoke to nursing  Equipment Recommended: Other (Comment) (None at this time)       See flowsheet documentation for full assessment, interventions and recommendations  Prognosis: Fair  Problem List: Decreased endurance, Decreased mobility, Impaired vision  Assessment: In summary, guiding factors including patient history, examination of body system(s), clinical presentation and clinical decision making were considered  Patient presents with comorbid conditions that impact function, lacking physical support and with living environment deficits  Patient also presents with impaired endurance for activity and gait abilities  Clinical presentation is evolving at this time  The assigned level of complexity is: moderate  Patient would benefit from continued PT treatment to address deficits as defined above and restore or maximize level of functional mobility with consistency  From PT/mobility standpoint, preliminary discharge recommendation would be: home with friend support and home independently, in order to facilitate return to prior/baseline level of function and return to home with CANDACE  Barriers to Discharge: Other (Comment) ([place] apartment, lives alone)     Recommendation: Other (Comment), Home independently (Home with friend support)     PT - OK to Discharge: Yes (When medically cleared)    See flowsheet documentation for full assessment

## 2018-07-20 NOTE — SOCIAL WORK
YVES met with pt to discuss discharge today  Pt informed SW that she will be calling her PO Lul shortly to determine if she needs to report directly to his office today  Pt stated that either way her sister gets out of work around 1:30-2:00 and will provide transportation for her  Pt showed SW that she still has the Eliquis cards as well as the card from [facility]  Pt stated that although she has to talk with her PO about D&A programs, she still prefers [facility] [employer] OP program  YVES contacted [facility] and notified Miriam Jett of pt's discharge and program preference  Miriam Jett confirmed they will f/u with pt  Nursing and Attending notified of above

## 2018-07-23 ENCOUNTER — TRANSITIONAL CARE MANAGEMENT (OUTPATIENT)
Dept: FAMILY MEDICINE CLINIC | Facility: CLINIC | Age: 58
End: 2018-07-23

## 2018-07-24 ENCOUNTER — TRANSCRIBE ORDERS (OUTPATIENT)
Dept: LAB | Facility: CLINIC | Age: 58
End: 2018-07-24

## 2018-07-24 ENCOUNTER — APPOINTMENT (OUTPATIENT)
Dept: LAB | Facility: CLINIC | Age: 58
End: 2018-07-24
Payer: COMMERCIAL

## 2018-07-24 DIAGNOSIS — I10 BENIGN ESSENTIAL HYPERTENSION: ICD-10-CM

## 2018-07-24 LAB
ANION GAP SERPL CALCULATED.3IONS-SCNC: 7 MMOL/L (ref 4–13)
BUN SERPL-MCNC: 17 MG/DL (ref 5–25)
CALCIUM SERPL-MCNC: 9.5 MG/DL (ref 8.3–10.1)
CHLORIDE SERPL-SCNC: 103 MMOL/L (ref 100–108)
CO2 SERPL-SCNC: 25 MMOL/L (ref 21–32)
CREAT SERPL-MCNC: 0.94 MG/DL (ref 0.6–1.3)
GFR SERPL CREATININE-BSD FRML MDRD: 68 ML/MIN/1.73SQ M
GLUCOSE P FAST SERPL-MCNC: 194 MG/DL (ref 65–99)
POTASSIUM SERPL-SCNC: 4 MMOL/L (ref 3.5–5.3)
SODIUM SERPL-SCNC: 135 MMOL/L (ref 136–145)

## 2018-07-24 PROCEDURE — 80048 BASIC METABOLIC PNL TOTAL CA: CPT

## 2018-07-24 PROCEDURE — 36415 COLL VENOUS BLD VENIPUNCTURE: CPT

## 2018-08-15 ENCOUNTER — OFFICE VISIT (OUTPATIENT)
Dept: FAMILY MEDICINE CLINIC | Facility: CLINIC | Age: 58
End: 2018-08-15
Payer: COMMERCIAL

## 2018-08-15 VITALS
OXYGEN SATURATION: 98 % | WEIGHT: 214 LBS | SYSTOLIC BLOOD PRESSURE: 110 MMHG | HEART RATE: 92 BPM | HEIGHT: 62 IN | TEMPERATURE: 97 F | BODY MASS INDEX: 39.38 KG/M2 | RESPIRATION RATE: 20 BRPM | DIASTOLIC BLOOD PRESSURE: 78 MMHG

## 2018-08-15 DIAGNOSIS — Z12.11 SCREENING FOR COLON CANCER: Primary | ICD-10-CM

## 2018-08-15 DIAGNOSIS — I48.91 NEW ONSET ATRIAL FIBRILLATION (HCC): ICD-10-CM

## 2018-08-15 DIAGNOSIS — R94.31 QT PROLONGATION: ICD-10-CM

## 2018-08-15 DIAGNOSIS — F41.9 ANXIETY: ICD-10-CM

## 2018-08-15 DIAGNOSIS — E13.9 DIABETES 1.5, MANAGED AS TYPE 2 (HCC): ICD-10-CM

## 2018-08-15 DIAGNOSIS — I10 BENIGN ESSENTIAL HYPERTENSION: ICD-10-CM

## 2018-08-15 DIAGNOSIS — E11.8 TYPE 2 DIABETES MELLITUS WITH COMPLICATION, UNSPECIFIED WHETHER LONG TERM INSULIN USE: ICD-10-CM

## 2018-08-15 LAB — SL AMB POCT HEMOGLOBIN AIC: 6.7

## 2018-08-15 PROCEDURE — 1111F DSCHRG MED/CURRENT MED MERGE: CPT | Performed by: FAMILY MEDICINE

## 2018-08-15 PROCEDURE — 83036 HEMOGLOBIN GLYCOSYLATED A1C: CPT | Performed by: FAMILY MEDICINE

## 2018-08-15 PROCEDURE — 3078F DIAST BP <80 MM HG: CPT | Performed by: FAMILY MEDICINE

## 2018-08-15 PROCEDURE — 99214 OFFICE O/P EST MOD 30 MIN: CPT | Performed by: FAMILY MEDICINE

## 2018-08-15 PROCEDURE — 3074F SYST BP LT 130 MM HG: CPT | Performed by: FAMILY MEDICINE

## 2018-08-15 PROCEDURE — 3044F HG A1C LEVEL LT 7.0%: CPT | Performed by: FAMILY MEDICINE

## 2018-08-15 RX ORDER — LISINOPRIL 20 MG/1
20 TABLET ORAL DAILY
Qty: 30 TABLET | Refills: 0 | Status: SHIPPED | OUTPATIENT
Start: 2018-08-15 | End: 2018-09-17 | Stop reason: SDUPTHER

## 2018-08-15 RX ORDER — LORAZEPAM 1 MG/1
1 TABLET ORAL
Qty: 30 TABLET | Refills: 0 | Status: SHIPPED | OUTPATIENT
Start: 2018-08-15 | End: 2018-10-17 | Stop reason: SDUPTHER

## 2018-08-15 RX ORDER — METOPROLOL TARTRATE 100 MG/1
100 TABLET ORAL EVERY 12 HOURS SCHEDULED
Qty: 60 TABLET | Refills: 0 | Status: SHIPPED | OUTPATIENT
Start: 2018-08-15 | End: 2018-09-17 | Stop reason: SDUPTHER

## 2018-08-15 NOTE — LETTER
To whom it may concern,     April Whiting was in our office for an appointment today 8/15/18 from 10:20 to 11 am      Please let me know if I can be of any further assistance,    Sincerely,       Emilio Mitchell MD

## 2018-08-15 NOTE — ASSESSMENT & PLAN NOTE
Lab Results   Component Value Date    HGBA1C 6 7 08/15/2018       No results for input(s): POCGLU in the last 72 hours      Blood Sugar Average: Last 72 hrs:  Slight improvement compared to May 2018  Continue current treatment

## 2018-08-15 NOTE — PROGRESS NOTES
Assessment/Plan:    New onset atrial fibrillation ([place] [address] )  Follow up with Cardiology as scheduled  Metoprolol refilled till seen by Cardiology     Type II diabetes mellitus ([place] [address] )  Lab Results   Component Value Date    HGBA1C 6 7 08/15/2018       No results for input(s): POCGLU in the last 72 hours  Blood Sugar Average: Last 72 hrs:  Slight improvement compared to May 2018  Continue current treatment          Problem List Items Addressed This Visit        Endocrine    Type II diabetes mellitus ([place] [address] )     Lab Results   Component Value Date    HGBA1C 6 7 08/15/2018       No results for input(s): POCGLU in the last 72 hours  Blood Sugar Average: Last 72 hrs:  Slight improvement compared to May 2018  Continue current treatment          Relevant Orders    POCT hemoglobin A1c (Completed)       Cardiovascular and Mediastinum    Benign essential hypertension    Relevant Medications    metoprolol tartrate (LOPRESSOR) 100 mg tablet    lisinopril (ZESTRIL) 20 mg tablet    New onset atrial fibrillation ([place] [address] )     Follow up with Cardiology as scheduled  Metoprolol refilled till seen by Cardiology          Relevant Medications    metoprolol tartrate (LOPRESSOR) 100 mg tablet       Other    QT prolongation      Other Visit Diagnoses     Screening for colon cancer    -  Primary    Diabetes 1 5, managed as type 2 ([place] [address] )        Relevant Orders    Ambulatory referral to Diabetic Education    Anxiety        Relevant Medications    LORazepam (ATIVAN) 1 mg tablet            Subjective:      Patient ID: Sada Reaves is a 66 y o  female  65 yo female recently inpatient due to new onset A fib, here today for follow up  Currently here main concern is she is unable to sleep since her Trazodone was stopped at the hospital because it was causing QT prolongation          The following portions of the patient's history were reviewed and updated as appropriate:   She  has a past medical history of Cirrhosis of liver ([place] [address] );  Diabetes mellitus ([place] [address] ); Disease of thyroid gland; HLD (hyperlipidemia); Hypertension; Psychiatric disorder; and Tobacco dependence  She   Patient Active Problem List    Diagnosis Date Noted    QT prolongation 07/19/2018    New onset atrial fibrillation ([place] [address] ) 07/17/2018    Demand ischemia of myocardium ([place] [address] ) 07/17/2018    Pancreatitis 08/24/2015    Alcohol dependence ([place] [address] ) 06/17/2014    Hypothyroidism 52/89/2607    Alcoholic cirrhosis ([place] [address] ) 33/72/1430    Benign essential hypertension 08/13/2012    Hyperlipidemia 08/13/2012    Type II diabetes mellitus ([place] [address] ) 08/13/2012     She  has a past surgical history that includes Appendectomy and Carpal tunnel release  Her family history includes Diabetes in her brother, father, and mother; Hypertension in her brother  She  reports that she has been smoking Cigarettes  She has been smoking about 1 00 pack per day  She has never used smokeless tobacco  She reports that she drinks alcohol  She reports that she does not use drugs  Current Outpatient Prescriptions   Medication Sig Dispense Refill    apixaban (ELIQUIS) 5 mg Take 1 tablet (5 mg total) by mouth 2 (two) times a day 60 tablet 0    buPROPion (WELLBUTRIN) 75 mg tablet Take 1 tablet (75 mg total) by mouth 2 (two) times a day 180 tablet 0    lisinopril (ZESTRIL) 20 mg tablet Take 1 tablet (20 mg total) by mouth daily 30 tablet 0    LORazepam (ATIVAN) 1 mg tablet Take 1 tablet (1 mg total) by mouth daily at bedtime 30 tablet 0    metoprolol tartrate (LOPRESSOR) 100 mg tablet Take 1 tablet (100 mg total) by mouth every 12 (twelve) hours 60 tablet 0    spironolactone (ALDACTONE) 25 mg tablet Take 1 tablet (25 mg total) by mouth daily 90 tablet 0    thiamine 100 MG tablet Take 1 tablet (100 mg total) by mouth daily 30 tablet 0     No current facility-administered medications for this visit        Current Outpatient Prescriptions on File Prior to Visit   Medication Sig    apixaban (ELIQUIS) 5 mg Take 1 tablet (5 mg total) by mouth 2 (two) times a day    buPROPion (WELLBUTRIN) 75 mg tablet Take 1 tablet (75 mg total) by mouth 2 (two) times a day    spironolactone (ALDACTONE) 25 mg tablet Take 1 tablet (25 mg total) by mouth daily    thiamine 100 MG tablet Take 1 tablet (100 mg total) by mouth daily    [DISCONTINUED] lisinopril (ZESTRIL) 20 mg tablet Take 1 tablet (20 mg total) by mouth daily    [DISCONTINUED] metoprolol tartrate (LOPRESSOR) 100 mg tablet Take 1 tablet (100 mg total) by mouth every 12 (twelve) hours     No current facility-administered medications on file prior to visit       Review of Systems   Psychiatric/Behavioral: Positive for sleep disturbance  All other systems reviewed and are negative  Objective:      /78   Pulse 92   Temp (!) 97 °F (36 1 °C) (Tympanic)   Resp 20   Ht 5' 2" (1 575 m)   Wt 97 1 kg (214 lb)   SpO2 98%   BMI 39 14 kg/m²          Physical Exam   Constitutional: She is oriented to person, place, and time  She appears well-developed  HENT:   Head: Normocephalic  Right Ear: External ear normal    Left Ear: External ear normal    Nose: Nose normal    Mouth/Throat: Oropharynx is clear and moist    Eyes: Conjunctivae and EOM are normal  Pupils are equal, round, and reactive to light  Neck: Normal range of motion  Neck supple  No thyromegaly present  Cardiovascular: Normal rate, regular rhythm and normal heart sounds  Pulmonary/Chest: Effort normal and breath sounds normal    Abdominal: Soft  There is no tenderness  There is no rebound and no guarding  Musculoskeletal: Normal range of motion  Neurological: She is alert and oriented to person, place, and time  She has normal reflexes  Skin: Skin is dry  Psychiatric: She has a normal mood and affect  Nursing note and vitals reviewed

## 2018-08-20 RX ORDER — TRAZODONE HYDROCHLORIDE 150 MG/1
TABLET ORAL
COMMUNITY
Start: 2018-07-25 | End: 2018-08-23 | Stop reason: ALTCHOICE

## 2018-08-20 RX ORDER — METOPROLOL SUCCINATE 50 MG/1
50 TABLET, EXTENDED RELEASE ORAL
COMMUNITY
Start: 2015-07-07 | End: 2018-08-23 | Stop reason: DRUGHIGH

## 2018-08-20 RX ORDER — IBUPROFEN 200 MG
600 TABLET ORAL EVERY 6 HOURS
COMMUNITY
End: 2018-08-23 | Stop reason: ALTCHOICE

## 2018-08-23 ENCOUNTER — OFFICE VISIT (OUTPATIENT)
Dept: CARDIOLOGY CLINIC | Facility: CLINIC | Age: 58
End: 2018-08-23
Payer: COMMERCIAL

## 2018-08-23 VITALS
BODY MASS INDEX: 39.34 KG/M2 | HEIGHT: 62 IN | DIASTOLIC BLOOD PRESSURE: 70 MMHG | SYSTOLIC BLOOD PRESSURE: 122 MMHG | HEART RATE: 102 BPM | OXYGEN SATURATION: 98 % | WEIGHT: 213.8 LBS

## 2018-08-23 DIAGNOSIS — I48.91 ATRIAL FIBRILLATION, UNSPECIFIED TYPE (HCC): Primary | ICD-10-CM

## 2018-08-23 DIAGNOSIS — I48.19 PERSISTENT ATRIAL FIBRILLATION (HCC): Primary | ICD-10-CM

## 2018-08-23 PROCEDURE — 99214 OFFICE O/P EST MOD 30 MIN: CPT | Performed by: INTERNAL MEDICINE

## 2018-08-23 PROCEDURE — 93000 ELECTROCARDIOGRAM COMPLETE: CPT | Performed by: INTERNAL MEDICINE

## 2018-08-23 RX ORDER — ACETAMINOPHEN 325 MG/1
650 TABLET ORAL EVERY 6 HOURS PRN
COMMUNITY
End: 2018-12-20 | Stop reason: ALTCHOICE

## 2018-08-23 NOTE — PROGRESS NOTES
Cardiology Follow Up    Estiven Plata  1960  [id number]  [address] [facility]  [address] [place] [id number] [phone]    1  Atrial fibrillation, unspecified type ([facility] )  POCT ECG         Interval History:   Patient was 1st seen in the hospital where she presented with new onset atrial fibrillation with a rapid ventricular response  She had not been anticoagulated  She was placed on Eliquis and rate control was marginally achieved with metoprolol 100 mg b i d  She has now been on anticoagulation for a month  Will arrange for patient to have an electrical cardioversion through the short procedure unit  May consider starting Multaq following cardioversion depending on patient's rate  Patient Active Problem List   Diagnosis    Alcohol dependence ([place] [address] )    Alcoholic cirrhosis ([place] [address] )    Benign essential hypertension    Hyperlipidemia    Hypothyroidism    Pancreatitis    Type II diabetes mellitus ([place] [address] )    New onset atrial fibrillation (HCC)    Demand ischemia of myocardium (HCC)    QT prolongation     Past Medical History:   Diagnosis Date    Cir[place]osis [place] liver ([place] [address] )     Diabetes mellitus ([place] [address] )     Disease of thyroid gland     hypothyrodism    HLD (hyperlipidemia)     Hypertension     Psychiatric disorder     Depression    Tobacco dependence      Social History     Social History    Marital status: Single     Spouse name: N/A    Number of children: N/A    Years of education: N/A     Occupational History    Not on file       Social History Main Topics    Smoking status: Current Every Day Smoker     Packs/day: 1 00     Types: Cigarettes    Smokeless tobacco: Never Used    Alcohol use Yes      Comment: 1 botttle of vodka over 4 days    Drug use: No    Sexual activity: No     Other Topics Concern    Not on file     Social History Narrative    No narrative on file      Family History   Problem Relation Age of Onset    Diabetes Mother     Diabetes Father     Diabetes Brother     Hypertension Brother      Past Surgical History:   Procedure Laterality Date    APPENDECTOMY      CARPAL TUNNEL RELEASE         Current Outpatient Prescriptions:     acetaminophen (TYLENOL) 325 mg tablet, Take 650 mg by mouth every 6 (six) hours as needed for mild pain, Disp: , Rfl:     apixaban (ELIQUIS) 5 mg, Take 1 tablet (5 mg total) by mouth 2 (two) times a day, Disp: 60 tablet, Rfl: 0    buPROPion (WELLBUTRIN) 75 mg tablet, Take 1 tablet (75 mg total) by mouth 2 (two) times a day, Disp: 180 tablet, Rfl: 0    lisinopril (ZESTRIL) 20 mg tablet, Take 1 tablet (20 mg total) by mouth daily, Disp: 30 tablet, Rfl: 0    LORazepam (ATIVAN) 1 mg tablet, Take 1 tablet (1 mg total) by mouth daily at bedtime, Disp: 30 tablet, Rfl: 0    metoprolol tartrate (LOPRESSOR) 100 mg tablet, Take 1 tablet (100 mg total) by mouth every 12 (twelve) hours, Disp: 60 tablet, Rfl: 0    spironolactone (ALDACTONE) 25 mg tablet, Take 1 tablet (25 mg total) by mouth daily, Disp: 90 tablet, Rfl: 0  Allergies   Allergen Reactions    Augmentin [Amoxicillin-Pot Clavulanate]      Yeast infection       Results for orders placed or performed in visit on 08/23/18   POCT ECG    Narrative    Atrial fibrillation with a ventricular response of 102 beats per minute  Minor nonspecific ST T wave changes           No results found for: CHOL  Lab Results   Component Value Date    HDL 56 05/09/2018     Lab Results   Component Value Date    LDLCALC 125 (H) 05/09/2018     Lab Results   Component Value Date    TRIG 103 05/09/2018     No components found for: [place], [place]  Lab Results   Component Value Date    GLUCOSE 126 (H) 07/20/2018    CALCIUM 9 5 07/24/2018     (L) 07/24/2018    K 4 0 07/24/2018    CO2 25 07/24/2018     07/24/2018    BUN 17 07/24/2018    CREATININE 0 94 07/24/2018     Lab Results   Component Value Date     (L) 07/24/2018    K 4 0 07/24/2018     07/24/2018    CO2 25 07/24/2018    ANIONGAP 7 07/24/2018    BUN 17 07/24/2018    CREATININE 0 94 07/24/2018    GLUCOSE 126 (H) 07/20/2018    GLUF 194 (H) 07/24/2018    CALCIUM 9 5 07/24/2018    AST 35 07/18/2018    ALT 33 07/18/2018    ALKPHOS 82 07/18/2018    PROT 6 6 07/18/2018    BILITOT 2 50 (H) 07/18/2018    EGFR 68 07/24/2018     Lab Results   Component Value Date    WBC 9 70 07/18/2018    HGB 13 5 07/18/2018    HCT 39 7 07/18/2018    MCV 88 07/18/2018     07/18/2018     Lab Results   Component Value Date    SYG6MPFCVMHL 2 250 07/17/2018         Review of Systems:  Review of Systems   Constitutional: Negative  HENT: Negative  Respiratory: Negative for chest tightness and shortness of breath  Cardiovascular: Negative for chest pain and leg swelling  Gastrointestinal: Negative  Endocrine: Negative  Genitourinary: Negative  Musculoskeletal: Negative  Skin: Negative  Allergic/Immunologic: Negative  Neurological: Negative  Hematological: Negative  Psychiatric/Behavioral: Negative  Physical Exam:  /70 (BP Location: Left arm, Patient Position: Sitting, Cuff Size: Large)   Pulse 102   Ht 5' 2" (1 575 m)   Wt 97 kg (213 lb 12 8 oz)   SpO2 98%   BMI 39 10 kg/m²   Physical Exam   Constitutional: She is oriented to person, place, and time  She appears well-developed and well-nourished  HENT:   Head: Normocephalic and atraumatic  Eyes: Conjunctivae and EOM are normal  Pupils are equal, round, and reactive to light  Neck: Normal range of motion  Neck supple  No JVD present  No tracheal deviation present  No thyromegaly present  Cardiovascular: Normal rate, normal heart sounds and intact distal pulses  Exam reveals no gallop and no friction rub  No murmur heard  Pulmonary/Chest: Effort normal  No respiratory distress  She has no rales  Abdominal: Soft   Bowel sounds are normal    Musculoskeletal: Normal range of motion  She exhibits no edema  Neurological: She is alert and oriented to person, place, and time  Skin: Skin is warm and dry  Psychiatric: She has a normal mood and affect  Her behavior is normal        Discussion/Summary:  1  New onset atrial fibrillation with rate control  2  Arrange electrical cardioversion through the short procedure unit on September 12 at 8:30 a m  Alissa Milton Patient will need to be to the hospital by about 7:00 a m  Alissa Milton

## 2018-08-24 DIAGNOSIS — I48.91 NEW ONSET ATRIAL FIBRILLATION (HCC): ICD-10-CM

## 2018-08-24 DIAGNOSIS — K70.30 ALCOHOLIC CIRRHOSIS OF LIVER WITHOUT ASCITES (HCC): ICD-10-CM

## 2018-08-24 RX ORDER — SPIRONOLACTONE 25 MG/1
TABLET ORAL
Qty: 30 TABLET | Refills: 2 | Status: SHIPPED | OUTPATIENT
Start: 2018-08-24 | End: 2018-10-17 | Stop reason: SDUPTHER

## 2018-09-05 DIAGNOSIS — F41.9 ANXIETY: ICD-10-CM

## 2018-09-05 RX ORDER — BUPROPION HYDROCHLORIDE 75 MG/1
75 TABLET ORAL 2 TIMES DAILY
Qty: 180 TABLET | Refills: 0 | Status: SHIPPED | OUTPATIENT
Start: 2018-09-05 | End: 2018-10-17 | Stop reason: SDUPTHER

## 2018-09-11 ENCOUNTER — TELEPHONE (OUTPATIENT)
Dept: SURGERY | Facility: HOSPITAL | Age: 58
End: 2018-09-11

## 2018-09-12 ENCOUNTER — DOCUMENTATION (OUTPATIENT)
Dept: NON INVASIVE DIAGNOSTICS | Facility: HOSPITAL | Age: 58
End: 2018-09-12

## 2018-09-12 ENCOUNTER — PREP FOR PROCEDURE (OUTPATIENT)
Dept: NON INVASIVE DIAGNOSTICS | Facility: HOSPITAL | Age: 58
End: 2018-09-12

## 2018-09-12 ENCOUNTER — TELEPHONE (OUTPATIENT)
Dept: CARDIOLOGY CLINIC | Facility: CLINIC | Age: 58
End: 2018-09-12

## 2018-09-12 ENCOUNTER — HOSPITAL ENCOUNTER (OUTPATIENT)
Dept: INTERVENTIONAL RADIOLOGY/VASCULAR | Facility: HOSPITAL | Age: 58
Discharge: HOME | End: 2018-09-12
Admitting: INTERNAL MEDICINE
Payer: COMMERCIAL

## 2018-09-12 ENCOUNTER — ANESTHESIA EVENT (OUTPATIENT)
Dept: INTERVENTIONAL RADIOLOGY/VASCULAR | Facility: HOSPITAL | Age: 58
End: 2018-09-12

## 2018-09-12 ENCOUNTER — ANESTHESIA (OUTPATIENT)
Dept: INTERVENTIONAL RADIOLOGY/VASCULAR | Facility: HOSPITAL | Age: 58
End: 2018-09-12

## 2018-09-12 VITALS
SYSTOLIC BLOOD PRESSURE: 135 MMHG | HEIGHT: 62 IN | RESPIRATION RATE: 18 BRPM | TEMPERATURE: 96.8 F | DIASTOLIC BLOOD PRESSURE: 65 MMHG | HEART RATE: 64 BPM | WEIGHT: 215 LBS | OXYGEN SATURATION: 98 % | BODY MASS INDEX: 39.56 KG/M2

## 2018-09-12 DIAGNOSIS — I48.19 PERSISTENT ATRIAL FIBRILLATION (HCC): ICD-10-CM

## 2018-09-12 DIAGNOSIS — I48.19 PERSISTENT ATRIAL FIBRILLATION (HCC): Primary | ICD-10-CM

## 2018-09-12 LAB
ATRIAL RATE: 234 BPM
ATRIAL RATE: 70 BPM
P AXIS: -1 DEGREES
PR INTERVAL: 168 MS
QRS AXIS: 60 DEGREES
QRS AXIS: 8 DEGREES
QRSD INTERVAL: 64 MS
QRSD INTERVAL: 66 MS
QT INTERVAL: 346 MS
QT INTERVAL: 418 MS
QTC INTERVAL: 432 MS
QTC INTERVAL: 451 MS
T WAVE AXIS: 16 DEGREES
T WAVE AXIS: 20 DEGREES
VENTRICULAR RATE: 70 BPM
VENTRICULAR RATE: 94 BPM

## 2018-09-12 PROCEDURE — 92960 CARDIOVERSION ELECTRIC EXT: CPT | Performed by: INTERNAL MEDICINE

## 2018-09-12 PROCEDURE — 92960 CARDIOVERSION ELECTRIC EXT: CPT

## 2018-09-12 PROCEDURE — 93010 ELECTROCARDIOGRAM REPORT: CPT | Performed by: INTERNAL MEDICINE

## 2018-09-12 PROCEDURE — 93005 ELECTROCARDIOGRAM TRACING: CPT

## 2018-09-12 RX ORDER — LIDOCAINE HYDROCHLORIDE 10 MG/ML
INJECTION, SOLUTION INFILTRATION; PERINEURAL AS NEEDED
Status: DISCONTINUED | OUTPATIENT
Start: 2018-09-12 | End: 2018-09-12 | Stop reason: SURG

## 2018-09-12 RX ORDER — SODIUM CHLORIDE 9 MG/ML
50 INJECTION, SOLUTION INTRAVENOUS CONTINUOUS
Status: DISCONTINUED | OUTPATIENT
Start: 2018-09-12 | End: 2018-09-16 | Stop reason: HOSPADM

## 2018-09-12 RX ORDER — PROPOFOL 10 MG/ML
INJECTION, EMULSION INTRAVENOUS AS NEEDED
Status: DISCONTINUED | OUTPATIENT
Start: 2018-09-12 | End: 2018-09-12 | Stop reason: SURG

## 2018-09-12 RX ADMIN — PROPOFOL 120 MG: 10 INJECTION, EMULSION INTRAVENOUS at 08:42

## 2018-09-12 RX ADMIN — SODIUM CHLORIDE: 9 INJECTION, SOLUTION INTRAVENOUS at 08:34

## 2018-09-12 RX ADMIN — SODIUM CHLORIDE 50 ML/HR: 9 INJECTION, SOLUTION INTRAVENOUS at 07:50

## 2018-09-12 RX ADMIN — DRONEDARONE 400 MG: 400 TABLET, FILM COATED ORAL at 10:00

## 2018-09-12 RX ADMIN — LIDOCAINE HYDROCHLORIDE 50 MG: 10 INJECTION, SOLUTION INFILTRATION; PERINEURAL at 08:42

## 2018-09-12 NOTE — H&P
H&P Exam - Cardiology   Rodolfo Hussein 66 y o  female MRN: [id number]  Unit/Bed#:  Encounter: [id number]    Assessment/Plan     Assessment:  Persistent atrial fibrillation  Plan:  Electrical cardioversion    History of Present Illness   HPI:  Rodolfo Hussein is a 66 y o  female who was 1st seen in the hospital with rapid atrial fibrillation  She was subsequently anticoagulated with Eliquis and placed on metoprolol 100 mg b i d  to obtain rate control  She has now been anticoagulated for over 30 days  She is admitted to the short procedure unit for electrical cardioversion      Patient Active Problem List   Diagnosis    Alcohol dependence ([place] [address] )    Alcoholic cirrhosis ([place] [address] )    Benign essential hypertension    Hyperlipidemia    Hypothyroidism    Pancreatitis    Type II diabetes mellitus ([place] [address] )    New onset atrial fibrillation (HCC)    Demand ischemia of myocardium (HCC)    QT prolongation      Medical History        Past Medical History:   Diagnosis Date    Cirrhosis of liver ([place] [address] )      Diabetes mellitus ([place] [address] )      Disease of thyroid gland       hypothyrodism    HLD (hyperlipidemia)      Hypertension      Psychiatric disorder       Depression    Tobacco dependence           Social History   Social History            Social History    Marital status: Single       Spouse name: N/A    Number of children: N/A    Years of education: N/A          Occupational History    Not on file              Social History Main Topics    Smoking status: Current Every Day Smoker       Packs/day: 1 00       Types: Cigarettes    Smokeless tobacco: Never Used    Alcohol use Yes         Comment: 1 botttle of vodka over 4 days    Drug use: No    Sexual activity: No           Other Topics Concern    Not on file          Social History Narrative    No narrative on file               Family History   Problem Relation Age of Onset    Diabetes Mother      Diabetes Father      Diabetes Brother      Hypertension Brother      Surgical History         Past Surgical History:   Procedure Laterality Date    APPENDECTOMY        CARPAL TUNNEL RELEASE                Current Outpatient Prescriptions:     acetaminophen (TYLENOL) 325 mg tablet, Take 650 mg by mouth every 6 (six) hours as needed for mild pain, Disp: , Rfl:     apixaban (ELIQUIS) 5 mg, Take 1 tablet (5 mg total) by mouth 2 (two) times a day, Disp: 60 tablet, Rfl: 0    buPROPion (WELLBUTRIN) 75 mg tablet, Take 1 tablet (75 mg total) by mouth 2 (two) times a day, Disp: 180 tablet, Rfl: 0    lisinopril (ZESTRIL) 20 mg tablet, Take 1 tablet (20 mg total) by mouth daily, Disp: 30 tablet, Rfl: 0    LORazepam (ATIVAN) 1 mg tablet, Take 1 tablet (1 mg total) by mouth daily at bedtime, Disp: 30 tablet, Rfl: 0    metoprolol tartrate (LOPRESSOR) 100 mg tablet, Take 1 tablet (100 mg total) by mouth every 12 (twelve) hours, Disp: 60 tablet, Rfl: 0    spironolactone (ALDACTONE) 25 mg tablet, Take 1 tablet (25 mg total) by mouth daily, Disp: 90 tablet, Rfl: 0        Allergies   Allergen Reactions    Augmentin [Amoxicillin-Pot Clavulanate]         Yeast infection         Review of Systems   Constitutional: Negative  HENT: Negative  Respiratory: Negative for chest tightness and shortness of breath  Cardiovascular: Negative for chest pain and leg swelling  Gastrointestinal: Negative  Endocrine: Negative  Genitourinary: Negative  Musculoskeletal: Negative  Skin: Negative  Allergic/Immunologic: Negative  Neurological: Negative  Hematological: Negative  Psychiatric/Behavioral: Negative          Historical Information   Past Medical History:   Diagnosis Date    Anxiety     Arthritis     [facility]     Diabetes mellitus [facility] )     HLD (hyperlipidemia)     Hypertension     Irregular heart beat     Psychiatric disorder     Depression    Tobacco dependence      Past Surgical History:   Procedure Laterality Date    APPENDECTOMY      BUNIONECTOMY Right     CARPAL TUNNEL RELEASE      CHOLECYSTECTOMY       Social History   History   Alcohol Use    Yes     Comment: 1 botttle of vodka over 4 days/denies since 7/18     History   Drug Use No     History   Smoking Status    Current Every Day Smoker    Packs/day: 1 00    Types: Cigarettes   Smokeless Tobacco    Never Used     Family History:   Family History   Problem Relation Age of Onset    Diabetes Mother     Diabetes Father     Diabetes Brother     Hypertension Brother        Meds/Allergies   all medications and allergies reviewed  Allergies   Allergen Reactions    Augmentin [Amoxicillin-Pot Clavulanate]      Yeast infection       Objective   Vitals: There were no vitals taken for this visit  [unfilled]    Invasive Devices     Peripheral Intravenous Line            Peripheral IV 09/12/18 Left Antecubital less than 1 day                Physical Exam   Constitutional: She is oriented to person, place, and time  She appears well-developed and well-nourished  HENT:   Head: Normocephalic and atraumatic  Eyes: Conjunctivae and EOM are normal  Pupils are equal, round, and reactive to light  Neck: Normal range of motion  Neck supple  No JVD present  No tracheal deviation present  No thyromegaly present  Cardiovascular: Normal rate, normal heart sounds and intact distal pulses  Exam reveals no gallop and no friction rub  No murmur heard  Pulmonary/Chest: Effort normal  No respiratory distress  She has no rales  Abdominal: Soft  Bowel sounds are normal    Musculoskeletal: Normal range of motion  She exhibits no edema  Neurological: She is alert and oriented to person, place, and time  Skin: Skin is warm and dry  Psychiatric: She has a normal mood and affect  Her behavior is normal            EKG: Atrial Fibrillation with controlled rate

## 2018-09-12 NOTE — PERIOPERATIVE NURSING NOTE
ivs out  Denies any pain or lightheadedness  Bella Simons Litvaughn here to see patient  Discharged ambulatory after discharge instructions given and verbalized an understanding of same

## 2018-09-12 NOTE — DISCHARGE INSTRUCTIONS
Has appt  for 2 weeks  Script for multaq 400 mg  Given to patient  Instructed to give with meals  Notify physician or go to er for any symptoms of afib  Cardioversion   WHAT YOU SHOULD KNOW:   Cardioversion is a procedure to correct arrhythmias, which is when your heart beats too fast or irregularly  Arrhythmias may prevent your body from getting the blood and oxygen it needs  Cardioversion delivers a shock of electricity to your heart to help it return to its normal rhythm  INSTRUCTIONS:   Medicines:   · Anticoagulants    are a type of blood thinner medicine that helps prevent clots  Clots can cause strokes, heart attacks, and death  These medicines may cause you to bleed or bruise more easily  ¨ Watch for bleeding from your gums or nose  Watch for blood in your urine and bowel movements  Use a soft washcloth and a soft toothbrush  If you shave, use an electric razor  Avoid activities that can cause bruising or bleeding  ¨ Tell your healthcare provider about all medicines you take because many medicines cannot be used with anticoagulants  Do not start or stop any medicines unless your healthcare provider tells you to  Tell your dentist and other healthcare providers that you take anticoagulants  Wear a bracelet or necklace that says you take this medicine  ¨ You will need regular blood tests so your healthcare provider can decide how much medicine you need  Take anticoagulants exactly as directed  Tell your healthcare provider right away if you forget to take the medicine, or if you take too much  ¨ If you take warfarin, some foods can change how your blood clots  Do not make major changes to your diet while you take warfarin  Warfarin works best when you eat about the same amount of vitamin K every day  Vitamin K is found in green leafy vegetables, broccoli, grapes, and other foods  Ask for more information about what to eat when you take warfarin  · Heart medicine:   This medicine is given to strengthen or regulate your heartbeat  · Take your medicine as directed  Call your healthcare provider if you think your medicine is not helping or if you have side effects  Tell him if you are allergic to any medicine  Keep a list of the medicines, vitamins, and herbs you take  Include the amounts, and when and why you take them  Bring the list or the pill bottles to follow-up visits  Carry your medicine list with you in case of an emergency  Follow up with your cardiologist as directed:  Write down your questions so you remember to ask them during your visits  Contact your cardiologist if:   · You have a fever  · You have new or worsening weakness or tiredness  · You have questions or concerns about your condition or care  Return to the emergency department if:   · You feel like your heart is fluttering or jumping in your chest     · You feel lightheaded or you have fainted  · You have chest pain when you take a deep breath or cough  You may cough up blood  · You have discomfort in your chest that feels like squeezing, pressure, fullness, or pain  · You have pain or discomfort in your back, neck, jaw, stomach, or arm  · You have weakness or numbness in part of your body  · You have sudden trouble breathing  · You become confused or have difficulty speaking  · You have dizziness, a severe headache, or vision loss  © 2014 [address] [employer] is for End User's use only and may not be sold, redistributed or otherwise used for commercial purposes  All illustrations and images included in CareNotes® are the copyrighted property of [employer]  or [employer]  The above information is an  only  It is not intended as medical advice for individual conditions or treatments  Talk to your doctor, nurse or pharmacist before following any medical regimen to see if it is safe and effective for you

## 2018-09-12 NOTE — PROCEDURES
Patient was cardioverted from atrial fibrillation to normal sinus rhythm with the assistance of anesthesia  Three shocks were administered at 150 joules, 200 joules and 200 joules  With the 3rd shock, normal sinus rhythm was obtained  The patient recovered from anesthesia without difficulty

## 2018-09-12 NOTE — ANESTHESIA PREPROCEDURE EVALUATION
Review of Systems/Medical History      No history of anesthetic complications     Cardiovascular  Exercise tolerance (METS): >4,  Hyperlipidemia, Hypertension controlled, Dysrhythmias , atrial fibrillation,    Pulmonary  Smoker , Tobacco cessation counseling given Cumulative Pack Years: 25,        GI/Hepatic    Liver disease , cirrhosis,        Negative  ROS        Endo/Other  Diabetes well controlled type 2 Diet controlled, No history of thyroid disease ,      GYN  Negative gynecology ROS          Hematology   Musculoskeletal       Neurology  Negative neurology ROS      Psychology   Negative psychology ROS Anxiety, Depression ,              Physical Exam    Airway    Mallampati score: II  TM Distance: >3 FB  Neck ROM: full     Dental       Cardiovascular  Cardiovascular exam normal    Pulmonary  Pulmonary exam normal     Other Findings        Anesthesia Plan  ASA Score- 3     Anesthesia Type- IV sedation with anesthesia with ASA Monitors  Additional Monitors:   Airway Plan:         Plan Factors- Patient instructed to abstain from smoking on day of procedure  Patient smoked on day of surgery  Induction- intravenous  Postoperative Plan-     Informed Consent- Anesthetic plan and risks discussed with patient              Lab Results   Component Value Date    HGBA1C 6 7 08/15/2018       Lab Results   Component Value Date     (L) 07/24/2018    K 4 0 07/24/2018     07/24/2018    CO2 25 07/24/2018    BUN 17 07/24/2018    CREATININE 0 94 07/24/2018    GLUF 194 (H) 07/24/2018    CALCIUM 9 5 07/24/2018    AST 35 07/18/2018    ALT 33 07/18/2018    ALKPHOS 82 07/18/2018    EGFR 68 07/24/2018       Lab Results   Component Value Date    WBC 9 70 07/18/2018    HGB 13 5 07/18/2018    HCT 39 7 07/18/2018    MCV 88 07/18/2018     07/18/2018

## 2018-09-12 NOTE — TELEPHONE ENCOUNTER
Called [facility] to see if new medication prescribed by Dr Maki Trorez following cardioversion would need a prior authorization  Spoke with Lloyd Gupta from [facility], she stated that the medication is a tier 3 medication that does not need prior authorization, reference number for the call is [id number]

## 2018-09-12 NOTE — PERIOPERATIVE NURSING NOTE
Returned from cath lab sleepy but arousable  ivs running  Denies any pain  Eating and drinking  Awaiting multaq

## 2018-09-17 DIAGNOSIS — I10 BENIGN ESSENTIAL HYPERTENSION: ICD-10-CM

## 2018-09-17 DIAGNOSIS — I48.91 NEW ONSET ATRIAL FIBRILLATION (HCC): ICD-10-CM

## 2018-09-17 DIAGNOSIS — I48.91 ATRIAL FIBRILLATION, UNSPECIFIED TYPE (HCC): Primary | ICD-10-CM

## 2018-09-17 RX ORDER — LISINOPRIL 20 MG/1
20 TABLET ORAL DAILY
Qty: 30 TABLET | Refills: 5 | Status: SHIPPED | OUTPATIENT
Start: 2018-09-17 | End: 2018-09-27 | Stop reason: SDUPTHER

## 2018-09-17 RX ORDER — METOPROLOL TARTRATE 100 MG/1
100 TABLET ORAL EVERY 12 HOURS SCHEDULED
Qty: 60 TABLET | Refills: 5 | Status: SHIPPED | OUTPATIENT
Start: 2018-09-17 | End: 2018-11-09 | Stop reason: SDUPTHER

## 2018-09-27 ENCOUNTER — OFFICE VISIT (OUTPATIENT)
Dept: CARDIOLOGY CLINIC | Facility: CLINIC | Age: 58
End: 2018-09-27
Payer: COMMERCIAL

## 2018-09-27 VITALS
HEART RATE: 64 BPM | HEIGHT: 62 IN | OXYGEN SATURATION: 98 % | DIASTOLIC BLOOD PRESSURE: 70 MMHG | BODY MASS INDEX: 39.38 KG/M2 | SYSTOLIC BLOOD PRESSURE: 142 MMHG | WEIGHT: 214 LBS

## 2018-09-27 DIAGNOSIS — I48.91 NEW ONSET ATRIAL FIBRILLATION (HCC): ICD-10-CM

## 2018-09-27 DIAGNOSIS — Z98.890 HISTORY OF CARDIOVERSION: ICD-10-CM

## 2018-09-27 DIAGNOSIS — I10 BENIGN ESSENTIAL HYPERTENSION: ICD-10-CM

## 2018-09-27 DIAGNOSIS — E78.2 MIXED HYPERLIPIDEMIA: ICD-10-CM

## 2018-09-27 DIAGNOSIS — I10 HYPERTENSION, UNSPECIFIED TYPE: Primary | ICD-10-CM

## 2018-09-27 PROBLEM — Z92.89 HISTORY OF CARDIOVERSION: Status: ACTIVE | Noted: 2018-09-27

## 2018-09-27 PROCEDURE — 93000 ELECTROCARDIOGRAM COMPLETE: CPT | Performed by: INTERNAL MEDICINE

## 2018-09-27 PROCEDURE — 99214 OFFICE O/P EST MOD 30 MIN: CPT | Performed by: INTERNAL MEDICINE

## 2018-09-27 RX ORDER — LISINOPRIL 20 MG/1
40 TABLET ORAL DAILY
Qty: 90 TABLET | Refills: 3 | Status: SHIPPED | OUTPATIENT
Start: 2018-09-27 | End: 2018-10-01 | Stop reason: SDUPTHER

## 2018-09-27 NOTE — PROGRESS NOTES
Cardiology Follow Up    Jeanie Galarza  1960  [id number]  [address] [facility]  [address] [place] [id number] [phone]    1  Hypertension, unspecified type  POCT ECG   2  Benign essential hypertension  lisinopril (ZESTRIL) 20 mg tablet   3  New onset atrial fibrillation ([facility] )     4  History of cardioversion     5  Mixed hyperlipidemia           Interval History:  Patient underwent a cardioversion from atrial fibrillation to normal sinus rhythm on September 12, 2018  She remains in normal sinus rhythm at this time  She is on Eliquis, but Toprol 100 mg b i d  and Multaq 400 mg b i d   She feels good and has no complaints  Patient Active Problem List   Diagnosis    [facility]    Alcoholic cirrhosis ([facility]    Benign essential hypertension    HLD (hyperlipidemia)    Hypothyroidism    Pancreatitis    Type II diabetes mellitus ([facility] )    New onset atrial fibrillation (HCC)    Demand ischemia of myocardium (HCC)    QT prolongation    History of cardioversion     Past Medical History:   Diagnosis Date    Anxiety     Arthritis     Cirrhosis of liver (HCC)     Diabetes mellitus ([facility] )     HLD (hyperlipidemia)     Hypertension     Irregular heart beat     Psychiatric disorder     Depression    Tobacco dependence      Social History     Social History    Marital status: Single     Spouse name: N/A    Number of children: N/A    Years of education: N/A     Occupational History    Not on file       Social History Main Topics    Smoking status: Current Every Day Smoker     Packs/day: 1 00     Types: Cigarettes    Smokeless tobacco: Never Used    Alcohol use Yes      Comment: 1 botttle of vodka over 4 days/denies since 7/18    Drug use: No    Sexual activity: No     Other Topics Concern    Not on file     Social History Narrative    No narrative on file      Family History Problem Relation Age of Onset    Diabetes Mother     Diabetes Father     Diabetes Brother     Hypertension Brother      Past Surgical History:   Procedure Laterality Date    APPENDECTOMY      BUNIONECTOMY Right     CARPAL TUNNEL RELEASE      CHOLECYSTECTOMY         Current Outpatient Prescriptions:     acetaminophen (TYLENOL) 325 mg tablet, Take 650 mg by mouth every 6 (six) hours as needed for mild pain, Disp: , Rfl:     apixaban (ELIQUIS) 5 mg, Take 1 tablet (5 mg total) by mouth 2 (two) times a day, Disp: 60 tablet, Rfl: 5    buPROPion (WELLBUTRIN) 75 mg tablet, Take 1 tablet (75 mg total) by mouth 2 (two) times a day, Disp: 180 tablet, Rfl: 0    dronedarone (MULTAQ) 400 mg tablet, Take 1 tablet (400 mg total) by mouth 2 (two) times a day with meals, Disp: , Rfl: 0    lisinopril (ZESTRIL) 20 mg tablet, Take 2 tablets (40 mg total) by mouth daily, Disp: 90 tablet, Rfl: 3    LORazepam (ATIVAN) 1 mg tablet, Take 1 tablet (1 mg total) by mouth daily at bedtime, Disp: 30 tablet, Rfl: 0    metoprolol tartrate (LOPRESSOR) 100 mg tablet, Take 1 tablet (100 mg total) by mouth every 12 (twelve) hours, Disp: 60 tablet, Rfl: 5    spironolactone (ALDACTONE) 25 mg tablet, TAKE ONE TABLET BY MOUTH ONCE DAILY, Disp: 30 tablet, Rfl: 2  Allergies   Allergen Reactions    Augmentin [Amoxicillin-Pot Clavulanate]      Yeast infection       Results for orders placed or performed in visit on 09/27/18   POCT ECG    Narrative    Normal sinus rhythm  Cannot rule out an old anterior wall myocardial infarction  However, anterior Q-waves may be secondary to poor lead placement due to obesity           No results found for: CHOL  Lab Results   Component Value Date    HDL 56 05/09/2018     Lab Results   Component Value Date    LDLCALC 125 (H) 05/09/2018     Lab Results   Component Value Date    TRIG 103 05/09/2018     No components found for: [place], [place]  Lab Results   Component Value Date    CALCIUM 9 5 07/24/2018     (L) 07/24/2018    K 4 0 07/24/2018    CO2 25 07/24/2018     07/24/2018    BUN 17 07/24/2018    CREATININE 0 94 07/24/2018     Lab Results   Component Value Date     (L) 07/24/2018    K 4 0 07/24/2018     07/24/2018    CO2 25 07/24/2018    BUN 17 07/24/2018    CREATININE 0 94 07/24/2018    GLUF 194 (H) 07/24/2018    CALCIUM 9 5 07/24/2018    AST 35 07/18/2018    ALT 33 07/18/2018    ALKPHOS 82 07/18/2018    EGFR 68 07/24/2018     Lab Results   Component Value Date    WBC 9 70 07/18/2018    HGB 13 5 07/18/2018    HCT 39 7 07/18/2018    MCV 88 07/18/2018     07/18/2018     Lab Results   Component Value Date    BXY2DKRRSGDG 2 250 07/17/2018         Review of Systems:  Review of Systems   Constitutional: Negative  HENT: Negative  Respiratory: Negative for chest tightness and shortness of breath  Cardiovascular: Negative for chest pain and leg swelling  Gastrointestinal: Negative  Endocrine: Negative  Genitourinary: Negative  Musculoskeletal: Negative  Skin: Negative  Allergic/Immunologic: Negative  Neurological: Negative  Hematological: Negative  Psychiatric/Behavioral: Negative  Physical Exam:  /70 (BP Location: Left arm, Patient Position: Sitting, Cuff Size: Adult)   Pulse 64   Ht 5' 2" (1 575 m)   Wt 97 1 kg (214 lb)   SpO2 98%   BMI 39 14 kg/m²   Physical Exam   Constitutional: She is oriented to person, place, and time  She appears well-developed and well-nourished  Obese   HENT:   Head: Normocephalic and atraumatic  Neck: Normal range of motion  Neck supple  No JVD present  No tracheal deviation present  No thyromegaly present  Cardiovascular: Normal rate, regular rhythm, normal heart sounds and intact distal pulses  Exam reveals no gallop and no friction rub  No murmur heard  Pulmonary/Chest: Effort normal  No respiratory distress  She has no rales  Abdominal: Soft   Bowel sounds are normal    Musculoskeletal: Normal range of motion  She exhibits no edema  Neurological: She is alert and oriented to person, place, and time  Skin: Skin is warm and dry  Psychiatric: She has a normal mood and affect  Her behavior is normal        Discussion/Summary:  1  Atrial fibrillation now in normal sinus rhythm  2  Hypertension  3  Hyperlipidemia  4  Continue present medications and return in 3 months

## 2018-10-01 DIAGNOSIS — I10 BENIGN ESSENTIAL HYPERTENSION: ICD-10-CM

## 2018-10-04 PROCEDURE — 4010F ACE/ARB THERAPY RXD/TAKEN: CPT | Performed by: FAMILY MEDICINE

## 2018-10-04 RX ORDER — LISINOPRIL 20 MG/1
40 TABLET ORAL DAILY
Qty: 180 TABLET | Refills: 2 | Status: SHIPPED | OUTPATIENT
Start: 2018-10-04 | End: 2020-09-18 | Stop reason: SDUPTHER

## 2018-10-17 DIAGNOSIS — K70.30 ALCOHOLIC CIRRHOSIS OF LIVER WITHOUT ASCITES (HCC): ICD-10-CM

## 2018-10-17 DIAGNOSIS — F41.9 ANXIETY: ICD-10-CM

## 2018-10-18 RX ORDER — LORAZEPAM 1 MG/1
1 TABLET ORAL
Qty: 30 TABLET | Refills: 0 | Status: SHIPPED | OUTPATIENT
Start: 2018-10-18 | End: 2018-11-15 | Stop reason: SDUPTHER

## 2018-10-18 RX ORDER — BUPROPION HYDROCHLORIDE 75 MG/1
75 TABLET ORAL 2 TIMES DAILY
Qty: 180 TABLET | Refills: 0 | Status: SHIPPED | OUTPATIENT
Start: 2018-10-18 | End: 2018-11-15 | Stop reason: SDUPTHER

## 2018-10-18 RX ORDER — SPIRONOLACTONE 25 MG/1
25 TABLET ORAL DAILY
Qty: 90 TABLET | Refills: 1 | Status: SHIPPED | OUTPATIENT
Start: 2018-10-18 | End: 2019-02-14

## 2018-11-09 DIAGNOSIS — I48.91 NEW ONSET ATRIAL FIBRILLATION (HCC): ICD-10-CM

## 2018-11-09 RX ORDER — METOPROLOL TARTRATE 100 MG/1
100 TABLET ORAL EVERY 12 HOURS SCHEDULED
Qty: 180 TABLET | Refills: 2 | Status: SHIPPED | OUTPATIENT
Start: 2018-11-09 | End: 2020-08-07 | Stop reason: SDUPTHER

## 2018-11-15 ENCOUNTER — APPOINTMENT (OUTPATIENT)
Dept: LAB | Facility: CLINIC | Age: 58
End: 2018-11-15
Payer: COMMERCIAL

## 2018-11-15 ENCOUNTER — OFFICE VISIT (OUTPATIENT)
Dept: FAMILY MEDICINE CLINIC | Facility: CLINIC | Age: 58
End: 2018-11-15
Payer: COMMERCIAL

## 2018-11-15 ENCOUNTER — CLINICAL SUPPORT (OUTPATIENT)
Dept: FAMILY MEDICINE CLINIC | Facility: CLINIC | Age: 58
End: 2018-11-15
Payer: COMMERCIAL

## 2018-11-15 ENCOUNTER — TRANSCRIBE ORDERS (OUTPATIENT)
Dept: LAB | Facility: CLINIC | Age: 58
End: 2018-11-15

## 2018-11-15 VITALS
HEIGHT: 62 IN | TEMPERATURE: 92.9 F | RESPIRATION RATE: 18 BRPM | WEIGHT: 223 LBS | SYSTOLIC BLOOD PRESSURE: 120 MMHG | HEART RATE: 71 BPM | OXYGEN SATURATION: 97 % | BODY MASS INDEX: 41.04 KG/M2 | DIASTOLIC BLOOD PRESSURE: 72 MMHG

## 2018-11-15 DIAGNOSIS — Z01.00 DIABETIC EYE EXAM (HCC): Primary | ICD-10-CM

## 2018-11-15 DIAGNOSIS — E66.01 CLASS 3 SEVERE OBESITY DUE TO EXCESS CALORIES WITHOUT SERIOUS COMORBIDITY WITH BODY MASS INDEX (BMI) OF 40.0 TO 44.9 IN ADULT (HCC): ICD-10-CM

## 2018-11-15 DIAGNOSIS — F41.9 ANXIETY: ICD-10-CM

## 2018-11-15 DIAGNOSIS — E11.9 DIABETIC EYE EXAM (HCC): Primary | ICD-10-CM

## 2018-11-15 DIAGNOSIS — E11.9 TYPE 2 DIABETES MELLITUS WITHOUT COMPLICATION, WITHOUT LONG-TERM CURRENT USE OF INSULIN (HCC): Primary | ICD-10-CM

## 2018-11-15 PROBLEM — E66.813 CLASS 3 SEVERE OBESITY WITHOUT SERIOUS COMORBIDITY IN ADULT (HCC): Status: ACTIVE | Noted: 2018-11-15

## 2018-11-15 LAB
ALBUMIN SERPL BCP-MCNC: 3.6 G/DL (ref 3.5–5)
ALP SERPL-CCNC: 95 U/L (ref 46–116)
ALT SERPL W P-5'-P-CCNC: 28 U/L (ref 12–78)
ANION GAP SERPL CALCULATED.3IONS-SCNC: 3 MMOL/L (ref 4–13)
AST SERPL W P-5'-P-CCNC: 13 U/L (ref 5–45)
BASOPHILS # BLD AUTO: 0.07 THOUSANDS/ΜL (ref 0–0.1)
BASOPHILS NFR BLD AUTO: 1 % (ref 0–1)
BILIRUB SERPL-MCNC: 0.45 MG/DL (ref 0.2–1)
BUN SERPL-MCNC: 19 MG/DL (ref 5–25)
CALCIUM SERPL-MCNC: 9 MG/DL (ref 8.3–10.1)
CHLORIDE SERPL-SCNC: 104 MMOL/L (ref 100–108)
CHOLEST SERPL-MCNC: 211 MG/DL (ref 50–200)
CO2 SERPL-SCNC: 26 MMOL/L (ref 21–32)
CREAT SERPL-MCNC: 1 MG/DL (ref 0.6–1.3)
CREAT UR-MCNC: 186 MG/DL
EOSINOPHIL # BLD AUTO: 0.1 THOUSAND/ΜL (ref 0–0.61)
EOSINOPHIL NFR BLD AUTO: 1 % (ref 0–6)
ERYTHROCYTE [DISTWIDTH] IN BLOOD BY AUTOMATED COUNT: 13.6 % (ref 11.6–15.1)
GFR SERPL CREATININE-BSD FRML MDRD: 63 ML/MIN/1.73SQ M
GLUCOSE P FAST SERPL-MCNC: 122 MG/DL (ref 65–99)
HCT VFR BLD AUTO: 42.6 % (ref 34.8–46.1)
HDLC SERPL-MCNC: 60 MG/DL (ref 40–60)
HGB BLD-MCNC: 13.5 G/DL (ref 11.5–15.4)
IMM GRANULOCYTES # BLD AUTO: 0.06 THOUSAND/UL (ref 0–0.2)
IMM GRANULOCYTES NFR BLD AUTO: 1 % (ref 0–2)
LDLC SERPL CALC-MCNC: 135 MG/DL (ref 0–100)
LEFT EYE DIABETIC RETINOPATHY: NORMAL
LEFT EYE IMAGE QUALITY: NORMAL
LYMPHOCYTES # BLD AUTO: 1.71 THOUSANDS/ΜL (ref 0.6–4.47)
LYMPHOCYTES NFR BLD AUTO: 17 % (ref 14–44)
MCH RBC QN AUTO: 29.3 PG (ref 26.8–34.3)
MCHC RBC AUTO-ENTMCNC: 31.7 G/DL (ref 31.4–37.4)
MCV RBC AUTO: 93 FL (ref 82–98)
MICROALBUMIN UR-MCNC: 37.2 MG/L (ref 0–20)
MICROALBUMIN/CREAT 24H UR: 20 MG/G CREATININE (ref 0–30)
MONOCYTES # BLD AUTO: 0.52 THOUSAND/ΜL (ref 0.17–1.22)
MONOCYTES NFR BLD AUTO: 5 % (ref 4–12)
NEUTROPHILS # BLD AUTO: 7.54 THOUSANDS/ΜL (ref 1.85–7.62)
NEUTS SEG NFR BLD AUTO: 75 % (ref 43–75)
NONHDLC SERPL-MCNC: 151 MG/DL
NRBC BLD AUTO-RTO: 0 /100 WBCS
PLATELET # BLD AUTO: 203 THOUSANDS/UL (ref 149–390)
PMV BLD AUTO: 11.5 FL (ref 8.9–12.7)
POTASSIUM SERPL-SCNC: 5 MMOL/L (ref 3.5–5.3)
PROT SERPL-MCNC: 7 G/DL (ref 6.4–8.2)
RBC # BLD AUTO: 4.6 MILLION/UL (ref 3.81–5.12)
RIGHT EYE DIABETIC RETINOPATHY: NORMAL
RIGHT EYE IMAGE QUALITY: NORMAL
SEVERITY (EYE EXAM): NORMAL
SL AMB POCT HEMOGLOBIN AIC: 6.7
SODIUM SERPL-SCNC: 133 MMOL/L (ref 136–145)
TRIGL SERPL-MCNC: 81 MG/DL
TSH SERPL DL<=0.05 MIU/L-ACNC: 1.97 UIU/ML (ref 0.36–3.74)
WBC # BLD AUTO: 10 THOUSAND/UL (ref 4.31–10.16)

## 2018-11-15 PROCEDURE — 83036 HEMOGLOBIN GLYCOSYLATED A1C: CPT | Performed by: FAMILY MEDICINE

## 2018-11-15 PROCEDURE — 99214 OFFICE O/P EST MOD 30 MIN: CPT | Performed by: FAMILY MEDICINE

## 2018-11-15 PROCEDURE — 3072F LOW RISK FOR RETINOPATHY: CPT | Performed by: FAMILY MEDICINE

## 2018-11-15 PROCEDURE — 3061F NEG MICROALBUMINURIA REV: CPT | Performed by: FAMILY MEDICINE

## 2018-11-15 PROCEDURE — 82570 ASSAY OF URINE CREATININE: CPT | Performed by: FAMILY MEDICINE

## 2018-11-15 PROCEDURE — 82043 UR ALBUMIN QUANTITATIVE: CPT | Performed by: FAMILY MEDICINE

## 2018-11-15 PROCEDURE — 3008F BODY MASS INDEX DOCD: CPT | Performed by: FAMILY MEDICINE

## 2018-11-15 PROCEDURE — 85025 COMPLETE CBC W/AUTO DIFF WBC: CPT | Performed by: FAMILY MEDICINE

## 2018-11-15 PROCEDURE — 92250 FUNDUS PHOTOGRAPHY W/I&R: CPT

## 2018-11-15 PROCEDURE — 3044F HG A1C LEVEL LT 7.0%: CPT | Performed by: FAMILY MEDICINE

## 2018-11-15 PROCEDURE — 36415 COLL VENOUS BLD VENIPUNCTURE: CPT | Performed by: FAMILY MEDICINE

## 2018-11-15 PROCEDURE — 80053 COMPREHEN METABOLIC PANEL: CPT | Performed by: FAMILY MEDICINE

## 2018-11-15 PROCEDURE — 4004F PT TOBACCO SCREEN RCVD TLK: CPT | Performed by: FAMILY MEDICINE

## 2018-11-15 PROCEDURE — 84443 ASSAY THYROID STIM HORMONE: CPT | Performed by: FAMILY MEDICINE

## 2018-11-15 PROCEDURE — 80061 LIPID PANEL: CPT | Performed by: FAMILY MEDICINE

## 2018-11-15 RX ORDER — LORAZEPAM 1 MG/1
TABLET ORAL
Qty: 45 TABLET | Refills: 0 | Status: SHIPPED | OUTPATIENT
Start: 2018-11-15 | End: 2019-02-14 | Stop reason: SDUPTHER

## 2018-11-15 RX ORDER — BUPROPION HYDROCHLORIDE 75 MG/1
75 TABLET ORAL 2 TIMES DAILY
Qty: 180 TABLET | Refills: 0 | Status: SHIPPED | OUTPATIENT
Start: 2018-11-15 | End: 2021-05-26 | Stop reason: SDUPTHER

## 2018-11-15 NOTE — ASSESSMENT & PLAN NOTE
May take a second Lorazepam only if needed not on a daily basis  Reviewed the addicting potential of Lorazepam

## 2018-11-15 NOTE — PATIENT INSTRUCTIONS
Obesity   AMBULATORY CARE:   Obesity  is when your body mass index (BMI) is greater than 30  Your healthcare provider will use your height and weight to measure your BMI  The risks of obesity include  many health problems, such as injuries or physical disability  You may need tests to check for the following:  · Diabetes     · High blood pressure or high cholesterol     · Heart disease     · Gallbladder or liver disease     · Cancer of the colon, breast, prostate, liver, or kidney     · Sleep apnea     · Arthritis or gout  Seek care immediately if:   · You have a severe headache, confusion, or difficulty speaking  · You have weakness on one side of your body  · You have chest pain, sweating, or shortness of breath  Contact your healthcare provider if:   · You have symptoms of gallbladder or liver disease, such as pain in your upper abdomen  · You have knee or hip pain and discomfort while walking  · You have symptoms of diabetes, such as intense hunger and thirst, and frequent urination  · You have symptoms of sleep apnea, such as snoring or daytime sleepiness  · You have questions or concerns about your condition or care  Treatment for obesity  focuses on helping you lose weight to improve your health  Even a small decrease in BMI can reduce the risk for many health problems  Your healthcare provider will help you set a weight-loss goal   · Lifestyle changes  are the first step in treating obesity  These include making healthy food choices and getting regular physical activity  Your healthcare provider may suggest a weight-loss program that involves coaching, education, and therapy  · Medicine  may help you lose weight when it is used with a healthy diet and physical activity  · Surgery  can help you lose weight if you are very obese and have other health problems  There are several types of weight-loss surgery  Ask your healthcare provider for more information    Be successful losing weight:   · Set small, realistic goals  An example of a small goal is to walk for 20 minutes 5 days a week  Anther goal is to lose 5% of your body weight  · Tell friends, family members, and coworkers about your goals  and ask for their support  Ask a friend to lose weight with you, or join a weight-loss support group  · Identify foods or triggers that may cause you to overeat , and find ways to avoid them  Remove tempting high-calorie foods from your home and workplace  Place a bowl of fresh fruit on your kitchen counter  If stress causes you to eat, then find other ways to cope with stress  · Keep a diary to track what you eat and drink  Also write down how many minutes of physical activity you do each day  Weigh yourself once a week and record it in your diary  Eating changes: You will need to eat 500 to 1,000 fewer calories each day than you currently eat to lose 1 to 2 pounds a week  The following changes will help you cut calories:  · Eat smaller portions  Use small plates, no larger than 9 inches in diameter  Fill your plate half full of fruits and vegetables  Measure your food using measuring cups until you know what a serving size looks like  · Eat 3 meals and 1 or 2 snacks each day  Plan your meals in advance  Jasmeet Mcfarland and eat at home most of the time  Eat slowly  · Eat fruits and vegetables at every meal   They are low in calories and high in fiber, which makes you feel full  Do not add butter, margarine, or cream sauce to vegetables  Use herbs to season steamed vegetables  · Eat less fat and fewer fried foods  Eat more baked or grilled chicken and fish  These protein sources are lower in calories and fat than red meat  Limit fast food  Dress your salads with olive oil and vinegar instead of bottled dressing  · Limit the amount of sugar you eat  Do not drink sugary beverages  Limit alcohol  Activity changes:  Physical activity is good for your body in many ways   It helps you burn calories and build strong muscles  It decreases stress and depression, and improves your mood  It can also help you sleep better  Talk to your healthcare provider before you begin an exercise program   · Exercise for at least 30 minutes 5 days a week  Start slowly  Set aside time each day for physical activity that you enjoy and that is convenient for you  It is best to do both weight training and an activity that increases your heart rate, such as walking, bicycling, or swimming  · Find ways to be more active  Do yard work and housecleaning  Walk up the stairs instead of using elevators  Spend your leisure time going to events that require walking, such as outdoor festivals or fairs  This extra physical activity can help you lose weight and keep it off  Follow up with your healthcare provider as directed: You may need to meet with a dietitian  Write down your questions so you remember to ask them during your visits  © 2017 [address] [employer] [employer] Information is for End User's use only and may not be sold, redistributed or otherwise used for commercial purposes  All illustrations and images included in CareNotes® are the copyrighted property of [employer]  or [employer]  The above information is an  only  It is not intended as medical advice for individual conditions or treatments  Talk to your doctor, nurse or pharmacist before following any medical regimen to see if it is safe and effective for you  Heart Healthy Diet   AMBULATORY CARE:   A heart healthy diet  is an eating plan low in total fat, unhealthy fats, and sodium (salt)  A heart healthy diet helps decrease your risk for heart disease and stroke  Limit the amount of fat you eat to 25% to 35% of your total daily calories  Limit sodium to less than 2,300 mg each day  Healthy fats:  Healthy fats can help improve cholesterol levels   The risk for heart disease is decreased when cholesterol levels are normal  Choose healthy fats, such as the following:  · Unsaturated fat  is found in foods such as soybean, canola, olive, corn, and safflower oils  It is also found in soft tub margarine that is made with liquid vegetable oil  · Omega-3 fat  is found in certain fish, such as salmon, tuna, and trout, and in walnuts and flaxseed  Unhealthy fats:  Unhealthy fats can cause unhealthy cholesterol levels in your blood and increase your risk of heart disease  Limit unhealthy fats, such as the following:  · Cholesterol  is found in animal foods, such as eggs and lobster, and in dairy products made from whole milk  Limit cholesterol to less than 300 milligrams (mg) each day  You may need to limit cholesterol to 200 mg each day if you have heart disease  · Saturated fat  is found in meats, such as hdez and hamburger  It is also found in chicken or turkey skin, whole milk, and butter  Limit saturated fat to less than 7% of your total daily calories  Limit saturated fat to less than 6% if you have heart disease or are at increased risk for it  · Trans fat  is found in packaged foods, such as potato chips and cookies  It is also in hard margarine, some fried foods, and shortening  Avoid trans fats as much as possible    Heart healthy foods and drinks to include:  Ask your dietitian or healthcare provider how many servings to have from each of the following food groups:  · Grains:      ¨ Whole-wheat breads, cereals, and pastas, and brown rice    ¨ Low-fat, low-sodium crackers and chips    · Vegetables:      ¨ Broccoli, green beans, green peas, and spinach    ¨ Collards, kale, and lima beans    ¨ Carrots, sweet potatoes, tomatoes, and peppers    ¨ Canned vegetables with no salt added    · Fruits:      ¨ Bananas, peaches, pears, and pineapple    ¨ Grapes, raisins, and dates    ¨ Oranges, tangerines, grapefruit, orange juice, and grapefruit juice    ¨ Apricots, mangoes, melons, and papaya    ¨ Raspberries and strawberries    ¨ Canned fruit with no added sugar    · Low-fat dairy products:      ¨ Nonfat (skim) milk, 1% milk, and low-fat almond, cashew, or soy milks fortified with calcium    ¨ Low-fat cheese, regular or frozen yogurt, and cottage cheese    · Meats and proteins , such as lean cuts of beef and pork (loin, leg, round), skinless chicken and turkey, legumes, soy products, egg whites, and nuts  Foods and drinks to limit or avoid:  Ask your dietitian or healthcare provider about these and other foods that are high in unhealthy fat, sodium, and sugar:  · Snack or packaged foods , such as frozen dinners, cookies, macaroni and cheese, and cereals with more than 300 mg of sodium per serving    · Canned or dry mixes  for cakes, soups, sauces, or gravies    · Vegetables with added sodium , such as instant potatoes, vegetables with added sauces, or regular canned vegetables    · Other foods high in sodium , such as ketchup, barbecue sauce, salad dressing, pickles, olives, soy sauce, and miso    · High-fat dairy foods  such as whole or 2% milk, cream cheese, or sour cream, and cheeses     · High-fat protein foods  such as high-fat cuts of beef (T-bone steaks, ribs), chicken or turkey with skin, and organ meats, such as liver    · Cured or smoked meats , such as hot dogs, hdez, and sausage    · Unhealthy fats and oils , such as butter, stick margarine, shortening, and cooking oils such as coconut or palm oil    · Food and drinks high in sugar , such as soft drinks (soda), sports drinks, sweetened tea, candy, cake, cookies, pies, and doughnuts  Other diet guidelines to follow:   · Eat more foods containing omega-3 fats  Eat fish high in omega-3 fats at least 2 times a week  · Limit alcohol  Too much alcohol can damage your heart and raise your blood pressure  Women should limit alcohol to 1 drink a day  Men should limit alcohol to 2 drinks a day   A drink of alcohol is 12 ounces of beer, 5 ounces of wine, or 1½ ounces of liquor  · Choose low-sodium foods  High-sodium foods can lead to high blood pressure  Add little or no salt to food you prepare  Use herbs and spices in place of salt  · Eat more fiber  to help lower cholesterol levels  Eat at least 5 servings of fruits and vegetables each day  Eat 3 ounces of whole-grain foods each day  Legumes (beans) are also a good source of fiber  Lifestyle guidelines:   · Do not smoke  Nicotine and other chemicals in cigarettes and cigars can cause lung and heart damage  Ask your healthcare provider for information if you currently smoke and need help to quit  E-cigarettes or smokeless tobacco still contain nicotine  Talk to your healthcare provider before you use these products  · Exercise regularly  to help you maintain a healthy weight and improve your blood pressure and cholesterol levels  Ask your healthcare provider about the best exercise plan for you  Do not start an exercise program without asking your healthcare provider  Follow up with your healthcare provider as directed:  Write down your questions so you remember to ask them during your visits  © 2017 [facility] [facility] Information is for End User's use only and may not be sold, redistributed or otherwise used for commercial purposes  All illustrations and images included in CareNotes® are the copyrighted property of A [employer]  or [employer]  The above information is an  only  It is not intended as medical advice for individual conditions or treatments  Talk to your doctor, nurse or pharmacist before following any medical regimen to see if it is safe and effective for you

## 2018-11-15 NOTE — PROGRESS NOTES
Assessment/Plan:    Anxiety  May take a second Lorazepam only if needed not on a daily basis  Reviewed the addicting potential of Lorazepam          Problem List Items Addressed This Visit        Endocrine    Type II diabetes mellitus ([facility] ) - Primary    Relevant Orders    CBC and differential    Comprehensive metabolic panel    Lipid panel    Microalbumin / creatinine urine ratio    TSH, 3rd generation with Free T4 reflex       Other    Anxiety     May take a second Lorazepam only if needed not on a daily basis  Reviewed the addicting potential of Lorazepam          Relevant Medications    LORazepam (ATIVAN) 1 mg tablet    buPROPion (WELLBUTRIN) 75 mg tablet    Class 3 severe obesity without serious comorbidity in [facility])            Subjective:      Patient ID: Rebekah Laird is a 66 y o  female  67 yo female with rate controlled A fib, HTN and well controlled DM, here today for follow up  Patient currently complains of neck spasm for 5 days, has tried ibuprofen with minimal improvement  Pain is worse when turning her head towards the right side  Described as a spasm  Not radiated  Constant  Patient also states a few times she has taken a second Lorazepam for her anxiety  The following portions of the patient's history were reviewed and updated as appropriate:   She  has a past medical history of Anxiety; Arthritis; Cirrhosis of liver ([facility] ); Diabetes mellitus ([place] [address] ); HLD (hyperlipidemia); Hypertension; Irregular heart beat; Psychiatric disorder; and Tobacco dependence    She   Patient Active Problem List    Diagnosis Date Noted    Anxiety 11/15/2018    Class 3 severe obesity without serious comorbidity in [facility]) 11/15/2018    History of cardioversion 09/27/2018    QT prolongation 07/19/2018    New onset atrial fibrillation ([place] [address] ) 07/17/2018    Demand ischemia of myocardium ([place] [address] ) 07/17/2018    Pancreatitis 08/24/2015    Alcohol dependence ([place] [address] ) 06/17/2014    Hypothyroidism 08/17/2012    Alcoholic cirrhosis ([place] [address] ) 69/07/8683    Benign essential hypertension 08/13/2012    HLD (hyperlipidemia) 08/13/2012    Type II diabetes mellitus ([place] [address] ) 08/13/2012     She  has a past surgical history that includes Appendectomy; Carpal tunnel release; Cholecystectomy; and Bunionectomy (Right)  Her family history includes Diabetes in her brother, father, and mother; Hypertension in her brother  She  reports that she has been smoking Cigarettes  She has been smoking about 1 00 pack per day  She has never used smokeless tobacco  She reports that she does not drink alcohol or use drugs  Current Outpatient Prescriptions   Medication Sig Dispense Refill    acetaminophen (TYLENOL) 325 mg tablet Take 650 mg by mouth every 6 (six) hours as needed for mild pain      apixaban (ELIQUIS) 5 mg Take 1 tablet (5 mg total) by mouth 2 (two) times a day 60 tablet 5    buPROPion (WELLBUTRIN) 75 mg tablet Take 1 tablet (75 mg total) by mouth 2 (two) times a day 180 tablet 0    dronedarone (MULTAQ) 400 mg tablet Take 1 tablet (400 mg total) by mouth 2 (two) times a day with meals  0    lisinopril (ZESTRIL) 20 mg tablet Take 2 tablets (40 mg total) by mouth daily 180 tablet 2    LORazepam (ATIVAN) 1 mg tablet May take a second dose PRN if increased anxiety 45 tablet 0    metoprolol tartrate (LOPRESSOR) 100 mg tablet Take 1 tablet (100 mg total) by mouth every 12 (twelve) hours 180 tablet 2    spironolactone (ALDACTONE) 25 mg tablet Take 1 tablet (25 mg total) by mouth daily 90 tablet 1     No current facility-administered medications for this visit        Current Outpatient Prescriptions on File Prior to Visit   Medication Sig    acetaminophen (TYLENOL) 325 mg tablet Take 650 mg by mouth every 6 (six) hours as needed for mild pain    apixaban (ELIQUIS) 5 mg Take 1 tablet (5 mg total) by mouth 2 (two) times a day    dronedarone (MULTAQ) 400 mg tablet Take 1 tablet (400 mg total) by mouth 2 (two) times a day with meals    lisinopril (ZESTRIL) 20 mg tablet Take 2 tablets (40 mg total) by mouth daily    metoprolol tartrate (LOPRESSOR) 100 mg tablet Take 1 tablet (100 mg total) by mouth every 12 (twelve) hours    spironolactone (ALDACTONE) 25 mg tablet Take 1 tablet (25 mg total) by mouth daily    [DISCONTINUED] buPROPion (WELLBUTRIN) 75 mg tablet Take 1 tablet (75 mg total) by mouth 2 (two) times a day    [DISCONTINUED] LORazepam (ATIVAN) 1 mg tablet Take 1 tablet (1 mg total) by mouth daily at bedtime     No current facility-administered medications on file prior to visit       Review of Systems   Musculoskeletal: Positive for neck pain and neck stiffness  Psychiatric/Behavioral: Positive for sleep disturbance  The patient is nervous/anxious  All other systems reviewed and are negative  Objective:      /72 (BP Location: Right arm, Patient Position: Sitting, Cuff Size: Standard)   Pulse 71   Temp (!) 92 9 °F (33 8 °C) (Tympanic)   Resp 18   Ht 5' 2" (1 575 m)   Wt 101 kg (223 lb)   SpO2 97%   BMI 40 79 kg/m²          Physical Exam   Constitutional: She is oriented to person, place, and time  She appears well-developed  HENT:   Head: Normocephalic  Right Ear: External ear normal    Left Ear: External ear normal    Nose: Nose normal    Mouth/Throat: Oropharynx is clear and moist    Eyes: Pupils are equal, round, and reactive to light  Conjunctivae and EOM are normal    Neck: Normal range of motion  Neck supple  No thyromegaly present  Cardiovascular: Normal rate, regular rhythm and normal heart sounds  Pulmonary/Chest: Effort normal and breath sounds normal    Abdominal: Soft  There is no tenderness  There is no rebound and no guarding  Musculoskeletal: She exhibits tenderness  Cervical back: She exhibits decreased range of motion, tenderness and spasm  Neurological: She is alert and oriented to person, place, and time  She has normal reflexes  Skin: Skin is dry     Psychiatric: She has a normal mood and affect  Nursing note and vitals reviewed  BMI Counseling: Body mass index is 40 79 kg/m²  Discussed with patient's BMI with her  The BMI is above average  BMI counseling and education was provided to the patient  Nutrition recommendations include reducing portion sizes, decreasing overall calorie intake, reducing fast food intake, increasing intake of lean protein, reducing intake of saturated fat and trans fat and reducing intake of cholesterol  Exercise recommendations include exercising 3-5 times per week and strength training exercises

## 2018-11-26 ENCOUNTER — TELEPHONE (OUTPATIENT)
Dept: FAMILY MEDICINE CLINIC | Facility: CLINIC | Age: 58
End: 2018-11-26

## 2018-11-27 ENCOUNTER — TELEPHONE (OUTPATIENT)
Dept: FAMILY MEDICINE CLINIC | Facility: CLINIC | Age: 58
End: 2018-11-27

## 2018-11-27 NOTE — TELEPHONE ENCOUNTER
Pt is questioning  that she didn't receive anything for a muscle relaxer per visit on 11/15/18       Pt is calling to let us know we sent her medication to the wrong pharmacy       She is using e script(on file)    She will  her meds at [facility] this time but would like mail order for the future     All medications       don't delete [facility], she would like to use it for emergency's

## 2018-11-28 DIAGNOSIS — M62.838 MUSCLE SPASM: Primary | ICD-10-CM

## 2018-11-28 RX ORDER — CYCLOBENZAPRINE HCL 10 MG
10 TABLET ORAL 3 TIMES DAILY PRN
Qty: 30 TABLET | Refills: 0 | Status: SHIPPED | OUTPATIENT
Start: 2018-11-28 | End: 2018-12-20 | Stop reason: ALTCHOICE

## 2018-11-28 NOTE — TELEPHONE ENCOUNTER
BW is OK  Cholesterol a tad high at 211, should be 200, should watch her diet  No fried foods, no red meats  Her sodium is a little low, nothing to worry about happens sometimes with some of the medications she is taking   Rest is OK

## 2018-11-28 NOTE — TELEPHONE ENCOUNTER
Please let her know medication was also sent today to pharmacy([facility] pharmacy)

## 2018-11-29 ENCOUNTER — TELEPHONE (OUTPATIENT)
Dept: FAMILY MEDICINE CLINIC | Facility: CLINIC | Age: 58
End: 2018-11-29

## 2018-11-29 NOTE — TELEPHONE ENCOUNTER
Pt says pharmacist would't fill the Flexeral because she is already on the [place] [address] from [facility]  If she should be on it let pharmacy know

## 2018-11-30 NOTE — TELEPHONE ENCOUNTER
Pls let pt know muscle relaxants that are covered by insurance are contraindicated for her due to her a fib  Suggest she try OTC salon pas or bengay

## 2018-12-19 PROBLEM — I48.91 NEW ONSET ATRIAL FIBRILLATION (HCC): Status: RESOLVED | Noted: 2018-07-17 | Resolved: 2018-12-19

## 2018-12-19 PROBLEM — I48.0 PAF (PAROXYSMAL ATRIAL FIBRILLATION) (HCC): Status: ACTIVE | Noted: 2018-07-17

## 2018-12-20 ENCOUNTER — OFFICE VISIT (OUTPATIENT)
Dept: CARDIOLOGY CLINIC | Facility: CLINIC | Age: 58
End: 2018-12-20
Payer: COMMERCIAL

## 2018-12-20 VITALS
WEIGHT: 224.2 LBS | BODY MASS INDEX: 41.26 KG/M2 | HEART RATE: 63 BPM | DIASTOLIC BLOOD PRESSURE: 80 MMHG | OXYGEN SATURATION: 97 % | SYSTOLIC BLOOD PRESSURE: 114 MMHG | HEIGHT: 62 IN

## 2018-12-20 DIAGNOSIS — I10 BENIGN ESSENTIAL HYPERTENSION: ICD-10-CM

## 2018-12-20 DIAGNOSIS — I48.0 PAF (PAROXYSMAL ATRIAL FIBRILLATION) (HCC): Primary | ICD-10-CM

## 2018-12-20 DIAGNOSIS — E78.2 MIXED HYPERLIPIDEMIA: ICD-10-CM

## 2018-12-20 DIAGNOSIS — Z98.890 HISTORY OF CARDIOVERSION: ICD-10-CM

## 2018-12-20 PROCEDURE — 93000 ELECTROCARDIOGRAM COMPLETE: CPT | Performed by: INTERNAL MEDICINE

## 2018-12-20 PROCEDURE — 99214 OFFICE O/P EST MOD 30 MIN: CPT | Performed by: INTERNAL MEDICINE

## 2018-12-20 RX ORDER — ATORVASTATIN CALCIUM 10 MG/1
10 TABLET, FILM COATED ORAL DAILY
Qty: 90 TABLET | Refills: 3 | Status: SHIPPED | OUTPATIENT
Start: 2018-12-20 | End: 2020-08-07 | Stop reason: HOSPADM

## 2018-12-20 NOTE — PROGRESS NOTES
Cardiology Follow Up    Benton Winkler  1960  [id number]  [address] [facility]  [address], [place] [id number] [phone]    1  PAF (paroxysmal atrial fibrillation) (HCC)  POCT ECG   2  Benign essential hypertension     3  Mixed hyperlipidemia  atorvastatin (LIPITOR) 10 mg tablet    LDL cholesterol, direct   4  History of cardioversion           Interval History:   Patient has no cardiac complaints  She denies palpitations, chest pain, unusual shortness of breath, lower extremity edema, PND, or symptoms of near-syncope/syncope  She has a significant weight gain from 97 1 kg in September 102 kg today  Her last lipid profile demonstrated total cholesterol 211, triglycerides 81, HDL 60, LDL calculated 135  Discussed with patient that there is an increasing amount of evidence that recurrence of atrial fibrillation and ability to control atrial fibrillation is weight related  If patient's lose weight, they usually stay in sinus rhythm  If they gain weight, atrial fibrillation has a higher likelihood of recurring and a higher likelihood of having difficulty with control  Discussed with patient her rising cholesterol  She is trying to improve her cholesterol with diet  However with the evidence of her inability to control her weight it is unlikely that she will be able to control heard cholesterol with diet  I have recommended that she begin a statin  If with diet her cholesterol drops significantly, the statin could always be discontinued in the future        Patient Active Problem List   Diagnosis    Alcohol dependence ([facility] )    Alcoholic cirrhosis ([facility] )    Benign essential hypertension    HLD (hyperlipidemia)    Hypothyroidism    Pancreatitis    Type II diabetes mellitus (HCC)    PAF (paroxysmal atrial fibrillation) ([facility] )    Demand ischemia of myocardium (HCC)    QT prolongation    History of cardioversion    Anxiety    Class 3 severe obesity without serious comorbidity in adult [facility])     Past Medical History:   Diagnosis Date    Anxiety     Arthritis     Cirrhosis of liver ([facility] )     [facility]     HLD (hyperlipidemia)     Hypertension     Irregular heart beat     Psychiatric disorder     Depression    Tobacco dependence      Social History     Social History    Marital status: Single     Spouse name: N/A    Number of children: N/A    Years of education: N/A     Occupational History    Not on file       Social History Main Topics    Smoking status: Current Every Day Smoker     Packs/day: 1 00     Types: Cigarettes    Smokeless tobacco: Never Used    Alcohol use No      Comment: 1 botttle of vodka over 4 days/denies since 7/18    Drug use: No    Sexual activity: No     Other Topics Concern    Not on file     Social History Narrative    No narrative on file      Family History   Problem Relation Age of Onset    Diabetes Mother     Diabetes Father     Diabetes Brother     Hypertension Brother      Past Surgical History:   Procedure Laterality Date    APPENDECTOMY      BUNIONECTOMY Right     CARPAL TUNNEL RELEASE      CHOLECYSTECTOMY         Current Outpatient Prescriptions:     apixaban (ELIQUIS) 5 mg, Take 1 tablet (5 mg total) by mouth 2 (two) times a day, Disp: 60 tablet, Rfl: 5    buPROPion (WELLBUTRIN) 75 mg tablet, Take 1 tablet (75 mg total) by mouth 2 (two) times a day, Disp: 180 tablet, Rfl: 0    dronedarone (MULTAQ) 400 mg tablet, Take 1 tablet (400 mg total) by mouth 2 (two) times a day with meals, Disp: , Rfl: 0    lisinopril (ZESTRIL) 20 mg tablet, Take 2 tablets (40 mg total) by mouth daily, Disp: 180 tablet, Rfl: 2    LORazepam (ATIVAN) 1 mg tablet, May take a second dose PRN if increased anxiety, Disp: 45 tablet, Rfl: 0    metoprolol tartrate (LOPRESSOR) 100 mg tablet, Take 1 tablet (100 mg total) by mouth every 12 (twelve) hours, Disp: 180 tablet, Rfl: 2    spironolactone (ALDACTONE) 25 mg tablet, Take 1 tablet (25 mg total) by mouth daily, Disp: 90 tablet, Rfl: 1    atorvastatin (LIPITOR) 10 mg tablet, Take 1 tablet (10 mg total) by mouth daily, Disp: 90 tablet, Rfl: 3  Allergies   Allergen Reactions    Augmentin [Amoxicillin-Pot Clavulanate]      Yeast infection       Results for orders placed or performed in visit on 12/20/18   POCT ECG    Narrative    Normal sinus rhythm at a rate of 63 beats per minute  Normal tracing  No results found for: CHOL  Lab Results   Component Value Date    HDL 60 11/15/2018    HDL 56 05/09/2018     Lab Results   Component Value Date    LDLCALC 135 (H) 11/15/2018    LDLCALC 125 (H) 05/09/2018     Lab Results   Component Value Date    TRIG 81 11/15/2018    TRIG 103 05/09/2018     No results found for: CHOLHDL  Lab Results   Component Value Date    CALCIUM 9 0 11/15/2018    K 5 0 11/15/2018    CO2 26 11/15/2018     11/15/2018    BUN 19 11/15/2018    CREATININE 1 00 11/15/2018     Lab Results   Component Value Date    K 5 0 11/15/2018     11/15/2018    CO2 26 11/15/2018    BUN 19 11/15/2018    CREATININE 1 00 11/15/2018    GLUF 122 (H) 11/15/2018    CALCIUM 9 0 11/15/2018    AST 13 11/15/2018    ALT 28 11/15/2018    ALKPHOS 95 11/15/2018    EGFR 63 11/15/2018     Lab Results   Component Value Date    WBC 10 00 11/15/2018    HGB 13 5 11/15/2018    HCT 42 6 11/15/2018    MCV 93 11/15/2018     11/15/2018     Lab Results   Component Value Date    QDQ5GZCGLTLZ 1 970 11/15/2018         Review of Systems:  Review of Systems   Respiratory: Negative for cough, choking, chest tightness, shortness of breath and wheezing  Cardiovascular: Negative for chest pain, palpitations and leg swelling  Musculoskeletal: Negative for gait problem  Skin: Negative for rash  Neurological: Negative for dizziness, tremors, syncope, weakness, light-headedness, numbness and headaches  Psychiatric/Behavioral: Negative for agitation and behavioral problems  The patient is not hyperactive  Physical Exam:  /80   Pulse 63   Ht 5' 2" (1 575 m)   Wt 102 kg (224 lb 3 2 oz)   SpO2 97%   BMI 41 01 kg/m²   Physical Exam   Constitutional: She is oriented to person, place, and time  She appears well-developed and well-nourished  No distress  Morbidly obese with progressive weight gain  HENT:   Head: Normocephalic and atraumatic  Neck: No JVD present  No thyromegaly present  Cardiovascular: Normal rate, regular rhythm, normal heart sounds and intact distal pulses  Exam reveals no gallop and no friction rub  No murmur heard  Pulmonary/Chest: No respiratory distress  She has no wheezes  She has no rales  Musculoskeletal: She exhibits no edema  Neurological: She is alert and oriented to person, place, and time  Skin: Skin is warm and dry  Psychiatric: She has a normal mood and affect  Her behavior is normal        Discussion/Summary:  1  Weight loss  2  Begin atorvastatin 10 mg daily  3  Obtain fasting direct LDL in 3 months  4    Return in 4 months

## 2019-02-14 ENCOUNTER — PATIENT OUTREACH (OUTPATIENT)
Dept: FAMILY MEDICINE CLINIC | Facility: CLINIC | Age: 59
End: 2019-02-14

## 2019-02-14 ENCOUNTER — OFFICE VISIT (OUTPATIENT)
Dept: FAMILY MEDICINE CLINIC | Facility: CLINIC | Age: 59
End: 2019-02-14

## 2019-02-14 VITALS
DIASTOLIC BLOOD PRESSURE: 72 MMHG | BODY MASS INDEX: 42.88 KG/M2 | HEART RATE: 74 BPM | TEMPERATURE: 98 F | RESPIRATION RATE: 18 BRPM | OXYGEN SATURATION: 96 % | SYSTOLIC BLOOD PRESSURE: 124 MMHG | HEIGHT: 62 IN | WEIGHT: 233 LBS

## 2019-02-14 DIAGNOSIS — K70.30 ALCOHOLIC CIRRHOSIS OF LIVER WITHOUT ASCITES (HCC): ICD-10-CM

## 2019-02-14 DIAGNOSIS — Z78.9 NEED FOR FOLLOW-UP BY SOCIAL WORKER: Primary | ICD-10-CM

## 2019-02-14 DIAGNOSIS — Z12.4 PAP SMEAR FOR CERVICAL CANCER SCREENING: ICD-10-CM

## 2019-02-14 DIAGNOSIS — R25.8 NOCTURNAL LEG MOVEMENTS: ICD-10-CM

## 2019-02-14 DIAGNOSIS — E11.9 TYPE 2 DIABETES MELLITUS WITHOUT COMPLICATION, WITHOUT LONG-TERM CURRENT USE OF INSULIN (HCC): ICD-10-CM

## 2019-02-14 DIAGNOSIS — R06.83 SNORING: ICD-10-CM

## 2019-02-14 DIAGNOSIS — F41.9 ANXIETY: ICD-10-CM

## 2019-02-14 LAB — SL AMB POCT HEMOGLOBIN AIC: 6.6 (ref ?–6.5)

## 2019-02-14 PROCEDURE — 3044F HG A1C LEVEL LT 7.0%: CPT | Performed by: FAMILY MEDICINE

## 2019-02-14 PROCEDURE — 83036 HEMOGLOBIN GLYCOSYLATED A1C: CPT | Performed by: FAMILY MEDICINE

## 2019-02-14 PROCEDURE — 99214 OFFICE O/P EST MOD 30 MIN: CPT | Performed by: FAMILY MEDICINE

## 2019-02-14 PROCEDURE — 3008F BODY MASS INDEX DOCD: CPT | Performed by: FAMILY MEDICINE

## 2019-02-14 PROCEDURE — 4004F PT TOBACCO SCREEN RCVD TLK: CPT | Performed by: FAMILY MEDICINE

## 2019-02-14 RX ORDER — ROPINIROLE 2 MG/1
0.5 TABLET, FILM COATED ORAL 3 TIMES DAILY
Status: CANCELLED | OUTPATIENT
Start: 2019-02-14

## 2019-02-14 RX ORDER — LORAZEPAM 1 MG/1
TABLET ORAL
Qty: 45 TABLET | Refills: 0 | Status: SHIPPED | OUTPATIENT
Start: 2019-02-14 | End: 2019-03-19 | Stop reason: SDUPTHER

## 2019-02-14 RX ORDER — SPIRONOLACTONE 25 MG/1
25 TABLET ORAL DAILY
Qty: 90 TABLET | Refills: 1 | Status: SHIPPED | OUTPATIENT
Start: 2019-02-14 | End: 2020-09-18 | Stop reason: ALTCHOICE

## 2019-02-14 RX ORDER — ROPINIROLE 1 MG/1
0.5 TABLET, FILM COATED ORAL
Qty: 90 TABLET | Refills: 0 | Status: SHIPPED | OUTPATIENT
Start: 2019-02-14 | End: 2021-03-10 | Stop reason: ALTCHOICE

## 2019-02-14 NOTE — PROGRESS NOTES
Assessment/Plan:    Referred for sleep study  Discussed sleep hygiene  Start Requip  Will have MSW try to assist with transportation for sleep study        Problem List Items Addressed This Visit        Digestive    Alcoholic cirrhosis ([facility] )    Relevant Medications    spironolactone (ALDACTONE) 25 mg tablet       Endocrine    Type II diabetes mellitus ([facility] )    Relevant Orders    POCT hemoglobin A1c (Completed)       Other    Anxiety    Relevant Medications    LORazepam (ATIVAN) 1 mg tablet    Snoring    Relevant Orders    Ambulatory referral to Sleep Medicine    Nocturnal leg movements    Relevant Orders    Ambulatory referral to Sleep Medicine      Other Visit Diagnoses     Pap smear for cervical cancer screening    -  Primary    Relevant Orders    Ambulatory referral to Obstetrics / Gynecology            Subjective:      Patient ID: Litzy Guzman is a 66 y o  female  50 yo female with multiple medical problems including but not limited too cirrhosis of the liver, HTN, DM, s/p pancreatitis, here today for follow up of chronic conditions  Currently complains of difficulty falling and staying asleep   Has difficulty falling asleep because she constantly needs to move, shake her legs  She finally falls asleep and wakes up multiple times during the night  Has been told she snores loudly, wakes up tired  Denies CP, SOB  The following portions of the patient's history were reviewed and updated as appropriate: She  has a past medical history of Anxiety, Arthritis, Cirrhosis of liver ([facility] ), Diabetes mellitus ([facility] ), HLD (hyperlipidemia), Hypertension, Irregular heart beat, Psychiatric disorder, and Tobacco dependence    She   Patient Active Problem List    Diagnosis Date Noted    Snoring 02/14/2019    Nocturnal leg movements 02/14/2019    Anxiety 11/15/2018    Class 3 severe obesity without serious comorbidity in adult [facility]) 11/15/2018    History of cardioversion 09/27/2018    QT prolongation 07/19/2018    PAF (paroxysmal atrial fibrillation) ([place] [address] ) 07/17/2018    Demand ischemia of myocardium ([place] [address] ) 07/17/2018    Pancreatitis 08/24/2015    Alcohol dependence ([place] [address] ) 06/17/2014    Hypothyroidism 56/09/1751    Alcoholic cirrhosis ([place] [address] ) 98/99/6942    Benign essential hypertension 08/13/2012    HLD (hyperlipidemia) 08/13/2012    Type II diabetes mellitus ([place] [address] ) 08/13/2012     She  has a past surgical history that includes Appendectomy; Carpal tunnel release; Cholecystectomy; and Bunionectomy (Right)  Her family history includes Diabetes in her brother, father, and mother; Hypertension in her brother  She  reports that she has been smoking cigarettes  She has been smoking about 1 00 pack per day  She has never used smokeless tobacco  She reports that she does not drink alcohol or use drugs  Current Outpatient Medications   Medication Sig Dispense Refill    apixaban (ELIQUIS) 5 mg Take 1 tablet (5 mg total) by mouth 2 (two) times a day 60 tablet 5    atorvastatin (LIPITOR) 10 mg tablet Take 1 tablet (10 mg total) by mouth daily 90 tablet 3    buPROPion (WELLBUTRIN) 75 mg tablet Take 1 tablet (75 mg total) by mouth 2 (two) times a day 180 tablet 0    dronedarone (MULTAQ) 400 mg tablet Take 1 tablet (400 mg total) by mouth 2 (two) times a day with meals  0    lisinopril (ZESTRIL) 20 mg tablet Take 2 tablets (40 mg total) by mouth daily 180 tablet 2    LORazepam (ATIVAN) 1 mg tablet May take a second dose PRN if increased anxiety 45 tablet 0    metoprolol tartrate (LOPRESSOR) 100 mg tablet Take 1 tablet (100 mg total) by mouth every 12 (twelve) hours 180 tablet 2    spironolactone (ALDACTONE) 25 mg tablet Take 1 tablet (25 mg total) by mouth daily 90 tablet 1     No current facility-administered medications for this visit        Current Outpatient Medications on File Prior to Visit   Medication Sig    apixaban (ELIQUIS) 5 mg Take 1 tablet (5 mg total) by mouth 2 (two) times a day    atorvastatin (LIPITOR) 10 mg tablet Take 1 tablet (10 mg total) by mouth daily    buPROPion (WELLBUTRIN) 75 mg tablet Take 1 tablet (75 mg total) by mouth 2 (two) times a day    dronedarone (MULTAQ) 400 mg tablet Take 1 tablet (400 mg total) by mouth 2 (two) times a day with meals    lisinopril (ZESTRIL) 20 mg tablet Take 2 tablets (40 mg total) by mouth daily    metoprolol tartrate (LOPRESSOR) 100 mg tablet Take 1 tablet (100 mg total) by mouth every 12 (twelve) hours    [DISCONTINUED] LORazepam (ATIVAN) 1 mg tablet May take a second dose PRN if increased anxiety    [DISCONTINUED] spironolactone (ALDACTONE) 25 mg tablet Take 1 tablet (25 mg total) by mouth daily     No current facility-administered medications on file prior to visit       Review of Systems   Constitutional: Positive for fatigue  Psychiatric/Behavioral: Positive for sleep disturbance  The patient is nervous/anxious  All other systems reviewed and are negative  Objective:      /72 (BP Location: Left arm, Patient Position: Sitting, Cuff Size: Large)   Pulse 74   Temp 98 °F (36 7 °C) (Tympanic)   Resp 18   Ht 5' 2" (1 575 m)   Wt 106 kg (233 lb)   SpO2 96%   BMI 42 62 kg/m²          Physical Exam   Constitutional: She is oriented to person, place, and time  She appears well-developed  HENT:   Head: Normocephalic  Right Ear: External ear normal    Left Ear: External ear normal    Nose: Nose normal    Mouth/Throat: Oropharynx is clear and moist    Eyes: Pupils are equal, round, and reactive to light  Conjunctivae and EOM are normal    Neck: Normal range of motion  Neck supple  No thyromegaly present  Cardiovascular: Normal rate, regular rhythm and normal heart sounds  Pulmonary/Chest: Effort normal and breath sounds normal    Abdominal: Soft  There is no tenderness  There is no rebound and no guarding  Musculoskeletal: Normal range of motion  Neurological: She is alert and oriented to person, place, and time  She has normal reflexes  Skin: Skin is dry  Psychiatric: She has a normal mood and affect  Nursing note and vitals reviewed

## 2019-02-15 ENCOUNTER — PATIENT OUTREACH (OUTPATIENT)
Dept: FAMILY MEDICINE CLINIC | Facility: CLINIC | Age: 59
End: 2019-02-15

## 2019-02-20 ENCOUNTER — PATIENT OUTREACH (OUTPATIENT)
Dept: FAMILY MEDICINE CLINIC | Facility: CLINIC | Age: 59
End: 2019-02-20

## 2019-02-20 NOTE — PROGRESS NOTES
SW Care Manager spoke with the PT in regards to sleep study and transportation issues  Pt will come in to see SW to complete [employer] Waiver forms in order to secure transportation  Pt is unsure of when she is able to come into the office to see SW and will call SW on 2/26/19 and leave VM with availability for that week  SW will f/u with PT and schedule  SW provided contact information for return call

## 2019-03-07 ENCOUNTER — PATIENT OUTREACH (OUTPATIENT)
Dept: FAMILY MEDICINE CLINIC | Facility: CLINIC | Age: 59
End: 2019-03-07

## 2019-03-19 DIAGNOSIS — F41.9 ANXIETY: ICD-10-CM

## 2019-03-19 RX ORDER — LORAZEPAM 1 MG/1
TABLET ORAL
Qty: 45 TABLET | Refills: 0 | Status: SHIPPED | OUTPATIENT
Start: 2019-03-19 | End: 2020-08-07 | Stop reason: HOSPADM

## 2020-08-05 ENCOUNTER — APPOINTMENT (EMERGENCY)
Dept: CT IMAGING | Facility: HOSPITAL | Age: 60
End: 2020-08-05
Payer: COMMERCIAL

## 2020-08-05 ENCOUNTER — HOSPITAL ENCOUNTER (INPATIENT)
Dept: RADIOLOGY | Facility: HOSPITAL | Age: 60
LOS: 2 days | Discharge: HOME | DRG: 023 | End: 2020-08-07
Attending: INTERNAL MEDICINE | Admitting: ANESTHESIOLOGY
Payer: COMMERCIAL

## 2020-08-05 ENCOUNTER — ANESTHESIA (OUTPATIENT)
Dept: RADIOLOGY | Facility: HOSPITAL | Age: 60
DRG: 023 | End: 2020-08-05
Payer: COMMERCIAL

## 2020-08-05 ENCOUNTER — ANESTHESIA EVENT (OUTPATIENT)
Dept: RADIOLOGY | Facility: HOSPITAL | Age: 60
DRG: 023 | End: 2020-08-05
Payer: COMMERCIAL

## 2020-08-05 ENCOUNTER — HOSPITAL ENCOUNTER (EMERGENCY)
Facility: HOSPITAL | Age: 60
End: 2020-08-05
Attending: EMERGENCY MEDICINE
Payer: COMMERCIAL

## 2020-08-05 ENCOUNTER — APPOINTMENT (OUTPATIENT)
Dept: RADIOLOGY | Facility: HOSPITAL | Age: 60
DRG: 023 | End: 2020-08-05
Payer: COMMERCIAL

## 2020-08-05 VITALS
HEART RATE: 112 BPM | SYSTOLIC BLOOD PRESSURE: 91 MMHG | TEMPERATURE: 97 F | DIASTOLIC BLOOD PRESSURE: 64 MMHG | RESPIRATION RATE: 22 BRPM | BODY MASS INDEX: 46.05 KG/M2 | WEIGHT: 251.77 LBS | OXYGEN SATURATION: 98 %

## 2020-08-05 DIAGNOSIS — E78.2 MIXED HYPERLIPIDEMIA: ICD-10-CM

## 2020-08-05 DIAGNOSIS — Z72.0 TOBACCO ABUSE: Primary | ICD-10-CM

## 2020-08-05 DIAGNOSIS — I48.91 RAPID ATRIAL FIBRILLATION (HCC): ICD-10-CM

## 2020-08-05 DIAGNOSIS — I63.00 CEREBROVASCULAR ACCIDENT (CVA) DUE TO THROMBOSIS OF PRECEREBRAL ARTERY (HCC): ICD-10-CM

## 2020-08-05 DIAGNOSIS — I48.91 NEW ONSET ATRIAL FIBRILLATION (HCC): ICD-10-CM

## 2020-08-05 DIAGNOSIS — I63.9 CVA (CEREBRAL VASCULAR ACCIDENT) (HCC): Primary | ICD-10-CM

## 2020-08-05 DIAGNOSIS — I63.9 CVA (CEREBRAL VASCULAR ACCIDENT) (HCC): ICD-10-CM

## 2020-08-05 DIAGNOSIS — E11.9 TYPE 2 DIABETES MELLITUS WITHOUT COMPLICATION, WITHOUT LONG-TERM CURRENT USE OF INSULIN (HCC): ICD-10-CM

## 2020-08-05 LAB
ANION GAP SERPL CALCULATED.3IONS-SCNC: 12 MMOL/L (ref 4–13)
APTT PPP: 25 SECONDS (ref 23–37)
BUN SERPL-MCNC: 22 MG/DL (ref 5–25)
CALCIUM SERPL-MCNC: 9.5 MG/DL (ref 8.3–10.1)
CHLORIDE SERPL-SCNC: 101 MMOL/L (ref 100–108)
CO2 SERPL-SCNC: 26 MMOL/L (ref 21–32)
CREAT SERPL-MCNC: 1.09 MG/DL (ref 0.6–1.3)
ERYTHROCYTE [DISTWIDTH] IN BLOOD BY AUTOMATED COUNT: 13.7 % (ref 11.6–15.1)
GFR SERPL CREATININE-BSD FRML MDRD: 56 ML/MIN/1.73SQ M
GLUCOSE SERPL-MCNC: 142 MG/DL (ref 65–140)
GLUCOSE SERPL-MCNC: 166 MG/DL (ref 65–140)
GLUCOSE SERPL-MCNC: 176 MG/DL (ref 65–140)
HCT VFR BLD AUTO: 47.9 % (ref 34.8–46.1)
HGB BLD-MCNC: 15.4 G/DL (ref 11.5–15.4)
INR PPP: 1.03 (ref 0.84–1.19)
MCH RBC QN AUTO: 29.5 PG (ref 26.8–34.3)
MCHC RBC AUTO-ENTMCNC: 32.2 G/DL (ref 31.4–37.4)
MCV RBC AUTO: 92 FL (ref 82–98)
PLATELET # BLD AUTO: 266 THOUSANDS/UL (ref 149–390)
PMV BLD AUTO: 9.7 FL (ref 8.9–12.7)
POTASSIUM SERPL-SCNC: 4.3 MMOL/L (ref 3.5–5.3)
PROTHROMBIN TIME: 13.3 SECONDS (ref 11.6–14.5)
RBC # BLD AUTO: 5.22 MILLION/UL (ref 3.81–5.12)
SARS-COV-2 RNA RESP QL NAA+PROBE: NEGATIVE
SODIUM SERPL-SCNC: 139 MMOL/L (ref 136–145)
TROPONIN I SERPL-MCNC: <0.02 NG/ML
WBC # BLD AUTO: 11.48 THOUSAND/UL (ref 4.31–10.16)

## 2020-08-05 PROCEDURE — 36228 PLACE CATH INTRACRANIAL ART: CPT

## 2020-08-05 PROCEDURE — 82948 REAGENT STRIP/BLOOD GLUCOSE: CPT

## 2020-08-05 PROCEDURE — 36415 COLL VENOUS BLD VENIPUNCTURE: CPT | Performed by: EMERGENCY MEDICINE

## 2020-08-05 PROCEDURE — G1004 CDSM NDSC: HCPCS

## 2020-08-05 PROCEDURE — 85730 THROMBOPLASTIN TIME PARTIAL: CPT | Performed by: EMERGENCY MEDICINE

## 2020-08-05 PROCEDURE — 36226 PLACE CATH VERTEBRAL ART: CPT

## 2020-08-05 PROCEDURE — 70450 CT HEAD/BRAIN W/O DYE: CPT

## 2020-08-05 PROCEDURE — 5A1935Z RESPIRATORY VENTILATION, LESS THAN 24 CONSECUTIVE HOURS: ICD-10-PCS | Performed by: INTERNAL MEDICINE

## 2020-08-05 PROCEDURE — 99291 CRITICAL CARE FIRST HOUR: CPT | Performed by: INTERNAL MEDICINE

## 2020-08-05 PROCEDURE — 87635 SARS-COV-2 COVID-19 AMP PRB: CPT | Performed by: EMERGENCY MEDICINE

## 2020-08-05 PROCEDURE — 70498 CT ANGIOGRAPHY NECK: CPT

## 2020-08-05 PROCEDURE — 03CG3ZZ EXTIRPATION OF MATTER FROM INTRACRANIAL ARTERY, PERCUTANEOUS APPROACH: ICD-10-PCS | Performed by: RADIOLOGY

## 2020-08-05 PROCEDURE — C1769 GUIDE WIRE: HCPCS

## 2020-08-05 PROCEDURE — 03CC3ZZ EXTIRPATION OF MATTER FROM LEFT RADIAL ARTERY, PERCUTANEOUS APPROACH: ICD-10-PCS | Performed by: RADIOLOGY

## 2020-08-05 PROCEDURE — 70496 CT ANGIOGRAPHY HEAD: CPT

## 2020-08-05 PROCEDURE — 0BH17EZ INSERTION OF ENDOTRACHEAL AIRWAY INTO TRACHEA, VIA NATURAL OR ARTIFICIAL OPENING: ICD-10-PCS | Performed by: INTERNAL MEDICINE

## 2020-08-05 PROCEDURE — 76937 US GUIDE VASCULAR ACCESS: CPT | Performed by: RADIOLOGY

## 2020-08-05 PROCEDURE — 96374 THER/PROPH/DIAG INJ IV PUSH: CPT

## 2020-08-05 PROCEDURE — 94760 N-INVAS EAR/PLS OXIMETRY 1: CPT

## 2020-08-05 PROCEDURE — 93005 ELECTROCARDIOGRAM TRACING: CPT

## 2020-08-05 PROCEDURE — 99291 CRITICAL CARE FIRST HOUR: CPT

## 2020-08-05 PROCEDURE — C1894 INTRO/SHEATH, NON-LASER: HCPCS

## 2020-08-05 PROCEDURE — 84484 ASSAY OF TROPONIN QUANT: CPT | Performed by: EMERGENCY MEDICINE

## 2020-08-05 PROCEDURE — 80048 BASIC METABOLIC PNL TOTAL CA: CPT | Performed by: EMERGENCY MEDICINE

## 2020-08-05 PROCEDURE — 96376 TX/PRO/DX INJ SAME DRUG ADON: CPT

## 2020-08-05 PROCEDURE — 61645 PERQ ART M-THROMBECT &/NFS: CPT | Performed by: RADIOLOGY

## 2020-08-05 PROCEDURE — 76937 US GUIDE VASCULAR ACCESS: CPT

## 2020-08-05 PROCEDURE — 99245 OFF/OP CONSLTJ NEW/EST HI 55: CPT | Performed by: PSYCHIATRY & NEUROLOGY

## 2020-08-05 PROCEDURE — 99291 CRITICAL CARE FIRST HOUR: CPT | Performed by: EMERGENCY MEDICINE

## 2020-08-05 PROCEDURE — C1757 CATH, THROMBECTOMY/EMBOLECT: HCPCS

## 2020-08-05 PROCEDURE — 94002 VENT MGMT INPAT INIT DAY: CPT

## 2020-08-05 PROCEDURE — 85610 PROTHROMBIN TIME: CPT | Performed by: EMERGENCY MEDICINE

## 2020-08-05 PROCEDURE — 85027 COMPLETE CBC AUTOMATED: CPT | Performed by: EMERGENCY MEDICINE

## 2020-08-05 RX ORDER — PROPOFOL 10 MG/ML
5-50 INJECTION, EMULSION INTRAVENOUS
Status: DISCONTINUED | OUTPATIENT
Start: 2020-08-05 | End: 2020-08-05

## 2020-08-05 RX ORDER — SODIUM CHLORIDE 9 MG/ML
INJECTION, SOLUTION INTRAVENOUS CONTINUOUS PRN
Status: DISCONTINUED | OUTPATIENT
Start: 2020-08-05 | End: 2020-08-05

## 2020-08-05 RX ORDER — PROPOFOL 10 MG/ML
INJECTION, EMULSION INTRAVENOUS AS NEEDED
Status: DISCONTINUED | OUTPATIENT
Start: 2020-08-05 | End: 2020-08-05

## 2020-08-05 RX ORDER — LABETALOL 20 MG/4 ML (5 MG/ML) INTRAVENOUS SYRINGE
10 EVERY 4 HOURS PRN
Status: DISCONTINUED | OUTPATIENT
Start: 2020-08-05 | End: 2020-08-06

## 2020-08-05 RX ORDER — VERAPAMIL HYDROCHLORIDE 2.5 MG/ML
INJECTION, SOLUTION INTRAVENOUS CODE/TRAUMA/SEDATION MEDICATION
Status: COMPLETED | OUTPATIENT
Start: 2020-08-05 | End: 2020-08-05

## 2020-08-05 RX ORDER — SUCCINYLCHOLINE/SOD CL,ISO/PF 100 MG/5ML
SYRINGE (ML) INTRAVENOUS AS NEEDED
Status: DISCONTINUED | OUTPATIENT
Start: 2020-08-05 | End: 2020-08-05

## 2020-08-05 RX ORDER — HEPARIN SODIUM 1000 [USP'U]/ML
INJECTION, SOLUTION INTRAVENOUS; SUBCUTANEOUS CODE/TRAUMA/SEDATION MEDICATION
Status: COMPLETED | OUTPATIENT
Start: 2020-08-05 | End: 2020-08-05

## 2020-08-05 RX ORDER — LIDOCAINE HYDROCHLORIDE 10 MG/ML
INJECTION, SOLUTION EPIDURAL; INFILTRATION; INTRACAUDAL; PERINEURAL AS NEEDED
Status: DISCONTINUED | OUTPATIENT
Start: 2020-08-05 | End: 2020-08-05

## 2020-08-05 RX ORDER — ROCURONIUM BROMIDE 10 MG/ML
INJECTION, SOLUTION INTRAVENOUS AS NEEDED
Status: DISCONTINUED | OUTPATIENT
Start: 2020-08-05 | End: 2020-08-05

## 2020-08-05 RX ORDER — ATORVASTATIN CALCIUM 80 MG/1
80 TABLET, FILM COATED ORAL EVERY EVENING
Status: DISCONTINUED | OUTPATIENT
Start: 2020-08-05 | End: 2020-08-07 | Stop reason: HOSPADM

## 2020-08-05 RX ORDER — FENTANYL CITRATE 50 UG/ML
25 INJECTION, SOLUTION INTRAMUSCULAR; INTRAVENOUS ONCE
Status: COMPLETED | OUTPATIENT
Start: 2020-08-05 | End: 2020-08-05

## 2020-08-05 RX ORDER — NITROGLYCERIN 20 MG/100ML
INJECTION INTRAVENOUS
Status: COMPLETED | OUTPATIENT
Start: 2020-08-05 | End: 2020-08-05

## 2020-08-05 RX ORDER — HYDRALAZINE HYDROCHLORIDE 20 MG/ML
10 INJECTION INTRAMUSCULAR; INTRAVENOUS EVERY 4 HOURS PRN
Status: DISCONTINUED | OUTPATIENT
Start: 2020-08-05 | End: 2020-08-06

## 2020-08-05 RX ORDER — ASPIRIN 81 MG/1
81 TABLET, CHEWABLE ORAL DAILY
Status: DISCONTINUED | OUTPATIENT
Start: 2020-08-05 | End: 2020-08-06

## 2020-08-05 RX ORDER — SODIUM CHLORIDE 9 MG/ML
75 INJECTION, SOLUTION INTRAVENOUS CONTINUOUS
Status: CANCELLED | OUTPATIENT
Start: 2020-08-05

## 2020-08-05 RX ORDER — NICOTINE 21 MG/24HR
1 PATCH, TRANSDERMAL 24 HOURS TRANSDERMAL DAILY
Status: DISCONTINUED | OUTPATIENT
Start: 2020-08-06 | End: 2020-08-07 | Stop reason: HOSPADM

## 2020-08-05 RX ORDER — FENTANYL CITRATE 50 UG/ML
INJECTION, SOLUTION INTRAMUSCULAR; INTRAVENOUS
Status: COMPLETED
Start: 2020-08-05 | End: 2020-08-05

## 2020-08-05 RX ORDER — METOPROLOL TARTRATE 5 MG/5ML
2.5 INJECTION INTRAVENOUS EVERY 6 HOURS
Status: DISCONTINUED | OUTPATIENT
Start: 2020-08-05 | End: 2020-08-06

## 2020-08-05 RX ORDER — ONDANSETRON 2 MG/ML
4 INJECTION INTRAMUSCULAR; INTRAVENOUS ONCE AS NEEDED
Status: CANCELLED | OUTPATIENT
Start: 2020-08-05

## 2020-08-05 RX ORDER — ACETAMINOPHEN 325 MG/1
650 TABLET ORAL EVERY 6 HOURS PRN
Status: DISCONTINUED | OUTPATIENT
Start: 2020-08-05 | End: 2020-08-06

## 2020-08-05 RX ORDER — DILTIAZEM HYDROCHLORIDE 5 MG/ML
15 INJECTION INTRAVENOUS ONCE
Status: COMPLETED | OUTPATIENT
Start: 2020-08-05 | End: 2020-08-05

## 2020-08-05 RX ORDER — CHLORHEXIDINE GLUCONATE 0.12 MG/ML
15 RINSE ORAL EVERY 12 HOURS SCHEDULED
Status: DISCONTINUED | OUTPATIENT
Start: 2020-08-05 | End: 2020-08-06

## 2020-08-05 RX ORDER — SODIUM CHLORIDE, SODIUM GLUCONATE, SODIUM ACETATE, POTASSIUM CHLORIDE, MAGNESIUM CHLORIDE, SODIUM PHOSPHATE, DIBASIC, AND POTASSIUM PHOSPHATE .53; .5; .37; .037; .03; .012; .00082 G/100ML; G/100ML; G/100ML; G/100ML; G/100ML; G/100ML; G/100ML
75 INJECTION, SOLUTION INTRAVENOUS CONTINUOUS
Status: DISCONTINUED | OUTPATIENT
Start: 2020-08-05 | End: 2020-08-06

## 2020-08-05 RX ADMIN — Medication 400 MCG: at 16:11

## 2020-08-05 RX ADMIN — SODIUM CHLORIDE: 0.9 INJECTION, SOLUTION INTRAVENOUS at 16:10

## 2020-08-05 RX ADMIN — DILTIAZEM HYDROCHLORIDE 5 MG/HR: 5 INJECTION INTRAVENOUS at 15:05

## 2020-08-05 RX ADMIN — ASPIRIN 81 MG 81 MG: 81 TABLET ORAL at 22:02

## 2020-08-05 RX ADMIN — VERAPAMIL HYDROCHLORIDE 5 MG: 2.5 INJECTION INTRAVENOUS at 16:11

## 2020-08-05 RX ADMIN — PHENYLEPHRINE HYDROCHLORIDE 300 MCG: 10 INJECTION INTRAVENOUS at 16:15

## 2020-08-05 RX ADMIN — HEPARIN SODIUM 3000 UNITS: 1000 INJECTION INTRAVENOUS; SUBCUTANEOUS at 16:12

## 2020-08-05 RX ADMIN — FENTANYL CITRATE 25 MCG: 50 INJECTION, SOLUTION INTRAMUSCULAR; INTRAVENOUS at 19:13

## 2020-08-05 RX ADMIN — DEXMEDETOMIDINE 0.3 MCG/KG/HR: 100 INJECTION, SOLUTION, CONCENTRATE INTRAVENOUS at 18:12

## 2020-08-05 RX ADMIN — SODIUM CHLORIDE 1000 ML: 0.9 INJECTION, SOLUTION INTRAVENOUS at 15:05

## 2020-08-05 RX ADMIN — ROCURONIUM BROMIDE 50 MG: 10 INJECTION, SOLUTION INTRAVENOUS at 16:07

## 2020-08-05 RX ADMIN — SODIUM CHLORIDE, SODIUM GLUCONATE, SODIUM ACETATE, POTASSIUM CHLORIDE, MAGNESIUM CHLORIDE, SODIUM PHOSPHATE, DIBASIC, AND POTASSIUM PHOSPHATE 75 ML/HR: .53; .5; .37; .037; .03; .012; .00082 INJECTION, SOLUTION INTRAVENOUS at 18:11

## 2020-08-05 RX ADMIN — PHENYLEPHRINE HYDROCHLORIDE 400 MCG: 10 INJECTION INTRAVENOUS at 16:08

## 2020-08-05 RX ADMIN — LABETALOL 20 MG/4 ML (5 MG/ML) INTRAVENOUS SYRINGE 10 MG: at 22:02

## 2020-08-05 RX ADMIN — SODIUM CHLORIDE: 0.9 INJECTION, SOLUTION INTRAVENOUS at 15:50

## 2020-08-05 RX ADMIN — PHENYLEPHRINE HYDROCHLORIDE 300 MCG: 10 INJECTION INTRAVENOUS at 16:17

## 2020-08-05 RX ADMIN — IOHEXOL 85 ML: 350 INJECTION, SOLUTION INTRAVENOUS at 14:25

## 2020-08-05 RX ADMIN — Medication 100 MG: at 15:58

## 2020-08-05 RX ADMIN — DILTIAZEM HYDROCHLORIDE 15 MG: 5 INJECTION INTRAVENOUS at 14:37

## 2020-08-05 RX ADMIN — PROPOFOL 200 MG: 10 INJECTION, EMULSION INTRAVENOUS at 15:58

## 2020-08-05 RX ADMIN — IODIXANOL 9 ML: 320 INJECTION, SOLUTION INTRAVASCULAR at 16:53

## 2020-08-05 RX ADMIN — HYDRALAZINE HYDROCHLORIDE 10 MG: 20 INJECTION INTRAMUSCULAR; INTRAVENOUS at 21:14

## 2020-08-05 RX ADMIN — FENTANYL CITRATE 25 MCG: 50 INJECTION, SOLUTION INTRAMUSCULAR; INTRAVENOUS at 19:16

## 2020-08-05 RX ADMIN — PHENYLEPHRINE HYDROCHLORIDE 80 MCG/MIN: 10 INJECTION INTRAVENOUS at 16:05

## 2020-08-05 RX ADMIN — SODIUM CHLORIDE 1000 ML: 0.9 INJECTION, SOLUTION INTRAVENOUS at 14:57

## 2020-08-05 NOTE — ANESTHESIA POSTPROCEDURE EVALUATION
Post-Op Assessment Note    CV Status:  Stable  Pain Score: 0    Pain management: adequate     Mental Status:  Unresponsive   Hydration Status:  Stable   PONV Controlled:  None   Airway Patency:  Patent  Airway: intubated      Post Op Vitals Reviewed: Yes      Staff: CRNA         No complications documented      BP      Temp      Pulse     Resp      SpO2

## 2020-08-05 NOTE — BRIEF OP NOTE (RAD/CATH)
IR STROKE ALERT Procedure Note    PATIENT NAME: Nic Asher  : 1960  MRN: [id number]    Pre-op Diagnosis:   1  Cerebrovascular accident (CVA) due to thrombosis of precerebral artery (HCC)      Post-op Diagnosis:   1  Cerebrovascular accident (CVA) due to thrombosis of precerebral artery [facility])        Surgeon:   Gayle Aguilar MD  Assistants:     No qualified resident was available, Resident is only observing    Estimated Blood Loss: 100 cc  Findings:   Basilar tip-left P1 occlusion, TICI 0  Successful mechanical thrombectomy  PUNCTURE: 16:12  FIRST PASS: 16:25 , ADAPT  RECAN: 16:37 , TICI 3    Right radial sheath removed, closure device applied       Specimens: none    Complications:  none    Anesthesia: General    Gayle Aguilar MD     Date: 2020  Time: 4:57 PM

## 2020-08-05 NOTE — ED PROVIDER NOTES
History  Chief Complaint   Patient presents with    Loss of Vision     pt reports sudden onset loss of vision to left eye   states hx of similar sx but usually resolves  states onset about 45 minutes ago  pt denies pain and alert and oriented at this time  closing left eye in triage otherwise current state of vision makes her dizzy  History provided by:  Patient (girlfriend)   used: No    Medical Problem - Major   Location:  Diplopia that started 1 hour prior to arrival  Severity:  Severe  Onset quality:  Sudden  Duration:  1 hour  Timing:  Constant  Progression:  Unchanged  Chronicity:  New  Context:  Per the patient's girlfriend she also had a left-sided facial droop which has improved right now  Relieved by:  Nothing  Worsened by:  Ambulation  Ineffective treatments:  None tried  Associated symptoms: no abdominal pain, no chest pain, no cough, no fever, no headaches, no loss of consciousness, no nausea, no shortness of breath and no vomiting      8 patient has a history of atrial fibrillation  She is supposed to be taking Eliquis but she states she does not take it regularly and only takes it when she thinks about it  She smokes  She has never had a stroke  No history of vision problems  She has no headache  She denies trauma  She had no weakness of the arms or legs  There was no speech trouble  There was no numbness  She did not lose her vision it has always been double vision  Prior to Admission Medications   Prescriptions Last Dose Informant Patient Reported? Taking?    LORazepam (ATIVAN) 1 mg tablet   No No   Sig: May take a second dose PRN if increased anxiety   apixaban (ELIQUIS) 5 mg  Self No No   Sig: Take 1 tablet (5 mg total) by mouth 2 (two) times a day   atorvastatin (LIPITOR) 10 mg tablet   No No   Sig: Take 1 tablet (10 mg total) by mouth daily   buPROPion (WELLBUTRIN) 75 mg tablet  Self No No   Sig: Take 1 tablet (75 mg total) by mouth 2 (two) times a day dronedarone (MULTAQ) 400 mg tablet  Self No No   Sig: Take 1 tablet (400 mg total) by mouth 2 (two) times a day with meals   lisinopril (ZESTRIL) 20 mg tablet  Self No No   Sig: Take 2 tablets (40 mg total) by mouth daily   metoprolol tartrate (LOPRESSOR) 100 mg tablet  Self No No   Sig: Take 1 tablet (100 mg total) by mouth every 12 (twelve) hours   rOPINIRole (REQUIP) 1 mg tablet   No No   Sig: Take 0 5 tablets (0 5 mg total) by mouth daily at bedtime   spironolactone (ALDACTONE) 25 mg tablet   No No   Sig: Take 1 tablet (25 mg total) by mouth daily      Facility-Administered Medications: None       Past Medical History:   Diagnosis Date    Anxiety     Arthritis     Cirrhosis of liver (HCC)     Diabetes mellitus ([facility] [address] )     HLD (hyperlipidemia)     Hypertension     Irregular heart beat     Psychiatric disorder     Depression    Tobacco dependence        Past Surgical History:   Procedure Laterality Date    APPENDECTOMY      BUNIONECTOMY Right     CARPAL TUNNEL RELEASE      CHOLECYSTECTOMY         Family History   Problem Relation Age of Onset    Diabetes Mother     Diabetes Father     Diabetes Brother     Hypertension Brother      I have reviewed and agree with the history as documented  E-Cigarette/Vaping     E-Cigarette/Vaping Substances     Social History     Tobacco Use    Smoking status: Current Every Day Smoker     Packs/day: 1 00     Types: Cigarettes    Smokeless tobacco: Never Used   Substance Use Topics    Alcohol use: Yes     Comment: 1 botttle of vodka over 4 days/denies since 7/18    Drug use: No       Review of Systems   Constitutional: Negative for fever  HENT: Negative for trouble swallowing and voice change  Eyes: Positive for visual disturbance  Diplopia  No visual field loss  Respiratory: Negative for cough, chest tightness and shortness of breath  Cardiovascular: Negative for chest pain and palpitations     Gastrointestinal: Negative for abdominal pain, nausea and vomiting  Neurological: Positive for facial asymmetry  Negative for loss of consciousness, speech difficulty, weakness, numbness and headaches  All other systems reviewed and are negative  Physical Exam  Physical Exam  Vitals signs and nursing note reviewed  Constitutional:       General: She is not in acute distress  Appearance: Normal appearance  She is well-developed  She is not ill-appearing, toxic-appearing or diaphoretic  HENT:      Head: Normocephalic and atraumatic  Right Ear: Hearing normal  No drainage or swelling  Left Ear: Hearing normal  No drainage or swelling  Nose: Nose normal       Mouth/Throat:      Mouth: Mucous membranes are moist       Pharynx: Oropharynx is clear  Uvula midline  No oropharyngeal exudate  Eyes:      General: Lids are normal  No visual field deficit  Right eye: No discharge  Left eye: No discharge  Extraocular Movements:      Right eye: Normal extraocular motion  Left eye: Abnormal extraocular motion present  Conjunctiva/sclera: Conjunctivae normal       Pupils: Pupils are equal, round, and reactive to light  Comments: Left eye deviated superiorly right eye midline  She had trouble keeping her left eyelid open  Neck:      Musculoskeletal: Normal range of motion  Vascular: No JVD  Trachea: Trachea normal    Cardiovascular:      Rate and Rhythm: Tachycardia present  Rhythm irregular  Pulses: Normal pulses  Heart sounds: Normal heart sounds  No murmur  No friction rub  No gallop  Pulmonary:      Effort: Pulmonary effort is normal  No respiratory distress  Breath sounds: Normal breath sounds  No stridor  No wheezing or rales  Chest:      Chest wall: No tenderness  Abdominal:      Palpations: Abdomen is soft  Tenderness: There is no abdominal tenderness  There is no guarding or rebound  Musculoskeletal: Normal range of motion           General: No tenderness or deformity  Lymphadenopathy:      Cervical: No cervical adenopathy  Skin:     General: Skin is warm and dry  Coloration: Skin is not pale  Findings: No rash  Neurological:      Mental Status: She is alert and oriented to person, place, and time  GCS: GCS eye subscore is 4  GCS verbal subscore is 5  GCS motor subscore is 6  Cranial Nerves: No cranial nerve deficit, dysarthria or facial asymmetry  Sensory: Sensation is intact  No sensory deficit  Motor: Motor function is intact  No abnormal muscle tone  Coordination: Coordination is intact  Psychiatric:         Mood and Affect: Mood normal          Speech: Speech normal          Behavior: Behavior is cooperative           Vital Signs  ED Triage Vitals   Temperature Pulse Respirations Blood Pressure SpO2   08/05/20 1342 08/05/20 1342 08/05/20 1342 08/05/20 1350 08/05/20 1342   (!) 97 °F (36 1 °C) 85 16 (!) 178/104 97 %      Temp Source Heart Rate Source Patient Position - Orthostatic VS BP Location FiO2 (%)   08/05/20 1342 08/05/20 1510 08/05/20 1510 -- --   Temporal Monitor Lying        Pain Score       --                  Vitals:    08/05/20 1455 08/05/20 1502 08/05/20 1510 08/05/20 1516   BP: 142/67 152/68 (!) 106/49 91/64   Pulse: (!) 110 (!) 106 (!) 114 (!) 106   Patient Position - Orthostatic VS:   Lying Lying         Visual Acuity  Visual Acuity      Most Recent Value   L Pupil Size (mm)  3   R Pupil Size (mm)  2          ED Medications  Medications   sodium chloride 0 9 % bolus 1,000 mL (1,000 mL Intravenous New Bag 8/5/20 1505)   iohexol (OMNIPAQUE) 350 MG/ML injection (MULTI-DOSE) 85 mL (85 mL Intravenous Given 8/5/20 1425)   diltiazem (CARDIZEM) injection 15 mg (15 mg Intravenous Given 8/5/20 1437)   diltiazem (CARDIZEM) 125 mg in sodium chloride 0 9 % 125 mL infusion (0 mg/hr Intravenous Stopped 8/5/20 1519)       Diagnostic Studies  Results Reviewed     Procedure Component Value Units Date/Time    Novel Coronavirus Janet Crain [facility] [[id number]] Collected:  08/05/20 1513    Lab Status:   In process Specimen:  Nares from Nose Updated:  08/05/20 1518    Fingerstick Glucose (POCT) [[id number]]  (Abnormal) Collected:  08/05/20 1433    Lab Status:  Final result Updated:  08/05/20 1434     POC Glucose 142 mg/dl     Troponin I [[id number]]  (Normal) Collected:  08/05/20 1407    Lab Status:  Final result Specimen:  Blood from Arm, Right Updated:  08/05/20 1432     Troponin I <0 02 ng/mL     Protime-INR [[id number]]  (Normal) Collected:  08/05/20 1407    Lab Status:  Final result Specimen:  Blood from Arm, Right Updated:  08/05/20 1421     Protime 13 3 seconds      INR 1 03    APTT [[id number]]  (Normal) Collected:  08/05/20 1407    Lab Status:  Final result Specimen:  Blood from Arm, Right Updated:  08/05/20 1421     PTT 25 seconds     Basic metabolic panel [[id number]]  (Abnormal) Collected:  08/05/20 1407    Lab Status:  Final result Specimen:  Blood from Arm, Right Updated:  08/05/20 1421     Sodium 139 mmol/L      Potassium 4 3 mmol/L      Chloride 101 mmol/L      CO2 26 mmol/L      ANION GAP 12 mmol/L      BUN 22 mg/dL      Creatinine 1 09 mg/dL      Glucose 176 mg/dL      Calcium 9 5 mg/dL      eGFR 56 ml/min/1 73sq m     Narrative:       Denise guidelines for Chronic Kidney Disease (CKD):     Stage 1 with normal or high GFR (GFR > 90 mL/min/1 73 square meters)    Stage 2 Mild CKD (GFR = 60-89 mL/min/1 73 square meters)    Stage 3A Moderate CKD (GFR = 45-59 mL/min/1 73 square meters)    Stage 3B Moderate CKD (GFR = 30-44 mL/min/1 73 square meters)    Stage 4 Severe CKD (GFR = 15-29 mL/min/1 73 square meters)    Stage 5 End Stage CKD (GFR <15 mL/min/1 73 square meters)  Note: GFR calculation is accurate only with a steady state creatinine    CBC and Platelet [[id number]]  (Abnormal) Collected:  08/05/20 1407    Lab Status:  Final result Specimen:  Blood from Arm, Right Updated:  08/05/20 1413     WBC 11 48 Thousand/uL      RBC 5 22 Million/uL      Hemoglobin 15 4 g/dL      Hematocrit 47 9 %      MCV 92 fL      MCH 29 5 pg      MCHC 32 2 g/dL      RDW 13 7 %      Platelets 119 Thousands/uL      MPV 9 7 fL                  CTA stroke alert (head/neck)   Final Result by Francheska Rossi MD (08/05 1511)      1  Occlusion at proximal P1 segment of left PCA with perfusion of P2 via posterior communicating artery  Fetal origin of left PCA is less favored since left P2 caliber is larger than left P-comm  The remaining left PCA is patent and grossly    unremarkable  2   Moderate to severe atherosclerotic disease of cavernous and supraclinoid internal carotid arteries  3   A 3 mm left P-comm origin infundibulum  4   Right greater than left atherosclerotic disease of cervical internal carotid artery without hemodynamically significant stenosis  5   Developmentally hypoplastic left vertebral artery  Findings were directly discussed with Stef Beltre on 8/5/2020 2:35 PM                      Workstation performed: [id number]         CT stroke alert brain   Final Result by Francheska Rossi MD (08/05 1512)      No acute intracranial CT abnormality              Findings were directly discussed with Stef Beltre on 8/5/2020 2:35 PM       Workstation performed: [id number]                    Procedures  ECG 12 Lead Documentation Only    Date/Time: 8/5/2020 2:35 PM  Performed by: Len Randle MD  Authorized by: Len Randle MD     Indications / Diagnosis:  Stroke alert  ECG reviewed by me, the ED Provider: yes    Patient location:  ED  Interpretation:     Interpretation: abnormal    Rate:     ECG rate:  136    ECG rate assessment: tachycardic    Rhythm:     Rhythm: atrial fibrillation    Ectopy:     Ectopy: none    QRS:     QRS axis:  Normal    QRS intervals:  Normal  Conduction:     Conduction: normal    ST segments:     ST segments:  Non-specific  T waves:     T waves: non-specific      CriticalCare Time  Performed by: Festus Whelan MD  Authorized by: Festus Whelan MD     Critical care provider statement:     Critical care time (minutes):  40    Critical care time was exclusive of:  Separately billable procedures and treating other patients and teaching time    Critical care was necessary to treat or prevent imminent or life-threatening deterioration of the following conditions: Stroke alert and rapid atrial fibrillation  Critical care was time spent personally by me on the following activities:  Obtaining history from patient or surrogate, development of treatment plan with patient or surrogate, discussions with consultants, evaluation of patient's response to treatment, examination of patient, review of old charts, re-evaluation of patient's condition, ordering and review of radiographic studies and ordering and review of laboratory studies    I assumed direction of critical care for this patient from another provider in my specialty: no               ED Course  ED Course as of Aug 05 1522   Wed Aug 05, 2020   1423 There may be a delay 2 tPA as a possibility as the patient is unclear when she last took her Eliquis  She does not take it regularly  Neurology is down seeing the patient  We just walked out of the CT scan  1436 Patient returns from CT to room 17, change of exam, aphasia, right arm paralysis, yawning, right facial droop  Attempting to call pharmacy to find out if she is even filling her Eliquis  She is unreliable as to her last dose  1517 Patient opens eyes to voice  She is protecting her airway  She unfortunately has expressive aphasia  She was able to squeeze my hand on the left  Awaiting transfer  Case was discussed with neuro intervention and COVID test was sent however she has no COVID risk exposure, she is from home and has not been having a cough or a fever        1518 Will DC Cardizem drip as patient's blood pressure did fall she is still getting the fluid bolus  EMS here for transport  Stroke Assessment     Row Name 08/05/20 1400 08/05/20 1443          NIH Stroke Scale    Interval     reevaluation     Level of Consciousness (1a )  0  1     LOC Questions (1b )  0  2     LOC Commands (1c )  0  2     Best Gaze (2 )  2  1     Visual (3 )  0  1     Facial Palsy (4 )  0  2     Motor Arm, Left (5a )  0  0     Motor Arm, Right (5b )  0  4     Motor Leg, Left (6a )  0  0     Motor Leg, Right (6b )  0  0     Limb Ataxia (7 )  0  0     Sensory (8 )  0  0     Best Language (9 )  0  2     Dysarthria (10 )  0  1     Extinction and Inattention (11 ) (Formerly Neglect)  0  0     Total  2  16         First Filed Value   TPA Decision  -- [Patient unclear to last dose of Eliquis  Trying to find out from pharmacy ]   Patient is not a candidate options  Bleeding risk  MDM  Number of Diagnoses or Management Options  CVA (cerebral vascular accident) [facility]):   Rapid atrial fibrillation [facility]):   Diagnosis management comments: Patient with rapid atrial fibrillation started on a drip  She had a profound change in her exam after returning from CT  Neurology was in discussion with neuro intervention  I spoke with the intensive care unit doctor for transfer to [facility]             Amount and/or Complexity of Data Reviewed  Clinical lab tests: ordered and reviewed  Tests in the radiology section of CPT®: ordered and reviewed  Tests in the medicine section of CPT®: ordered and reviewed  Review and summarize past medical records: yes  Discuss the patient with other providers: yes    Patient Progress  Patient progress: (Critical)        Disposition  Final diagnoses:   CVA (cerebral vascular accident) ([facility] [address] )   Rapid atrial fibrillation ([facility] [address] )     Time reflects when diagnosis was documented in both MDM as applicable and the Disposition within this note     Time User Action Codes Description Comment 8/5/2020  2:12 PM Francis Purdy Add [I63 9] CVA (cerebral vascular accident) ([facility] [address] )     8/5/2020  2:46 PM Francis Purdy Add [I48 91] Rapid atrial fibrillation [facility])       ED Disposition     ED Disposition Condition Date/Time Comment    Transfer to Another Facility-In Network  Wed Aug 5, 2020  2:48 PM Devi Puller should be transferred out to [facility] for neurovascular intervention        MD Documentation      Most Recent Value   Patient Condition  The patient has been stabilized such that within reasonable medical probability, no material deterioration of the patient condition or the condition of the unborn child(bernice) is likely to result from the transfer   Reason for Transfer  Level of Care needed not available at this facility, No bed available at level of patient's needs   Benefits of Transfer  Specialized equipment and/or services available at the receiving facility (Include comment)________________________ [Neurointervention]   Risks of Transfer  Potential for delay in receiving treatment, Potential deterioration of medical condition, Loss of IV, Increased discomfort during transfer, Possible worsening of condition or death during transfer   Accepting Physician  Simone NameCésar   Sending MD Jeanette Molina MD   Provider Certification  General risk, such as traffic hazards, adverse weather conditions, rough terrain or turbulence, possible failure of equipment (including vehicle or aircraft), or consequences of actions of persons outside the control of the transport personnel, Risk of worsening condition      RN Documentation      Most [address] [facility] NameCésar      Follow-up Information    None         Current Discharge Medication List      CONTINUE these medications which have NOT CHANGED    Details   apixaban (ELIQUIS) 5 mg Take 1 tablet (5 mg total) by mouth 2 (two) times a day  Qty: 60 tablet, Refills: 5    Associated Diagnoses: New onset atrial fibrillation (HCC)      atorvastatin (LIPITOR) 10 mg tablet Take 1 tablet (10 mg total) by mouth daily  Qty: 90 tablet, Refills: 3    Associated Diagnoses: Mixed hyperlipidemia      buPROPion (WELLBUTRIN) 75 mg tablet Take 1 tablet (75 mg total) by mouth 2 (two) times a day  Qty: 180 tablet, Refills: 0    Associated Diagnoses: Anxiety      dronedarone (MULTAQ) 400 mg tablet Take 1 tablet (400 mg total) by mouth 2 (two) times a day with meals  Refills: 0    Associated Diagnoses: Atrial fibrillation, unspecified type (HCC)      lisinopril (ZESTRIL) 20 mg tablet Take 2 tablets (40 mg total) by mouth daily  Qty: 180 tablet, Refills: 2    Associated Diagnoses: Benign essential hypertension      LORazepam (ATIVAN) 1 mg tablet May take a second dose PRN if increased anxiety  Qty: 45 tablet, Refills: 0    Associated Diagnoses: Anxiety      metoprolol tartrate (LOPRESSOR) 100 mg tablet Take 1 tablet (100 mg total) by mouth every 12 (twelve) hours  Qty: 180 tablet, Refills: 2    Associated Diagnoses: New onset atrial fibrillation (HCC)      rOPINIRole (REQUIP) 1 mg tablet Take 0 5 tablets (0 5 mg total) by mouth daily at bedtime  Qty: 90 tablet, Refills: 0    Associated Diagnoses: Snoring; Nocturnal leg movements      spironolactone (ALDACTONE) 25 mg tablet Take 1 tablet (25 mg total) by mouth daily  Qty: 90 tablet, Refills: 1    Associated Diagnoses: Alcoholic cirrhosis of liver without ascites (HCC)           No discharge procedures on file      PDMP Review     None          ED Provider  Electronically Signed by           Jonny Dyson MD  08/05/20 6931

## 2020-08-05 NOTE — SEDATION DOCUMENTATION
IR Procedure Bedrest Start Time is [id number]  Right arm TR Band had 4cc total out by 1715, syringe taped to head of bed  Report given to Daiana Pritchett and Ammy Kathleen at bedside  CT of head complete post procedure  Belongings sent with patient

## 2020-08-05 NOTE — PLAN OF CARE
Problem: Neurological Deficit  Goal: Neurological status is stable or improving  Description: Interventions:  - Monitor and assess patient's level of consciousness, motor function, sensory function, and level of assistance needed for ADLs  - Monitor and report changes from baseline  Collaborate with interdisciplinary team to initiate plan and implement interventions as ordered  - Provide and maintain a safe environment  - Consider seizure precautions  - Consider fall precautions  - Consider aspiration precautions  - Consider bleeding precautions  Outcome: Progressing     Problem: Activity Intolerance/Impaired Mobility  Goal: Mobility/activity is maintained at optimum level for patient  Description: Interventions:  - Assess and monitor patient  barriers to mobility and need for assistive/adaptive devices  - Assess patient's emotional response to limitations  - Collaborate with interdisciplinary team and initiate plans and interventions as ordered  - Encourage independent activity per ability   - Maintain proper body alignment  - Perform active/passive rom as tolerated/ordered  - Plan activities to conserve energy   - Turn patient as appropriate  Outcome: Progressing     Problem: Communication Impairment  Goal: Ability to express needs and understand communication  Description: Assess patient's communication skills and ability to understand information  Patient will demonstrate use of effective communication techniques, alternative methods of communication and understanding even if not able to speak  - Encourage communication and provide alternate methods of communication as needed  - Collaborate with case management/ for discharge needs  - Include patient/family/caregiver in decisions related to communication    Outcome: Progressing     Problem: Potential for Aspiration  Goal: Non-ventilated patient's risk of aspiration is minimized  Description: Assess and monitor vital signs, respiratory status, and labs (WBC)  Monitor for signs of aspiration (tachypnea, cough, rales, wheezing, cyanosis, fever)  - Assess and monitor patient's ability to swallow  - Place patient up in chair to eat if possible  - HOB up at 90 degrees to eat if unable to get patient up into chair   - Supervise patient during oral intake  - Instruct patient/ family to take small bites  - Instruct patient/ family to take small single sips when taking liquids  - Follow patient-specific strategies generated by speech pathologist   Outcome: Progressing  Goal: Ventilated patient's risk of aspiration is minimized  Description: Assess and monitor vital signs, respiratory status, airway cuff pressure, and labs (WBC)  Monitor for signs of aspiration (tachypnea, cough, rales, wheezing, cyanosis, fever)  - Elevate head of bed 30 degrees if patient has tube feeding   - Monitor tube feeding  Outcome: Progressing     Problem: Nutrition  Goal: Nutrition/Hydration status is improving  Description: Monitor and assess patient's nutrition/hydration status for malnutrition (ex- brittle hair, bruises, dry skin, pale skin and conjunctiva, muscle wasting, smooth red tongue, and disorientation)  Collaborate with interdisciplinary team and initiate plan and interventions as ordered  Monitor patient's weight and dietary intake as ordered or per policy  Utilize nutrition screening tool and intervene per policy  Determine patient's food preferences and provide high-protein, high-caloric foods as appropriate  - Assist patient with eating   - Allow adequate time for meals   - Encourage patient to take dietary supplement as ordered  - Collaborate with clinical nutritionist   - Include patient/family/caregiver in decisions related to nutrition    Outcome: Progressing     Problem: SAFETY,RESTRAINT: NV/NON-SELF DESTRUCTIVE BEHAVIOR  Goal: Remains free of harm/injury (restraint for non violent/non self-detsructive behavior)  Description: INTERVENTIONS:  - Instruct patient/family regarding restraint use   - Assess and monitor physiologic and psychological status   - Provide interventions and comfort measures to meet assessed patient needs   - Identify and implement measures to help patient regain control  - Assess readiness for release of restraint   Outcome: Progressing  Goal: Returns to optimal restraint-free functioning  Description: INTERVENTIONS:  - Assess the patient's behavior and symptoms that indicate continued need for restraint  - Identify and implement measures to help patient regain control  - Assess readiness for release of restraint   Outcome: Progressing     Problem: DECISION MAKING  Goal: Pt/Family able to effectively weigh alternatives and participate in decision making related to treatment and care  Description: INTERVENTIONS:  - Identify decision maker  - Determine when there are differences among patient's view, family's view, and healthcare provider's view of patient condition and care goals  - Facilitate patient/family articulation of goals for care  - Help patient/family identify pros/cons of alternative solutions  - Provide information as requested by patient/family  - Respect patient/family rights related to privacy and sharing information   - Serve as a liaison between patient, family and health care team  - Initiate consults as appropriate (Ethics Team, Palliative Care, Family Care Conference, etc )  Outcome: Progressing     Problem: Nutrition/Hydration-ADULT  Goal: Nutrient/Hydration intake appropriate for improving, restoring or maintaining nutritional needs  Description: Monitor and assess patient's nutrition/hydration status for malnutrition  Collaborate with interdisciplinary team and initiate plan and interventions as ordered  Monitor patient's weight and dietary intake as ordered or per policy  Utilize nutrition screening tool and intervene as necessary   Determine patient's food preferences and provide high-protein, high-caloric foods as appropriate       INTERVENTIONS:  - Monitor oral intake, urinary output, labs, and treatment plans  - Assess nutrition and hydration status and recommend course of action  - Evaluate amount of meals eaten  - Assist patient with eating if necessary   - Allow adequate time for meals  - Recommend/ encourage appropriate diets, oral nutritional supplements, and vitamin/mineral supplements  - Order, calculate, and assess calorie counts as needed  - Recommend, monitor, and adjust tube feedings and TPN/PPN based on assessed needs  - Assess need for intravenous fluids  - Provide specific nutrition/hydration education as appropriate  - Include patient/family/caregiver in decisions related to nutrition  Outcome: Progressing

## 2020-08-05 NOTE — H&P
History and Physical - Critical Care    Ciarra Darling 64 y o  female MRN: [id number]  [facility]   Unit/Bed#: ICU OVR Encounter: [id number]      Reason for Admission / Principal Problem: Left PCA ischemic stroke  HPI: Ciarra Darling is a 64 y o  female with history of hypertension, DM2, atrial fibrillation, who presented to [facility] earlier today due to vision changes and facial droop  Stroke alert was called and CT/CTA demonstrated right PCA occlusion  Patient was ineligible for tpa due to unknown last dosing of home eliquis medication  Patient's status deteriorated and was noted to become aphasic, drowsy, and with right upper extremity paralysis  [facility] IR was contacted and patient was transferred for endovascular procedure  History obtained from chart review  PMH:   Past Medical History:   Diagnosis Date    Anxiety     Arthritis     [facility]osis [facility] [address] [facility]     [facility]     HLD (hyperlipidemia)     Hypertension     Irregular heart beat     Psychiatric disorder     Depression    Tobacco dependence        PSH:   Past Surgical History:   Procedure Laterality Date    APPENDECTOMY      BUNIONECTOMY Right     CARPAL TUNNEL RELEASE      CHOLECYSTECTOMY         Family History:   Family History   Problem Relation Age of Onset    Diabetes Mother     Diabetes Father     Diabetes Brother     Hypertension Brother        Social History:   Social History     Tobacco Use   Smoking Status Current Every Day Smoker    Packs/day: 1 00    Types: Cigarettes   Smokeless Tobacco Never Used      Social History     Substance and Sexual Activity   Alcohol Use Yes    Comment: 1 botttle of vodka over 4 days/denies since 7/18      Marital Status: Single    ROS: 14 point ROS is negative and/or as stated in the HPI  Allergies:    Allergies   Allergen Reactions    Augmentin [Amoxicillin-Pot Clavulanate]      Yeast infection       Home Medications:   Prior to Admission medications    Medication Sig Start Date End Date Taking? Authorizing Provider   apixaban (ELIQUIS) 5 mg Take 1 tablet (5 mg total) by mouth 2 (two) times a day 18   Sotero Alicea MD   atorvastatin (LIPITOR) 10 mg tablet Take 1 tablet (10 mg total) by mouth daily 18   Sotero Alicea MD   buPROPion [facility]) 75 mg tablet Take 1 tablet (75 mg total) by mouth 2 (two) times a day 11/15/18   Ion Gamez MD   dronedarone (MULTAQ) 400 mg tablet Take 1 tablet (400 mg total) by mouth 2 (two) times a day with meals 18   Sotero Alicea MD   lisinopril (ZESTRIL) 20 mg tablet Take 2 tablets (40 mg total) by mouth daily 10/4/18   Sotero Alicea MD   LORazepam (ATIVAN) 1 mg tablet May take a second dose PRN if increased anxiety 3/19/19   Lexie Toscano PA-C   metoprolol tartrate (LOPRESSOR) 100 mg tablet Take 1 tablet (100 mg total) by mouth every 12 (twelve) hours 18   Sotero Alicea MD   rOPINIRole (REQUIP) 1 mg tablet Take 0 5 tablets (0 5 mg total) by mouth daily at bedtime 19   Ion Gamez MD   spironolactone (ALDACTONE) 25 mg tablet Take 1 tablet (25 mg total) by mouth daily 19   Ion Gamez MD       Vitals: There were no vitals filed for this visit  Arterial Line:          Respiratory:  SpO2:         Temperature: Temp (24hrs), Av °F (36 1 °C), Min:97 °F (36 1 °C), Max:97 °F (36 1 °C)  Current:      Weights: There is no height or weight on file to calculate BMI  Physical Exam:    Physical Exam  Vitals signs and nursing note reviewed  Constitutional:       Appearance: She is well-developed  She is not diaphoretic  HENT:      Head: Normocephalic and atraumatic  Eyes:      General: No scleral icterus  Right eye: No discharge  Left eye: No discharge  Conjunctiva/sclera: Conjunctivae normal    Neck:      Musculoskeletal: Normal range of motion and neck supple  Vascular: No JVD  Trachea: No tracheal deviation     Cardiovascular: Rate and Rhythm: Normal rate and regular rhythm  Heart sounds: Normal heart sounds  No murmur  No friction rub  No gallop  Pulmonary:      Effort: Pulmonary effort is normal  No respiratory distress  Breath sounds: Normal breath sounds  No stridor  No wheezing or rales  Chest:      Chest wall: No tenderness  Abdominal:      Palpations: Abdomen is soft  Tenderness: There is no abdominal tenderness  Musculoskeletal:         General: No tenderness or deformity  Skin:     General: Skin is warm  Capillary Refill: Capillary refill takes less than 2 seconds  Findings: No erythema or rash  Neurological:      Motor: No abnormal muscle tone  Comments: Intubated  Moving all extremities spontaneously but not to command         Labs:   Results from last 7 days   Lab Units 20  1407   WBC Thousand/uL 11 48*   HEMOGLOBIN g/dL 15 4   HEMATOCRIT % 47 9*   PLATELETS Thousands/uL 266     Results from last 7 days   Lab Units 20  1407   SODIUM mmol/L 139   POTASSIUM mmol/L 4 3   CHLORIDE mmol/L 101   CO2 mmol/L 26   BUN mg/dL 22   CREATININE mg/dL 1 09   CALCIUM mg/dL 9 5         Results from last 7 days   Lab Units 20  1407   INR  1 03   PTT seconds 25         Results from last 7 days   Lab Units 20  1407   TROPONIN I ng/mL <0 02           Imagin20 CXR:  I have personally reviewed pertinent reports  20 CT:  I have personally reviewed pertinent reports  Micro:  Blood Culture: No results found for: BLOODCX  Urine Culture:   Lab Results   Component Value Date    URINECX >100,000 cfu/ml Escherichia coli 2017     Sputum Culture: No components found for: SPUTUMCX  Wound Culure: No results found for: WOUNDCULT    Impression:  Active Problems:    * No active hospital problems   *      Plan:    Neuro:   · Sedation plan: precedex  · RASS goal: -1 Drowsy  · Pain controlled with: tylenol prn  · Delirium precautions  · CAM-ICU daily  · Trend neuro exam  · q1h neuro exams  · Ischemic stroke  · S/p thrombectomy 8/5  · Was not tpa candidate  · Keep systolic AQ<368  CV:   · MAP goal > 60  · Rhythm: Atrial Fibrillation  · Follow rhythm on telemetry  · Metoprolol 2 5mg IV q6h    Lung:   · Wean from intubation as able  · SpO2 goal >92%  · Pulmonary toileting     GI:   · Stress ulcer prophylaxis: Protonix IV and No prophylaxis needed  · Bowel regimen: PRN  · Zofran PRN for nausea    FEN:   · Fluid/Diuretic plan: 75ml/hr isolyte  · Nutrition/diet plan: npo until speech eval  · Replete electrolytes with goals: K >4 0, Mag >2 0, and Phos >3 0  :   · Indwelling Oliva: remove when able  · Trend UOP and BUN/creat  · Strict I and O    ID:   · Trend temps and WBC count  · Maintain normothermia    Heme:   · Trend hgb and plts  · Transfuse as needed for goal hgb >7    Endo:   · Glycemic control plan: maintain normoglycemia    MSK/Skin:   · Frequent turning and pressure off-loading  · Local wound care as needed      Disposition: ICU admission  Given critical illness, patient length of stay will require greater than two midnights  VTE Pharmacologic Prophylaxis: Pharmacologic VTE Prophylaxis contraindicated due to post surgical  VTE Mechanical Prophylaxis: sequential compression device    Invasive lines and devices: Invasive Devices     Peripheral Intravenous Line            Peripheral IV 08/05/20 Left Antecubital less than 1 day    Peripheral IV 08/05/20 Left Hand less than 1 day    Peripheral IV 08/05/20 Right Antecubital less than 1 day          Arterial Line            Arterial Line 08/05/20 Left Radial less than 1 day          Drain            Urethral Catheter Temperature probe 16 Fr  less than 1 day          Airway            ETT  Cuffed;Oral 7 mm less than 1 day                Code Status: Prior    Counseling / Coordination of Care  Total Critical Care time spent 30 minutes excluding procedures, teaching and family updates            SIGNATURE: Monica Berry, DO  DATE: August 5, 2020  TIME: 4:35 PM

## 2020-08-05 NOTE — SEDATION DOCUMENTATION
TR Band applied to right arm at 1645  Site CDI  Syringe with pt       In Dept: 1550  In Room: 1550  Access: 1612  1st pass: [id number]: 1628  2nd Pass: 1633  Recan: 1637    NIH-15 pre intervention  TICI pre: 0  TICI post: 3

## 2020-08-05 NOTE — EMTALA/ACUTE CARE TRANSFER
[facility]  [address] [facility] [address]  Dept: [phone]      EMTALA TRANSFER CONSENT    NAME Katie Tipton                                         1960                              MRN [id number]    I have been informed of my rights regarding examination, treatment, and transfer   by Dr Marija Herrera, *    Benefits: Specialized equipment and/or services available at the receiving facility (Include comment)________________________(Neurointervention)    Risks: Potential for delay in receiving treatment, Potential deterioration of medical condition, Loss of IV, Increased discomfort during transfer, Possible worsening of condition or death during transfer      Consent for Transfer:  I acknowledge that my medical condition has been evaluated and explained to me by the emergency department physician or other qualified medical person and/or my attending physician, who has recommended that I be transferred to the service of  Accepting Physician: Jen Barillas at [address] Name, [address] : [address] [place]  The above potential benefits of such transfer, the potential risks associated with such transfer, and the probable risks of not being transferred have been explained to me, and I fully understand them  The doctor has explained that, in my case, the benefits of transfer outweigh the risks  I agree to be transferred  I authorize the performance of emergency medical procedures and treatments upon me in both transit and upon arrival at the receiving facility  Additionally, I authorize the release of any and all medical records to the receiving facility and request they be transported with me, if possible  I understand that the safest mode of transportation during a medical emergency is an ambulance and that the Hospital advocates the use of this mode of transport   Risks of traveling to the receiving facility by car, including absence of medical control, life sustaining equipment, such as oxygen, and medical personnel has been explained to me and I fully understand them  (BILLY CORRECT BOX BELOW)  [  ]  I consent to the stated transfer and to be transported by ambulance/helicopter  [  ]  I consent to the stated transfer, but refuse transportation by ambulance and accept full responsibility for my transportation by car  I understand the risks of non-ambulance transfers and I exonerate the Hospital and its staff from any deterioration in my condition that results from this refusal     X___________________________________________    DATE  20  TIME________  Signature of patient or legally responsible individual signing on patient behalf           RELATIONSHIP TO PATIENT_________________________          Provider Certification    NAME Devi Ray                                         1960                              MRN [id number]    A medical screening exam was performed on the above named patient  Based on the examination:    Condition Necessitating Transfer The primary encounter diagnosis was CVA (cerebral vascular accident) ([facility] [address] )  A diagnosis of Rapid atrial fibrillation (HCC) was also pertinent to this visit      Patient Condition: The patient has been stabilized such that within reasonable medical probability, no material deterioration of the patient condition or the condition of the unborn child(bernice) is likely to result from the transfer    Reason for Transfer: Level of Care needed not available at this facility, No bed available at level of patient's needs    Transfer Requirements: [facility]   · Space available and qualified personnel available for treatment as acknowledged by    · Agreed to accept transfer and to provide appropriate medical treatment as acknowledged by       [place]  · Appropriate medical records of the examination and treatment of the patient are provided at the time of transfer STAFF INITIAL WHEN COMPLETED _______  · Transfer will be performed by qualified personnel from    and appropriate transfer equipment as required, including the use of necessary and appropriate life support measures  Provider Certification: I have examined the patient and explained the following risks and benefits of being transferred/refusing transfer to the patient/family:  General risk, such as traffic hazards, adverse weather conditions, rough terrain or turbulence, possible failure of equipment (including vehicle or aircraft), or consequences of actions of persons outside the control of the transport personnel, Risk of worsening condition      Based on these reasonable risks and benefits to the patient and/or the unborn child(bernice), and based upon the information available at the time of the patients examination, I certify that the medical benefits reasonably to be expected from the provision of appropriate medical treatments at another medical facility outweigh the increasing risks, if any, to the individuals medical condition, and in the case of labor to the unborn child, from effecting the transfer      X____________________________________________ DATE 08/05/20        TIME_______      ORIGINAL - SEND TO MEDICAL RECORDS   COPY - SEND WITH PATIENT DURING TRANSFER

## 2020-08-05 NOTE — DISCHARGE INSTRUCTIONS
Today, you underwent a cerebral angiogram with intervention under the care of Dr Konstantin Snow*** for evaluation of basilar stroke***  ? The following instructions will help you care for yourself, or be cared for upon your return home today  These are guidelines for your care right after your surgery only  ? Notify Your Doctor or Nurse if you have any of the following:  ? SYMPTOMS OF WOUND INFECTION--   Increased pain in or around the incision   Swelling around the incision  Any drainage from the incision  Incision separates or opens up  Warmth in the tissues around the incision  Redness or tenderness on the skin near the incision   Fever (temperature greater than 101 degrees F)   ? NEUROLOGICAL CHANGES--  Change in alertness  Increased sleepiness   Nausea and vomiting   New onset of numbness or weakness in arms or legs   New problems with your bowels or bladder  New or worse problems with balance or walking  Seizures, new or worsening  ? UNRELIEVED HEADACHE PAIN--  New or increased pain unrelieved with pain medications   Pain associated with nausea and vomiting   Pain associated with other symptoms  ? QUESTIONS OR PROBLEMS--  Any questions or problems that you are unsure about  Wound Care:  Keep Incision Clean and Dry   You may shower daily, but do not soak incision  Pat dry after showering  No tub baths, soaking, swimming for 1 week after angiogram    You do not need to cover the incision  Mild to moderate bruising and tenderness to the site is expected and may last up to 1-2 weeks after your procedure  ?  A closure device was placed at the catheter insertion site  This is MRI compatible  Remove the dressing 24 hours after your procedure  If your groin site is bleeding, apply firm pressure for 10 minutes  Reinforce dressing rather than removing and checking frequently  If continues to bleed through the dressing after 1 hour, contact your neurosurgeon's office  Anticipatory Education:  ?   PAIN MED W/ Acetaminophen (Tylenol)  --IF a prescription for pain medicine has been sent home with you:  --Narcotic pain medication may cause constipation  Be sure to take stool softeners or laxatives while you are on narcotic pain medication  --Do not drive after taking prescription pain medicine  ?  If this medicine is too strong, or no longer necessary, or we did NOT recommend/prescribe oral narcotics, you may take:   - Tylenol Extra-strength/Acetaminophen, 2 tablets every 4-6 hours as needed for mild pain  DO NOT TAKE MORE THAN 4000MG PER DAY from combined sources  NOTE: Remember to eat when taking pain medicines in order to avoid nausea  Watch for constipation  Eat plenty of fruits, vegetables, juices, and drink 6-8 glasses of water each day  Constipation: Stay active and drink at least 6-8 cups of fluid each day to prevent constipation  If you need a laxative or stool softener follow the package directions or consult with your local pharmacists if you have questions  ? After anesthesia, rest for 24 hours  Do not drive, drink alcohol beverages or make any important decisions during this time  General anesthesia may cause sore throat, jaw discomfort or muscle aches  These symptoms can last for one or two days  Activity: Please follow these instructions:  Advance your activity as you can tolerate  You may do light house work; nothing strenuous   You may walk all you want  You may go up and down the steps  Use the railing for support  Do not do excessive bending, straining or heavy lifting for 48 hours after your procedure  Do not drive or return to work until you are instructed   It is normal for your energy level and sleep patterns to change after surgery  Get extra sleep at night and take naps during the day to help you feel less tired  Take rest periods during the day  Complete recovery may take several weeks  ?  You may resume driving after 98-35 hours recovery     You may return to work after 48 hours of recovery  ?  Diet:  Your doctor has recommended that you follow these diet instructions at home  Refer to the patient education materials you received during your hospital stay  If you would like more nutrition counseling, ask your doctor about making an appointment with an outpatient dietitian  Resume your home diet  ? Medications:  Please resume your home medications as instructed  ? Home Supplies and Equipment:  none  Additional Contacts:  ? CONTACTS FOR NEUROSURGERY: You may call your neurosurgeons office if you have questions between 8:30 am and 4:30 pm  You may request to speak to the nurse practitioner who is available Monday through Friday  ?  For off hours or the weekend you may call your neurosurgeon's office to leave a message

## 2020-08-05 NOTE — CONSULTS
Consultation - Neurology   Dima Santos 64 y o  female MRN: [id number]  Unit/Bed#: ED 17 Encounter: [id number]    Assessment/Plan   1)  Right PCA embolism and occlusion, with additional stroke-like findings of dysconjugate gaze, anisocoria, right facial droop, right upper extremity flaccidity and left gaze preference, and expressive and receptive aphasia - concerning for embolic shower in setting of noncompliance with anticoagulation and atrial fibrillation   -patient is to be transferred to [facility] for higher level of care and neurovascular intervention   -stroke workup   -MRI B pending   -CTA head and neck demonstrates right PCA occlusion   -CT H demonstrates no acute intracranial abnormality   -strongly recommend permissive hypertension prior to neurovascular intervention, would recommend systolic blood pressure of >180   -Tele   -Echo pending   -HbA1c, Lipid profile pending   -will hold anticoagulation and anti-platelet medications until post thrombectomy attempt, will defer antiplatelet/anticoagulation to Neurosurgery for now   -statin   -Supportive care and medication management per primary team   -PT/ OT/ Speech/ PM&R   -Will continue to follow, please monitor exam and notify with changes     Stroke alert:  [phone]  Neurology at bedside:  1404  NIHSS:  16  TPA:  Not applicable secondary to use of anticoagulation  Neurovascular intervention:  Patient transferred to [facility] as an endovascular alert    History of Present Illness     Reason for Consult / Principal Problem:  Stroke-like symptoms  Hx and PE limited by:  Aphasia  HPI: Dima Santos is a 64 y o   female with atrial fibrillation, intermittently compliant with anticoagulation, on Eliquis, morbidly obese, DM 2, current smoker who [facility] after several min noted that she was having difficulty with vision in her left eye starting approximately 1 hour prior to presentation    Upon evaluation in the emergency department, the patient was noted to have dysconjugate gaze, with left eye deviated up and out  The patient was also noted to have right central facial droop  A stroke alert was initiated  Neurology met the patient on the way to CT scan  On initial evaluation, the patient was noted to have symptomology as detailed above  The patient was conversant and appropriately interactive  She was able to describe her vision changes as swirling, like tie dye "  CT head without acute intracranial abnormality  CTA head and neck did demonstrate a subtle right PCA occlusion  The patient was taken from CT scan to her emergency department room  On further evaluation of the patient, the patient significantly worsened  In addition to patient's dysconjugate gaze,  the patient now demonstrated a left gaze preference, right central facial droop, flaccid right upper extremity, expressive and receptive aphasia, and anisocoria of 2 mm, with right eye pinpoint left eye 3 mm  Unfortunately, the patient was seen to be ineligible for tPA, as she had previously stated before becoming aphasic that she took her Eliquis the day prior  The patient was an unreliable historian, and therefore it could not be determined that the patient had had a 48 hour anticoagulation holiday  Pharmacist did attempt to reconcile patient's outpatient medications  The patient's was noted to be in AFib with RVR, and therefore Cardizem was initiated  Neurology did advise that the patient be hypertensive, and recommended with fluids running with SBP goal of >180  Recommended consideration of metoprolol for rate control  Attending neurologist spoke with attending neurovascular interventionalist, and the patient was transferred to [facility] for higher level of care and potential neurovascular intervention  On exam today, the patient was initially lying comfortably on the stretcher    As further detailed above, the patient demonstrated of rapid deterioration and became aphasic  The patient was also drowsy and  demonstrated anisocoria as well as dysconjugate gaze  Patient rapidly transferred to higher level of care  Patient was re-evaluated with Critical Care transport team prior to transfer  Consults    Review of Systems   Unable to obtain secondary to aphasia    Historical Information   Past Medical History:   Diagnosis Date    Anxiety     Arthritis     [facility] [address] [facility]     Diabetes mellitus ([facility] )     HLD (hyperlipidemia)     Hypertension     Irregular heart beat     Psychiatric disorder     Depression    Tobacco dependence      Past Surgical History:   Procedure Laterality Date    APPENDECTOMY      BUNIONECTOMY Right     CARPAL TUNNEL RELEASE      CHOLECYSTECTOMY       Social History   Social History     Substance and Sexual Activity   Alcohol Use Yes    Comment: 1 botttle of vodka over 4 days/denies since 7/18     Social History     Substance and Sexual Activity   Drug Use No     E-Cigarette/Vaping     E-Cigarette/Vaping Substances     Social History     Tobacco Use   Smoking Status Current Every Day Smoker    Packs/day: 1 00    Types: Cigarettes   Smokeless Tobacco Never Used     Family History: non-contributory    Review of previous medical records was completed  Meds/Allergies   Scheduled Meds:  Continuous Infusions:No current facility-administered medications for this encounter  PRN Meds:  Allergies   Allergen Reactions    Augmentin [Amoxicillin-Pot Clavulanate]      Yeast infection       Objective   Vitals:Blood pressure 91/64, pulse (!) 112, temperature (!) 97 °F (36 1 °C), temperature source Temporal, resp  rate 22, weight 114 kg (251 lb 12 3 oz), SpO2 98 %  ,Body mass index is 46 05 kg/m²  No intake or output data in the 24 hours ending 08/05/20 1546    Invasive Devices:    Invasive Devices     Peripheral Intravenous Line            Peripheral IV 08/05/20 Left Antecubital less than 1 day    Peripheral IV 08/05/20 Right Antecubital less than 1 day                Physical Exam  Vitals signs and nursing note reviewed  Constitutional:       Appearance: She is obese  She is not toxic-appearing or diaphoretic  HENT:      Head: Normocephalic and atraumatic  Nose: Nose normal       Mouth/Throat:      Mouth: Mucous membranes are moist       Pharynx: Oropharynx is clear  Eyes:      Conjunctiva/sclera: Conjunctivae normal    Cardiovascular:      Rate and Rhythm: Tachycardia present  Rhythm irregular  Pulmonary:      Effort: Pulmonary effort is normal  No respiratory distress  Breath sounds: Normal breath sounds  Musculoskeletal:         General: No swelling, tenderness or signs of injury  Skin:     General: Skin is warm and dry  Coloration: Skin is not jaundiced or pale  Findings: No bruising, erythema, lesion or rash  Neurologic Exam     Mental Status   Patient initially interactive and attentive, became drowsy but arousable by and demonstrated receptive and expressive aphasia    Decreased attention and concentration     Cranial Nerves   Patient demonstrated right field cut  Dysconjugate gaze, with left eye deviated superiorly and laterally  Anisocoria of 2 mm, with right pupil pinpoint, left pupil 3 mm  Patient with left gaze preference, intermittently able to cross midline  Right central facial droop  Patient with intact coughing and gag, no tongue deviation, fasciculations or atrophy     Motor Exam   Muscle bulk: normalPatient with right upper extremity flaccidity, no withdraw to noxious stimuli  Withdraw to noxious stimuli on right lower extremity, spontaneous movement of left upper and lower extremity     Sensory Exam   Grimace/agitation to noxious stimuli x4     Gait, Coordination, and Reflexes     Gait  Gait: (Deferred for safety)    Tremor   Resting tremor: absent    Reflexes   Right plantar: normal  Left plantar: normalUnable to assess coordination secondary to aphasia       Lab Results: I have personally reviewed pertinent reports  Recent Results (from the past 24 hour(s))   Basic metabolic panel    Collection Time: 08/05/20  2:07 PM   Result Value Ref Range    Sodium 139 136 - 145 mmol/L    Potassium 4 3 3 5 - 5 3 mmol/L    Chloride 101 100 - 108 mmol/L    CO2 26 21 - 32 mmol/L    ANION GAP 12 4 - 13 mmol/L    BUN 22 5 - 25 mg/dL    Creatinine 1 09 0 60 - 1 30 mg/dL    Glucose 176 (H) 65 - 140 mg/dL    Calcium 9 5 8 3 - 10 1 mg/dL    eGFR 56 ml/min/1 73sq m   CBC and Platelet    Collection Time: 08/05/20  2:07 PM   Result Value Ref Range    WBC 11 48 (H) 4 31 - 10 16 Thousand/uL    RBC 5 22 (H) 3 81 - 5 12 Million/uL    Hemoglobin 15 4 11 5 - 15 4 g/dL    Hematocrit 47 9 (H) 34 8 - 46 1 %    MCV 92 82 - 98 fL    MCH 29 5 26 8 - 34 3 pg    MCHC 32 2 31 4 - 37 4 g/dL    RDW 13 7 11 6 - 15 1 %    Platelets 313 196 - 819 Thousands/uL    MPV 9 7 8 9 - 12 7 fL   Protime-INR    Collection Time: 08/05/20  2:07 PM   Result Value Ref Range    Protime 13 3 11 6 - 14 5 seconds    INR 1 03 0 84 - 1 19   APTT    Collection Time: 08/05/20  2:07 PM   Result Value Ref Range    PTT 25 23 - 37 seconds   Troponin I    Collection Time: 08/05/20  2:07 PM   Result Value Ref Range    Troponin I <0 02 <=0 04 ng/mL   Fingerstick Glucose (POCT)    Collection Time: 08/05/20  2:33 PM   Result Value Ref Range    POC Glucose 142 (H) 65 - 140 mg/dl   ]    Imaging Studies: I have personally reviewed pertinent reports  and I have personally reviewed pertinent films in PACS  EKG, Pathology, and Other Studies: I have personally reviewed pertinent reports      VTE Prophylaxis: Reason for no pharmacologic prophylaxis in ED    Code Status: Prior  Advance Directive and Living Will:      Power of :    POLST:

## 2020-08-05 NOTE — ANESTHESIA PROCEDURE NOTES
Arterial Line Insertion  Performed by: Berenice Navarro CRNA  Authorized by: Dorcas Garcia DO   Consent: Verbal consent obtained  Written consent obtained  Risks and benefits: risks, benefits and alternatives were discussed  Consent given by: patient  Patient understanding: patient states understanding of the procedure being performed  Patient consent: the patient's understanding of the procedure matches consent given  Relevant documents: relevant documents present and verified  Required items: required blood products, implants, devices, and special equipment available  Patient identity confirmed: verbally with patient, arm band, hospital-assigned identification number and anonymous protocol, patient vented/unresponsive  Time out: Immediately prior to procedure a "time out" was called to verify the correct patient, procedure, equipment, support staff and site/side marked as required  Preparation: Patient was prepped and draped in the usual sterile fashion    Indications: hemodynamic monitoring  Orientation:  Left  Location: radial artery  Procedure Details:  Stephon's test normal: yes  Needle gauge: 20  Seldinger technique: Seldinger technique used      Post-procedure:  Post-procedure: dressing applied  Waveform: good waveform and waveform confirmed  Post-procedure CNS: normal  Patient tolerance: Patient tolerated the procedure well with no immediate complications

## 2020-08-05 NOTE — EMTALA/ACUTE CARE TRANSFER
[facility]  [address] [address]  Dept: [phone]      EMTALA TRANSFER CONSENT    NAME Fred Gordillo                                         1960                              MRN [id number]    I have been informed of my rights regarding examination, treatment, and transfer   by Dr Abdulkadir Guevara, *    Benefits: Specialized equipment and/or services available at the receiving facility (Include comment)________________________(Neurointervention)    Risks: Potential for delay in receiving treatment, Potential deterioration of medical condition, Loss of IV, Increased discomfort during transfer, Possible worsening of condition or death during transfer      Consent for Transfer:  I acknowledge that my medical condition has been evaluated and explained to me by the emergency department physician or other qualified medical person and/or my attending physician, who has recommended that I be transferred to the service of  Accepting Physician: Elliott Friedman at [address] Name, [address] : [facility]  The above potential benefits of such transfer, the potential risks associated with such transfer, and the probable risks of not being transferred have been explained to me, and I fully understand them  The doctor has explained that, in my case, the benefits of transfer outweigh the risks  I agree to be transferred  I authorize the performance of emergency medical procedures and treatments upon me in both transit and upon arrival at the receiving facility  Additionally, I authorize the release of any and all medical records to the receiving facility and request they be transported with me, if possible  I understand that the safest mode of transportation during a medical emergency is an ambulance and that the Hospital advocates the use of this mode of transport   Risks of traveling to the receiving facility by car, including absence of medical control, life sustaining equipment, such as oxygen, and medical personnel has been explained to me and I fully understand them  (BILLY CORRECT BOX BELOW)  [ x ]  I consent to the stated transfer and to be transported by ambulance/helicopter  [  ]  I consent to the stated transfer, but refuse transportation by ambulance and accept full responsibility for my transportation by car  I understand the risks of non-ambulance transfers and I exonerate the Hospital and its staff from any deterioration in my condition that results from this refusal     X___________________________________________    DATE  20  TIME________  Signature of patient or legally responsible individual signing on patient behalf           RELATIONSHIP TO PATIENT_________________________          Provider Certification    NAME Efraín Dacosta                                        [facility] 1960                              MRN [id number]    A medical screening exam was performed on the above named patient  Based on the examination:    Condition Necessitating Transfer The primary encounter diagnosis was CVA (cerebral vascular accident) ([facility] [address] )  A diagnosis of Rapid atrial fibrillation (HCC) was also pertinent to this visit      Patient Condition: The patient has been stabilized such that within reasonable medical probability, no material deterioration of the patient condition or the condition of the unborn child(bernice) is likely to result from the transfer    Reason for Transfer: Level of Care needed not available at this facility, No bed available at level of patient's needs    Transfer Requirements: [facility]   · Space available and qualified personnel available for treatment as acknowledged by  PACS  · Agreed to accept transfer and to provide appropriate medical treatment as acknowledged by       [place]  · Appropriate medical records of the examination and treatment of the patient are provided at the time of transfer STAFF INITIAL WHEN COMPLETED _______  · Transfer will be performed by qualified personnel from  [facility]  and appropriate transfer equipment as required, including the use of necessary and appropriate life support measures  Provider Certification: I have examined the patient and explained the following risks and benefits of being transferred/refusing transfer to the patient/family:  General risk, such as traffic hazards, adverse weather conditions, rough terrain or turbulence, possible failure of equipment (including vehicle or aircraft), or consequences of actions of persons outside the control of the transport personnel, Risk of worsening condition      Based on these reasonable risks and benefits to the patient and/or the unborn child(bernice), and based upon the information available at the time of the patients examination, I certify that the medical benefits reasonably to be expected from the provision of appropriate medical treatments at another medical facility outweigh the increasing risks, if any, to the individuals medical condition, and in the case of labor to the unborn child, from effecting the transfer      X____________________________________________ DATE 08/05/20        TIME_______      ORIGINAL - SEND TO MEDICAL RECORDS   COPY - SEND WITH PATIENT DURING TRANSFER

## 2020-08-06 ENCOUNTER — APPOINTMENT (INPATIENT)
Dept: NON INVASIVE DIAGNOSTICS | Facility: HOSPITAL | Age: 60
DRG: 023 | End: 2020-08-06
Payer: COMMERCIAL

## 2020-08-06 ENCOUNTER — APPOINTMENT (INPATIENT)
Dept: RADIOLOGY | Facility: HOSPITAL | Age: 60
DRG: 023 | End: 2020-08-06
Payer: COMMERCIAL

## 2020-08-06 LAB
ANION GAP SERPL CALCULATED.3IONS-SCNC: 6 MMOL/L (ref 4–13)
ATRIAL RATE: 375 BPM
BASOPHILS # BLD AUTO: 0.08 THOUSANDS/ΜL (ref 0–0.1)
BASOPHILS NFR BLD AUTO: 1 % (ref 0–1)
BUN SERPL-MCNC: 17 MG/DL (ref 5–25)
CALCIUM SERPL-MCNC: 8.2 MG/DL (ref 8.3–10.1)
CHLORIDE SERPL-SCNC: 109 MMOL/L (ref 100–108)
CHOLEST SERPL-MCNC: 189 MG/DL (ref 50–200)
CO2 SERPL-SCNC: 28 MMOL/L (ref 21–32)
CREAT SERPL-MCNC: 0.79 MG/DL (ref 0.6–1.3)
EOSINOPHIL # BLD AUTO: 0.09 THOUSAND/ΜL (ref 0–0.61)
EOSINOPHIL NFR BLD AUTO: 1 % (ref 0–6)
ERYTHROCYTE [DISTWIDTH] IN BLOOD BY AUTOMATED COUNT: 14 % (ref 11.6–15.1)
EST. AVERAGE GLUCOSE BLD GHB EST-MCNC: 157 MG/DL
GFR SERPL CREATININE-BSD FRML MDRD: 83 ML/MIN/1.73SQ M
GLUCOSE SERPL-MCNC: 148 MG/DL (ref 65–140)
GLUCOSE SERPL-MCNC: 181 MG/DL (ref 65–140)
HBA1C MFR BLD: 7.1 %
HCT VFR BLD AUTO: 40.6 % (ref 34.8–46.1)
HDLC SERPL-MCNC: 49 MG/DL
HGB BLD-MCNC: 13.1 G/DL (ref 11.5–15.4)
IMM GRANULOCYTES # BLD AUTO: 0.04 THOUSAND/UL (ref 0–0.2)
IMM GRANULOCYTES NFR BLD AUTO: 0 % (ref 0–2)
LDLC SERPL CALC-MCNC: 112 MG/DL (ref 0–100)
LYMPHOCYTES # BLD AUTO: 1.63 THOUSANDS/ΜL (ref 0.6–4.47)
LYMPHOCYTES NFR BLD AUTO: 13 % (ref 14–44)
MAGNESIUM SERPL-MCNC: 1.8 MG/DL (ref 1.6–2.6)
MCH RBC QN AUTO: 29.6 PG (ref 26.8–34.3)
MCHC RBC AUTO-ENTMCNC: 32.3 G/DL (ref 31.4–37.4)
MCV RBC AUTO: 92 FL (ref 82–98)
MONOCYTES # BLD AUTO: 0.79 THOUSAND/ΜL (ref 0.17–1.22)
MONOCYTES NFR BLD AUTO: 6 % (ref 4–12)
NEUTROPHILS # BLD AUTO: 10.03 THOUSANDS/ΜL (ref 1.85–7.62)
NEUTS SEG NFR BLD AUTO: 79 % (ref 43–75)
NRBC BLD AUTO-RTO: 0 /100 WBCS
PHOSPHATE SERPL-MCNC: 3.3 MG/DL (ref 2.7–4.5)
PLATELET # BLD AUTO: 211 THOUSANDS/UL (ref 149–390)
PMV BLD AUTO: 9.4 FL (ref 8.9–12.7)
POTASSIUM SERPL-SCNC: 4.2 MMOL/L (ref 3.5–5.3)
QRS AXIS: 70 DEGREES
QRSD INTERVAL: 62 MS
QT INTERVAL: 286 MS
QTC INTERVAL: 430 MS
RBC # BLD AUTO: 4.43 MILLION/UL (ref 3.81–5.12)
SODIUM SERPL-SCNC: 143 MMOL/L (ref 136–145)
T WAVE AXIS: 37 DEGREES
TRIGL SERPL-MCNC: 142 MG/DL
VENTRICULAR RATE: 136 BPM
WBC # BLD AUTO: 12.66 THOUSAND/UL (ref 4.31–10.16)

## 2020-08-06 PROCEDURE — 93010 ELECTROCARDIOGRAM REPORT: CPT

## 2020-08-06 PROCEDURE — 97166 OT EVAL MOD COMPLEX 45 MIN: CPT

## 2020-08-06 PROCEDURE — 92610 EVALUATE SWALLOWING FUNCTION: CPT

## 2020-08-06 PROCEDURE — 80061 LIPID PANEL: CPT | Performed by: EMERGENCY MEDICINE

## 2020-08-06 PROCEDURE — 80048 BASIC METABOLIC PNL TOTAL CA: CPT | Performed by: EMERGENCY MEDICINE

## 2020-08-06 PROCEDURE — 84100 ASSAY OF PHOSPHORUS: CPT | Performed by: EMERGENCY MEDICINE

## 2020-08-06 PROCEDURE — 82948 REAGENT STRIP/BLOOD GLUCOSE: CPT

## 2020-08-06 PROCEDURE — 70551 MRI BRAIN STEM W/O DYE: CPT

## 2020-08-06 PROCEDURE — 99232 SBSQ HOSP IP/OBS MODERATE 35: CPT | Performed by: PSYCHIATRY & NEUROLOGY

## 2020-08-06 PROCEDURE — 93306 TTE W/DOPPLER COMPLETE: CPT | Performed by: INTERNAL MEDICINE

## 2020-08-06 PROCEDURE — 99233 SBSQ HOSP IP/OBS HIGH 50: CPT | Performed by: INTERNAL MEDICINE

## 2020-08-06 PROCEDURE — 97163 PT EVAL HIGH COMPLEX 45 MIN: CPT

## 2020-08-06 PROCEDURE — 85025 COMPLETE CBC W/AUTO DIFF WBC: CPT | Performed by: EMERGENCY MEDICINE

## 2020-08-06 PROCEDURE — 83036 HEMOGLOBIN GLYCOSYLATED A1C: CPT | Performed by: EMERGENCY MEDICINE

## 2020-08-06 PROCEDURE — 93306 TTE W/DOPPLER COMPLETE: CPT

## 2020-08-06 PROCEDURE — 99024 POSTOP FOLLOW-UP VISIT: CPT | Performed by: PHYSICIAN ASSISTANT

## 2020-08-06 PROCEDURE — NC001 PR NO CHARGE: Performed by: PSYCHIATRY & NEUROLOGY

## 2020-08-06 PROCEDURE — G1004 CDSM NDSC: HCPCS

## 2020-08-06 PROCEDURE — NC001 PR NO CHARGE: Performed by: PHYSICIAN ASSISTANT

## 2020-08-06 PROCEDURE — 99233 SBSQ HOSP IP/OBS HIGH 50: CPT | Performed by: FAMILY MEDICINE

## 2020-08-06 PROCEDURE — 83735 ASSAY OF MAGNESIUM: CPT | Performed by: EMERGENCY MEDICINE

## 2020-08-06 RX ORDER — HYDRALAZINE HYDROCHLORIDE 20 MG/ML
10 INJECTION INTRAMUSCULAR; INTRAVENOUS EVERY 4 HOURS PRN
Status: DISCONTINUED | OUTPATIENT
Start: 2020-08-06 | End: 2020-08-07 | Stop reason: HOSPADM

## 2020-08-06 RX ORDER — OXYCODONE HYDROCHLORIDE 5 MG/1
5 TABLET ORAL EVERY 4 HOURS PRN
Status: DISCONTINUED | OUTPATIENT
Start: 2020-08-06 | End: 2020-08-06

## 2020-08-06 RX ORDER — LANOLIN ALCOHOL/MO/W.PET/CERES
3 CREAM (GRAM) TOPICAL
Status: DISCONTINUED | OUTPATIENT
Start: 2020-08-06 | End: 2020-08-07 | Stop reason: HOSPADM

## 2020-08-06 RX ORDER — ACETAMINOPHEN 325 MG/1
975 TABLET ORAL EVERY 8 HOURS PRN
Status: DISCONTINUED | OUTPATIENT
Start: 2020-08-06 | End: 2020-08-07 | Stop reason: HOSPADM

## 2020-08-06 RX ORDER — METHOCARBAMOL 500 MG/1
500 TABLET, FILM COATED ORAL EVERY 6 HOURS PRN
Status: DISCONTINUED | OUTPATIENT
Start: 2020-08-06 | End: 2020-08-07 | Stop reason: HOSPADM

## 2020-08-06 RX ORDER — MAGNESIUM SULFATE HEPTAHYDRATE 40 MG/ML
2 INJECTION, SOLUTION INTRAVENOUS ONCE
Status: COMPLETED | OUTPATIENT
Start: 2020-08-06 | End: 2020-08-07

## 2020-08-06 RX ORDER — METOPROLOL TARTRATE 50 MG/1
50 TABLET, FILM COATED ORAL EVERY 12 HOURS SCHEDULED
Status: DISCONTINUED | OUTPATIENT
Start: 2020-08-06 | End: 2020-08-07 | Stop reason: HOSPADM

## 2020-08-06 RX ORDER — ONDANSETRON 2 MG/ML
4 INJECTION INTRAMUSCULAR; INTRAVENOUS EVERY 4 HOURS PRN
Status: DISCONTINUED | OUTPATIENT
Start: 2020-08-06 | End: 2020-08-07 | Stop reason: HOSPADM

## 2020-08-06 RX ORDER — ACETAMINOPHEN 325 MG/1
650 TABLET ORAL EVERY 6 HOURS SCHEDULED
Status: DISCONTINUED | OUTPATIENT
Start: 2020-08-06 | End: 2020-08-06

## 2020-08-06 RX ADMIN — METHOCARBAMOL 500 MG: 500 TABLET, FILM COATED ORAL at 15:36

## 2020-08-06 RX ADMIN — METOPROLOL TARTRATE 50 MG: 50 TABLET, FILM COATED ORAL at 13:43

## 2020-08-06 RX ADMIN — METHOCARBAMOL 500 MG: 500 TABLET, FILM COATED ORAL at 22:08

## 2020-08-06 RX ADMIN — DRONEDARONE 400 MG: 400 TABLET, FILM COATED ORAL at 17:45

## 2020-08-06 RX ADMIN — OXYCODONE HYDROCHLORIDE 5 MG: 5 TABLET ORAL at 15:35

## 2020-08-06 RX ADMIN — OXYCODONE HYDROCHLORIDE 5 MG: 5 TABLET ORAL at 06:47

## 2020-08-06 RX ADMIN — APIXABAN 5 MG: 5 TABLET, FILM COATED ORAL at 11:22

## 2020-08-06 RX ADMIN — MAGNESIUM SULFATE HEPTAHYDRATE 2 G: 40 INJECTION, SOLUTION INTRAVENOUS at 08:48

## 2020-08-06 RX ADMIN — ACETAMINOPHEN 650 MG: 325 TABLET, FILM COATED ORAL at 00:10

## 2020-08-06 RX ADMIN — ACETAMINOPHEN 650 MG: 325 TABLET, FILM COATED ORAL at 13:42

## 2020-08-06 RX ADMIN — NICOTINE 1 PATCH: 14 PATCH TRANSDERMAL at 08:47

## 2020-08-06 RX ADMIN — DRONEDARONE 400 MG: 400 TABLET, FILM COATED ORAL at 11:23

## 2020-08-06 RX ADMIN — MELATONIN 3 MG: at 22:06

## 2020-08-06 RX ADMIN — CHLORHEXIDINE GLUCONATE 0.12% ORAL RINSE 15 ML: 1.2 LIQUID ORAL at 08:47

## 2020-08-06 RX ADMIN — METOPROLOL TARTRATE 2.5 MG: 5 INJECTION INTRAVENOUS at 05:54

## 2020-08-06 RX ADMIN — METOPROLOL TARTRATE 50 MG: 50 TABLET, FILM COATED ORAL at 22:06

## 2020-08-06 RX ADMIN — LABETALOL 20 MG/4 ML (5 MG/ML) INTRAVENOUS SYRINGE 10 MG: at 08:48

## 2020-08-06 RX ADMIN — METOPROLOL TARTRATE 2.5 MG: 5 INJECTION INTRAVENOUS at 00:10

## 2020-08-06 RX ADMIN — APIXABAN 5 MG: 5 TABLET, FILM COATED ORAL at 17:45

## 2020-08-06 RX ADMIN — ATORVASTATIN CALCIUM 80 MG: 80 TABLET, FILM COATED ORAL at 17:45

## 2020-08-06 NOTE — PHYSICAL THERAPY NOTE
PHYSICAL THERAPY Evaluation NOTE    Patient Name: Kathie BHAT Date: 8/6/2020   AGE:   64 y o  Mrn:   [id number]  ADMIT DX:  Cerebrovascular accident (CVA) due to thrombosis of precerebral artery ([facility] ) [I63 [id number]]    Past Medical History:   Diagnosis Date    Anxiety     Arthritis     Cirrhosis of liver ([facility] )     Diabetes mellitus ([place] [address] )     HLD (hyperlipidemia)     Hypertension     Irregular heart beat     Psychiatric disorder     Depression    Stroke ([place] [address] )     Tobacco dependence      Length Of Stay: 1  PHYSICAL THERAPY EVALUATION :      08/06/20 1600   Note Type   Note type Eval/Treat   Pain Assessment   Pain Assessment Tool 0-10   Pain Score 8   Pain Location/Orientation Location: Abdomen;Orientation: Upper;Orientation: Mid   Home Living   Type of Home Apartment  (1st floor apartment 3STE)   Home Layout One level;Performs ADLs on one level; Able to live on main level with bedroom/bathroom;Stairs to enter with rails   Bathroom Shower/Tub Tub/shower unit   Bathroom Toilet Standard   Bathroom Equipment Other (Comment)  (none)   Home Equipment   (none)   Additional Comments Pt lives in 1st floor apartment with 3STE with bilateral railings  Bathroom has a tub/shower  Denies any DME or AD   Prior Function   Level of [place] Independent with ADLs and functional mobility   Lives With Alone   Receives Help From Friend(s)   ADL Assistance Independent   IADLs Independent   Falls in the last 6 months 0   Vocational Retired   Comments pt reported INDEP with ADLs, IADLs, and functional mobility without use of AD PTA   Restrictions/Precautions   Weight Bearing Precautions Per Order No   Other Precautions Multiple lines;Telemetry; Fall Risk;Pain   General   Additional Pertinent History Pt is a 65 yo F s/p thrombectomy 8/6/2020 following R PCA occlusion   Family/Caregiver Present No   Cognition   Overall Cognitive Status [facility] Arousal/Participation Cooperative   Attention Within functional limits   Orientation Level Oriented X4   Memory Within functional limits   Following Commands Follows all commands and directions without difficulty   Comments Pt identified with full name and birthdate  Pt is pleasant and cooperative   RLE Assessment   RLE Assessment   (functionally graded > 3+/5)   LLE Assessment   LLE Assessment   (functionally graded > 3+/5)   Coordination   Movements are Fluid and Coordinated 0   Coordination and Movement Description mildly ataxic with dizziness   Transfers   Sit to Stand 5  Supervision   Additional items Increased time required   Stand to Sit 5  Supervision   Additional items Increased time required   Additional Comments Pt was seated OOB in recliner at start and end of PT evaluation  Pt reported feeling mild dizziness with gait training following stair training  Ambulation/Elevation   Gait pattern Ataxia; Improper Weight shift; Wide DELFINA   Gait Assistance 5  Supervision   Additional items Assist x 1   Assistive Device Rolling walker   Distance [de-identified]' x2 with stair training and standing rest break in between   Stair Management Assistance 4  Minimal assist   Additional items Assist x 1;Verbal cues; Increased time required   Stair Management Technique Step to pattern; One rail R;Foreward   Number of Stairs 3  (Pt reported feeling mild dizziness following stair training)   Balance   Static Sitting Good   Dynamic Sitting Fair   Static Standing Fair   Dynamic Standing Fair -   Ambulatory Fair -   Endurance Deficit   Endurance Deficit Yes   Endurance Deficit Description mild fatigue and dyspnea with activity   Activity Tolerance   Activity Tolerance Patient limited by fatigue;Patient limited by pain   Medical Staff Made Aware Spoke to Karla Alonso   Nurse Made Aware Spoke to RN, bed rest order cleared   Assessment   Prognosis Good   Problem List Decreased strength;Decreased endurance; Impaired balance;Decreased mobility;Pain Assessment Pt is a 63 yo F s/p thrombectomy 8/6/2020 following R PCA occlusion  Order placed for PT eval and treat  Spoke to RN who cleared bed rest order  with MD at morning rounds; awaiting activity orders  Pt presents with PMHx of CVA, HTN, HLD, DM2, PAF and personal factors of mobilizing w/ assistive device, stair(s) to enter home, inability to ambulate household distances, inability to navigate community distances, inability to navigate level surfaces w/o external assistance, unable to perform dynamic tasks in community, limited home support, anxiety and inability to perform IADLs  Pt clinical presentation is unstable/unpredictable based on these factors  Pt PLOF was independent with ADLs, IADLs, and functional mobility without use of AD  Upon evaluation, Pt impairments include pain, weakness, decreased endurance, impaired balance, gait deviations and fall risk  Functional mobility assessment showed a need for Supervision assistance with transfers and gait; MinAx1 with stairs and utilizes RW AD for mobilization  Pt demonstrates decreased tolerance to activity due to fatigue and is able to follow commands appropriately  Pt is a good candidate for IP PT to address their impairments and to prevent secondary complications associated with hospital stay  Pt D/C recommendation is for Home with home PT consult to facilitate safe discharge and facilitate further recovery to PLOF  Goals   Patient Goals to go home   STG Expiration Date 08/16/20   Short Term Goal #1 pt will:  Increase bilateral LE strength 1/2 grade to facilitate independent mobility, Perform all bed mobility tasks modified independent to decrease fall risk factors, Perform all transfers modified independent to improve independence, Ambulate 300 ft  with roller walker w/ supervision w/o LOB, Navigate 3 stairs w/ supervision with unilateral handrail to facilitate return to previous living environment, Increase all balance 1/2 grade to decrease risk for falls and Improve Barthel Index score to 100 or greater to facilitate independence   PT Treatment Day 0   Plan   Treatment/Interventions Functional transfer training;LE strengthening/ROM; Therapeutic exercise;Elevations; Endurance training;Gait training;Bed mobility;Spoke to nursing;OT   PT Frequency   (3-5x/wk)   Recommendation   PT Discharge Recommendation Home with skilled therapy  (HHPT consult)   Equipment Recommended Walker   PT - OK to Discharge Yes   Additional Comments when medically appropriate   Barthel Index   Feeding 10   Bathing 5   Grooming Score 5   Dressing Score 10   Bladder Score 10   Bowels Score 10   Toilet Use Score 10   Transfers (Bed/Chair) Score 10   Mobility (Level Surface) Score 10   Stairs Score 5   Barthel Index Score 85       Skilled PT recommended while in hospital and upon DC to progress pt toward treatment goals       Reyna Allison, SPT 8/6/2020

## 2020-08-06 NOTE — PROGRESS NOTES
Progress Note - Critical Care   Madina Reynolds 64 y o  female MRN: [id number]  Unit/Bed#: ICU 02 Encounter: [id number]    Impression:  Principal Problem:    CVA (cerebral vascular accident) [facility])  Active Problems:    Alcohol dependence ([facility] )    Alcoholic cirrhosis ([facility] )    Benign essential hypertension    HLD (hyperlipidemia)    Hypothyroidism    Type II diabetes mellitus (HCC)    PAF (paroxysmal atrial fibrillation) (HCC)    Anxiety    Class 3 severe obesity without serious comorbidity in adult [facility])      Plan:    Neuro:   · Pain controlled with: Tylenol scheduled, oxycodone 5mg prn  · Regulate sleep/wake cycle  · melatonin  · Delirium precautions  · CAM-ICU daily  · Trend neuro exam  · CVA- basilar tip/left p1 occlusion  · Patient was not tpa candidate due to unknown eliquis use within 48hrs  · S/p mechanical thrombectomy 8/6  · Successful, TICI3  · Right radial sheath removed  · Repeat MRI : Findings consistent with subacute nonhemorrhagic infarct in left thalamus     Suspected bilateral punctate foci of restricted diffusion in the cerebellum suspicious for small embolic infarct  · X8I neuro checks  · Statin  CV:   · MAP goal > 60  · Rhythm: Atrial Fibrillation  · Follow rhythm on telemetry  · 50mg metoprolol bid, patient states she is prescribed 100mg bid at home but admits to poor adherence    Lung:   · Extubated 8/5 evening  · SpO2 goal >92%  · Pulmonary toileting    GI:   · Stress ulcer prophylaxis: No prophylaxis needed  · Bowel regimen: PRN  · Zofran PRN for nausea    FEN:   · Fluid/Diuretic plan: stopped IV fluids, tolerating PO diet  · Nutrition/diet plan: PO diabetic diet  · Replete electrolytes with goals: K >4 0, Mag >2 0, and Phos >3 0  · Mg was 1 8, repleting     :   · Indwelling Oliva present: no   · Trend UOP and BUN/creat  · Strict I and O    ID:   · Trend temps and WBC count  · Maintain normothermia    Heme:   · Trend hgb and plts  · Transfuse as needed for goal hgb >7    Endo:   · Glycemic control plan: maintain normoglycemia  · Patient is not on insulin or diabetes medication  · A1C was 7 1  · Blood glucose 148 this morning    MSK/Skin:  · Mobility goal: Ambulate as tolerated  · PT consult: yes  · OT consult: yes  · Frequent turning and pressure off-loading  · Local wound care as needed    Lines:  · Arterial line: removed  VTE Prophylaxis:  · Pharmacologic Prophylaxis: Enoxaparin (Lovenox)  · Mechanical Prophylaxis: sequential compression device    Disposition: Transfer to SD level 2    Treatment Team/Consultants: Neurosurgery, Neurology, PT/OT     Date of admission: 8/5/2020    Reason for Admission: CVA    HPI/24hr events: Patient was extubated last night  Currently awake and alert  States she has mild headache but denies any other pain  Moving all extremities and no deficits in speech  Denies any weakness, tingling or numbness in extremities  ROS: 14 point ROS is negative and/or as stated in the HPI  Physical Exam:    Physical Exam  Vitals signs and nursing note reviewed  Constitutional:       General: She is not in acute distress  Appearance: She is well-developed  She is not diaphoretic  HENT:      Head: Normocephalic and atraumatic  Eyes:      General: No scleral icterus  Right eye: No discharge  Left eye: No discharge  Extraocular Movements: Extraocular movements intact  Conjunctiva/sclera: Conjunctivae normal       Pupils: Pupils are equal, round, and reactive to light  Neck:      Musculoskeletal: Normal range of motion and neck supple  Vascular: No JVD  Trachea: No tracheal deviation  Cardiovascular:      Rate and Rhythm: Tachycardia present  Rhythm irregular  Heart sounds: Normal heart sounds  No murmur  No friction rub  No gallop  Pulmonary:      Effort: Pulmonary effort is normal  No respiratory distress  Breath sounds: Normal breath sounds  No stridor  No wheezing or rales  Chest:      Chest wall: No tenderness     Abdominal: Palpations: Abdomen is soft  Tenderness: There is no abdominal tenderness  Musculoskeletal: Normal range of motion  General: No tenderness or deformity  Skin:     General: Skin is warm  Capillary Refill: Capillary refill takes less than 2 seconds  Findings: No erythema or rash  Neurological:      Mental Status: She is alert and oriented to person, place, and time  Cranial Nerves: No cranial nerve deficit  Sensory: No sensory deficit  Motor: No weakness or abnormal muscle tone  Comments: Normal finger to nose testing   Psychiatric:         Mood and Affect: Mood normal          Behavior: Behavior normal          Thought Content: Thought content normal          Judgment: Judgment normal          Vitals:   Vitals:    20 0308 20 0400 20 0500 20 0600   BP: 123/52 133/80 158/85 147/84   BP Location:  Left arm     Pulse: (!) 126 (!) 110 (!) 108 (!) 112   Resp:  (!) 23 20 20   Temp:  98 2 °F (36 8 °C)     TempSrc:  Oral     SpO2:  96% 96% 96%   Weight:       Height:           Arterial Line:  Arterial Line BP: 146/60  Arterial Line MAP (mmHg): 86 mmHg    Temperature: Temp (24hrs), Av 1 °F (36 7 °C), Min:97 °F (36 1 °C), Max:98 6 °F (37 °C)  Current: Temperature: 98 2 °F (36 8 °C)    Weights: IBW: 50 1 kg  Body mass index is 48 06 kg/m²  Hemodynamic Monitoring:  N/A       Respiratory:  SpO2: SpO2: 96 %       Intake and Outputs:    Intake/Output Summary (Last 24 hours) at 2020 0656  Last data filed at 2020 0630  Gross per 24 hour   Intake 1000 11 ml   Output 975 ml   Net 25 11 ml     I/O last 24 hours: In: 1000 1 [P O :240;  I V :760 1]  Out: 200 [Urine:975]        Nutrition:        Diet Orders   (From admission, onward)             Start     Ordered    20 0640  Diet Laith/CHO Controlled; Consistent Carbohydrate Diet Level 2 (5 carb servings/75 grams CHO/meal)  Diet effective now     Question Answer Comment   Diet Type Laith/CHO Controlled    Laith/CHO Controlled Consistent Carbohydrate Diet Level 2 (5 carb servings/75 grams CHO/meal)    RD to adjust diet per protocol? Yes        08/06/20 0640                  Labs:   Results from last 7 days   Lab Units 08/06/20  0452 08/05/20  1407   WBC Thousand/uL 12 66* 11 48*   HEMOGLOBIN g/dL 13 1 15 4   HEMATOCRIT % 40 6 47 9*   PLATELETS Thousands/uL 211 266   NEUTROS PCT % 79*  --    MONOS PCT % 6  --      Results from last 7 days   Lab Units 08/06/20  0452 08/05/20  1407   SODIUM mmol/L 143 139   POTASSIUM mmol/L 4 2 4 3   CHLORIDE mmol/L 109* 101   CO2 mmol/L 28 26   BUN mg/dL 17 22   CREATININE mg/dL 0 79 1 09   CALCIUM mg/dL 8 2* 9 5     Results from last 7 days   Lab Units 08/06/20  0452   MAGNESIUM mg/dL 1 8     Results from last 7 days   Lab Units 08/06/20  0452   PHOSPHORUS mg/dL 3 3      Results from last 7 days   Lab Units 08/05/20  1407   INR  1 03   PTT seconds 25         Results from last 7 days   Lab Units 08/05/20  1407   TROPONIN I ng/mL <0 02             No results found for: Woodchristiano Copas   Results from last 7 days   Lab Units 08/06/20  0452   HEMOGLOBIN A1C % 7 1*           Imaging:    CXR:  I have personally reviewed pertinent reports  Micro:   Blood Culture: No results found for: BLOODCX  Urine Culture:   Lab Results   Component Value Date    URINECX >100,000 cfu/ml Escherichia coli 05/26/2017     Sputum Culture: No components found for: SPUTUMCX  Wound Culure: No results found for: WOUNDCULT        Allergies:    Allergies   Allergen Reactions    Augmentin [Amoxicillin-Pot Clavulanate]      Yeast infection       Medications:   Scheduled Meds:acetaminophen, 650 mg, Oral, Q6H PRN, Lesli Valenzuela MD  aspirin, 81 mg, Oral, Daily, Lauren Vigil MD  atorvastatin, 80 mg, Oral, QPM, Sofia Atkins MD  chlorhexidine, 15 mL, [employer] [place], Q12H [facility], Juan A Paster, DO  hydrALAZINE, 10 mg, Intravenous, Q4H PRN, Juan A Paster, DO  Labetalol HCl, 10 mg, Intravenous, Q4H PRN, Brigitte Babb MD  melatonin, 3 mg, Oral, HS, Juan A Paster, DO  metoprolol tartrate, 50 mg, Oral, Q12H ERICK, Juan A Paster, DO  nicotine, 1 patch, Transdermal, Daily, Brigitte Babb MD  ondansetron, 4 mg, Intravenous, Q4H PRN, Juan A Paster, DO  oxyCODONE, 5 mg, Oral, Q4H PRN, Juan A Paster, DO      Continuous Infusions:   PRN Meds:  acetaminophen, 650 mg, Q6H PRN  hydrALAZINE, 10 mg, Q4H PRN  Labetalol HCl, 10 mg, Q4H PRN  ondansetron, 4 mg, Q4H PRN  oxyCODONE, 5 mg, Q4H PRN        Invasive lines and devices: Invasive Devices     Peripheral Intravenous Line            Peripheral IV 08/05/20 Left Antecubital less than 1 day    Peripheral IV 08/05/20 Left Hand less than 1 day    Peripheral IV 08/05/20 Right Antecubital less than 1 day                Code Status: Level 1 - Full Code    Counseling / Coordination of Care  Total Critical Care time spent 30 minutes excluding procedures, teaching and family updates        SIGNATURE: Mendel Orlando DO  DATE: August 6, 2020  TIME: 6:56 AM

## 2020-08-06 NOTE — OCCUPATIONAL THERAPY NOTE
Occupational Therapy Evaluation     Patient Name: Ramiro Owen  SSMEJ'T Date: 8/6/2020  Problem List  Principal Problem:    CVA (cerebral vascular accident) [facility])  Active Problems:    Alcohol dependence ([facility] )    Alcoholic cirrhosis ([place] [address] )    Benign essential hypertension    HLD (hyperlipidemia)    Hypothyroidism    Type II diabetes mellitus (HCC)    PAF (paroxysmal atrial fibrillation) (HCC)    Anxiety    Class 3 severe obesity without serious comorbidity in adult [facility])    Past Medical History  Past Medical History:   Diagnosis Date    Anxiety     Arthritis     Cirrhosis of liver ([place] [address] )     Diabetes mellitus ([place] [address] )     HLD (hyperlipidemia)     Hypertension     Irregular heart beat     Psychiatric disorder     Depression    Stroke ([place] [address] )     Tobacco dependence      Past Surgical History  Past Surgical History:   Procedure Laterality Date    APPENDECTOMY      BUNIONECTOMY Right     CARPAL TUNNEL RELEASE      CHOLECYSTECTOMY               08/06/20 1428   Note Type   Note type Eval only   Restrictions/Precautions   Weight Bearing Precautions Per Order No   Other Precautions Multiple lines;Telemetry; Fall Risk;Pain   Pain Assessment   Pain Assessment Tool 0-10   Pain Score 8   Pain Location/Orientation Location: Abdomen;Orientation: Upper;Orientation: Mid   Pain Onset/Description Onset: Ongoing   Patient's Stated Pain Goal No pain   Hospital Pain Intervention(s) Repositioned; Ambulation/increased activity; Emotional support   Home Living   Type of [address] Other (Comment); One level;Performs ADLs on one level; Able to live on main level with bedroom/bathroom  (3 CANDACE)   Bathroom Shower/Tub Tub/shower unit   Bathroom Toilet Standard   Bathroom Equipment Other (Comment)  (denies any)   Home Equipment Other (Comment)  (denies any)   Additional Comments Pt reports living in 1 floor apt w/ 3 CANDACE   Prior Function   Level of [place] Independent with ADLs and functional mobility   Lives With Alone   Receives Help From Friend(s)   ADL Assistance Independent   IADLs Independent   Falls in the last 6 months 0   Vocational Retired   Comments Pt reports being I w/ ADLS/IADLS, transfers and functional mobility PTA   Lifestyle   Autonomy I ADLS/IADLS, transfers and functional mobility PTA   Reciprocal Relationships Pt lives alone; has supportive friend who assists w/ groceries   Service to Others Pt is a retired nursing aide; still looking for PTE   Intrinsic Gratification Pt enjoys being on her computer; and visiting friends   Psychosocial   Psychosocial (WDL) WDL   ADL   Eating Assistance 7  Independent   Grooming Assistance 7  [address]  5  Supervision/Setup   LB Dressing Assistance 5  Supervision/Setup   LB Dressing Deficit Don/doff R sock; Don/doff L sock   Toileting Assistance  5  Supervision/Setup   Functional Assistance 5  Supervision/Setup   Functional Deficit Steadying;Verbal cueing;Supervision/safety; Increased time to complete   Bed Mobility   Supine to Sit Unable to assess   Sit to Supine Unable to assess   Additional Comments Pt seated up OOB in chair prior to and end of session   Transfers   Sit to Stand 5  Supervision   Additional items Increased time required;Verbal cues   Stand to Sit 5  Supervision   Additional items Increased time required;Verbal cues   Additional Comments Pt performed STS transfer from chair w/ S, no DME used  Functional Mobility   Functional Mobility 5  Supervision   Additional Comments Pt ambulated short household distance w/ S, no DME Used     Balance   Static Sitting Good   Dynamic Sitting Fair   Static Standing Fair   Dynamic Standing Fair   Ambulatory Fair   Activity Tolerance   Activity Tolerance Patient limited by fatigue;Patient limited by pain   Medical Staff Made Aware PT   Nurse Made Aware yes, Mira/Tali   RUE Assessment   RUE Assessment [facility] LUE Assessment   LUE Assessment WFL   Hand Function   Gross Motor Coordination Functional   Fine Motor Coordination Functional   Sensation   Light Touch No apparent deficits   Sharp/Dull Not tested   Proprioception   Proprioception No apparent deficits   Vision-Basic Assessment   Current Vision Wears glasses only for reading   Vision - Complex Assessment   Ocular Range of Motion WFL   Head Position WDL   Perception   Inattention/Neglect Appears intact   Cognition   Overall Cognitive Status WFL   Arousal/Participation Responsive; Cooperative   Attention Within functional limits   Orientation Level Oriented X4   Memory Within functional limits   Following Commands Follows all commands and directions without difficulty   Comments Pt is pleasant and cooperative   Assessment   Limitation Decreased endurance;Decreased high-level ADLs; Decreased self-care trans   Prognosis Fair   Assessment Pt is a 65 y/o female  seen for OT eval s/p adm to [facility] as a transfer from [facility] due to vision change and facial droop  CT/CTA demonstrated R PCA occlusion  Pt is s/p mechanical thrombectomy  Pt is dx'd w/ subacute non hemorrhagic infarct in the L thalamus  Pt  has a past medical history of Anxiety, Arthritis, Cirrhosis of liver ([facility] ), Diabetes mellitus ([facility] ), HLD (hyperlipidemia), Hypertension, Irregular heart beat, Psychiatric disorder, Stroke ([facility] ), and Tobacco dependence  Pt with active OT orders and up with assistance  orders  Pt lives alone in a 1 floor apt w/ 3 CANDACE  Pt was I w/ ADLS and IADLS, does not drive (either takes the bus or friend transports), & required no use of DME PTA  Pt is not currently demonstrating any significant deficits in occupational performance @ this time  Overall, pt is functioning at a supervision level for all functional activity  Requires no use of DME  Reports having friend support upon D/C if needed  Reports no concerns about D/C home once medically stable  Pt scored overall 90/100 on the Barthel Index  Based on the aforementioned OT evaluation, functional performance deficits, and assessments, pt has been identified as a moderate complexity evaluation  Recommend home w/ friend support, upon D/C, once medically stable  Pt will no longer benefit from immediate acute skilled OT services  Pt w/ no further questions or concerns regarding OT services  Will D/C OT at this time  Please re-consult if pt's medical status changes     Goals   Patient Goals to go home and find a part time job   Recommendation   OT Discharge Recommendation Return to previous environment with social support   OT - OK to Discharge Yes  (when medically stable)   Barthel Index   Feeding 10   Bathing 5   Grooming Score 5   Dressing Score 10   Bladder Score 10   Bowels Score 10   Toilet Use Score 10   Transfers (Bed/Chair) Score 15   Mobility (Level Surface) Score 10   Stairs Score 5   Barthel Index Score 90        Alma Ordonez MS, OTR/L

## 2020-08-06 NOTE — ASSESSMENT & PLAN NOTE
Continue metoprolol 50 mg b i d  Continue dronedarone 400 mg b i d    Eliquis restarted  Monitor on telemetry

## 2020-08-06 NOTE — PROGRESS NOTES
Progress Note - Goran Jamadict 1960, 64 y o  female MRN: [id number]    Unit/Bed#: ICU 02 Encounter: [id number]    Primary Care Provider: Caleb Barrientos MD   Date and time admitted to hospital: 8/5/2020  4:20 PM        * CVA (cerebral vascular accident) [facility])  Assessment & Plan  · Basilar tip occlusion status post of mechanical thrombectomy on 08/06  · Patient was not a tPA candidate due to unknown Eliquis use within the past 48 hours  · Repeat MRI findings consistent with subacute nonhemorrhagic infarct in the left thalamus suspected bilateral punctate foci of restricted diffusion in the cerebellum suspicious of small embolic infarct  · Etiology most likely cardioembolic in the setting of AFib and medication noncompliance  She does have atherosclerotic risk factors including diabetes, hyperlipidemia, obesity and smoking and evidence of atherosclerotic disease on CTA  · Eliquis restarted per neurology recommendations  · Continue atorvastatin  · Keep on telemetry  · Q 4 hours neuro checks  · Secondary risk factor modifications  · Blood pressure goal of 140-160 for 24-48 hours  · PT/OT        PAF (paroxysmal atrial fibrillation) (HCC)  Assessment & Plan  Continue metoprolol 50 mg b i d  Continue dronedarone 400 mg b i d  Eliquis restarted  Monitor on telemetry    Type II diabetes mellitus [facility])  Assessment & Plan  Lab Results   Component Value Date    HGBA1C 7 1 (H) 08/06/2020       Recent Labs     08/05/20  1433 08/05/20  2244   POCGLU 142* 166*       Blood Sugar Average: Last 72 hrs:  (P) 166     Diet controled as outpatient   BG goal 140-180     HLD (hyperlipidemia)  Assessment & Plan  Lipitor increased to 80 mg daily    Benign essential hypertension  Assessment & Plan  · Permissive hypertension in the setting of acute stroke  · Blood pressure goal 140-160 for the next 24-48 hours  · Home Metoprolol restarted at a lower dose of 50 mg b i d      Alcohol dependence ([facility] )  Assessment & Plan  · Per patient she has not drank much within the last year  · She reports last drink was "weeks" ago  · Denies any past history of withdrawal          Code Status: Level 1 - Full Code  POA:    POLST:      Reason for ICU admission:   R PCA occlusion s/p mechanical thrombectomy 8/6    Active problems:   Principal Problem:    [facility]  Active Problems:    Alcohol dependence ([place] [address] )    Alcoholic cirrhosis ([place] [address] )    Benign essential hypertension    HLD (hyperlipidemia)    Hypothyroidism    Type II diabetes mellitus ([place] [address] )    PAF (paroxysmal atrial fibrillation) ([place] [address] )    Anxiety    Class 3 severe obesity without serious comorbidity in [facility])  Resolved Problems:    * No resolved hospital problems  *      Consultants:   Neurology      History of Present Illness:   "Jinny Krause is a 64 y o  female with history of hypertension, DM2, atrial fibrillation, who presented to [facility] earlier today due to vision changes and facial droop  Stroke alert was called and CT/CTA demonstrated right PCA occlusion  Patient was ineligible for tpa due to unknown last dosing of home eliquis medication  Patient's status deteriorated and was noted to become aphasic, drowsy, and with right upper extremity paralysis  [facility] IR was contacted and patient was transferred for endovascular procedure " -per [facility] resident, Jaren Romero    Summary of clinical course:   Jordin Olivarez underwent mechanical thrombectomy on 8/5 and was admitted to the ICU on the stroke pathway  She was intially intubated due to altered mental status and soon after extubated in the evening on 8/5  MRI brain this morning showed subacute nonhemorrhagic L thalamus infarct and bilateral punctate foci suspicious for small embolic infarct  She was started on high dose lipitor  She reported medication non-compliance and her Eliquis was restarted this morning  She has worked with speech therapy and was passed for a regular diet   She has worked with PT and OT and walked around her room and is OOB to chair  She is stable and safe for transfer to the floor  Recent or scheduled procedures:   8/5 mechanical thrombectomy  8/5 intubation  8/5 extubation     Outstanding/pending diagnostics:   none    Cultures:   8/5 COVID negative       Mobilization Plan:   PT/OT following    Nutrition Plan:   Carb controlled diet    Invasive Devices Review  Invasive Devices     Peripheral Intravenous Line            Peripheral IV 08/05/20 Left Antecubital 1 day    Peripheral IV 08/05/20 Right Antecubital 1 day    Peripheral IV 08/05/20 Left Hand less than 1 day                Rationale for remaining devices: necessary for access    VTE Pharmacologic Prophylaxis: Apixaban (Eliquis)  VTE Mechanical Prophylaxis: sequential compression device    Discharge Plan:       Initial Physical Therapy Recommendations: Pending  Initial Occupational Therapy Recommendations: Pending  Initial /Plan: Pending    Spoke with Dr Luke Crespo  regarding transfer  Please call [phone] with any questions or concerns  Portions of the record may have been created with voice recognition software  Occasional wrong word or "sound a like" substitutions may have occurred due to the inherent limitations of voice recognition software  Read the chart carefully and recognize, using context, where substitutions have occurred

## 2020-08-06 NOTE — ASSESSMENT & PLAN NOTE
PPD1 successful mechanical thrombectomy basilar tip-left L1 via right radial artery - TICI3  · Originally presented with visual disturbance and facial weakness with rapid deterioration including left arm paralysis, confusion and drowsiness  · Past medical history of atrial fibrillation with questionable Eliquis compliance    Imaging:  · CT stroke alert brain 8/5/2020:  No acute intracranial abnormality  · CTA stroke alert 8/5/2020:  Occluded at proximal P1 segment of the left PCA with perfusion of P2 via posterior communicating artery  Moderate to severe atherosclerotic disease  3 mm left PCOM origin infundibulum  Developmentally hypoplastic left vertebral artery  · CT head without 8/5/20 1709:  No acute intracranial abnormality  · MRI brain without 8/5/20: Focus with subtle restriction diffusion of the left thalamus along with punctate foci of restricted diffusion in the bilateral cerebellum concerning for small bowel infarcts  Plan:   · Continue monitor neurological exam  · Ongoing stroke management  · Neurology following and input appreciated  · Goal euvolemic, normothermia and blood sugar control  · Lipid panel and A1c  · Secondary stroke risk factor modification  · Permissive blood pressure 140-160  · Mobilize with physical and occupational therapy  Speech therapy evaluation  · DVT prophylaxis:  Bilateral SCDs  · Neurosurgery will follow from the periphery  Call with any questions concerns

## 2020-08-06 NOTE — ASSESSMENT & PLAN NOTE
Lab Results   Component Value Date    HGBA1C 7 1 (H) 08/06/2020       Recent Labs     08/05/20  1433 08/05/20  2244   POCGLU 142* 166*       Blood Sugar Average: Last 72 hrs:  (P) 166     Ongoing management per primary team

## 2020-08-06 NOTE — CONSULTS
Please see consult progress note from neurology today, consult completed at time of stroke alert yesterday

## 2020-08-06 NOTE — CONSULTS
Consult- Nafisa Louis 1960, 64 y o  female MRN: [id number]    Unit/Bed#: ICU 02 Encounter: [id number]    Primary Care Provider: Vicky Park MD   Date and time admitted to hospital: 8/5/2020  4:20 PM      Inpatient consult to Neurosurgery  Consult performed by: Solo Guerrero PA-C  Consult ordered by: Lana Winn MD          * CVA (cerebral vascular accident) [facility])  [facility] successful mechanical thrombectomy basilar tip-left L1 via right radial artery - TICI3  · Originally presented with visual disturbance and facial weakness with rapid deterioration including left arm paralysis, confusion and drowsiness  · Past medical history of atrial fibrillation with questionable Eliquis compliance    Imaging:  · CT stroke alert brain 8/5/2020:  No acute intracranial abnormality  · CTA stroke alert 8/5/2020:  Occluded at proximal P1 segment of the left PCA with perfusion of P2 via posterior communicating artery  Moderate to severe atherosclerotic disease  3 mm left PCOM origin infundibulum  Developmentally hypoplastic left vertebral artery  · CT head without 8/5/20 1709:  No acute intracranial abnormality  · MRI brain without 8/5/20: Focus with subtle restriction diffusion of the left thalamus along with punctate foci of restricted diffusion in the bilateral cerebellum concerning for small bowel infarcts  Plan:   · Continue monitor neurological exam  · Ongoing stroke management  · Neurology following and input appreciated  · Goal euvolemic, normothermia and blood sugar control  · Lipid panel and A1c  · Secondary stroke risk factor modification  · Permissive blood pressure 140-160  · Mobilize with physical and occupational therapy  Speech therapy evaluation  · DVT prophylaxis:  Bilateral SCDs  · Neurosurgery will follow from the periphery  Call with any questions concerns  PAF (paroxysmal atrial fibrillation) (HCC)  Assessment & Plan  Currently on Eliquis 5 mg b i d      Type II diabetes mellitus [facility])  Assessment & Plan  Lab Results   Component Value Date    HGBA1C 7 1 (H) 08/06/2020       Recent Labs     08/05/20  1433 08/05/20  2244   POCGLU 142* 166*       Blood Sugar Average: Last 72 hrs:  (P) 166     Ongoing management per primary team     HLD (hyperlipidemia)  Assessment & Plan  Continue Lipitor    Benign essential hypertension  Assessment & Plan  Close monitoring of blood pressure  Avoid hypotension  History of Present Illness     HPI: Jinny Krause is a 64 y o  female with PMH including Afib on Elquis, Tobacco abuse, hypertension, hyperlipidemia, diabetes who presents with complaint of visual disturbance and patient weakness  Patient's have rapid neurological decline including right-sided weakness, confusion and drowsiness  Patient was stroke alert with CTA demonstrated basilar tip/left P1 occlusion  Patient was not a candidate for tPA for unclear history of last dose of Eliquis  She underwent mechanical thrombectomy TICI 3  Currently, patient is without complaints  She denies any headaches  No rest pain  Denies any weakness or numbness  Denies any vision or speech deficits  Denies any chest pain, shortness breath, nausea or vomiting  Review of Systems   Constitutional: Negative for activity change, fatigue and fever  HENT: Negative for mouth sores, trouble swallowing and voice change  Eyes: Positive for visual disturbance  Negative for photophobia  Respiratory: Negative for cough and shortness of breath  Cardiovascular: Negative for chest pain and leg swelling  Gastrointestinal: Negative for abdominal pain, nausea and vomiting  Genitourinary: Negative for decreased urine volume and difficulty urinating  Musculoskeletal: Negative for back pain, gait problem and neck pain  Skin: Negative for pallor, rash and wound  Neurological: Positive for speech difficulty and weakness   Negative for dizziness, tremors, seizures, light-headedness, numbness and headaches  Psychiatric/Behavioral: Negative for agitation and confusion         Historical Information   Past Medical History:   Diagnosis Date    Anxiety     Arthritis     [facility]osis [facility] )     Diabetes mellitus ([place] [address] )     HLD (hyperlipidemia)     Hypertension     Irregular heart beat     Psychiatric disorder     Depression    Stroke ([place] [address] )     Tobacco dependence      Past Surgical History:   Procedure Laterality Date    APPENDECTOMY      BUNIONECTOMY Right     CARPAL TUNNEL RELEASE      CHOLECYSTECTOMY       Social History     Substance and Sexual Activity   Alcohol Use Yes    Frequency: Monthly or less    Binge frequency: Monthly    Comment: 1 botttle of vodka over 4 days/denies since 7/18     Social History     Substance and Sexual Activity   Drug Use No     Social History     Tobacco Use   Smoking Status Current Every Day Smoker    Packs/day: 1 00    Types: Cigarettes   Smokeless Tobacco Never Used     Family History   Problem Relation Age of Onset    Diabetes Mother     Diabetes Father     Diabetes Brother     Hypertension Brother        Meds/Allergies   all current active meds have been reviewed, current meds:   Current Facility-Administered Medications   Medication Dose Route Frequency    acetaminophen (TYLENOL) tablet 975 mg  975 mg Oral Q8H PRN    apixaban (ELIQUIS) tablet 5 mg  5 mg Oral BID    atorvastatin (LIPITOR) tablet 80 mg  80 mg Oral QPM    chlorhexidine (PERIDEX) 0 12 % oral rinse 15 mL  15 mL Swish & Spit Q12H [address]    dronedarone (MULTAQ) tablet 400 mg  400 mg Oral BID With Meals    hydrALAZINE (APRESOLINE) injection 10 mg  10 mg Intravenous Q4H PRN    Labetalol HCl (NORMODYNE) injection 10 mg  10 mg Intravenous Q4H PRN    melatonin tablet 3 mg  3 mg Oral HS    methocarbamol (ROBAXIN) tablet 500 mg  500 mg Oral Q6H PRN    metoprolol tartrate (LOPRESSOR) tablet 50 mg  50 mg Oral Q12H [address]    nicotine (NICODERM CQ) 14 mg/24hr TD 24 hr patch 1 patch 1 patch Transdermal Daily    ondansetron (ZOFRAN) injection 4 mg  4 mg Intravenous Q4H PRN    oxyCODONE (ROXICODONE) IR tablet 5 mg  5 mg Oral Q4H PRN    and PTA meds:   Prior to Admission Medications   Prescriptions Last Dose Informant Patient Reported? Taking? LORazepam (ATIVAN) 1 mg tablet Not Taking at Unknown time  No No   Sig: May take a second dose PRN if increased anxiety   Patient not taking: Reported on 8/5/2020   apixaban (ELIQUIS) 5 mg Past Week at Unknown time Self No Yes   Sig: Take 1 tablet (5 mg total) by mouth 2 (two) times a day   atorvastatin (LIPITOR) 10 mg tablet Unknown at Unknown time  No No   Sig: Take 1 tablet (10 mg total) by mouth daily   buPROPion (WELLBUTRIN) 75 mg tablet More than a month at Unknown time Self No No   Sig: Take 1 tablet (75 mg total) by mouth 2 (two) times a day   Patient not taking: Reported on 8/5/2020   dronedarone (MULTAQ) 400 mg tablet 8/4/2020 at Unknown time Self No Yes   Sig: Take 1 tablet (400 mg total) by mouth 2 (two) times a day with meals   lisinopril (ZESTRIL) 20 mg tablet More than a month at Unknown time Self No No   Sig: Take 2 tablets (40 mg total) by mouth daily   metoprolol tartrate (LOPRESSOR) 100 mg tablet More than a month at Unknown time Self No No   Sig: Take 1 tablet (100 mg total) by mouth every 12 (twelve) hours   rOPINIRole (REQUIP) 1 mg tablet Unknown at Unknown time  No No   Sig: Take 0 5 tablets (0 5 mg total) by mouth daily at bedtime   spironolactone (ALDACTONE) 25 mg tablet More than a month at Unknown time  No No   Sig: Take 1 tablet (25 mg total) by mouth daily      Facility-Administered Medications: None     Allergies   Allergen Reactions    Augmentin [Amoxicillin-Pot Clavulanate]      Yeast infection       Objective   I/O       08/04 0701 - 08/05 0700 08/05 0701 - 08/06 0700 08/06 0701 - 08/07 0700    P  O   240     I V  (mL/kg)  760 1 (6 4)     Total Intake(mL/kg)  1000 1 (8 4)     Urine (mL/kg/hr)  975     Total Output  975 Net  +25 1                  Physical Exam   Constitutional: She is oriented to person, place, and time  She appears well-developed  No distress  HENT:   Head: Normocephalic and atraumatic  Mouth/Throat: Mucous membranes are moist    Eyes: Pupils are equal, round, and reactive to light  Conjunctivae and EOM are normal  Right eye exhibits no discharge  Left eye exhibits no discharge  No scleral icterus  Neck: Neck supple  Cardiovascular: Normal rate  Pulmonary/Chest: Effort normal    Abdominal: Soft  Normal appearance  She exhibits no distension  Musculoskeletal:         General: No deformity  Neurological: She is alert and oriented to person, place, and time  She has normal strength  She has a normal Finger-Nose-Finger Test    Reflex Scores:       Bicep reflexes are 2+ on the right side and 2+ on the left side  Brachioradialis reflexes are 2+ on the right side and 2+ on the left side  Patellar reflexes are 2+ on the right side and 2+ on the left side  Skin: Skin is warm and dry  No rash noted  Psychiatric: Her speech is normal and behavior is normal  Judgment and thought content normal  Her affect is labile  Neurologic Exam     Mental Status   Oriented to person, place, and time  Follows 2 step commands  Attention: normal  Concentration: normal    Speech: speech is normal   Level of consciousness: alert  Knowledge: good  Able to name object  Normal comprehension  Cranial Nerves     CN III, IV, VI   Pupils are equal, round, and reactive to light  Extraocular motions are normal    Right pupil: Shape: regular  Left pupil: Shape: regular  Nystagmus: none   Upgaze: normal  Conjugate gaze: present    CN V   Facial sensation intact  CN VII   Facial expression full, symmetric       CN VIII   Hearing: intact    CN IX, X   Palate: symmetric    CN XI   Right trapezius strength: normal  Left trapezius strength: normal    CN XII   Tongue: not atrophic  Fasciculations: absent  Tongue deviation: none    Motor Exam   Muscle bulk: normal  Overall muscle tone: normal  Right arm pronator drift: absent  Left arm pronator drift: absent    Strength   Strength 5/5 throughout  Sensory Exam   Light touch normal      Gait, Coordination, and Reflexes     Coordination   Finger to nose coordination: normal    Tremor   Resting tremor: absent  Intention tremor: absent  Action tremor: absent    Reflexes   Right brachioradialis: 2+  Left brachioradialis: 2+  Right biceps: 2+  Left biceps: 2+  Right patellar: 2+  Left patellar: 2+  Right plantar: normal  Left plantar: normal  Right Mistry: absent  Left Mistry: absent  Right ankle clonus: absent  Left ankle clonus: absent        Vitals:Blood pressure 141/66, pulse 100, temperature 98 2 °F (36 8 °C), temperature source Oral, resp  rate (!) 26, height 5' 2" (1 575 m), weight 119 kg (262 lb 12 6 oz), SpO2 93 %  ,Body mass index is 48 06 kg/m²  Lab Results:   Results from last 7 days   Lab Units 08/06/20  0452 08/05/20  1407   WBC Thousand/uL 12 66* 11 48*   HEMOGLOBIN g/dL 13 1 15 4   HEMATOCRIT % 40 6 47 9*   PLATELETS Thousands/uL 211 266   NEUTROS PCT % 79*  --    MONOS PCT % 6  --      Results from last 7 days   Lab Units 08/06/20  0452 08/05/20  1407   POTASSIUM mmol/L 4 2 4 3   CHLORIDE mmol/L 109* 101   CO2 mmol/L 28 26   BUN mg/dL 17 22   CREATININE mg/dL 0 79 1 09   CALCIUM mg/dL 8 2* 9 5     Results from last 7 days   Lab Units 08/06/20  0452   MAGNESIUM mg/dL 1 8     Results from last 7 days   Lab Units 08/06/20  0452   PHOSPHORUS mg/dL 3 3     Results from last 7 days   Lab Units 08/05/20  1407   INR  1 03   PTT seconds 25     No results found for: TROPONINT  ABG:No results found for: PHART, SVE3JUU, PO2ART, VDS9TAV, K9ZIINLO, BEART, SOURCE    Imaging Studies: I have personally reviewed pertinent reports     and I have personally reviewed pertinent films in PACS    Ct Head Wo Contrast    Result Date: 8/6/2020  Impression: No acute intracranial abnormality  Workstation performed: [id number]     Mri Brain Wo Contrast    Result Date: 8/6/2020  Impression: Findings consistent with subacute nonhemorrhagic infarct in left thalamus  Suspected bilateral punctate foci of restricted diffusion in the cerebellum suspicious for small embolic infarct    Findings discussed with Dr Pam Urban at 4:00 AM, 8/6/2020 Workstation performed: [id number]     Ct Stroke Alert Brain    Result Date: 8/5/2020  Impression: No acute intracranial CT abnormality  Findings were directly discussed with Graeme Wilkerson on 8/5/2020 2:35 PM  Workstation performed: [id number]     Xr Chest Portable Icu    Result Date: 8/5/2020  Impression: ET tube tip is located 3 cm above the rosales  Right parahilar/right upper lobe opacity is likely artifactual and is not seen on the CT from earlier today  Patient is rotated to the right  Workstation performed: [id number]     Cta Stroke Alert (head/neck)    Result Date: 8/5/2020  Impression: 1  Occlusion at proximal P1 segment of left PCA with perfusion of P2 via posterior communicating artery  Fetal origin of left PCA is less favored since left P2 caliber is larger than left P-comm  The remaining left PCA is patent and grossly unremarkable  2   Moderate to severe atherosclerotic disease of cavernous and supraclinoid internal carotid arteries  3   A 3 mm left P-comm origin infundibulum  4   Right greater than left atherosclerotic disease of cervical internal carotid artery without hemodynamically significant stenosis  5   Developmentally hypoplastic left vertebral artery  Findings were directly discussed with Graeme Wilkerson on 8/5/2020 2:35 PM  Workstation performed: [id number]       EKG, Pathology, and Other Studies: I have personally reviewed pertinent reports        VTE Prophylaxis: Sequential compression device (Venodyne)  and Enoxaparin (Lovenox)    Code Status: Level 1 - Full Code  Advance Directive and Living Will:      Power of : POLST:      Counseling / Coordination of Care  I spent 30 minutes with the patient

## 2020-08-06 NOTE — PLAN OF CARE
Problem: PHYSICAL THERAPY ADULT  Goal: Performs mobility at highest level of function for planned discharge setting  See evaluation for individualized goals  Description: Treatment/Interventions: Functional transfer training, LE strengthening/ROM, Therapeutic exercise, Elevations, Endurance training, Gait training, Bed mobility, Spoke to nursing, OT  Equipment Recommended: Rodgers Cranker       See flowsheet documentation for full assessment, interventions and recommendations  Outcome: Progressing  Note: Prognosis: Good  Problem List: Decreased strength, Decreased endurance, Impaired balance, Decreased mobility, Pain  Assessment: Pt is a 63 yo F s/p thrombectomy 8/6/2020 following R PCA occlusion  Order placed for PT eval and treat  Spoke to RN who cleared bed rest order  with MD at morning rounds; awaiting activity orders  Pt presents with PMHx of CVA, HTN, HLD, DM2, PAF and personal factors of mobilizing w/ assistive device, stair(s) to enter home, inability to ambulate household distances, inability to navigate community distances, inability to navigate level surfaces w/o external assistance, unable to perform dynamic tasks in community, limited home support, anxiety and inability to perform IADLs  Pt clinical presentation is unstable/unpredictable based on these factors  Pt PLOF was independent with ADLs, IADLs, and functional mobility without use of AD  Upon evaluation, Pt impairments include pain, weakness, decreased endurance, impaired balance, gait deviations and fall risk  Functional mobility assessment showed a need for Supervision assistance with transfers and gait; MinAx1 with stairs and utilizes RW AD for mobilization  Pt demonstrates decreased tolerance to activity due to fatigue and is able to follow commands appropriately  Pt is a good candidate for IP PT to address their impairments and to prevent secondary complications associated with hospital stay   Pt D/C recommendation is for Home with home PT consult to facilitate safe discharge and facilitate further recovery to PLOF  PT Discharge Recommendation: Home with skilled therapy(HHPT consult)     PT - OK to Discharge: Yes    See flowsheet documentation for full assessment

## 2020-08-06 NOTE — UTILIZATION REVIEW
Initial Clinical Review    Admission: Date/Time/Statement:   Admission Orders (From admission, onward)     Ordered        08/05/20 2242  Inpatient Admission  Once                   Orders Placed This Encounter   Procedures    Inpatient Admission     Standing Status:   Standing     Number of Occurrences:   1     Order Specific Question:   Admitting Physician     Answer:   Chica Dodson [[id number]]     Order Specific Question:   Level of Care     Answer:   Critical Care [15]     Order Specific Question:   Estimated length of stay     Answer:   More than 2 Midnights     Order Specific Question:   Certification     Answer:   I certify that inpatient services are medically necessary for this patient for a duration of greater than two midnights  See H&P and MD Progress Notes for additional information about the patient's course of treatment  No chief complaint on file  Assessment/Plan:  63 yo female presented to [address] [place] [address] Emergency Department, transferred to [facility] for neurology eval and possible intervention due to CVA and profound changes in patient condition  63 yo female with PMHX for HTN,DM2,A -Fib presented on eliquis atherosclerotic disease, ,  Presented with facial drooping and left eye vision loss, left eye deviates superiorly right eye midline tPA ineligible due to the patient being on eliquis  Patient condition changed now aphasia, right arm paralysis yawning right facial drooping  IR contacted and patient went right to IR from [address] [place] [address] ED for endovascular procedure  Patient intubated and a line inserted   IR  Pre-op Diagnosis:   1  Cerebrovascular accident (CVA) due to thrombosis of precerebral artery (HCC)      Post-op Diagnosis:   1  Cerebrovascular accident (CVA) due to thrombosis of precerebral artery ([address] )   Estimated Blood Loss: 100 cc  Findings:   Basilar tip-left P1 occlusion, TICI 0    Successful mechanical thrombectomy      PUNCTURE: 16:12  FIRST PASS: 16:25 , ADAPT  RECAN: 16:37 , TICI 3     Right radial sheath removed, closure device applied  Post IR patient taken to ICU remains on vent     08-06-20  S/p mechanical thrombectomy  08-05-20 Successful, TICI3  Right radial sheath removed Repeat MRI : Findings consistent with subacute nonhemorrhagic infarct in left thalamus  Suspected bilateral punctate foci of restricted diffusion in the cerebellum suspicious for small embolic infarct  Patient was extubated 08-05-20 evening, hypotensive in the 70s on 8/5, which has resolved Current she offers no new neurologic complaints, she denies any diplopia, problems or vision, she denies any problems with speech, problems swallowing, she denies one-sided weakness or numbness, she denies any ataxia  She did have a mild headache which has improved, light does bother her eyes at times  Impression: 1  Occlusion at proximal P1 segment of left PCA with perfusion of P2 via posterior communicating artery  Fetal origin of left PCA is less favored since left P2 caliber is larger than left P-comm  The remaining left PCA is patent and grossly unremarkable  2   Moderate to severe atherosclerotic disease of cavernous and supraclinoid internal carotid arteries  3   A 3 mm left P-comm origin infundibulum  4   Right greater than left atherosclerotic disease of cervical internal carotid artery without hemodynamically significant stenosis  5   Developmentally hypoplastic left vertebral artery  ? MAP goal > 60  · Rhythm: Atrial Fibrillation  ? Follow rhythm on telemetry  ? 50mg metoprolol bid, patient states she is prescribed 100mg bid at home but admits to poor adherence  ·  Fluid/Diuretic plan: stopped IV fluids, tolerating PO diet  · Nutrition/diet plan: PO diabetic diet  · Replete electrolytes with goals: K >4 0, Mag >2 0, and Phos >3 0  ?  Mg was 1 8, repleting      Admitting  Vitals   Temperature Pulse Respirations Blood Pressure SpO2   08/05/20 1750 08/05/20 1729 08/05/20 1729 08/05/20 1850 08/05/20 1729   98 3 °F (36 8 °C) 96 (!) 37 (!) 72/44 100 %      Temp Source Heart Rate Source Patient Position - Orthostatic VS BP Location FiO2 (%)   08/05/20 1750 08/05/20 1735 08/05/20 2000 08/05/20 1850 --   Oral Monitor Lying Left arm       Pain Score       08/05/20 1913       Med Not Given for Pain - for MAR use only          Wt Readings from Last 1 Encounters:   08/05/20 119 kg (262 lb 12 6 oz)     Additional Vital Signs:   Pertinent Labs/Diagnostic Test Results:   Results from last 7 days   Lab Units 08/05/20  1513   SARS-COV-2  Negative     Results from last 7 days   Lab Units 08/06/20  0452 08/05/20  1407   WBC Thousand/uL 12 66* 11 48*   HEMOGLOBIN g/dL 13 1 15 4   HEMATOCRIT % 40 6 47 9*   PLATELETS Thousands/uL 211 266   NEUTROS ABS Thousands/µL 10 03*  --          Results from last 7 days   Lab Units 08/06/20  0452 08/05/20  1407   SODIUM mmol/L 143 139   POTASSIUM mmol/L 4 2 4 3   CHLORIDE mmol/L 109* 101   CO2 mmol/L 28 26   ANION GAP mmol/L 6 12   BUN mg/dL 17 22   CREATININE mg/dL 0 79 1 09   EGFR ml/min/1 73sq m 83 56   CALCIUM mg/dL 8 2* 9 5   MAGNESIUM mg/dL 1 8  --    PHOSPHORUS mg/dL 3 3  --          Results from last 7 days   Lab Units 08/05/20  2244 08/05/20  1433   POC GLUCOSE mg/dl 166* 142*     Results from last 7 days   Lab Units 08/06/20  0452 08/05/20  1407   GLUCOSE RANDOM mg/dL 148* 176*         Results from last 7 days   Lab Units 08/06/20  0452   HEMOGLOBIN A1C % 7 1*   EAG mg/dl 157       Results from last 7 days   Lab Units 08/05/20  1407   TROPONIN I ng/mL <0 02         Results from last 7 days   Lab Units 08/05/20  1407   PROTIME seconds 13 3   INR  1 03   PTT seconds 25       MRI brain 08-06-20  ET tube tip is located 3 cm above the rosales  Right parahilar/right upper lobe opacity is likely artifactual and is not seen on the CT from earlier today  Patient is rotated to the right  CT head 08-06-20 post stroke   No acute intracranial abnormality       CTA brain and neck 08-05-20  1   Occlusion at proximal P1 segment of left PCA with perfusion of P2 via posterior communicating artery  Fetal origin of left PCA is less favored since left P2 caliber is larger than left P-comm  The remaining left PCA is patent and grossly   unremarkable  2   Moderate to severe atherosclerotic disease of cavernous and supraclinoid internal carotid arteries  3   A 3 mm left P-comm origin infundibulum  4   Right greater than left atherosclerotic disease of cervical internal carotid artery without hemodynamically significant stenosis  5   Developmentally hypoplastic left vertebral artery  CT brain  08-05-20  No acute intracranial CT abnormality       EKG 08-05-20  ECG rate:  136    ECG rate assessment: tachycardic    Rhythm:     Rhythm: atrial fibrillation    Ectopy:     Ectopy: none    QRS:     QRS axis:  Normal    QRS intervals:  Normal  Conduction:     Conduction: normal    ST segments:     ST segments:  Non-specific  T waves:     T waves: non-specific      Past Medical History:   Diagnosis Date    Anxiety     Arthritis     Cirrhosis of liver ([facility] )     Diabetes mellitus ([place] [address] )     HLD (hyperlipidemia)     Hypertension     Irregular heart beat     Psychiatric disorder     Depression    Stroke ([place] [address] )     Tobacco dependence      Present on Admission:   Alcohol dependence ([place] [address] )   Alcoholic cirrhosis (HCC)   Benign essential hypertension   HLD (hyperlipidemia)   Hypothyroidism   Type II diabetes mellitus (HCC)   PAF (paroxysmal atrial fibrillation) (HCC)   Class 3 severe obesity without serious comorbidity in adult ([facility] )   Anxiety   CVA (cerebral vascular accident) ([facility] )      Admitting Diagnosis: Cerebrovascular accident (CVA) due to thrombosis of precerebral artery ([place] [address] ) [I63 00]  Age/Sex: 64 y o  female  Admission Orders:  Scheduled Medications:  acetaminophen, 650 mg, Oral, Q6H ERICK  apixaban, 5 mg, Oral, BID  atorvastatin, 80 mg, Oral, QPM  chlorhexidine, 15 mL, Swish & Spit, Q12H [address]  dronedarone, 400 mg, Oral, BID With Meals  magnesium sulfate, 2 g, Intravenous, Once  melatonin, 3 mg, Oral, HS  metoprolol tartrate, 50 mg, Oral, Q12H ERICK  nicotine, 1 patch, Transdermal, Daily      Continuous IV Infusions:     PRN Meds:  hydrALAZINE, 10 mg, Intravenous, Q4H PRN  Labetalol HCl, 10 mg, Intravenous, Q4H PRN  ondansetron, 4 mg, Intravenous, Q4H PRN  oxyCODONE, 5 mg, Oral, Q4H PRN        IP CONSULT TO NEUROSURGERY  IP CONSULT TO PHYSICAL MEDICINE REHAB  IP CONSULT TO CASE MANAGEMENT  IP CONSULT TO NEUROLOGY  Intubated vent  Continuous cardio-pulmonary and pulse oximetry monitoring  SCD  Neuro & neurovascular checks q 15 minutes x 2 hrs q 30 minutes x 6 hrs q 1 hr x 16 hrs   OG tube inserted  ECHO      Network Utilization Review Department  [email or web address]  ATTENTION: Please call with any questions or concerns to [phone] and carefully listen to the prompts so that you are directed to the right person  All voicemails are confidential   Harlene Pair all requests for admission clinical reviews, approved or denied determinations and any other requests to dedicated fax number below belonging to the campus where the patient is receiving treatment   List of dedicated fax numbers for the Facilities:  [address] [place] [address] DENIALS (Administrative/Medical Necessity) [phone]   [address] (Maternity/NICU/Pediatrics) [phone]   Lanny Fregoso [phone]     [address] [phone]   [facility] [phone]   [facility] [phone]   [facility] [address] [phone]   [facility]  [phone]   [address] [facility]  [address] [facility] [address] [facility] [phone]

## 2020-08-06 NOTE — PROGRESS NOTES
Progress Note - Neurology   Madina Reynolds 64 y o  female MRN: [id number]  Unit/Bed#: ICU 02 Encounter: [id number]    Assessment:  80-year-old woman with AFib, non complaint with Eliquis, atherosclerotic disease, diabetes, tobacco abuse, who presented with dysconjugate gaze and right facial, with rapidly deterioration  The patient was not a tpa candidate, the patient was transferred to [facility] for mechanical thrombectomy, she was found to have a basilar tip - left P1 occlusion (TICI 0), she was a successful mechanical thrombectomy (ITCI 3)    MRI was completed today - which revealed subacute non hemorrhagic infarct in the left thalamus, suspected bilateral punctate foci of restricted diffusion in the cerebellum suspicious for small embolic infarct  Etiology - most likely cardio-embolic - with medication non-compliance, she does have extra/intracranial ascvd - right greater than left atherosclerotic disease cervical internal carotid artery without hemodynamic significant stenosis, moderate to severe atherosclerotic disease of the cavernous and supraclinoid internal carotid arteries  Plan:  -  Stroke protocol   -  MRI of brain with out contrast - completed and reviewed as below   -  Echocardiogram  -  Telemetry  -  Lipid panel (112) and hemoglobin A1c ( 7 1 H)  -  Secondary stroke risk factor modification  -- Statin  -- Smoking cessation  -  Reviewed with attending, normal neurological examination, small stroke burden seen on MRI of brain,  Start Eliquis, monitor examination closely  -  Permissive blood pressures post acute stoke, goal blood pressure 406-172 systolically for 24 -48 hours  -  Keep euvolemic, normothermic  -  Therapies to include PT/OT/ST  -  DVT prevention  -  Frequent neurological check and notify neurology with any changes in neurological condition  -  Continue to monitor for infectious and metabolic derangements and treat as arises    Subjective:    Follow up stroke and mechanical thrombectomy - please see consult HPI for details of symptoms/examination on initial stroke alert as well as hx  Transferred to [facility] for thrombectomy, reported to be successful  She was extubated last night  No reported events overnight  She was noted to be hypotensive in the 70s on 8/5, which has resolved  Current she offers no new neurologic complaints, she denies any diplopia, problems or vision, she denies any problems with speech, problems swallowing, she denies one-sided weakness or numbness, she denies any ataxia  She did have a mild headache which has improved, light does bother her eyes at times  ROS:  Twelve point review of symptoms are negative except for as stated above  Vitals: Blood pressure 147/84, pulse (!) 112, temperature 98 2 °F (36 8 °C), temperature source Oral, resp  rate 20, height 5' 2" (1 575 m), weight 119 kg (262 lb 12 6 oz), SpO2 96 %  ,Body mass index is 48 06 kg/m²  acetaminophen, 650 mg, Oral, Q6H ERICK, Juan A Paster, DO  aspirin, 81 mg, Oral, Daily, Jo Cook MD  atorvastatin, 80 mg, Oral, QPM, Mackenzie Bills MD  chlorhexidine, 15 mL, [employer] [place], Q12H [address], Juan A Paster, DO  enoxaparin, 40 mg, Subcutaneous, Q24H ERICK, Juan A Paster, DO  hydrALAZINE, 10 mg, Intravenous, Q4H PRN, Juan A Paster, DO  Labetalol HCl, 10 mg, Intravenous, Q4H PRN, Mackenzie Bills MD  melatonin, 3 mg, Oral, HS, Juan A Paster, DO  metoprolol tartrate, 50 mg, Oral, Q12H ERICK, Juan A Paster, DO  nicotine, 1 patch, Transdermal, Daily, Mackenzie Bills MD  ondansetron, 4 mg, Intravenous, Q4H PRN, Juan A Paster, DO  oxyCODONE, 5 mg, Oral, Q4H PRN, [place] Keo, DO      Physical Exam:  Vital signs reviewed  Constitutional - in NAD  HEENT - NC/AT  Cardiac - Tachycardia, irregular irregular  Lungs - No respiratory distress noted     Abdomen - Soft and non tender, non distended  Extremities - No edema noted  Skin - no rashes noted    Neurological    Mental status - the patient is awake alert oriented x 3, with no evidence of aphasia or dysarthria, patient is able to follow simple commands, is able to follow complex commands  No paraphasic errors noted  She is able to read and repeat  She is able to tell us why she is in the hospital      Cranial nerves 2 through 12 are intact - pupils 3 mm and symmetric, reactive, EOMI, no facial droop, tongue midline VF intact  Motor - 5/5 upper extremities and lower extremities, without drift, normal tone and bulk    No tremor or fixed deficit noted    Rapid movements are equal bilaterally    Sensation - nonfocal to touch, pp intact in uppers and lowers  Coordination -  no ataxia noted on finger-to-nose     Toes are downgoing bilaterally    No evidence of seizure activity    Lab, Imaging and other studies:   I have personally reviewed pertinent reports    , CBC:   Results from last 7 days   Lab Units 08/06/20  0452 08/05/20  1407   WBC Thousand/uL 12 66* 11 48*   RBC Million/uL 4 43 5 22*   HEMOGLOBIN g/dL 13 1 15 4   HEMATOCRIT % 40 6 47 9*   MCV fL 92 92   PLATELETS Thousands/uL 211 266   , BMP/CMP:   Results from last 7 days   Lab Units 08/06/20  0452 08/05/20  1407   SODIUM mmol/L 143 139   POTASSIUM mmol/L 4 2 4 3   CHLORIDE mmol/L 109* 101   CO2 mmol/L 28 26   BUN mg/dL 17 22   CREATININE mg/dL 0 79 1 09   CALCIUM mg/dL 8 2* 9 5   EGFR ml/min/1 73sq m 83 56   , Vitamin B12:   , HgBA1C:   Results from last 7 days   Lab Units 08/06/20  0452   HEMOGLOBIN A1C % 7 1*   , TSH:   , Coagulation:   Results from last 7 days   Lab Units 08/05/20  1407   INR  1 03   , Lipid Profile:   Results from last 7 days   Lab Units 08/06/20  0452   HDL mg/dL 49   LDL CALC mg/dL 112*   TRIGLYCERIDES mg/dL 142   , Ammonia:   , Urinalysis:       Invalid input(s): URIBILINOGEN, Drug Screen:   , Medication Drug Levels:       Invalid input(s): CARBAMAZEPINE,  PHENOBARB, LACOSAMIDE, OXCARBAZEPINE     Per radiology interpretation - reviewed in detail    "  Procedure: Ct Head Wo Contrast    Result Date: 8/6/2020  Narrative: CT BRAIN - WITHOUT CONTRAST INDICATION:   post stroke  COMPARISON:  CT head 8/5/2020  TECHNIQUE:  CT examination of the brain was performed  In addition to axial images, coronal 2D reformatted images were created and submitted for interpretation  Radiation dose length product (DLP) for this visit:  930 67 mGy-cm   This examination, like all CT scans performed in the [facility], was performed utilizing techniques to minimize radiation dose exposure, including the use of iterative  reconstruction and automated exposure control  IMAGE QUALITY:  Diagnostic  FINDINGS: PARENCHYMA:  No intracranial mass, mass effect or midline shift  No CT signs of acute infarction  No acute parenchymal hemorrhage  VENTRICLES AND EXTRA-AXIAL SPACES:  Normal for the patient's age  VISUALIZED ORBITS AND PARANASAL SINUSES:  Unremarkable  CALVARIUM AND EXTRACRANIAL SOFT TISSUES:  Normal      Impression: No acute intracranial abnormality  Workstation performed: [id number]     Procedure: Mri Brain Wo Contrast    Result Date: 8/6/2020  Narrative: MRI BRAIN WITHOUT CONTRAST INDICATION: mechanical thrombectomy for basilar thrombus; CVA   COMPARISON:   CTA stroke alert 8/5/2020  TECHNIQUE:  Sagittal T1, axial T2, axial FLAIR, axial T1, axial [place] and axial diffusion imaging  IMAGE QUALITY:  Diagnostic  FINDINGS: BRAIN PARENCHYMA:  There is no discrete mass, mass effect or midline shift  There is no intracranial hemorrhage  Focus of subtle restricted diffusion with associated T2/FLAIR hyperintensity at the left thalamus  Additional punctate foci of restricted diffusion in the bilateral cerebellum concerning for additional small embolic infarct    Small scattered hyperintensities on T2/FLAIR imaging are noted in the periventricular and subcortical white matter demonstrating an appearance that is statistically most likely to represent mild microangiopathic change   VENTRICLES:  Ventricles and extra-axial CSF spaces are prominent commensurate with the degree of volume loss  No hydrocephalus  No acute intraventricular hemorrhage  SELLA AND PITUITARY GLAND:  Normal  ORBITS:  Normal  PARANASAL SINUSES:  Normal  VASCULATURE:  Evaluation of the major intracranial vasculature demonstrates appropriate flow voids  CALVARIUM AND SKULL BASE:  Normal  EXTRACRANIAL SOFT TISSUES:  Normal      Impression: Findings consistent with subacute nonhemorrhagic infarct in left thalamus  Suspected bilateral punctate foci of restricted diffusion in the cerebellum suspicious for small embolic infarct    Findings discussed with Dr Colni Chong at 4:00 AM, 8/6/2020 Workstation performed: [id number]     Procedure: Ct Stroke Alert Brain    Result Date: 8/5/2020  Narrative: CT BRAIN - STROKE ALERT PROTOCOL INDICATION:   stroke alert  COMPARISON:  None  TECHNIQUE:  CT examination of the brain was performed  In addition to axial images, coronal reformatted images were created and submitted for interpretation  Radiation dose length product (DLP) for this visit:  849 mGy-cm   This examination, like all CT scans performed in the [facility], was performed utilizing techniques to minimize radiation dose exposure, including the use of iterative reconstruction and automated exposure control  IMAGE QUALITY:  Diagnostic  FINDINGS:  PARENCHYMA: No acute intracranial hemorrhage  No masslike lesion, mass effect or midline shift  No CT evidence for acute infarct    VENTRICLES AND EXTRA-AXIAL SPACES:  Normal for patient's age  VISUALIZED ORBITS AND PARANASAL SINUSES:  Unremarkable  CALVARIUM AND EXTRACRANIAL SOFT TISSUES:   Normal      Impression: No acute intracranial CT abnormality  Findings were directly discussed with Felipe Collier on 8/5/2020 2:35 PM  Workstation performed: [id number]     Procedure: Xr Chest Portable Icu    Result Date: 8/5/2020  Narrative: CHEST INDICATION:   post intubation   COMPARISON:  Compared with CT chest images included with CTA head/neck  EXAM PERFORMED/VIEWS:  XR CHEST PORTABLE ICU FINDINGS:  ET tube tip is located 3 cm above the rosales  Severe cardiomegaly  Right parahilar/right upper lobe opacity is likely artifactual and is not seen on the CT from earlier today  Osseous structures appear within normal limits for patient age  Impression: ET tube tip is located 3 cm above the rosales  Right parahilar/right upper lobe opacity is likely artifactual and is not seen on the CT from earlier today  Patient is rotated to the right  Workstation performed: [id number]     Procedure: Cta Stroke Alert (head/neck)    Result Date: 8/5/2020  Narrative: CTA NECK AND BRAIN WITH CONTRAST INDICATION: stroke alert COMPARISON:   None  TECHNIQUE:   Post contrast imaging was performed after administration of iodinated contrast through the neck and brain  Post contrast axial 0 625 mm images timed to opacify the arterial system  3D rendering was performed on an independent workstation  MIP reconstructions performed  Coronal reconstructions were performed of the noncontrast portion of the brain  Radiation dose length product (DLP) for this visit:  1627 mGy-cm   This examination, like all CT scans performed in the [facility], was performed utilizing techniques to minimize radiation dose exposure, including the use of iterative reconstruction and automated exposure control  IV Contrast:  85 mL of iohexol (OMNIPAQUE)  IMAGE QUALITY:   Diagnostic FINDINGS: CERVICAL VASCULATURE AORTIC ARCH AND GREAT VESSELS: Mild atherosclerotic calcification of aortic arch  Great vessel origins are patent without significant stenosis  RIGHT VERTEBRAL ARTERY CERVICAL SEGMENT:Mild atherosclerotic narrowing at the origin  Scattered atherosclerotic disease throughout the neck without critical stenosis  LEFT VERTEBRAL ARTERY CERVICAL SEGMENT: The vessel is developmentally hypoplastic    Scattered atherosclerotic disease throughout the neck without critical stenosis  RIGHT EXTRACRANIAL CAROTID SEGMENT:There is calcified and screw plaque at the bifurcation and proximal ICA  Focal stenosis measuring 2 1mm at the point of maximal stenosis in the proximal cervical internal carotid artery  Comparing this to the more distal cervical internal carotid artery which [id number], this corresponds to an approximately 48% stenosis by NASCET criteria  LEFT EXTRACRANIAL CAROTID SEGMENT: Calcified atherosclerotic plaque at the bifurcation and proximal ICA  No hemodynamically significant stenosis by NASCET criteria  NASCET criteria was used to determine the degree of internal carotid artery diameter stenosis  INTRACRANIAL VASCULATURE INTERNAL CAROTID ARTERIES: Moderate to severe atherosclerotic disease of cavernous and supraclinoid internal carotid arteries without critical stenosis  Normal ophthalmic artery origins  ANTERIOR CIRCULATION:  Normal A1 segments  Bilateral anterior cerebral arteries are unremarkable    Normal anterior comminuting artery  MIDDLE CEREBRAL ARTERY CIRCULATION:  Bilateral M1 segments and major M2 branches are patent without critical stenosis  DISTAL VERTEBRAL ARTERIES:  Distal vertebral arteries are unremarkable  Posterior inferior cerebellar artery origins are patent  BASILAR ARTERY:  Basilar artery is normal in caliber  Normal superior cerebellar arteries  POSTERIOR CEREBRAL ARTERIES: There is occlusion of proximal P1 segment (series 3 image 161) with perfusion of P2 via left posterior communicating artery  The remaining left PCA is patent and unremarkable  Right posterior cerebral artery is unremarkable  Normal posterior communicating arteries  DURAL VENOUS SINUSES:  Unremarkable  NONVASCULAR ANATOMY BONY STRUCTURES:     No acute or aggressive appearing osseous abnormaUnremarkableT TISSUES OF THE NECK:  Unremarkable  THORACIC INLET:  Right upper lobe calcified granulomas  Impression: 1    Occlusion at proximal P1 segment of left PCA with perfusion of P2 via posterior communicating artery  Fetal origin of left PCA is less favored since left P2 caliber is larger than left P-comm  The remaining left PCA is patent and grossly unremarkable  2   Moderate to severe atherosclerotic disease of cavernous and supraclinoid internal carotid arteries  3   A 3 mm left P-comm origin infundibulum  4   Right greater than left atherosclerotic disease of cervical internal carotid artery without hemodynamically significant stenosis  5   Developmentally hypoplastic left vertebral artery   Findings were directly discussed with Claudia Sadler on 8/5/2020 2:35 PM  Workstation performed: [id number]   "

## 2020-08-06 NOTE — ASSESSMENT & PLAN NOTE
· Per patient she has not drank much within the last year  · She reports last drink was "weeks" ago  · Denies any past history of withdrawal

## 2020-08-06 NOTE — ASSESSMENT & PLAN NOTE
Lab Results   Component Value Date    HGBA1C 7 1 (H) 08/06/2020       Recent Labs     08/05/20  1433 08/05/20  2244   POCGLU 142* 166*       Blood Sugar Average: Last 72 hrs:  (P) 166     Diet controled as outpatient   BG goal 140-180

## 2020-08-06 NOTE — PLAN OF CARE
Problem: Neurological Deficit  Goal: Neurological status is stable or improving  Description: Interventions:  - Monitor and assess patient's level of consciousness, motor function, sensory function, and level of assistance needed for ADLs  - Monitor and report changes from baseline  Collaborate with interdisciplinary team to initiate plan and implement interventions as ordered  - Provide and maintain a safe environment  - Consider seizure precautions  - Consider fall precautions  - Consider aspiration precautions  - Consider bleeding precautions  Outcome: Progressing     Problem: Activity Intolerance/Impaired Mobility  Goal: Mobility/activity is maintained at optimum level for patient  Description: Interventions:  - Assess and monitor patient  barriers to mobility and need for assistive/adaptive devices  - Assess patient's emotional response to limitations  - Collaborate with interdisciplinary team and initiate plans and interventions as ordered  - Encourage independent activity per ability   - Maintain proper body alignment  - Perform active/passive rom as tolerated/ordered  - Plan activities to conserve energy   - Turn patient as appropriate  Outcome: Progressing     Problem: Communication Impairment  Goal: Ability to express needs and understand communication  Description: Assess patient's communication skills and ability to understand information  Patient will demonstrate use of effective communication techniques, alternative methods of communication and understanding even if not able to speak  - Encourage communication and provide alternate methods of communication as needed  - Collaborate with case management/ for discharge needs  - Include patient/family/caregiver in decisions related to communication    Outcome: Progressing     Problem: Potential for Aspiration  Goal: Non-ventilated patient's risk of aspiration is minimized  Description: Assess and monitor vital signs, respiratory status, and labs (WBC)  Monitor for signs of aspiration (tachypnea, cough, rales, wheezing, cyanosis, fever)  - Assess and monitor patient's ability to swallow  - Place patient up in chair to eat if possible  - HOB up at 90 degrees to eat if unable to get patient up into chair   - Supervise patient during oral intake  - Instruct patient/ family to take small bites  - Instruct patient/ family to take small single sips when taking liquids  - Follow patient-specific strategies generated by speech pathologist   Outcome: Progressing  Goal: Ventilated patient's risk of aspiration is minimized  Description: Assess and monitor vital signs, respiratory status, airway cuff pressure, and labs (WBC)  Monitor for signs of aspiration (tachypnea, cough, rales, wheezing, cyanosis, fever)  - Elevate head of bed 30 degrees if patient has tube feeding   - Monitor tube feeding  Outcome: Progressing     Problem: Nutrition  Goal: Nutrition/Hydration status is improving  Description: Monitor and assess patient's nutrition/hydration status for malnutrition (ex- brittle hair, bruises, dry skin, pale skin and conjunctiva, muscle wasting, smooth red tongue, and disorientation)  Collaborate with interdisciplinary team and initiate plan and interventions as ordered  Monitor patient's weight and dietary intake as ordered or per policy  Utilize nutrition screening tool and intervene per policy  Determine patient's food preferences and provide high-protein, high-caloric foods as appropriate  - Assist patient with eating   - Allow adequate time for meals   - Encourage patient to take dietary supplement as ordered  - Collaborate with clinical nutritionist   - Include patient/family/caregiver in decisions related to nutrition    Outcome: Progressing     Problem: SAFETY,RESTRAINT: NV/NON-SELF DESTRUCTIVE BEHAVIOR  Goal: Remains free of harm/injury (restraint for non violent/non self-detsructive behavior)  Description: INTERVENTIONS:  - Instruct patient/family regarding restraint use   - Assess and monitor physiologic and psychological status   - Provide interventions and comfort measures to meet assessed patient needs   - Identify and implement measures to help patient regain control  - Assess readiness for release of restraint   Outcome: Progressing  Goal: Returns to optimal restraint-free functioning  Description: INTERVENTIONS:  - Assess the patient's behavior and symptoms that indicate continued need for restraint  - Identify and implement measures to help patient regain control  - Assess readiness for release of restraint   Outcome: Progressing     Problem: DECISION MAKING  Goal: Pt/Family able to effectively weigh alternatives and participate in decision making related to treatment and care  Description: INTERVENTIONS:  - Identify decision maker  - Determine when there are differences among patient's view, family's view, and healthcare provider's view of patient condition and care goals  - Facilitate patient/family articulation of goals for care  - Help patient/family identify pros/cons of alternative solutions  - Provide information as requested by patient/family  - Respect patient/family rights related to privacy and sharing information   - Serve as a liaison between patient, family and health care team  - Initiate consults as appropriate (Ethics Team, Palliative Care, Family Care Conference, etc )  Outcome: Progressing     Problem: Nutrition/Hydration-ADULT  Goal: Nutrient/Hydration intake appropriate for improving, restoring or maintaining nutritional needs  Description: Monitor and assess patient's nutrition/hydration status for malnutrition  Collaborate with interdisciplinary team and initiate plan and interventions as ordered  Monitor patient's weight and dietary intake as ordered or per policy  Utilize nutrition screening tool and intervene as necessary   Determine patient's food preferences and provide high-protein, high-caloric foods as appropriate  INTERVENTIONS:  - Monitor oral intake, urinary output, labs, and treatment plans  - Assess nutrition and hydration status and recommend course of action  - Evaluate amount of meals eaten  - Assist patient with eating if necessary   - Allow adequate time for meals  - Recommend/ encourage appropriate diets, oral nutritional supplements, and vitamin/mineral supplements  - Order, calculate, and assess calorie counts as needed  - Recommend, monitor, and adjust tube feedings and TPN/PPN based on assessed needs  - Assess need for intravenous fluids  - Provide specific nutrition/hydration education as appropriate  - Include patient/family/caregiver in decisions related to nutrition  Outcome: Progressing     Problem: Prexisting or High Potential for Compromised Skin Integrity  Goal: Skin integrity is maintained or improved  Description: INTERVENTIONS:  - Identify patients at risk for skin breakdown  - Assess and monitor skin integrity  - Assess and monitor nutrition and hydration status  - Monitor labs   - Assess for incontinence   - Turn and reposition patient  - Assist with mobility/ambulation  - Relieve pressure over bony prominences  - Avoid friction and shearing  - Provide appropriate hygiene as needed including keeping skin clean and dry  - Evaluate need for skin moisturizer/barrier cream  - Collaborate with interdisciplinary team   - Patient/family teaching  - Consider wound care consult   Outcome: Progressing     Problem: Potential for Falls  Goal: Patient will remain free of falls  Description: INTERVENTIONS:  - Assess patient frequently for physical needs  -  Identify cognitive and physical deficits and behaviors that affect risk of falls    -  [place] fall precautions as indicated by assessment   - Educate patient/family on patient safety including physical limitations  - Instruct patient to call for assistance with activity based on assessment  - Modify environment to reduce risk of injury  - Consider OT/PT consult to assist with strengthening/mobility  Outcome: Progressing

## 2020-08-06 NOTE — ASSESSMENT & PLAN NOTE
· Permissive hypertension in the setting of acute stroke  · Blood pressure goal 140-160 for the next 24-48 hours  · Home Metoprolol restarted at a lower dose of 50 mg b i d

## 2020-08-06 NOTE — SPEECH THERAPY NOTE
Speech Language/Pathology  Speech-Language Pathology Bedside Swallow Evaluation      Patient Name: Jinny Ek    AYVWA'Z Date: 8/6/2020     Problem List  Principal Problem:    CVA (cerebral vascular accident) [facility])  Active Problems:    Alcohol dependence ([facility] )    Alcoholic cirrhosis ([facility] )    Benign essential hypertension    HLD (hyperlipidemia)    Hypothyroidism    Type II diabetes mellitus (HCC)    PAF (paroxysmal atrial fibrillation) (HCC)    Anxiety    Class 3 severe obesity without serious comorbidity in adult [facility])      Past Medical History  Past Medical History:   Diagnosis Date    Anxiety     Arthritis     Cirrhosis of liver ([facility]     Diabetes mellitus ([facility] )     HLD (hyperlipidemia)     Hypertension     Irregular heart beat     Psychiatric disorder     Depression    Stroke ([facility] )     Tobacco dependence        Past Surgical History  Past Surgical History:   Procedure Laterality Date    APPENDECTOMY      BUNIONECTOMY Right     CARPAL TUNNEL RELEASE      CHOLECYSTECTOMY         Summary   Pt presented with functional appearing oral and pharyngeal stage swallowing skills with materials administered today  Recommended Diet: regular diet and thin liquids   Recommended Form of Meds: whole with liquid   Aspiration precautions and swallowing strategies: upright posture              Current Medical Status  Pt is a 64 y o  female who presented initially to [facility] and later transferred to [facility] with CVA    The patient was not a tpa candidate, the patient was transferred to [facility] for mechanical thrombectomy, she was found to have a basilar tip - left P1 occlusion (TICI 0), she was a successful mechanical thrombectomy     Past medical history:  Please see H&P for details    Special Studies:  MRI Brain 8/6/20:  Findings consistent with subacute nonhemorrhagic infarct in left thalamus      Suspected bilateral punctate foci of restricted diffusion in the cerebellum suspicious for small embolic infarct       Social/Education/Vocational Hx:  Pt come from home    Swallow Information   Current Risks for Dysphagia & Aspiration: CVA  Current Symptoms/Concerns: risk related to CVA  Current Diet: regular diet and thin liquids   Baseline Diet: regular diet and thin liquids      Baseline Assessment   Behavior/Cognition: alert  Speech/Language Status: able to participate in conversation and able to follow commands  Patient Positioning: upright in bed  Pain Status/Interventions/Response to Interventions:  No report of or nonverbal indications of pain  Swallow Mechanism Exam  Facial: WFL  Labial: WFL  Lingual: WFL  Mandible: adequate ROM  Dentition: adequate  Vocal quality:clear/adequate   Volitional Cough: strong/productive       Consistencies Assessed and Performance   Consistencies Administered: thin liquids, mechanical soft solids, soft solids and hard solids  Materials administered included scrambled eggs, english muffin, home fries, and mild via straw  Oral Stage: WFL  Mastication was adequate with the materials administered today  Bolus formation and transfer were functional with no significant oral residue noted  No overt s/s reduced oral control  Pharyngeal Stage: WFL  Swallow Mechanics:  Swallowing initiation appeared prompt  Laryngeal rise was palpated and judged to be within functional limits  No coughing, throat clearing, change in vocal quality or respiratory status noted today  Esophageal Concerns: none reported    Summary and Recommendations (see above)    Results Reviewed with: patient and RN     Treatment Recommended: No ST intervention indicated at this time

## 2020-08-06 NOTE — ASSESSMENT & PLAN NOTE
· Basilar tip occlusion status post of mechanical thrombectomy on 08/06  · Patient was not a tPA candidate due to unknown Eliquis use within the past 48 hours  · Repeat MRI findings consistent with subacute nonhemorrhagic infarct in the left thalamus suspected bilateral punctate foci of restricted diffusion in the cerebellum suspicious of small embolic infarct  · Etiology most likely cardioembolic in the setting of AFib and medication noncompliance  She does have atherosclerotic risk factors including diabetes, hyperlipidemia, obesity and smoking and evidence of atherosclerotic disease on CTA  · Eliquis restarted per neurology recommendations  · Continue atorvastatin  · Keep on telemetry  · Q 4 hours neuro checks  · Secondary risk factor modifications  · Blood pressure goal of 140-160 for 24-48 hours    · PT/OT

## 2020-08-07 LAB
ANION GAP SERPL CALCULATED.3IONS-SCNC: 4 MMOL/L (ref 4–13)
BASOPHILS # BLD AUTO: 0.05 THOUSANDS/ΜL (ref 0–0.1)
BASOPHILS NFR BLD AUTO: 0 % (ref 0–1)
BUN SERPL-MCNC: 19 MG/DL (ref 5–25)
CALCIUM SERPL-MCNC: 9.2 MG/DL (ref 8.3–10.1)
CHLORIDE SERPL-SCNC: 107 MMOL/L (ref 100–108)
CO2 SERPL-SCNC: 26 MMOL/L (ref 21–32)
CREAT SERPL-MCNC: 0.93 MG/DL (ref 0.6–1.3)
EOSINOPHIL # BLD AUTO: 0.12 THOUSAND/ΜL (ref 0–0.61)
EOSINOPHIL NFR BLD AUTO: 1 % (ref 0–6)
ERYTHROCYTE [DISTWIDTH] IN BLOOD BY AUTOMATED COUNT: 14.3 % (ref 11.6–15.1)
GFR SERPL CREATININE-BSD FRML MDRD: 68 ML/MIN/1.73SQ M
GLUCOSE SERPL-MCNC: 117 MG/DL (ref 65–140)
GLUCOSE SERPL-MCNC: 124 MG/DL (ref 65–140)
GLUCOSE SERPL-MCNC: 130 MG/DL (ref 65–140)
HCT VFR BLD AUTO: 41.2 % (ref 34.8–46.1)
HGB BLD-MCNC: 13.2 G/DL (ref 11.5–15.4)
IMM GRANULOCYTES # BLD AUTO: 0.05 THOUSAND/UL (ref 0–0.2)
IMM GRANULOCYTES NFR BLD AUTO: 0 % (ref 0–2)
LYMPHOCYTES # BLD AUTO: 2.02 THOUSANDS/ΜL (ref 0.6–4.47)
LYMPHOCYTES NFR BLD AUTO: 16 % (ref 14–44)
MAGNESIUM SERPL-MCNC: 2.4 MG/DL (ref 1.6–2.6)
MCH RBC QN AUTO: 29.5 PG (ref 26.8–34.3)
MCHC RBC AUTO-ENTMCNC: 32 G/DL (ref 31.4–37.4)
MCV RBC AUTO: 92 FL (ref 82–98)
MONOCYTES # BLD AUTO: 0.74 THOUSAND/ΜL (ref 0.17–1.22)
MONOCYTES NFR BLD AUTO: 6 % (ref 4–12)
NEUTROPHILS # BLD AUTO: 9.32 THOUSANDS/ΜL (ref 1.85–7.62)
NEUTS SEG NFR BLD AUTO: 77 % (ref 43–75)
NRBC BLD AUTO-RTO: 0 /100 WBCS
PLATELET # BLD AUTO: 200 THOUSANDS/UL (ref 149–390)
PMV BLD AUTO: 9.7 FL (ref 8.9–12.7)
POTASSIUM SERPL-SCNC: 4.3 MMOL/L (ref 3.5–5.3)
POTASSIUM SERPL-SCNC: 4.8 MMOL/L (ref 3.5–5.3)
RBC # BLD AUTO: 4.48 MILLION/UL (ref 3.81–5.12)
SODIUM SERPL-SCNC: 137 MMOL/L (ref 136–145)
WBC # BLD AUTO: 12.3 THOUSAND/UL (ref 4.31–10.16)

## 2020-08-07 PROCEDURE — 85025 COMPLETE CBC W/AUTO DIFF WBC: CPT | Performed by: INTERNAL MEDICINE

## 2020-08-07 PROCEDURE — 84132 ASSAY OF SERUM POTASSIUM: CPT | Performed by: FAMILY MEDICINE

## 2020-08-07 PROCEDURE — 83735 ASSAY OF MAGNESIUM: CPT | Performed by: INTERNAL MEDICINE

## 2020-08-07 PROCEDURE — 99238 HOSP IP/OBS DSCHRG MGMT 30/<: CPT | Performed by: FAMILY MEDICINE

## 2020-08-07 PROCEDURE — 82948 REAGENT STRIP/BLOOD GLUCOSE: CPT

## 2020-08-07 PROCEDURE — 80048 BASIC METABOLIC PNL TOTAL CA: CPT | Performed by: INTERNAL MEDICINE

## 2020-08-07 PROCEDURE — 99232 SBSQ HOSP IP/OBS MODERATE 35: CPT | Performed by: PSYCHIATRY & NEUROLOGY

## 2020-08-07 RX ORDER — NICOTINE 21 MG/24HR
1 PATCH, TRANSDERMAL 24 HOURS TRANSDERMAL DAILY
Qty: 28 PATCH | Refills: 0 | Status: SHIPPED | OUTPATIENT
Start: 2020-08-08 | End: 2020-09-18 | Stop reason: ALTCHOICE

## 2020-08-07 RX ORDER — ATORVASTATIN CALCIUM 80 MG/1
80 TABLET, FILM COATED ORAL EVERY EVENING
Qty: 30 TABLET | Refills: 1 | Status: SHIPPED | OUTPATIENT
Start: 2020-08-07 | End: 2020-09-18 | Stop reason: SDUPTHER

## 2020-08-07 RX ORDER — METOPROLOL TARTRATE 100 MG/1
100 TABLET ORAL EVERY 12 HOURS SCHEDULED
Qty: 60 TABLET | Refills: 0 | Status: SHIPPED | OUTPATIENT
Start: 2020-08-07 | End: 2020-09-09

## 2020-08-07 RX ADMIN — METFORMIN HYDROCHLORIDE 500 MG: 500 TABLET ORAL at 08:25

## 2020-08-07 RX ADMIN — METHOCARBAMOL 500 MG: 500 TABLET, FILM COATED ORAL at 04:37

## 2020-08-07 RX ADMIN — APIXABAN 5 MG: 5 TABLET, FILM COATED ORAL at 08:26

## 2020-08-07 RX ADMIN — DRONEDARONE 400 MG: 400 TABLET, FILM COATED ORAL at 08:26

## 2020-08-07 RX ADMIN — NICOTINE 1 PATCH: 14 PATCH TRANSDERMAL at 08:26

## 2020-08-07 RX ADMIN — METOPROLOL TARTRATE 50 MG: 50 TABLET, FILM COATED ORAL at 08:25

## 2020-08-07 NOTE — ASSESSMENT & PLAN NOTE
Lab Results   Component Value Date    HGBA1C 7 1 (H) 08/06/2020       Recent Labs     08/05/20  1433 08/05/20  2244   POCGLU 142* 166*     -Blood sugars have been relatively controlled with the highest being 178  -Hemoglobin A1c 7 1 will likely need to be discharged on metformin  Given risk factors and recent stroke goal A1c should be less than 6 5  - Based on A1c estimated average glucose level is 157 will hold off on sliding scale but will check sugars with meals and before bedtime  -Will order metformin 500 mg to start tomorrow morning     GFR 83 and creatinine 0 79

## 2020-08-07 NOTE — RESTORATIVE TECHNICIAN NOTE
Restorative Specialist Mobility Note       Activity: Ambulate in [place], Ambulate in room, Bathroom privileges, Chair, Dangle, Stand at bedside(Educated/encouraged pt to ambulate with assistance 3-4 x's/day  Bed alarm on   Pt callbell, phone/tray within reach )     Assistive Device: None       Emmie CABRERA, Restorative Technician, [employer] [facility]

## 2020-08-07 NOTE — PLAN OF CARE
Problem: Neurological Deficit  Goal: Neurological status is stable or improving  Description: Interventions:  - Monitor and assess patient's level of consciousness, motor function, sensory function, and level of assistance needed for ADLs  - Monitor and report changes from baseline  Collaborate with interdisciplinary team to initiate plan and implement interventions as ordered  - Provide and maintain a safe environment  - Consider seizure precautions  - Consider fall precautions  - Consider aspiration precautions  - Consider bleeding precautions  Outcome: Progressing     Problem: Activity Intolerance/Impaired Mobility  Goal: Mobility/activity is maintained at optimum level for patient  Description: Interventions:  - Assess and monitor patient  barriers to mobility and need for assistive/adaptive devices  - Assess patient's emotional response to limitations  - Collaborate with interdisciplinary team and initiate plans and interventions as ordered  - Encourage independent activity per ability   - Maintain proper body alignment  - Perform active/passive rom as tolerated/ordered  - Plan activities to conserve energy   - Turn patient as appropriate  Outcome: Progressing     Problem: Communication Impairment  Goal: Ability to express needs and understand communication  Description: Assess patient's communication skills and ability to understand information  Patient will demonstrate use of effective communication techniques, alternative methods of communication and understanding even if not able to speak  - Encourage communication and provide alternate methods of communication as needed  - Collaborate with case management/ for discharge needs  - Include patient/family/caregiver in decisions related to communication    Outcome: Progressing     Problem: Potential for Aspiration  Goal: Non-ventilated patient's risk of aspiration is minimized  Description: Assess and monitor vital signs, respiratory status, and labs (WBC)  Monitor for signs of aspiration (tachypnea, cough, rales, wheezing, cyanosis, fever)  - Assess and monitor patient's ability to swallow  - Place patient up in chair to eat if possible  - HOB up at 90 degrees to eat if unable to get patient up into chair   - Supervise patient during oral intake  - Instruct patient/ family to take small bites  - Instruct patient/ family to take small single sips when taking liquids  - Follow patient-specific strategies generated by speech pathologist   Outcome: Progressing  Goal: Ventilated patient's risk of aspiration is minimized  Description: Assess and monitor vital signs, respiratory status, airway cuff pressure, and labs (WBC)  Monitor for signs of aspiration (tachypnea, cough, rales, wheezing, cyanosis, fever)  - Elevate head of bed 30 degrees if patient has tube feeding   - Monitor tube feeding    Outcome: Progressing

## 2020-08-07 NOTE — PROGRESS NOTES
Progress Note - Neurology   Amelia Orantes 64 y o  female [id number]  Unit/Bed#: [facility] 732/[facility] 0-36    Assessment:  64 y o  female with tobacco use, AFib on Eliquis, HTN, HLD, diabetes, arthritis, who initially presented to [facility] 8/5 for stroke-like symptoms of dysconjugate gaze and right facial droop  Stroke activated  Patient was not a tPA candidate  CT brain without acute changes  CTA head and neck demonstrated occlusion at proximal P1 segment of left PCA with perfusion of P2 via posterior communicating artery as well as right greater than left atherosclerotic disease of cervical internal carotid artery without significant stenosis  Patient transferred to [facility] for successful mechanical thrombectomy (TICI 3)  MRI brain demonstrated subacute non hemorrhagic infarct in the left thalamus, suspected bilateral punctate foci of restricted diffusion in the cerebellum suspicious for small embolic infarct  Echo: EF 58%  No regional wall motion abnormalities  Bilateral atrium mildly to moderately dilated  Lipid panel with LDL of 112  A1C of 7 1  Etiology suspected most likely cardioembolic in the setting of medication noncompliance  Eliquis restarted yesterday  Plan:  · Secondary stroke risk factor modification  · Atorvastatin 80 mg   · Encourage smoking cessation  · Eliquis 5 mg BID  · Normotension  · Euvolemic, normothermic  · Telemetry  · PT/OT/ST  · Monitor neuro exam; notify with any changes  · Medical management and supportive care per primary team  · Patient will need follow up with Neurology after discharge  Message sent to office to schedule  · No further inpatient neurology recommendations at this time  Please call with any questions  Subjective:   Patient resting in bed comfortably  She states she is getting discharged today but is frustrated that get can't get up without the bed alarm going off  She understands that it is on for her safety   She denies any new complaints and understands she needs to be compliant with her medications and do her best with lifestyle changes including diet, exercise, smoking cessation        Past Medical History:   Diagnosis Date    Anxiety     Arthritis     Cir[place]osis of liver ([place] [address] )     Diabetes mellitus ([place] [address] )     HLD (hyperlipidemia)     Hypertension     Irregular heart beat     Psychiatric disorder     Depression    Stroke ([place] [address] )     Tobacco dependence      Past Surgical History:   Procedure Laterality Date    APPENDECTOMY      BUNIONECTOMY Right     CARPAL TUNNEL RELEASE      CHOLECYSTECTOMY       Family History   Problem Relation Age of Onset    Diabetes Mother     Diabetes Father     Diabetes Brother     Hypertension Brother      Social History     Socioeconomic History    Marital status: Single     Spouse name: None    Number of children: None    Years of education: None    Highest education level: None   Occupational History    None   Social Needs    Financial resource strain: None    Food insecurity     Worry: None     Inability: None    Transportation needs     Medical: None     Non-medical: None   Tobacco Use    Smoking status: Current Every Day Smoker     Packs/day: 1 00     Types: Cigarettes    Smokeless tobacco: Never Used   Substance and Sexual Activity    Alcohol use: Yes     Frequency: Monthly or less     Binge frequency: Monthly     Comment: 1 botttle of vodka over 4 days/denies since 7/18    Drug use: No    Sexual activity: Not Currently   Lifestyle    Physical activity     Days per week: None     Minutes per session: None    Stress: None   Relationships    Social connections     Talks on phone: None     Gets together: None     Attends [employer] service: None     Active member of club or organization: None     Attends meetings of clubs or organizations: None     Relationship status: None    Intimate partner violence     Fear of current or ex partner: None     Emotionally abused: None     Physically abused: None     Forced sexual activity: None   Other Topics Concern    None   Social History Narrative    None         Medications: All current active meds have been reviewed and current meds:  Scheduled Meds:acetaminophen, 975 mg, Oral, Q8H PRN, Mary Stauffer MD  apixaban, 5 mg, Oral, BID, Mary Stauffer MD  atorvastatin, 80 mg, Oral, QPM, Mary Stauffer MD  dronedarone, 400 mg, Oral, BID With Meals, Mary Stauffer MD  hydrALAZINE, 10 mg, Intravenous, Q4H PRN, Zheng Vallecillo MD  melatonin, 3 mg, Oral, HS, Mary Stauffer MD  metFORMIN, 500 mg, Oral, BID With Meals, Zheng Vallecillo MD  methocarbamol, 500 mg, Oral, Q6H PRN, Mary Stauffer MD  metoprolol tartrate, 50 mg, Oral, Q12H [address], Mary Stauffer MD  nicotine, 1 patch, Transdermal, Daily, Mary Stauffer MD  ondansetron, 4 mg, Intravenous, Q4H PRN, Mary Stauffer MD      Continuous Infusions:   PRN Meds:   acetaminophen    hydrALAZINE    methocarbamol    ondansetron       ROS:   Review of Systems   Constitutional: Negative for fatigue  HENT: Negative for trouble swallowing  Eyes: Negative for photophobia and visual disturbance  Respiratory: Negative for shortness of breath  Cardiovascular: Negative for chest pain  Gastrointestinal: Negative for abdominal pain  Musculoskeletal: Negative for arthralgias, back pain, myalgias and neck pain  Neurological: Negative for dizziness, tremors, seizures, syncope, facial asymmetry, speech difficulty, weakness, light-headedness, numbness and headaches  Psychiatric/Behavioral: Negative for agitation, confusion and hallucinations  The patient is not nervous/anxious  All other systems reviewed and are negative  Vitals:   /93 (BP Location: Right arm)   Pulse 97   Temp 98 6 °F (37 °C) (Oral)   Resp 18   Ht 5' 2" (1 575 m)   Wt 119 kg (262 lb 5 6 oz)   SpO2 97%   BMI 47 98 kg/m²       Physical Exam:   Physical Exam  Vitals signs and nursing note reviewed     Constitutional:       General: She is not in acute distress  Appearance: Normal appearance  She is obese  She is not toxic-appearing  HENT:      Head: Normocephalic and atraumatic  Nose: Nose normal       Mouth/Throat:      Mouth: Mucous membranes are moist       Pharynx: Oropharynx is clear  Eyes:      General: No scleral icterus  Right eye: No discharge  Left eye: No discharge  Extraocular Movements: Extraocular movements intact  Conjunctiva/sclera: Conjunctivae normal       Pupils: Pupils are equal, round, and reactive to light  Neck:      Musculoskeletal: Normal range of motion  Cardiovascular:      Rate and Rhythm: Normal rate  Pulmonary:      Effort: Pulmonary effort is normal  No respiratory distress  Abdominal:      Palpations: Abdomen is soft  Musculoskeletal: Normal range of motion  General: No swelling or deformity  Skin:     General: Skin is warm and dry  Coloration: Skin is not pale  Findings: No erythema  Neurological:      Mental Status: She is alert and oriented to person, place, and time  Coordination: Finger-Nose-Finger Test normal    Psychiatric:         Mood and Affect: Mood normal          Speech: Speech normal          Behavior: Behavior normal          Thought Content: Thought content normal          Judgment: Judgment normal        Neurologic Exam     Mental Status   Oriented to person, place, and time  Attention: normal  Concentration: normal    Speech: speech is normal   Level of consciousness: alert  AAO x4  Able to follow commands appropriately  Able to have a full conversation without dysarthria noted  Cranial Nerves     CN II   Visual acuity: normal  Right visual field deficit: none  Left visual field deficit: none     CN III, IV, VI   Pupils are equal, round, and reactive to light  Nystagmus: none     CN V   Facial sensation intact  CN VII   Facial expression full, symmetric       CN VIII   CN VIII normal      CN XI   CN XI normal      CN XII CN XII normal      Motor Exam   Muscle bulk: normal  Bilateral UE strength 5/5 deltoids, biceps, triceps, hand   Bilateral LE strength 5/5 hip flexion, dorsiflexion, plantar flexion     Sensory Exam   Light touch normal      Gait, Coordination, and Reflexes     Coordination   Finger to nose coordination: normal    Tremor   Resting tremor: absent  Intention tremor: absent      Labs: I have personally reviewed pertinent reports     Recent Results (from the past 24 hour(s))   Fingerstick Glucose (POCT)    Collection Time: 08/06/20  9:57 PM   Result Value Ref Range    POC Glucose 181 (H) 65 - 140 mg/dl   Basic metabolic panel    Collection Time: 08/07/20  5:13 AM   Result Value Ref Range    Sodium 137 136 - 145 mmol/L    Potassium 4 8 3 5 - 5 3 mmol/L    Chloride 107 100 - 108 mmol/L    CO2 26 21 - 32 mmol/L    ANION GAP 4 4 - 13 mmol/L    BUN 19 5 - 25 mg/dL    Creatinine 0 93 0 60 - 1 30 mg/dL    Glucose 117 65 - 140 mg/dL    Calcium 9 2 8 3 - 10 1 mg/dL    eGFR 68 ml/min/1 73sq m   Magnesium    Collection Time: 08/07/20  5:13 AM   Result Value Ref Range    Magnesium 2 4 1 6 - 2 6 mg/dL   CBC and differential    Collection Time: 08/07/20  5:20 AM   Result Value Ref Range    WBC 12 30 (H) 4 31 - 10 16 Thousand/uL    RBC 4 48 3 81 - 5 12 Million/uL    Hemoglobin 13 2 11 5 - 15 4 g/dL    Hematocrit 41 2 34 8 - 46 1 %    MCV 92 82 - 98 fL    MCH 29 5 26 8 - 34 3 pg    MCHC 32 0 31 4 - 37 4 g/dL    RDW 14 3 11 6 - 15 1 %    MPV 9 7 8 9 - 12 7 fL    Platelets 743 084 - 774 Thousands/uL    nRBC 0 /100 WBCs    Neutrophils Relative 77 (H) 43 - 75 %    Immat GRANS % 0 0 - 2 %    Lymphocytes Relative 16 14 - 44 %    Monocytes Relative 6 4 - 12 %    Eosinophils Relative 1 0 - 6 %    Basophils Relative 0 0 - 1 %    Neutrophils Absolute 9 32 (H) 1 85 - 7 62 Thousands/µL    Immature Grans Absolute 0 05 0 00 - 0 20 Thousand/uL    Lymphocytes Absolute 2 02 0 60 - 4 47 Thousands/µL    Monocytes Absolute 0 74 0 17 - 1 22 Thousand/µL    Eosinophils Absolute 0 12 0 00 - 0 61 Thousand/µL    Basophils Absolute 0 05 0 00 - 0 10 Thousands/µL   Fingerstick Glucose (POCT)    Collection Time: 08/07/20  6:12 AM   Result Value Ref Range    POC Glucose 124 65 - 140 mg/dl       Imaging: I have personally reviewed pertinent imaging in PACS, and I have personally reviewed PACS reports  EKG, Pathology, and Other Studies: I have personally reviewed pertinent reports  VTE Prophylaxis: Sequential compression device (Venodyne) and Eliquis        Total time spent today 26 minutes  Greater than 50% of total time was spent with the patient and / or family counseling and / or coordination of care  A description of the counseling / coordination of care: Patient seen and evaluated  Case reviewed with attending  Chart thoroughly reviewed including imaging and labs  Coordiation of care with RN  Discussion of imaging, medications, lifestyle changes, smoking cessation, and follow up care with patient

## 2020-08-07 NOTE — DISCHARGE SUMMARY
Discharge Summary - Jerman Baez 64 y o  female MRN: [id number]    Unit/Bed#: [facility] 732-01 Encounter: [id number]    Admission Date:   Admission Orders (From admission, onward)     Ordered        08/05/20 2242  Inpatient Admission  Once                     Admitting Diagnosis: Cerebrovascular accident (CVA) due to thrombosis of precerebral artery ([address] ) [I63 00]    HPI:   As per Dr Lavern Serra H&P on 08/05  "Jerman Baez is a 64 y o  female with history of hypertension, DM2, atrial fibrillation, who presented to [facility] earlier today due to vision changes and facial droop  Stroke alert was called and CT/CTA demonstrated right PCA occlusion  Patient was ineligible for tpa due to unknown last dosing of home eliquis medication  Patient's status deteriorated and was noted to become aphasic, drowsy, and with right upper extremity paralysis  [facility] IR was contacted and patient was transferred for endovascular procedure  "    Procedures Performed: No orders of the defined types were placed in this encounter  Summary of Hospital Course:   Jerman Baez 64 y o female who presented to [facility] on 08/05 for vision changes and facial droop was found to have right PCA occlusion on CT  Pt was not a candidate for TPA in setting of noncompliance w/PTA eliquis /last dose of eliquis unknown  was transferred to [facility] for mechanical thrombectomy  Procedure was done on 08/05  Pt was briefly intubated and succesfully extubated on 08/05  Repeat MRI finding showed no new changes  Pt was transferred to floor on 08/06 night  Pt was started back on eliquis as per neurology recs  PT was consulted, recommended home PT  Pt is being started on Metformin 500mg BID due to no PTA meds and A1C of 7 1  Confirmed dc w/ both neurology and neurosurgery teams who both agreed with plan to dc  On day of dc no FND  Pt w/o neuro complaint; denies weakness or vision changes  Only complaint endorse by a patient as follows:   Sore throat in setting of recent extubation, and feeling sore all over from laying in bed (COVID testing completed and negative)  F/u w/ PCP, neurology, neurosurgery  Significant Findings, Care, Treatment and Services Provided:     MRI brain without contrast: 08/06: Findings consistent with subacute nonhemorrhagic infarct in left thalamus    Suspected bilateral punctate foci of restricted diffusion in the cerebellum suspicious for small embolic infarct  Physical Exam  Vitals signs and nursing note reviewed  Constitutional:       General: She is not in acute distress  Appearance: Normal appearance  She is obese  She is not ill-appearing, toxic-appearing or diaphoretic  HENT:      Head: Normocephalic and atraumatic  Eyes:      Conjunctiva/sclera: Conjunctivae normal    Neck:      Musculoskeletal: Normal range of motion  Cardiovascular:      Rate and Rhythm: Normal rate and regular rhythm  Pulses: Normal pulses  Heart sounds: Normal heart sounds  No murmur  Pulmonary:      Effort: Pulmonary effort is normal       Breath sounds: Wheezing (expiratory) present  Abdominal:      General: Abdomen is flat  Bowel sounds are normal  There is no distension  Palpations: Abdomen is soft  Tenderness: There is no abdominal tenderness  Musculoskeletal: Normal range of motion  Skin:     General: Skin is warm and dry  Neurological:      General: No focal deficit present  Mental Status: She is alert and oriented to person, place, and time  Cranial Nerves: No cranial nerve deficit  Motor: No weakness     Psychiatric:         Mood and Affect: Mood normal          Behavior: Behavior normal        Complications: NONE    Discharge Diagnosis:   Patient Active Problem List   Diagnosis    Benign essential hypertension    HLD (hyperlipidemia)    Pancreatitis    Type II diabetes mellitus ([facility])    PAF (paroxysmal atrial fibrillation) ([facility])    Demand ischemia of myocardium ([facility])    QT prolongation    History of cardioversion  Class 3 severe obesity without serious comorbidity in adult (HCC)    Snoring    Nocturnal leg movements    CVA (cerebral vascular accident) [facility])         Resolved Problems  Date Reviewed: 8/6/2020          Resolved    Alcohol dependence ([facility] [address] ) 8/6/2020     Resolved by  Cory Cespedes MD    Alcoholic cirrhosis ([facility] [address] ) 2/7/2865     Resolved by  Cory Cespedes MD    Overview Signed 5/9/2018 11:45 AM by Otto Montenegro MD     Last Assessment & Plan:   Denies alcohol use currently  Transaminases are much higher that 6 mos ago  Will recheck with additional labs as ordered and will check US         Hypothyroidism 8/6/2020     Resolved by  Cory Cespedes MD    Overview Signed 5/9/2018 11:45 AM by Otto Montenegro MD     Last Assessment & Plan:   Recheck TSH  - has been normal off meds however she still has fatigue               Condition at Discharge: stable         Discharge instructions/Information to patient and family:   See after visit summary for information provided to patient and family  Provisions for Follow-Up Care:  See after visit summary for information related to follow-up care and any pertinent home health orders  PCP: Otto Montenegro MD    Disposition: Home    Planned Readmission: No      Discharge Statement   I spent 30 minutes discharging the patient  This time was spent on the day of discharge  I had direct contact with the patient on the day of discharge  Additional documentation is required if more than 30 minutes were spent on discharge  Discharge Medications:  See after visit summary for reconciled discharge medications provided to patient and family

## 2020-08-07 NOTE — PLAN OF CARE
Problem: Neurological Deficit  Goal: Neurological status is stable or improving  Description: Interventions:  - Monitor and assess patient's level of consciousness, motor function, sensory function, and level of assistance needed for ADLs  - Monitor and report changes from baseline  Collaborate with interdisciplinary team to initiate plan and implement interventions as ordered  - Provide and maintain a safe environment  - Consider seizure precautions  - Consider fall precautions  - Consider aspiration precautions  - Consider bleeding precautions  Outcome: Progressing     Problem: Activity Intolerance/Impaired Mobility  Goal: Mobility/activity is maintained at optimum level for patient  Description: Interventions:  - Assess and monitor patient  barriers to mobility and need for assistive/adaptive devices  - Assess patient's emotional response to limitations  - Collaborate with interdisciplinary team and initiate plans and interventions as ordered  - Encourage independent activity per ability   - Maintain proper body alignment  - Perform active/passive rom as tolerated/ordered  - Plan activities to conserve energy   - Turn patient as appropriate  Outcome: Progressing     Problem: Communication Impairment  Goal: Ability to express needs and understand communication  Description: Assess patient's communication skills and ability to understand information  Patient will demonstrate use of effective communication techniques, alternative methods of communication and understanding even if not able to speak  - Encourage communication and provide alternate methods of communication as needed  - Collaborate with case management/ for discharge needs  - Include patient/family/caregiver in decisions related to communication    Outcome: Progressing     Problem: Potential for Aspiration  Goal: Non-ventilated patient's risk of aspiration is minimized  Description: Assess and monitor vital signs, respiratory status, and labs (WBC)  Monitor for signs of aspiration (tachypnea, cough, rales, wheezing, cyanosis, fever)  - Assess and monitor patient's ability to swallow  - Place patient up in chair to eat if possible  - HOB up at 90 degrees to eat if unable to get patient up into chair   - Supervise patient during oral intake  - Instruct patient/ family to take small bites  - Instruct patient/ family to take small single sips when taking liquids  - Follow patient-specific strategies generated by speech pathologist   Outcome: Progressing  Goal: Ventilated patient's risk of aspiration is minimized  Description: Assess and monitor vital signs, respiratory status, airway cuff pressure, and labs (WBC)  Monitor for signs of aspiration (tachypnea, cough, rales, wheezing, cyanosis, fever)  - Elevate head of bed 30 degrees if patient has tube feeding   - Monitor tube feeding  Outcome: Progressing     Problem: Nutrition  Goal: Nutrition/Hydration status is improving  Description: Monitor and assess patient's nutrition/hydration status for malnutrition (ex- brittle hair, bruises, dry skin, pale skin and conjunctiva, muscle wasting, smooth red tongue, and disorientation)  Collaborate with interdisciplinary team and initiate plan and interventions as ordered  Monitor patient's weight and dietary intake as ordered or per policy  Utilize nutrition screening tool and intervene per policy  Determine patient's food preferences and provide high-protein, high-caloric foods as appropriate  - Assist patient with eating   - Allow adequate time for meals   - Encourage patient to take dietary supplement as ordered  - Collaborate with clinical nutritionist   - Include patient/family/caregiver in decisions related to nutrition    Outcome: Progressing     Problem: SAFETY,RESTRAINT: NV/NON-SELF DESTRUCTIVE BEHAVIOR  Goal: Remains free of harm/injury (restraint for non violent/non self-detsructive behavior)  Description: INTERVENTIONS:  - Instruct patient/family regarding restraint use   - Assess and monitor physiologic and psychological status   - Provide interventions and comfort measures to meet assessed patient needs   - Identify and implement measures to help patient regain control  - Assess readiness for release of restraint   Outcome: Progressing  Goal: Returns to optimal restraint-free functioning  Description: INTERVENTIONS:  - Assess the patient's behavior and symptoms that indicate continued need for restraint  - Identify and implement measures to help patient regain control  - Assess readiness for release of restraint   Outcome: Progressing     Problem: DECISION MAKING  Goal: Pt/Family able to effectively weigh alternatives and participate in decision making related to treatment and care  Description: INTERVENTIONS:  - Identify decision maker  - Determine when there are differences among patient's view, family's view, and healthcare provider's view of patient condition and care goals  - Facilitate patient/family articulation of goals for care  - Help patient/family identify pros/cons of alternative solutions  - Provide information as requested by patient/family  - Respect patient/family rights related to privacy and sharing information   - Serve as a liaison between patient, family and health care team  - Initiate consults as appropriate (Ethics Team, Palliative Care, Family Care Conference, etc )  Outcome: Progressing     Problem: Nutrition/Hydration-ADULT  Goal: Nutrient/Hydration intake appropriate for improving, restoring or maintaining nutritional needs  Description: Monitor and assess patient's nutrition/hydration status for malnutrition  Collaborate with interdisciplinary team and initiate plan and interventions as ordered  Monitor patient's weight and dietary intake as ordered or per policy  Utilize nutrition screening tool and intervene as necessary   Determine patient's food preferences and provide high-protein, high-caloric foods as appropriate  INTERVENTIONS:  - Monitor oral intake, urinary output, labs, and treatment plans  - Assess nutrition and hydration status and recommend course of action  - Evaluate amount of meals eaten  - Assist patient with eating if necessary   - Allow adequate time for meals  - Recommend/ encourage appropriate diets, oral nutritional supplements, and vitamin/mineral supplements  - Order, calculate, and assess calorie counts as needed  - Recommend, monitor, and adjust tube feedings and TPN/PPN based on assessed needs  - Assess need for intravenous fluids  - Provide specific nutrition/hydration education as appropriate  - Include patient/family/caregiver in decisions related to nutrition  Outcome: Progressing     Problem: Prexisting or High Potential for Compromised Skin Integrity  Goal: Skin integrity is maintained or improved  Description: INTERVENTIONS:  - Identify patients at risk for skin breakdown  - Assess and monitor skin integrity  - Assess and monitor nutrition and hydration status  - Monitor labs   - Assess for incontinence   - Turn and reposition patient  - Assist with mobility/ambulation  - Relieve pressure over bony prominences  - Avoid friction and shearing  - Provide appropriate hygiene as needed including keeping skin clean and dry  - Evaluate need for skin moisturizer/barrier cream  - Collaborate with interdisciplinary team   - Patient/family teaching  - Consider wound care consult   Outcome: Progressing     Problem: Potential for Falls  Goal: Patient will remain free of falls  Description: INTERVENTIONS:  - Assess patient frequently for physical needs  -  Identify cognitive and physical deficits and behaviors that affect risk of falls    -  [place] fall precautions as indicated by assessment   - Educate patient/family on patient safety including physical limitations  - Instruct patient to call for assistance with activity based on assessment  - Modify environment to reduce risk of injury  - Consider OT/PT consult to assist with strengthening/mobility  Outcome: Progressing

## 2020-08-07 NOTE — PROGRESS NOTES
ICU acceptance  note  - Jose R Benítez 1960, 64 y o  female MRN: [id number]    Unit/Bed#: [facility] 732-01 Encounter: [id number]    Primary Care Provider: Adelita Goldberg, MD   Date and time admitted to hospital: 8/5/2020  4:20 PM        * CVA (cerebral vascular accident) [facility])  Assessment & Plan  -Patient currently alert oriented x3 and no focal neurological deficits on exam   Etiology likely cardioembolic is with history of AFib and noncompliance with medications   -Postop day 1 mechanical thrombectomy, was not a candidate for tPA  -Repeat MRI findings consistent with subacute nonhemorrhagic infarct in the left thalamus suspected bilateral punctate foci of restricted diffusion in the cerebellum suspicious of small embolic infarct    -Eliquis restarted today per neurology recommendations  Patient admits noncompliance with Eliquis which likely cause of stroke it is not a failure of Eliquis so that is why the decision was made to restart Eliquis and not change anticoagulation   -Continue atorvastatin  -Will continue to [facility] on telemetry  -Q 4 hours neuro checks  -smoking cessation counseling at discharge  -Blood pressure goal of 140-160 for 24-48 hours  -PT/OT:  Recommending home with home PT    PAF (paroxysmal atrial fibrillation) ([facility])  Assessment & Plan  Stable currently normal sinus rhythm  Will monitor on tele  Continue Eliquis,  metoprolol 50 mg b i d  And Multaq  Of note patient's home doses metoprolol 100 mg b i d  But that was held to allow for permissive hypertension  Patient admits noncompliance with Eliquis which likely cause of stroke it is not a failure of Eliquis so that is why the decision was made to restart Eliquis and not change anticoagulation  Class 3 severe obesity without serious comorbidity in adult [facility])  Assessment & Plan  BMI 47 98  BMI Counseling: Body mass index is 47 98 kg/m²   The BMI is above normal  Nutrition recommendations include reducing portion sizes, decreasing overall calorie intake, 3-5 servings of fruits/vegetables daily, reducing fast food intake, consuming healthier snacks and decreasing soda and/or juice intake  Exercise recommendations include moderate aerobic physical activity for 150 minutes/week  Type II diabetes mellitus [facility])  Assessment & Plan  Lab Results   Component Value Date    HGBA1C 7 1 (H) 08/06/2020       Recent Labs     08/05/20  1433 08/05/20  2244   POCGLU 142* 166*     -Blood sugars have been relatively controlled with the highest being 178  -Hemoglobin A1c 7 1 will likely need to be discharged on metformin  Given risk factors and recent stroke goal A1c should be less than 6 5  - Based on A1c estimated average glucose level is 157 will hold off on sliding scale but will check sugars with meals and before bedtime  -Will order metformin 500 mg to start tomorrow morning   GFR 83 and creatinine 0 79    HLD (hyperlipidemia)  Assessment & Plan  Continue Lipitor 80 mg    Benign essential hypertension  Assessment & Plan  Stable  Allow permissive hypertension BP goal 140-160  Hydralazine 10 mg Q 4 p r n  For systolic blood pressure over 160  Home dose of metoprolol 100 mg b i d  Decreased to 50 mg b i d  PPX:  Eliquis  Diet:  Diabetic  Code Status:  Full  Dispo:  Likely discharge tomorrow  PT OT recommending home with home PT  Case management on board  Subjective:   Patient was seen examined at bedside after transfer out of ICU  She was admitted to ICU yesterday for acute basilar tip stroke  She has a history of AFib diabetes hypertension  Patient initially presented to [facility] yesterday with vision changes and facial droop  She admitted noncompliance with medications  Head imaging showed right PCA occlusion  Patient was not a candidate for tPA because it was unknown if she had taking her Eliquis and was the last time she had taken it  In the ED patient became aphasic and was lethargic    She was transferred to [place] and had thrombectomy yesterday  She was briefly intubated and sedated however was extubated yesterday  She was monitored overnight in the ICU   Repeat MRI today showed no new complications  Her home Eliquis was restarted  After thrombectomy she return back to baseline and had no residual neurological deficits  Decision was made to transfer her to medical floor him out of the ICU  At time evaluation out of the ICU patient was seen resting comfortably in bed  She had no complaints  She is alert oriented x3 and had no focal neurological deficits  Objective:     Vitals: Blood pressure 125/75, pulse 94, temperature 98 7 °F (37 1 °C), resp  rate 17, height 5' 2" (1 575 m), weight 119 kg (262 lb 5 6 oz), SpO2 95 %  ,Body mass index is 47 98 kg/m²  Intake/Output Summary (Last 24 hours) at 8/6/2020 2208  Last data filed at 8/6/2020 1801  Gross per 24 hour   Intake 1511 25 ml   Output 300 ml   Net 1211 25 ml       Physical Exam:   Physical Exam   Constitutional: She is oriented to person, place, and time  Non-toxic appearance  She does not appear ill  No distress  HENT:   Head: Normocephalic and atraumatic  Mouth/Throat: Mucous membranes are moist  Oropharynx is clear  Eyes: Pupils are equal, round, and reactive to light  Conjunctivae are normal    Neck: Normal range of motion  Neck supple  Cardiovascular: Normal rate, regular rhythm and normal pulses  No murmur heard  Pulmonary/Chest: Effort normal  No respiratory distress  She has no wheezes  Abdominal: Normal appearance  She exhibits no distension  There is no abdominal tenderness  Musculoskeletal:      Right lower leg: No edema  Left lower leg: No edema  Neurological: She is alert and oriented to person, place, and time  She displays no weakness  No cranial nerve deficit  Skin: Skin is warm and dry  She is not diaphoretic  Psychiatric: Mood normal    Nursing note and vitals reviewed        Invasive Devices     Peripheral Intravenous Line            Peripheral IV 08/05/20 Left Antecubital 1 day    Peripheral IV 08/05/20 Left Hand 1 day    Peripheral IV 08/05/20 Right Antecubital 1 day                           Lab and other studies:  I have personally reviewed pertinent reports       Admission on 08/05/2020   Component Date Value    POC Glucose 08/05/2020 166*    WBC 08/06/2020 12 66*    RBC 08/06/2020 4 43     Hemoglobin 08/06/2020 13 1     Hematocrit 08/06/2020 40 6     MCV 08/06/2020 92     MCH 08/06/2020 29 6     MCHC 08/06/2020 32 3     RDW 08/06/2020 14 0     MPV 08/06/2020 9 4     Platelets 42/32/3761 211     nRBC 08/06/2020 0     Neutrophils Relative 08/06/2020 79*    Immat GRANS % 08/06/2020 0     Lymphocytes Relative 08/06/2020 13*    Monocytes Relative 08/06/2020 6     Eosinophils Relative 08/06/2020 1     Basophils Relative 08/06/2020 1     Neutrophils Absolute 08/06/2020 10 03*    Immature Grans Absolute 08/06/2020 0 04     Lymphocytes Absolute 08/06/2020 1 63     Monocytes Absolute 08/06/2020 0 79     Eosinophils Absolute 08/06/2020 0 09     Basophils Absolute 08/06/2020 0 08     Sodium 08/06/2020 143     Potassium 08/06/2020 4 2     Chloride 08/06/2020 109*    CO2 08/06/2020 28     ANION GAP 08/06/2020 6     BUN 08/06/2020 17     Creatinine 08/06/2020 0 79     Glucose 08/06/2020 148*    Calcium 08/06/2020 8 2*    eGFR 08/06/2020 83     Magnesium 08/06/2020 1 8     Phosphorus 08/06/2020 3 3     Cholesterol 08/06/2020 189     Triglycerides 08/06/2020 142     HDL, Direct 08/06/2020 49     LDL Calculated 08/06/2020 112*    Hemoglobin A1C 08/06/2020 7 1*    EAG 08/06/2020 157     POC Glucose 08/06/2020 181*       Recent Results (from the past 24 hour(s))   Fingerstick Glucose (POCT)    Collection Time: 08/05/20 10:44 PM   Result Value Ref Range    POC Glucose 166 (H) 65 - 140 mg/dl   CBC and differential    Collection Time: 08/06/20  4:52 AM   Result Value Ref Range    WBC 12 66 (H) 4 31 - 10 16 Thousand/uL    RBC 4 43 3 81 - 5 12 Million/uL    Hemoglobin 13 1 11 5 - 15 4 g/dL    Hematocrit 40 6 34 8 - 46 1 %    MCV 92 82 - 98 fL    MCH 29 6 26 8 - 34 3 pg    MCHC 32 3 31 4 - 37 4 g/dL    RDW 14 0 11 6 - 15 1 %    MPV 9 4 8 9 - 12 7 fL    Platelets 588 042 - 250 Thousands/uL    nRBC 0 /100 WBCs    Neutrophils Relative 79 (H) 43 - 75 %    Immat GRANS % 0 0 - 2 %    Lymphocytes Relative 13 (L) 14 - 44 %    Monocytes Relative 6 4 - 12 %    Eosinophils Relative 1 0 - 6 %    Basophils Relative 1 0 - 1 %    Neutrophils Absolute 10 03 (H) 1 85 - 7 62 Thousands/µL    Immature Grans Absolute 0 04 0 00 - 0 20 Thousand/uL    Lymphocytes Absolute 1 63 0 60 - 4 47 Thousands/µL    Monocytes Absolute 0 79 0 17 - 1 22 Thousand/µL    Eosinophils Absolute 0 09 0 00 - 0 61 Thousand/µL    Basophils Absolute 0 08 0 00 - 0 10 Thousands/µL   Basic metabolic panel    Collection Time: 08/06/20  4:52 AM   Result Value Ref Range    Sodium 143 136 - 145 mmol/L    Potassium 4 2 3 5 - 5 3 mmol/L    Chloride 109 (H) 100 - 108 mmol/L    CO2 28 21 - 32 mmol/L    ANION GAP 6 4 - 13 mmol/L    BUN 17 5 - 25 mg/dL    Creatinine 0 79 0 60 - 1 30 mg/dL    Glucose 148 (H) 65 - 140 mg/dL    Calcium 8 2 (L) 8 3 - 10 1 mg/dL    eGFR 83 ml/min/1 73sq m   Magnesium    Collection Time: 08/06/20  4:52 AM   Result Value Ref Range    Magnesium 1 8 1 6 - 2 6 mg/dL   Phosphorus    Collection Time: 08/06/20  4:52 AM   Result Value Ref Range    Phosphorus 3 3 2 7 - 4 5 mg/dL   Lipid Panel with Direct LDL reflex    Collection Time: 08/06/20  4:52 AM   Result Value Ref Range    Cholesterol 189 50 - 200 mg/dL    Triglycerides 142 <=150 mg/dL    HDL, Direct 49 >=40 mg/dL    LDL Calculated 112 (H) 0 - 100 mg/dL   Hemoglobin A1c w/EAG Estimation    Collection Time: 08/06/20  4:52 AM   Result Value Ref Range    Hemoglobin A1C 7 1 (H) Normal 3 8-5 6%; PreDiabetic 5 7-6 4%;  Diabetic >=6 5%; Glycemic control for adults with diabetes <7 0% %     mg/dl   Fingerstick Glucose (POCT)    Collection Time: 08/06/20  9:57 PM   Result Value Ref Range    POC Glucose 181 (H) 65 - 140 mg/dl     Blood Culture: No results found for: BLOODCX,   Urinalysis:   Lab Results   Component Value Date    COLORU Straw 07/18/2018    CLARITYU Clear 07/18/2018    SPECGRAV 1 010 07/18/2018    PHUR 6 5 07/18/2018    LEUKOCYTESUR Negative 07/18/2018    NITRITE Negative 07/18/2018    GLUCOSEU Negative 07/18/2018    KETONESU Negative 07/18/2018    BILIRUBINUR Negative 07/18/2018    BLOODU Negative 07/18/2018   ,   Urine Culture:   Lab Results   Component Value Date    URINECX >100,000 cfu/ml Escherichia coli 05/26/2017   ,   Wound Culure: No results found for: WOUNDCULT      Imaging:  None           Current Facility-Administered Medications   Medication Dose Route Frequency    acetaminophen (TYLENOL) tablet 975 mg  975 mg Oral Q8H PRN    apixaban (ELIQUIS) tablet 5 mg  5 mg Oral BID    atorvastatin (LIPITOR) tablet 80 mg  80 mg Oral QPM    dronedarone (MULTAQ) tablet 400 mg  400 mg Oral BID With Meals    hydrALAZINE (APRESOLINE) injection 10 mg  10 mg Intravenous Q4H PRN    [START ON 8/7/2020] insulin lispro (HumaLOG) 100 units/mL subcutaneous injection 1-5 Units  1-5 Units Subcutaneous TID AC    melatonin tablet 3 mg  3 mg Oral HS    [START ON 8/7/2020] metFORMIN (GLUCOPHAGE) tablet 500 mg  500 mg Oral BID With Meals    methocarbamol (ROBAXIN) tablet 500 mg  500 mg Oral Q6H PRN    metoprolol tartrate (LOPRESSOR) tablet 50 mg  50 mg Oral Q12H [facility]    nicotine (NICODERM CQ) 14 mg/24hr TD 24 hr patch 1 patch  1 patch Transdermal Daily    ondansetron (ZOFRAN) injection 4 mg  4 mg Intravenous Q4H PRN       Ling William MD  Family Medicine Resident PGY3

## 2020-08-07 NOTE — ASSESSMENT & PLAN NOTE
Stable currently normal sinus rhythm  Will monitor on tele  Continue Eliquis,  metoprolol 50 mg b i d  And Multaq  Of note patient's home doses metoprolol 100 mg b i d  But that was held to allow for permissive hypertension  Patient admits noncompliance with Eliquis which likely cause of stroke it is not a failure of Eliquis so that is why the decision was made to restart Eliquis and not change anticoagulation

## 2020-08-07 NOTE — ASSESSMENT & PLAN NOTE
Stable  Allow permissive hypertension BP goal 140-160  Hydralazine 10 mg Q 4 p r n  For systolic blood pressure over 160  Home dose of metoprolol 100 mg b i d  Decreased to 50 mg b i d

## 2020-08-07 NOTE — PLAN OF CARE
Problem: Neurological Deficit  Goal: Neurological status is stable or improving  Description: Interventions:  - Monitor and assess patient's level of consciousness, motor function, sensory function, and level of assistance needed for ADLs  - Monitor and report changes from baseline  Collaborate with interdisciplinary team to initiate plan and implement interventions as ordered  - Provide and maintain a safe environment  - Consider seizure precautions  - Consider fall precautions  - Consider aspiration precautions  - Consider bleeding precautions  8/7/2020 1204 by Patric Cuba RN  Outcome: Adequate for Discharge  8/7/2020 0815 by Patric Cuba RN  Outcome: Progressing     Problem: Activity Intolerance/Impaired Mobility  Goal: Mobility/activity is maintained at optimum level for patient  Description: Interventions:  - Assess and monitor patient  barriers to mobility and need for assistive/adaptive devices  - Assess patient's emotional response to limitations  - Collaborate with interdisciplinary team and initiate plans and interventions as ordered  - Encourage independent activity per ability   - Maintain proper body alignment  - Perform active/passive rom as tolerated/ordered  - Plan activities to conserve energy   - Turn patient as appropriate  8/7/2020 1204 by Patric Cuba RN  Outcome: Adequate for Discharge  8/7/2020 0815 by Patric Cuba RN  Outcome: Progressing     Problem: Communication Impairment  Goal: Ability to express needs and understand communication  Description: Assess patient's communication skills and ability to understand information  Patient will demonstrate use of effective communication techniques, alternative methods of communication and understanding even if not able to speak  - Encourage communication and provide alternate methods of communication as needed  - Collaborate with case management/ for discharge needs    - Include patient/family/caregiver in decisions related to communication  8/7/2020 1204 by Lucía Moraes RN  Outcome: Adequate for Discharge  8/7/2020 0815 by Lucía Moraes RN  Outcome: Progressing     Problem: Potential for Aspiration  Goal: Non-ventilated patient's risk of aspiration is minimized  Description: Assess and monitor vital signs, respiratory status, and labs (WBC)  Monitor for signs of aspiration (tachypnea, cough, rales, wheezing, cyanosis, fever)  - Assess and monitor patient's ability to swallow  - Place patient up in chair to eat if possible  - HOB up at 90 degrees to eat if unable to get patient up into chair   - Supervise patient during oral intake  - Instruct patient/ family to take small bites  - Instruct patient/ family to take small single sips when taking liquids  - Follow patient-specific strategies generated by speech pathologist   8/7/2020 1204 by Lucía Moraes RN  Outcome: Adequate for Discharge  8/7/2020 0815 by Lucía Moraes RN  Outcome: Progressing  Goal: Ventilated patient's risk of aspiration is minimized  Description: Assess and monitor vital signs, respiratory status, airway cuff pressure, and labs (WBC)  Monitor for signs of aspiration (tachypnea, cough, rales, wheezing, cyanosis, fever)  - Elevate head of bed 30 degrees if patient has tube feeding   - Monitor tube feeding  8/7/2020 1204 by Lucía Moraes RN  Outcome: Adequate for Discharge  8/7/2020 0815 by Lucía Moraes RN  Outcome: Progressing     Problem: Nutrition  Goal: Nutrition/Hydration status is improving  Description: Monitor and assess patient's nutrition/hydration status for malnutrition (ex- brittle hair, bruises, dry skin, pale skin and conjunctiva, muscle wasting, smooth red tongue, and disorientation)  Collaborate with interdisciplinary team and initiate plan and interventions as ordered  Monitor patient's weight and dietary intake as ordered or per policy   Utilize nutrition screening tool and intervene per policy  Determine patient's food preferences and provide high-protein, high-caloric foods as appropriate  - Assist patient with eating   - Allow adequate time for meals   - Encourage patient to take dietary supplement as ordered  - Collaborate with clinical nutritionist   - Include patient/family/caregiver in decisions related to nutrition    8/7/2020 1204 by Rukhsana Cristina RN  Outcome: Adequate for Discharge  8/7/2020 0815 by Rukhsana Cristina RN  Outcome: Progressing     Problem: SAFETY,RESTRAINT: NV/NON-SELF DESTRUCTIVE BEHAVIOR  Goal: Remains free of harm/injury (restraint for non violent/non self-detsructive behavior)  Description: INTERVENTIONS:  - Instruct patient/family regarding restraint use   - Assess and monitor physiologic and psychological status   - Provide interventions and comfort measures to meet assessed patient needs   - Identify and implement measures to help patient regain control  - Assess readiness for release of restraint   8/7/2020 1204 by Rukhsana Cristina RN  Outcome: Adequate for Discharge  8/7/2020 0815 by Rukhsana Cristina RN  Outcome: Progressing  Goal: Returns to optimal restraint-free functioning  Description: INTERVENTIONS:  - Assess the patient's behavior and symptoms that indicate continued need for restraint  - Identify and implement measures to help patient regain control  - Assess readiness for release of restraint   8/7/2020 1204 by Rukhsana Cristina RN  Outcome: Adequate for Discharge  8/7/2020 0815 by Rukhsana Cristina RN  Outcome: Progressing     Problem: DECISION MAKING  Goal: Pt/Family able to effectively weigh alternatives and participate in decision making related to treatment and care  Description: INTERVENTIONS:  - Identify decision maker  - Determine when there are differences among patient's view, family's view, and healthcare provider's view of patient condition and care goals  - Facilitate patient/family articulation of goals for care  - Help patient/family identify pros/cons of alternative solutions  - Provide information as requested by patient/family  - Respect patient/family rights related to privacy and sharing information   - Serve as a liaison between patient, family and health care team  - Initiate consults as appropriate (Ethics Team, Palliative Care, [facility], etc )  8/7/2020 1204 by Janae Burgess RN  Outcome: Adequate for Discharge  8/7/2020 0815 by Janae Burgess RN  Outcome: Progressing     Problem: Nutrition/Hydration-ADULT  Goal: Nutrient/Hydration intake appropriate for improving, restoring or maintaining nutritional needs  Description: Monitor and assess patient's nutrition/hydration status for malnutrition  Collaborate with interdisciplinary team and initiate plan and interventions as ordered  Monitor patient's weight and dietary intake as ordered or per policy  Utilize nutrition screening tool and intervene as necessary  Determine patient's food preferences and provide high-protein, high-caloric foods as appropriate       INTERVENTIONS:  - Monitor oral intake, urinary output, labs, and treatment plans  - Assess nutrition and hydration status and recommend course of action  - Evaluate amount of meals eaten  - Assist patient with eating if necessary   - Allow adequate time for meals  - Recommend/ encourage appropriate diets, oral nutritional supplements, and vitamin/mineral supplements  - Order, calculate, and assess calorie counts as needed  - Assess need for intravenous fluids  - Provide specific nutrition/hydration education as appropriate  - Include patient/family/caregiver in decisions related to nutrition  8/7/2020 1204 by Janae Burgess RN  Outcome: Adequate for Discharge  8/7/2020 0815 by Janae Burgess RN  Outcome: Progressing     Problem: Prexisting or High Potential for Compromised Skin Integrity  Goal: Skin integrity is maintained or improved  Description: INTERVENTIONS:  - Identify patients at risk for skin breakdown  - Assess and monitor skin integrity  - Assess and monitor nutrition and hydration status  - Monitor labs   - Assess for incontinence   - Turn and reposition patient  - Assist with mobility/ambulation  - Relieve pressure over bony prominences  - Avoid friction and shearing  - Provide appropriate hygiene as needed including keeping skin clean and dry  - Evaluate need for skin moisturizer/barrier cream  - Collaborate with interdisciplinary team   - Patient/family teaching  - Consider wound care consult   8/7/2020 1204 by Fermin Calloway RN  Outcome: Adequate for Discharge  8/7/2020 0815 by Fermin Calloway RN  Outcome: Progressing     Problem: Potential for Falls  Goal: Patient will remain free of falls  Description: INTERVENTIONS:  - Assess patient frequently for physical needs  -  Identify cognitive and physical deficits and behaviors that affect risk of falls    -  Schulter fall precautions as indicated by assessment   - Educate patient/family on patient safety including physical limitations  - Instruct patient to call for assistance with activity based on assessment  - Modify environment to reduce risk of injury  - Consider OT/PT consult to assist with strengthening/mobility  8/7/2020 1204 by Fermin Calloway RN  Outcome: Adequate for Discharge  8/7/2020 0815 by Fermin Calloway RN  Outcome: Progressing

## 2020-08-07 NOTE — SOCIAL WORK
Met with pt to discuss the role of CM and to discuss any help pt may need prior to dc  Pt lives alone in a 1st floor apt with 7 [address]  Pt performed ADL's indptly pta, no use of DME  No hx of HHC or rehab  No hx of mental health or D&A treatment  Pt's preferred pharmacy is [facility] on [address]     Contact: Xin Mckay (niece) but unsure of contact  Pt's friend Jayme Hernandez to transport home at [facility]  No POA or living will  CM reviewed d/c planning process including the following: identifying help at home, patient preference for d/c planning needs, Discharge Lounge, [facility] Meds to Bed program, availability of treatment team to discuss questions or concerns patient and/or family may have regarding understanding medications and recognizing signs and symptoms once discharged  CM also encouraged patient to follow up with all recommended appointments after discharge  Patient advised of importance for patient and family to participate in managing patients medical well being  Patient/caregiver received discharge checklist  Content reviewed  Patient/caregiver encouraged to participate in discharge plan of care prior to discharge home

## 2020-08-09 VITALS
WEIGHT: 262.35 LBS | OXYGEN SATURATION: 97 % | SYSTOLIC BLOOD PRESSURE: 126 MMHG | RESPIRATION RATE: 16 BRPM | DIASTOLIC BLOOD PRESSURE: 92 MMHG | TEMPERATURE: 99.2 F | HEART RATE: 97 BPM | HEIGHT: 62 IN | BODY MASS INDEX: 48.28 KG/M2

## 2020-08-10 ENCOUNTER — TRANSITIONAL CARE MANAGEMENT (OUTPATIENT)
Dept: FAMILY MEDICINE CLINIC | Facility: CLINIC | Age: 60
End: 2020-08-10

## 2020-08-11 ENCOUNTER — TELEPHONE (OUTPATIENT)
Dept: NEUROLOGY | Facility: CLINIC | Age: 60
End: 2020-08-11

## 2020-08-11 NOTE — TELEPHONE ENCOUNTER
----- Message from [employer] sent at 8/7/2020 10:46 AM EDT -----  Regarding: HFU  Diagnosis/reason for follow-up: Stroke  Subspecialty for follow-up: Stroke  Recommending timing for HFU: 4 weeks  Existing neurologist: None  Tests/labs/imaging ordered: None  Orders placed electronically: None  Additional notes: None    Thank you!

## 2020-08-12 ENCOUNTER — TELEPHONE (OUTPATIENT)
Dept: NEUROLOGY | Facility: CLINIC | Age: 60
End: 2020-08-12

## 2020-08-12 NOTE — TELEPHONE ENCOUNTER
Post CVA Discharge Follow Up    Hospitalization: 8/5-8/7  The purpose of this phone call is to assess patient's general wellbeing or for any assistance needed with follow-up care  Called patient at [phone] with no answer  Left message on voicemail box for call back

## 2020-08-14 NOTE — TELEPHONE ENCOUNTER
Call transferred to me by MIA Acuña stating that patient had called in reporting she was contacting the wrong number  She connected the call and patient, I introduced myself and explained neurovascular nurse navigator role I reviewed my phone number with her  Since discharge, she denies experiencing any new or worsening stroke-like symptoms  Patient reports she continues to have the following symptoms: left eye vision disturbance with some slight blurriness  She reports she plays a lot of games on her computer and thinks this worsens her blurriness but reports it has been improving greatly since hospitalization  She also reports her balance is back to baseline  Ambulation / ADLs:  Patient claims she is ambulating independently as well as preforming her own ADLs  Patient manages her own medications, appointments, and affairs  She reports she does not drive and usually uses public transportation (such as a bus) but currently her sister-in-law transports her to appointments  Appointments / Medication Review:  Patient does not have an appointment with her PCP at this time but I did advise she contact them to schedule and she confirmed she will  Offered to schedule stroke hospital follow up appointment 9/8 with Nya Sapp, patient is agreeable to this  I scheduled appointment, verified mailing address, then mailed new patient packet to home address on file  I reviewed medications with her  There have not been any medication changes since discharge from the hospital  Patient reports having no difficulties obtaining medications  Reports she is taking all as prescribed with no missed doses, medication side effects, or signs of bleeding  Risk Factors / Education:  We reviewed stroke type, symptoms, personal risk factors and management, medications, and resources  Patient verbalizes understanding of teaching  Patient reports 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   Offered to send stroke education binder to her but she declines and states she has the "What You Need To Know About Stroke" booklet from recent hospital encounter  Patient reports she does not currently monitor her BP as she does not have a BP cuff at home but states her niece is mailing one to her  Patient also reports difficulty managing her other risk factors and continues to be an everyday smoker and reports she is smoking 1/2 pack a day (decreased from 1 pack a day)  Smoking cessation reviewed, patient determined to quit  We also reviewed low salt, low cholesterol, diabetic friendly diets and shared resources with her from www heart  org for diet info and recipes approved by the American Heart Association for stroke  Patient also notes she has been feeling more "down and sad lately", she denies feelings of wanting to hurt herself or others  I did advise she follow up with her PCP regarding this and provided her additional resources for stroke support groups through [email or web address] as well as the American [employer] Association  I addressed all additional questions  At the conclusion of the conversation, patient denies having any further questions or concerns

## 2020-08-14 NOTE — TELEPHONE ENCOUNTER
Called patient with no answer  Left message on voicemail box for call back  Second failed attempt to contact patient for follow up call  Letter mailed to home address on file to let patient know office is trying to contact them  Closing encounter  Will reopen when/if call is returned

## 2020-08-28 ENCOUNTER — TELEPHONE (OUTPATIENT)
Dept: NEUROLOGY | Facility: CLINIC | Age: 60
End: 2020-08-28

## 2020-08-28 NOTE — TELEPHONE ENCOUNTER
21-30 Day Post CVA Discharge Follow Up    Hospitalization: 8/5-8/7  The purpose of this phone call is to assess patient's general wellbeing or for any assistance needed with follow-up care  Called patient, since discharge, she denies experiencing any new or worsening stroke-like  Patient reports she continues to have slight blurriness of the left peripheral vision field, but she is otherwise at her baseline and notes She only really notices these symptoms when on the computer  Ambulation / ADLs:  Patient claims she continues to be independent of personal and medical care  Appointments / Medication Review:  Patient scheduled for follow up with PCP 9/16  Stroke hospital follow up appointment still scheduled 9/8  I reviewed medications with her  There have not been any medication changes since discharge from the hospital  Patient reports having no difficulties obtaining medications  Patient reports she thought she was "feeling off" when taking metformin as prescribed and notes she was taking only 1/2 a pill at night (instead of 1 tablet twice a day)  She states she felt as if her blood sugar was dropping and she does not have the equipment to check this at this time but tried (for 2 evenings) to take the lower dose at night  Then patient reports she did not notice a difference and plans to take as directed this evening  I advised she call her PCP after this conversation to address situation and possible adjustments as needed  She notes she will call them today  Risk Factors / Education:  We reviewed stroke type, symptoms, personal risk factors and management, medications, and resources  Patient verbalizes understanding of teaching though could use reinforcement  Patient reports she was unable to obtain a BP cuff from her family members and is going to ask her PCP to help with getting a script for this   She also reports she continues to be an everyday smoker (smoking 1/2 pack a day)  Smoking cessation reviewed, patient states she will try to quit but notes she continues to struggle  I did also review resources with her regarding dietary restrictions discussed in our last follow up and she reports she is trying to increase her intake of fresh produce and repots abstaining from adding salt to her diet  Again reviewed low cholesterol and diabetic friendly diets  I addressed all her questions  At the conclusion of the conversation, patient denies having any further questions or concerns

## 2020-09-03 DIAGNOSIS — I48.91 NEW ONSET ATRIAL FIBRILLATION (HCC): ICD-10-CM

## 2020-09-04 ENCOUNTER — TELEPHONE (OUTPATIENT)
Dept: NEUROLOGY | Facility: CLINIC | Age: 60
End: 2020-09-04

## 2020-09-04 DIAGNOSIS — I48.91 NEW ONSET ATRIAL FIBRILLATION (HCC): ICD-10-CM

## 2020-09-04 NOTE — TELEPHONE ENCOUNTER
Confirmed 9/8 9:45AM Haley Orozco at [facility]  Registration completed  Covid-screening questions completed  Patient verbalized understanding for consents and verbal consents obtained for the following documents (Financial Policy, NOPP, Insurance authorization, and outpatient general consent)  Aware of all policies - mask, visitor, temperature and no show fee

## 2020-09-08 ENCOUNTER — OFFICE VISIT (OUTPATIENT)
Dept: NEUROLOGY | Facility: CLINIC | Age: 60
End: 2020-09-08
Payer: COMMERCIAL

## 2020-09-08 VITALS
DIASTOLIC BLOOD PRESSURE: 85 MMHG | BODY MASS INDEX: 47.55 KG/M2 | HEIGHT: 62 IN | TEMPERATURE: 96.8 F | WEIGHT: 258.4 LBS | SYSTOLIC BLOOD PRESSURE: 141 MMHG | HEART RATE: 87 BPM

## 2020-09-08 DIAGNOSIS — Z86.73 HISTORY OF CVA (CEREBROVASCULAR ACCIDENT): Primary | ICD-10-CM

## 2020-09-08 DIAGNOSIS — I48.0 PAF (PAROXYSMAL ATRIAL FIBRILLATION) (HCC): ICD-10-CM

## 2020-09-08 PROCEDURE — 3008F BODY MASS INDEX DOCD: CPT | Performed by: FAMILY MEDICINE

## 2020-09-08 PROCEDURE — 99214 OFFICE O/P EST MOD 30 MIN: CPT | Performed by: PHYSICIAN ASSISTANT

## 2020-09-08 RX ORDER — PHENOL 1.4 %
30 AEROSOL, SPRAY (ML) MUCOUS MEMBRANE
COMMUNITY

## 2020-09-08 NOTE — PROGRESS NOTES
Patient ID: Kenyatta Carrasco is a 64 y o  female  Assessment:  Patient is a 53-year-old female with multiple vascular risk factors as well as atrial fibrillation, who presents for hospital follow-up  Patient presented with left eye visual disturbance, and developed aphasia, left gaze preference, left facial droop and left upper extremity plegia  She was not given tPA, but underwent successful mechanical thrombectomy of a basilar tip-left P1 occlusion  She had been on Eliquis for AFib, but admitted to significant noncompliance with that medication as well as others  MRI brain demonstrated bilateral cerebellar infarcts, as well as a questionable left thalamic infarct  She was restarted on her Eliquis  Fortunately she has no residual deficits from her stroke and denies any new neurologic symptoms to indicate recurrent TIA/CVA  She reports compliance with all of her medications  She has an upcoming appointment with PCP and her cardiologist     We spent quite a bit of time today discussing medication compliance, as well as controlling her cardiovascular risk factors for stroke including blood pressure, lipids, blood sugar and smoking cessation  She is still smoking but plans on discussing ways to quit with her PCP next week  Plan:  -for ongoing stroke prevention, she should continue her combination of Eliquis 5 mg b i d  and Lipitor 80 mg daily, along with appropriate blood pressure and glycemic control  -will defer management of blood pressure, lipids and blood sugar to her PCP  -continue to follow with Cardiology for management of atrial fibrillation  -complete smoking cessation was discussed in detail with the patient today  -dietary changes and exercise were also discussed with patient in detail today    Weight loss suggested  -patient to follow-up in 4-6 months or sooner if needed    Signs and symptoms of stroke were discussed with the patient in detail today     Diagnoses and all orders for this visit: History of CVA (cerebrovascular accident)    PAF (paroxysmal atrial fibrillation) ([address] )    Other orders           Subjective:    HPI    Valorie López is a 64 y o  female with PMH of atrial fibrillation, intermittently compliant with anticoagulation on Eliquis, morbid obesity, DM Type 2, HTN, HLD, tobacco abuse, who presents today for a hospital follow up  Patient presented to the [place] [address] ED on 8/5/2020 after an episode of vision loss in the left eye that started approximately 1 hour prior to presentation  Upon evaluation in the emergency department, the patient was noted to have dysconjugate gaze, with left eye deviated up and out  The patient was also noted to have right central facial droop  A stroke alert was initiated  Neurology met the patient on the way to CT scan  She was able to describe her vision changes as swirling, like tie dye " CT head without acute intracranial abnormality  CTA head and neck did demonstrate a subtle right PCA occlusion  After imaging, the patient significantly worsened  In addition to patient's dysconjugate gaze, the patient was demonstratinga left gaze preference, right central facial droop, flaccid right upper extremity, expressive and receptive aphasia, and anisocoria  Unfortunately, the patient was ineligible for tPA, as she had previously stated before becoming aphasic that she took her Eliquis the day prior  The patient was noted to be in AFib with RVR  Attending neurologist spoke with attending neurovascular interventionalist, and the patient was transferred to [facility]  She went for endovascular procedure, was found to have a basilar tip - left P1 occlusion and underwent successful mechanical thrombectomy  MRI brain demonstrated bilateral cerebellar infarcts (embolic appearing) and possible L thalamic infarct  ECHO with EF 58%, severe concentric hypertrophy, and mild to mod left atrial dilation  A1C 7 1  Lipid panel ,   Etiology of stroke felt to be cardioembolic related to her AFib and her poor compliance with anticoagulation  She was started back on her Eliquis  Today, patient reports she is overall doing well  Fortunately, she has no residual deficits from her stroke  She reports she has been compliant with her medications since discharge from the hospital, specifically her Eliquis  She is scheduled to see her PCP and cardiologist coming up shortly  She notes that she is still smoking, but would like to quit  She plans on discussing this with her PCP next week  She denies any new neurologic symptoms since discharge  The following portions of the patient's history were reviewed and updated as appropriate: current medications, past family history, past medical history, past social history, past surgical history and problem list          Objective:    Blood pressure 141/85, pulse 87, temperature (!) 96 8 °F (36 °C), temperature source Temporal, height 5' 2" (1 575 m), weight 117 kg (258 lb 6 4 oz)  Physical Exam  Constitutional:       Appearance: She is well-developed  Comments: Obese female in no distress   HENT:      Head: Normocephalic and atraumatic  Eyes:      Extraocular Movements: EOM normal       Pupils: Pupils are equal, round, and reactive to light  Skin:     General: Skin is warm and dry  Neurological:      Coordination: Coordination is intact  Deep Tendon Reflexes: Strength normal and reflexes are normal and symmetric  Psychiatric:         Mood and Affect: Mood normal          Speech: Speech normal          Behavior: Behavior normal          Neurological Exam  Mental Status   Oriented to person, place, time and situation  Speech is normal  Language is fluent with no aphasia  Attention and concentration are normal     Cranial Nerves  CN II: Visual fields full to confrontation  CN III, IV, VI: Extraocular movements intact bilaterally   Pupils equal round and reactive to light bilaterally  CN V: Facial sensation is normal   CN VII: Full and symmetric facial movement  CN VIII: Hearing is normal   CN IX, X: Palate elevates symmetrically  CN XI: Shoulder shrug strength is normal   CN XII: Tongue midline without atrophy or fasciculations  Motor   Normal muscle tone  Strength is 5/5 throughout all four extremities  Sensory  Light touch is normal in upper and lower extremities  Reflexes  Deep tendon reflexes are 2+ and symmetric in all four extremities with downgoing toes bilaterally  Coordination  Finger-to-nose, rapid alternating movements and heel-to-shin normal bilaterally without dysmetria  Gait  Casual gait is normal including stance, stride, and arm swing  ROS:    Review of Systems   Constitutional: Negative  HENT: Positive for sinus pressure  Eyes: Negative  Respiratory: Negative  Cardiovascular: Negative  Gastrointestinal: Negative  Endocrine: Negative  Genitourinary: Negative  Musculoskeletal: Negative  Skin: Negative  Allergic/Immunologic: Negative  Neurological: Positive for headaches  Hematological: Negative  Psychiatric/Behavioral: Negative        I personally reviewed and updated the ROS as appropriate

## 2020-09-08 NOTE — PATIENT INSTRUCTIONS
Continue Eliquis 5mg twice a day and Lipitor 80mg daily for secondary stroke prevention  Medication compliance is very important  Dietary changes and routine exercise suggested  Also very important to control cardiovascular risk factors for ongoing stroke prevention, including blood pressure, cholesterol and blood sugar  Will defer management of these risk factors to your PCP  Tobacco use is also a large risk factor for stroke  Suggest complete smoking cessation  Can discuss further with PCP regarding ways to quit  Continue to follow with cardiology regarding Afib management  Follow up in 4-6 months or sooner if needed  If you experience any facial droop, weakness on one side of the body, speech or swallowing difficulty, painless loss of vision in one eye, double vision, vertigo that does not resolve quickly/imbalance, go to the ER/call 911

## 2020-09-09 RX ORDER — METOPROLOL TARTRATE 100 MG/1
TABLET ORAL
Qty: 60 TABLET | Refills: 0 | Status: SHIPPED | OUTPATIENT
Start: 2020-09-09 | End: 2020-09-18 | Stop reason: SDUPTHER

## 2020-09-18 ENCOUNTER — OFFICE VISIT (OUTPATIENT)
Dept: CARDIOLOGY CLINIC | Facility: CLINIC | Age: 60
End: 2020-09-18
Payer: COMMERCIAL

## 2020-09-18 VITALS
SYSTOLIC BLOOD PRESSURE: 110 MMHG | HEART RATE: 82 BPM | DIASTOLIC BLOOD PRESSURE: 80 MMHG | TEMPERATURE: 97.3 F | WEIGHT: 254.2 LBS | BODY MASS INDEX: 46.49 KG/M2

## 2020-09-18 DIAGNOSIS — E78.2 MIXED HYPERLIPIDEMIA: ICD-10-CM

## 2020-09-18 DIAGNOSIS — I48.91 ATRIAL FIBRILLATION, UNSPECIFIED TYPE (HCC): Primary | ICD-10-CM

## 2020-09-18 DIAGNOSIS — I48.91 NEW ONSET ATRIAL FIBRILLATION (HCC): ICD-10-CM

## 2020-09-18 DIAGNOSIS — I10 BENIGN ESSENTIAL HYPERTENSION: ICD-10-CM

## 2020-09-18 PROCEDURE — 4010F ACE/ARB THERAPY RXD/TAKEN: CPT | Performed by: INTERNAL MEDICINE

## 2020-09-18 PROCEDURE — 4004F PT TOBACCO SCREEN RCVD TLK: CPT | Performed by: INTERNAL MEDICINE

## 2020-09-18 PROCEDURE — 99215 OFFICE O/P EST HI 40 MIN: CPT | Performed by: INTERNAL MEDICINE

## 2020-09-18 PROCEDURE — 4010F ACE/ARB THERAPY RXD/TAKEN: CPT | Performed by: FAMILY MEDICINE

## 2020-09-18 PROCEDURE — 93000 ELECTROCARDIOGRAM COMPLETE: CPT | Performed by: INTERNAL MEDICINE

## 2020-09-18 RX ORDER — LISINOPRIL 20 MG/1
40 TABLET ORAL DAILY
Qty: 60 TABLET | Refills: 11 | Status: SHIPPED | OUTPATIENT
Start: 2020-09-18 | End: 2021-10-17 | Stop reason: SDUPTHER

## 2020-09-18 RX ORDER — ATORVASTATIN CALCIUM 80 MG/1
80 TABLET, FILM COATED ORAL EVERY EVENING
Qty: 30 TABLET | Refills: 11 | Status: SHIPPED | OUTPATIENT
Start: 2020-09-18 | End: 2021-10-17 | Stop reason: SDUPTHER

## 2020-09-18 RX ORDER — METOPROLOL TARTRATE 100 MG/1
100 TABLET ORAL EVERY 12 HOURS
Qty: 60 TABLET | Refills: 11 | Status: SHIPPED | OUTPATIENT
Start: 2020-09-18 | End: 2021-10-13

## 2020-09-18 NOTE — PROGRESS NOTES
Cardiology Follow Up    Cali Pereyra  1960  [id number]  [address] [facility] [id number] [phone]    1  Atrial fibrillation, unspecified type ([address] )  POCT ECG   2  New onset atrial fibrillation (HCC)  metoprolol tartrate (LOPRESSOR) 100 mg tablet   3  Benign essential hypertension  lisinopril (ZESTRIL) 20 mg tablet   4  Mixed hyperlipidemia  atorvastatin (LIPITOR) 80 mg tablet       Patient was 1st seen in the hospital where she presented with new onset atrial fibrillation with a rapid ventricular response  She had not been anticoagulated  She was placed on Eliquis and rate control was marginally achieved with metoprolol 100 mg b i d  One month later she was cardioverted on Eliquis and placed on Multaq  In 12/20/2018 Discussed with patient that there is an increasing amount of evidence that recurrence of atrial fibrillation and ability to control atrial fibrillation is weight related  If patient's lose weight, they usually stay in sinus rhythm  If they gain weight, atrial fibrillation has a higher likelihood of recurring and a higher likelihood of having difficulty with control  Interval History:  Patient has not been seen since 12/20/2018  She was hospitalized on 08/05/2020 with an acute cerebral vascular accident  CT/CTA demonstrated right posterior cerebral artery occlusion  She was not administered tPA since she was non compliant and had stopped Eliquis on her own  Patient no longer remembered when she had taken Eliquis in the past   She subsequently underwent a mechanical thrombectomy using an endovascular procedure  An MRI of the brain was consistent with a subacute nonhemorrhagic infarct of the left thalamus  There were several bilateral punctate foci in the cerebellum suspicious for small embolic infarcts  Patient is presently back on Eliquis    She has no residual impairments at this time     An echocardiogram demonstrated moderate dilatation of both the right and left atrium  It is unlikely that normal sinus rhythm can be established  Patient is free of chest discomfort or shortness of breath  She has no dizziness or lightheadedness  She has no symptoms of near-syncope or syncope  Discussion/Summary:  1  Cardiac medications renewed for patient  2   Return in 6 months    Patient Active Problem List   Diagnosis    Benign essential hypertension    HLD (hyperlipidemia)    Pancreatitis    Type II diabetes mellitus (HCC)    PAF (paroxysmal atrial fibrillation) (HCC)    Demand ischemia of myocardium (HCC)    QT prolongation    History of cardioversion    Class 3 severe obesity without serious comorbidity in adult ([facility] )    Snoring    Nocturnal leg movements    History of CVA (cerebrovascular accident)     Past Medical History:   Diagnosis Date    Anxiety     Arthritis     Cirrhosis of liver (HCC)     Diabetes mellitus ([place] [address] )     HLD (hyperlipidemia)     Hypertension     Irregular heart beat     Psychiatric disorder     Depression    Stroke ([place] [address] )     Tobacco dependence      Social History     Socioeconomic History    Marital status: Single     Spouse name: Not on file    Number of children: Not on file    Years of education: Not on file    Highest education level: Not on file   Occupational History    Not on file   Social Needs    Financial resource strain: Not on file    Food insecurity     Worry: Not on file     Inability: Not on file    Transportation needs     Medical: Not on file     Non-medical: Not on file   Tobacco Use    Smoking status: Current Every Day Smoker     Packs/day: 0 50     Types: Cigarettes    Smokeless tobacco: Never Used   Substance and Sexual Activity    Alcohol use: Yes     Binge frequency: Monthly     Comment: 1 botttle of vodka over 4 days/denies since 7/18    Drug use: No    Sexual activity: Not Currently   Lifestyle    Physical activity     Days per week: Not on file     Minutes per session: Not on file    Stress: Not on file   Relationships    Social connections     Talks on phone: Not on file     Gets together: Not on file     Attends Scientologist service: Not on file     Active member of club or organization: Not on file     Attends meetings of clubs or organizations: Not on file     Relationship status: Not on file    Intimate partner violence     Fear of current or ex partner: Not on file     Emotionally abused: Not on file     Physically abused: Not on file     Forced sexual activity: Not on file   Other Topics Concern    Not on file   Social History Narrative    Not on file      Family History   Problem Relation Age of Onset    Diabetes Mother     Diabetes Father     Diabetes Brother     Hypertension Brother      Past Surgical History:   Procedure Laterality Date    APPENDECTOMY      BUNIONECTOMY Right     CARPAL TUNNEL RELEASE      CHOLECYSTECTOMY      IR STROKE ALERT  8/5/2020       Current Outpatient Medications:     apixaban (ELIQUIS) 5 mg, Take 1 tablet (5 mg total) by mouth 2 (two) times a day, Disp: 60 tablet, Rfl: 5    atorvastatin (LIPITOR) 80 mg tablet, Take 1 tablet (80 mg total) by mouth every evening, Disp: 30 tablet, Rfl: 11    lisinopril (ZESTRIL) 20 mg tablet, Take 2 tablets (40 mg total) by mouth daily, Disp: 60 tablet, Rfl: 11    Melatonin 10 MG TABS, Take 30 mg by mouth daily at bedtime as needed, Disp: , Rfl:     metFORMIN (GLUCOPHAGE) 500 mg tablet, Take 1 tablet (500 mg total) by mouth 2 (two) times a day with meals, Disp: 60 tablet, Rfl: 1    metoprolol tartrate (LOPRESSOR) 100 mg tablet, Take 1 tablet (100 mg total) by mouth every 12 (twelve) hours, Disp: 60 tablet, Rfl: 11    buPROPion (WELLBUTRIN) 75 mg tablet, Take 1 tablet (75 mg total) by mouth 2 (two) times a day (Patient not taking: Reported on 8/5/2020), Disp: 180 tablet, Rfl: 0    rOPINIRole (REQUIP) 1 mg tablet, Take 0 5 tablets (0 5 mg total) by mouth daily at bedtime (Patient not taking: Reported on 9/8/2020), Disp: 90 tablet, Rfl: 0  Allergies   Allergen Reactions    Augmentin [Amoxicillin-Pot Clavulanate]      Yeast infection       Results for orders placed or performed in visit on 09/18/20   POCT ECG    Narrative    Atrial fibrillation with a ventricular rate of 83 beats per minute  Cannot rule out a prior anterior septal myocardial infarction  Nonspecific ST T wave changes  Abnormal EKG  Review of Systems:  Review of Systems   Respiratory: Negative for cough, choking, chest tightness, shortness of breath and wheezing  Cardiovascular: Negative for chest pain, palpitations and leg swelling  Musculoskeletal: Negative for gait problem  Skin: Negative for rash  Neurological: Negative for dizziness, tremors, syncope, weakness, light-headedness, numbness and headaches  Psychiatric/Behavioral: Negative for agitation and behavioral problems  The patient is not hyperactive  Physical Exam:  /80 (BP Location: Right arm, Patient Position: Sitting, Cuff Size: Large)   Pulse 82   Temp (!) 97 3 °F (36 3 °C)   Wt 115 kg (254 lb 3 2 oz)   BMI 46 49 kg/m²   Physical Exam  Constitutional:       General: She is not in acute distress  Appearance: She is well-developed  HENT:      Head: Normocephalic and atraumatic  Neck:      Thyroid: No thyromegaly  Vascular: No JVD  Cardiovascular:      Rate and Rhythm: Normal rate and regular rhythm  Heart sounds: Normal heart sounds  No murmur  No friction rub  No gallop  Pulmonary:      Effort: No respiratory distress  Breath sounds: No wheezing or rales  Skin:     General: Skin is warm and dry  Neurological:      Mental Status: She is alert and oriented to person, place, and time     Psychiatric:         Behavior: Behavior normal        --------------------------------------------------------------------------------  ECHO:   Results for orders placed during the hospital encounter of 20   Echo complete with contrast if indicated    Narrative [address]  [facility], [address] [facility]  [phone]    Transthoracic Echocardiogram  2D, M-mode, Doppler, and Color Doppler    Study date:  06-Aug-2020    Patient: Lukas Herndon  MR number: [id number]  Account number: [de-identified]  : 1960  Age: 64 years  Gender: Female  Status: Inpatient  Location: Bedside  Height: 63 in  Weight: 261 4 lb  BP: 148/ 96 mmHg    Indications: CVA    Diagnoses: I63 50 - Cerebral infarction due to unspecified occlusion or stenosis of unspecified cerebral artery    Sonographer:  [address]  Primary Physician:  Gilberto Spann MD  Referring Physician:  Castro Alcocer MD  Group:  [facility]  Interpreting Physician:  Thea Klein MD    SUMMARY    PROCEDURE INFORMATION:  This was a technically difficult study  LEFT VENTRICLE:  Systolic function was normal  Ejection fraction was estimated to be 58 %  There were no regional wall motion abnormalities  Wall thickness was markedly increased  There was severe concentric hypertrophy  RIGHT VENTRICLE:  The size was at the upper limits of normal     LEFT ATRIUM:  The atrium was mildly to moderately dilated  RIGHT ATRIUM:  The atrium was mildly to moderately dilated  MITRAL VALVE:  There was mild annular calcification  There was mild regurgitation  TRICUSPID VALVE:  There was trace regurgitation  Estimated peak PA pressure was 40 mmHg  The findings suggest mild pulmonary hypertension  HISTORY: PRIOR HISTORY: AF, HTN, DM II    PROCEDURE: The procedure was performed at the bedside  This was a routine study  The transthoracic approach was used  The study included complete 2D imaging, M-mode, complete spectral Doppler, and color Doppler  Echocardiographic views  were limited due to high windows and lung interference   This was a technically difficult study  LEFT VENTRICLE: Size was normal  Systolic function was normal  Ejection fraction was estimated to be 58 %  There were no regional wall motion abnormalities  Wall thickness was markedly increased  There was severe concentric hypertrophy  DOPPLER: Transmitral flow pattern: atrial fibrillation  The study was not technically sufficient to allow evaluation of LV diastolic function  RIGHT VENTRICLE: The size was at the upper limits of normal  Systolic function was low normal     LEFT ATRIUM: The atrium was mildly to moderately dilated  RIGHT ATRIUM: The atrium was mildly to moderately dilated  MITRAL VALVE: There was mild annular calcification  Valve structure was normal  There was normal leaflet separation  DOPPLER: The transmitral velocity was within the normal range  There was no evidence for stenosis  There was mild  regurgitation  AORTIC VALVE: The valve was trileaflet  Leaflets exhibited normal cuspal separation and sclerosis  DOPPLER: Transaortic velocity was within the normal range  There was no evidence for stenosis  There was no regurgitation  TRICUSPID VALVE: The valve structure was normal  There was normal leaflet separation  DOPPLER: The transtricuspid velocity was within the normal range  There was no evidence for stenosis  There was trace regurgitation  Estimated peak PA  pressure was 40 mmHg  The findings suggest mild pulmonary hypertension  PULMONIC VALVE: Leaflets exhibited normal thickness, no calcification, and normal cuspal separation  DOPPLER: The transpulmonic velocity was within the normal range  There was no regurgitation  PERICARDIUM: There was no pericardial effusion  The pericardium was normal in appearance  AORTA: The root exhibited normal size      SYSTEM MEASUREMENT TABLES    2D mode  Aortic Root Diameter; User chosen value; 2D mode;: 2 6 cm  LA/Ao (2D): 1 58  Left Atrium Jonn-posterior Systolic Dimension; User chosen value; 2D mode;: 4 1 cm  EDV (2D-Cubed): 76 8 cm3  EF (2D-Cubed): 71 7 %  ESV (2D-Cubed): 21 7 cm3  FS (2D-Cubed): 34 4 %  FS (2D-Teich): 34 4 %  IVS/LVPW (2D): 1 06  Interventricular Septum Diastolic Thickness; User chosen value; 2D mode;: 1 96 cm  Left Ventricle Internal End Diastolic Dimension; User chosen value; 2D mode;: 4 25 cm  Left Ventricle Internal Systolic Dimension; User chosen value; 2D mode;: 2 79 cm  Left Ventricle Posterior Wall Diastolic Thickness; User chosen value; 2D mode;: 1 85 cm  Left Ventricular Ejection Fraction; Teichholz; 2D mode;: 63 7 %  Left Ventricular End Diastolic Volume; Teichholz; 2D mode;: 80 8 cm3  Left Ventricular End Systolic Volume; Teichholz; 2D mode;: 29 3 cm3  SI (2D-Cubed): 25 4 ml/m2  SV (2D-Cubed): 55 1 cm3  Stroke Index; Teichholz; 2D mode;: 23 7 ml/m2  Stroke Volume; Teichholz; 2D mode;: 51 5 cm3    Apical four chamber  Left Atrium MOD Diam; Most recent value chosen; End Systole; Apical four chamber;: 1 57 cm  Left Atrium Systolic Area; Most recent value chosen; Method of Disks, Single Plane; 2D mode; Apical four chamber;: 3040 mm2  Left Atrium Systolic Volume Index; Method of Disks, Single Plane; 2D mode; Apical four chamber;: 44 7 ml/m2  Left Atrium Systolic Volume; Most recent value chosen; Method of Disks, Single Plane; 2D mode; Apical four chamber;: 97 cm3  Left Atrium systolic major axis; Most recent value chosen; Method of Disks, Single Plane; 2D mode; Apical four chamber;: 7 77 cm  LV MOD Diam; End Diastole; (A4C): 1 17 cm  LV MOD Diam; Recent value; End Diastole (A4C): 1 2 cm  LV MOD Diam; Recent value; End Systole (A4C): 0 82 cm  LVEF MOD A4C: 58 %  Left Ventricle diastolic major axis; Most recent value chosen; Method of Disks, Single Plane; 2D mode; Apical four chamber;: 7 33 cm  Left Ventricle systolic major axis; Most recent value chosen; Method of Disks, Single Plane; 2D mode; Apical four chamber;: 6 22 cm  Left Ventricular Diastolic Area;  Most recent value chosen; Method of Disks, Single Plane; 2D mode; Apical four chamber;: 2490 mm2  Left Ventricular End Diastolic Volume; Most recent value chosen; Method of Disks, Single Plane; 2D mode; Apical four chamber;: 71 cm3  Left Ventricular End Systolic Volume; Most recent value chosen; Method of Disks, Single Plane; 2D mode; Apical four chamber;: 30 cm3  Left Ventricular Systolic Area; Most recent value chosen; Method of Disks, Single Plane; 2D mode; Apical four chamber;: 1500 mm2  SI (A4C): 18 9 ml/m2  SV MOD A4C: 41 cm3    Tissue Doppler Imaging  LV Peak Early Mancini Tissue Gordo; Lateral MA (TDI): 118 mm/s  LV Peak Early Mancini Tissue Gordo; Mean; Lateral MA (TDI): 92 mm/s  LV Peak Early Mancini Tissue Gordo; Medial MA (TDI): 84 4 mm/s  Left Ventricular Peak Early Diastolic Tissue Velocity; Mean; Mean value chosen; Medial Mitral Annulus; Tissue Doppler Imaging;: 75 1 mm/s    Unspecified Scan Mode  Peak Grad; Mean; Antegrade Flow: 4 mm[Hg]  Vmax; Antegrade Flow: 977 mm/s  Vmax; Mean; Antegrade Flow: 997 mm/s  LVOT Peak Grad; Mean: 3 mm[Hg]  LVOT Vmax: 784 mm/s  LVOT Vmax; Mean: 918 mm/s  MV Peak Gordo/LV Peak Tissue Gordo E-Wave; Lateral MA: 15 3  MV Peak Gordo/LV Peak Tissue Gordo E-Wave; Medial MA: 18 8  DT; Antegrade Flow: 222 ms  DT; Mean; Antegrade Flow: 191 ms  MV E Gordo: 1520 mm/s  MV Peak E Gordo; Mean;  Antegrade Flow: 1410 mm/s  Peak Grad; Mean; Regurgitant Flow: 26 mm[Hg]  Vmax; Mean; Regurgitant Flow: 2560 mm/s  Vmax; Regurgitant Flow: 2290 mm/s  Atrial Darby Area;: 2690 mm2  Atrial Darby Area; Mean; Mean value chosen;: 2690 mm2  Atrial Darby Distance;: 6 6 cm  Atrial Darby Distance; Mean; Mean value chosen;: 6 6 cm  Atrial Darby Volume;: 91 3 cm3  Atrial Darby Volume; Mean; Mean value chosen;: 91 3 cm3  Distance;: 4 4 cm  Distance; Mean; Mean value chosen;: 4 4 cm  Distance; User chosen value;: 1 5 cm    [facility]    Prepared and electronically signed by    Isabela Bradshaw MD  Signed 06-Aug-2020 14:08:06       ======================================================    Lab Results   Component Value Date    WBC 12 30 (H) 08/07/2020    HGB 13 2 08/07/2020    HCT 41 2 08/07/2020    MCV 92 08/07/2020     08/07/2020      Lab Results   Component Value Date    SODIUM 137 08/07/2020    K 4 3 08/07/2020     08/07/2020    CO2 26 08/07/2020    BUN 19 08/07/2020    CREATININE 0 93 08/07/2020    GLUC 117 08/07/2020    CALCIUM 9 2 08/07/2020      Lab Results   Component Value Date    HGBA1C 7 1 (H) 08/06/2020      No results found for: CHOL  Lab Results   Component Value Date    HDL 49 08/06/2020    HDL 60 11/15/2018    HDL 56 05/09/2018     Lab Results   Component Value Date    LDLCALC 112 (H) 08/06/2020    LDLCALC 135 (H) 11/15/2018    LDLCALC 125 (H) 05/09/2018     Lab Results   Component Value Date    TRIG 142 08/06/2020    TRIG 81 11/15/2018    TRIG 103 05/09/2018     No results found for: [place], [place]   Lab Results   Component Value Date    INR 1 03 08/05/2020    INR 1 08 07/17/2018    PROTIME 13 3 08/05/2020    PROTIME 11 4 07/17/2018        Imaging:   I have personally reviewed pertinent reports  Portions of the record may have been created with voice recognition software  Occasional wrong word or "sound a like" substitutions may have occurred due to the inherent limitations of voice recognition software  Read the chart carefully and recognize, using context, where substitutions have occurred

## 2020-09-18 NOTE — LETTER
September 18, 2020     Stanley Lovett MD  [address]  [address] [facility]  [address]  [id number]    Patient: Tunde Harrell   YOB: 1960   Date of Visit: 9/18/2020       Dear Dr Deandra Hernandez:    Thank you for referring Tunde Harrell to me for evaluation  Below are my notes for this consultation  If you have questions, please do not hesitate to call me  I look forward to following your patient along with you  Sincerely,        Sheila Waite MD        CC: No Recipients  Sheila Waite MD  9/18/2020 10:58 AM  Sign when Signing Visit                                             Cardiology Follow Up    Tunde Harrell  1960  [id number]  [address] [facility]  [address] [facility] [address]  [phone]  [phone]    1  Atrial fibrillation, unspecified type ([address] )  POCT ECG   2  New onset atrial fibrillation (HCC)  metoprolol tartrate (LOPRESSOR) 100 mg tablet   3  Benign essential hypertension  lisinopril (ZESTRIL) 20 mg tablet   4  Mixed hyperlipidemia  atorvastatin (LIPITOR) 80 mg tablet       Patient was 1st seen in the hospital where she presented with new onset atrial fibrillation with a rapid ventricular response  She had not been anticoagulated  She was placed on Eliquis and rate control was marginally achieved with metoprolol 100 mg b i d  One month later she was cardioverted on Eliquis and placed on Multaq  In 12/20/2018 Discussed with patient that there is an increasing amount of evidence that recurrence of atrial fibrillation and ability to control atrial fibrillation is weight related  If patient's lose weight, they usually stay in sinus rhythm  If they gain weight, atrial fibrillation has a higher likelihood of recurring and a higher likelihood of having difficulty with control  Interval History:  Patient has not been seen since 12/20/2018  She was hospitalized on 08/05/2020 with an acute cerebral vascular accident    CT/CTA demonstrated right posterior cerebral artery occlusion  She was not administered tPA since she was non compliant and had stopped Eliquis on her own  Patient no longer remembered when she had taken Eliquis in the past   She subsequently underwent a mechanical thrombectomy using an endovascular procedure  An MRI of the brain was consistent with a subacute nonhemorrhagic infarct of the left thalamus  There were several bilateral punctate foci in the cerebellum suspicious for small embolic infarcts  Patient is presently back on Eliquis  She has no residual impairments at this time  An echocardiogram demonstrated moderate dilatation of both the right and left atrium  It is unlikely that normal sinus rhythm can be established  Patient is free of chest discomfort or shortness of breath  She has no dizziness or lightheadedness  She has no symptoms of near-syncope or syncope  Discussion/Summary:  1  Cardiac medications renewed for patient  2   Return in 6 months    Patient Active Problem List   Diagnosis    Benign essential hypertension    HLD (hyperlipidemia)    Pancreatitis    Type II diabetes mellitus (HCC)    PAF (paroxysmal atrial fibrillation) (HCC)    Demand ischemia of myocardium (HCC)    QT prolongation    History of cardioversion    Class 3 severe obesity without serious comorbidity in adult ([facility] )    Snoring    Nocturnal leg movements    History of CVA (cerebrovascular accident)     Past Medical History:   Diagnosis Date    Anxiety     Arthritis     Cirrhosis of liver (HCC)     Diabetes mellitus ([facility] )     HLD (hyperlipidemia)     Hypertension     Irregular heart beat     Psychiatric disorder     Depression    St[facility]     Tobacco dependence      Social History     Socioeconomic History    Marital status: Single     Spouse name: Not on file    Number of children: Not on file    Years of education: Not on file    Highest education level: Not on file   Occupational History    Not on file   Social Needs    Financial resource strain: Not on file    Food insecurity     Worry: Not on file     Inability: Not on file    Transportation needs     Medical: Not on file     Non-medical: Not on file   Tobacco Use    Smoking status: Current Every Day Smoker     Packs/day: 0 50     Types: Cigarettes    Smokeless tobacco: Never Used   Substance and Sexual Activity    Alcohol use: Yes     Binge frequency: Monthly     Comment: 1 botttle of vodka over 4 days/denies since 7/18    Drug use: No    Sexual activity: Not Currently   Lifestyle    Physical activity     Days per week: Not on file     Minutes per session: Not on file    Stress: Not on file   Relationships    Social connections     Talks on phone: Not on file     Gets together: Not on file     Attends Spiritism service: Not on file     Active member of club or organization: Not on file     Attends meetings of clubs or organizations: Not on file     Relationship status: Not on file    Intimate partner violence     Fear of current or ex partner: Not on file     Emotionally abused: Not on file     Physically abused: Not on file     Forced sexual activity: Not on file   Other Topics Concern    Not on file   Social History Narrative    Not on file      Family History   Problem Relation Age of Onset    Diabetes Mother     Diabetes Father     Diabetes Brother     Hypertension Brother      Past Surgical History:   Procedure Laterality Date    APPENDECTOMY      BUNIONECTOMY Right     CARPAL TUNNEL RELEASE      CHOLECYSTECTOMY      IR STROKE ALERT  8/5/2020       Current Outpatient Medications:     apixaban (ELIQUIS) 5 mg, Take 1 tablet (5 mg total) by mouth 2 (two) times a day, Disp: 60 tablet, Rfl: 5    atorvastatin (LIPITOR) 80 mg tablet, Take 1 tablet (80 mg total) by mouth every evening, Disp: 30 tablet, Rfl: 11    lisinopril (ZESTRIL) 20 mg tablet, Take 2 tablets (40 mg total) by mouth daily, Disp: 60 tablet, Rfl: 11    Melatonin 10 MG TABS, Take 30 mg by mouth daily at bedtime as needed, Disp: , Rfl:     metFORMIN (GLUCOPHAGE) 500 mg tablet, Take 1 tablet (500 mg total) by mouth 2 (two) times a day with meals, Disp: 60 tablet, Rfl: 1    metoprolol tartrate (LOPRESSOR) 100 mg tablet, Take 1 tablet (100 mg total) by mouth every 12 (twelve) hours, Disp: 60 tablet, Rfl: 11    buPROPion (WELLBUTRIN) 75 mg tablet, Take 1 tablet (75 mg total) by mouth 2 (two) times a day (Patient not taking: Reported on 8/5/2020), Disp: 180 tablet, Rfl: 0    rOPINIRole (REQUIP) 1 mg tablet, Take 0 5 tablets (0 5 mg total) by mouth daily at bedtime (Patient not taking: Reported on 9/8/2020), Disp: 90 tablet, Rfl: 0  Allergies   Allergen Reactions    Augmentin [Amoxicillin-Pot Clavulanate]      Yeast infection       Results for orders placed or performed in visit on 09/18/20   POCT ECG    Narrative    Atrial fibrillation with a ventricular rate of 83 beats per minute  Cannot rule out a prior anterior septal myocardial infarction  Nonspecific ST T wave changes  Abnormal EKG  Review of Systems:  Review of Systems   Respiratory: Negative for cough, choking, chest tightness, shortness of breath and wheezing  Cardiovascular: Negative for chest pain, palpitations and leg swelling  Musculoskeletal: Negative for gait problem  Skin: Negative for rash  Neurological: Negative for dizziness, tremors, syncope, weakness, light-headedness, numbness and headaches  Psychiatric/Behavioral: Negative for agitation and behavioral problems  The patient is not hyperactive  Physical Exam:  /80 (BP Location: Right arm, Patient Position: Sitting, Cuff Size: Large)   Pulse 82   Temp (!) 97 3 °F (36 3 °C)   Wt 115 kg (254 lb 3 2 oz)   BMI 46 49 kg/m²   Physical Exam  Constitutional:       General: She is not in acute distress  Appearance: She is well-developed  HENT:      Head: Normocephalic and atraumatic  Neck:      Thyroid: No thyromegaly  Vascular: No JVD  Cardiovascular:      Rate and Rhythm: Normal rate and regular rhythm  Heart sounds: Normal heart sounds  No murmur  No friction rub  No gallop  Pulmonary:      Effort: No respiratory distress  Breath sounds: No wheezing or rales  Skin:     General: Skin is warm and dry  Neurological:      Mental Status: She is alert and oriented to person, place, and time  Psychiatric:         Behavior: Behavior normal        --------------------------------------------------------------------------------  ECHO:   Results for orders placed during the hospital encounter of 20   Echo complete with contrast if indicated    Narrative [address]  [facility], [address] [facility]  [phone]    Transthoracic Echocardiogram  2D, M-mode, Doppler, and Color Doppler    Study date:  06-Aug-2020    Patient: Kristi Muñiz  MR number: [id number]  Account number: [de-identified]  : 1960  Age: 64 years  Gender: Female  Status: Inpatient  Location: Bedside  Height: 63 in  Weight: 261 4 lb  BP: 148/ 96 mmHg    Indications: CVA    Diagnoses: I63 50 - Cerebral infarction due to unspecified occlusion or stenosis of unspecified cerebral artery    Sonographer:  [address]  Primary Physician:  Dottie Walker MD  Referring Physician:  Timoteo Coombs MD  Group:  [facility]  Interpreting Physician:  Timothy Wagoner MD    SUMMARY    PROCEDURE INFORMATION:  This was a technically difficult study  LEFT VENTRICLE:  Systolic function was normal  Ejection fraction was estimated to be 58 %  There were no regional wall motion abnormalities  Wall thickness was markedly increased  There was severe concentric hypertrophy  RIGHT VENTRICLE:  The size was at the upper limits of normal     LEFT ATRIUM:  The atrium was mildly to moderately dilated  RIGHT ATRIUM:  The atrium was mildly to moderately dilated      MITRAL VALVE:  There was mild annular calcification  There was mild regurgitation  TRICUSPID VALVE:  There was trace regurgitation  Estimated peak PA pressure was 40 mmHg  The findings suggest mild pulmonary hypertension  HISTORY: PRIOR HISTORY: AF, HTN, DM II    PROCEDURE: The procedure was performed at the bedside  This was a routine study  The transthoracic approach was used  The study included complete 2D imaging, M-mode, complete spectral Doppler, and color Doppler  Echocardiographic views  were limited due to high windows and lung interference  This was a technically difficult study  LEFT VENTRICLE: Size was normal  Systolic function was normal  Ejection fraction was estimated to be 58 %  There were no regional wall motion abnormalities  Wall thickness was markedly increased  There was severe concentric hypertrophy  DOPPLER: Transmitral flow pattern: atrial fibrillation  The study was not technically sufficient to allow evaluation of LV diastolic function  RIGHT VENTRICLE: The size was at the upper limits of normal  Systolic function was low normal     LEFT ATRIUM: The atrium was mildly to moderately dilated  RIGHT ATRIUM: The atrium was mildly to moderately dilated  MITRAL VALVE: There was mild annular calcification  Valve structure was normal  There was normal leaflet separation  DOPPLER: The transmitral velocity was within the normal range  There was no evidence for stenosis  There was mild  regurgitation  AORTIC VALVE: The valve was trileaflet  Leaflets exhibited normal cuspal separation and sclerosis  DOPPLER: Transaortic velocity was within the normal range  There was no evidence for stenosis  There was no regurgitation  TRICUSPID VALVE: The valve structure was normal  There was normal leaflet separation  DOPPLER: The transtricuspid velocity was within the normal range  There was no evidence for stenosis  There was trace regurgitation  Estimated peak PA  pressure was 40 mmHg   The findings suggest mild pulmonary hypertension  PULMONIC VALVE: Leaflets exhibited normal thickness, no calcification, and normal cuspal separation  DOPPLER: The transpulmonic velocity was within the normal range  There was no regurgitation  PERICARDIUM: There was no pericardial effusion  The pericardium was normal in appearance  AORTA: The root exhibited normal size  SYSTEM MEASUREMENT TABLES    2D mode  Aortic Root Diameter; User chosen value; 2D mode;: 2 6 cm  LA/Ao (2D): 1 58  Left Atrium Jonn-posterior Systolic Dimension; User chosen value; 2D mode;: 4 1 cm  EDV (2D-Cubed): 76 8 cm3  EF (2D-Cubed): 71 7 %  ESV (2D-Cubed): 21 7 cm3  FS (2D-Cubed): 34 4 %  FS (2D-Teich): 34 4 %  IVS/LVPW (2D): 1 06  Interventricular Septum Diastolic Thickness; User chosen value; 2D mode;: 1 96 cm  Left Ventricle Internal End Diastolic Dimension; User chosen value; 2D mode;: 4 25 cm  Left Ventricle Internal Systolic Dimension; User chosen value; 2D mode;: 2 79 cm  Left Ventricle Posterior Wall Diastolic Thickness; User chosen value; 2D mode;: 1 85 cm  Left Ventricular Ejection Fraction; Teichholz; 2D mode;: 63 7 %  Left Ventricular End Diastolic Volume; Teichholz; 2D mode;: 80 8 cm3  Left Ventricular End Systolic Volume; Teichholz; 2D mode;: 29 3 cm3  SI (2D-Cubed): 25 4 ml/m2  SV (2D-Cubed): 55 1 cm3  Stroke Index; Teichholz; 2D mode;: 23 7 ml/m2  Stroke Volume; Teichholz; 2D mode;: 51 5 cm3    Apical four chamber  Left Atrium MOD Diam; Most recent value chosen; End Systole; Apical four chamber;: 1 57 cm  Left Atrium Systolic Area; Most recent value chosen; Method of Disks, Single Plane; 2D mode; Apical four chamber;: 3040 mm2  Left Atrium Systolic Volume Index; Method of Disks, Single Plane; 2D mode; Apical four chamber;: 44 7 ml/m2  Left Atrium Systolic Volume; Most recent value chosen; Method of Disks, Single Plane; 2D mode; Apical four chamber;: 97 cm3  Left Atrium systolic major axis;  Most recent value chosen; Method of Disks, Single Plane; 2D mode; Apical four chamber;: 7 77 cm  LV MOD Diam; End Diastole; (A4C): 1 17 cm  LV MOD Diam; Recent value; End Diastole (A4C): 1 2 cm  LV MOD Diam; Recent value; End Systole (A4C): 0 82 cm  LVEF MOD A4C: 58 %  Left Ventricle diastolic major axis; Most recent value chosen; Method of Disks, Single Plane; 2D mode; Apical four chamber;: 7 33 cm  Left Ventricle systolic major axis; Most recent value chosen; Method of Disks, Single Plane; 2D mode; Apical four chamber;: 6 22 cm  Left Ventricular Diastolic Area; Most recent value chosen; Method of Disks, Single Plane; 2D mode; Apical four chamber;: 2490 mm2  Left Ventricular End Diastolic Volume; Most recent value chosen; Method of Disks, Single Plane; 2D mode; Apical four chamber;: 71 cm3  Left Ventricular End Systolic Volume; Most recent value chosen; Method of Disks, Single Plane; 2D mode; Apical four chamber;: 30 cm3  Left Ventricular Systolic Area; Most recent value chosen; Method of Disks, Single Plane; 2D mode; Apical four chamber;: 1500 mm2  SI (A4C): 18 9 ml/m2  SV MOD A4C: 41 cm3    Tissue Doppler Imaging  LV Peak Early Mancini Tissue Gordo; Lateral MA (TDI): 118 mm/s  LV Peak Early Mancini Tissue Gordo; Mean; Lateral MA (TDI): 92 mm/s  LV Peak Early Mancini Tissue Gordo; Medial MA (TDI): 84 4 mm/s  Left Ventricular Peak Early Diastolic Tissue Velocity; Mean; Mean value chosen; Medial Mitral Annulus; Tissue Doppler Imaging;: 75 1 mm/s    Unspecified Scan Mode  Peak Grad; Mean; Antegrade Flow: 4 mm[Hg]  Vmax; Antegrade Flow: 977 mm/s  Vmax; Mean; Antegrade Flow: 997 mm/s  LVOT Peak Grad; Mean: 3 mm[Hg]  LVOT Vmax: 784 mm/s  LVOT Vmax; Mean: 918 mm/s  MV Peak Gordo/LV Peak Tissue Gordo E-Wave; Lateral MA: 15 3  MV Peak Gordo/LV Peak Tissue Gordo E-Wave; Medial MA: 18 8  DT; Antegrade Flow: 222 ms  DT; Mean; Antegrade Flow: 191 ms  MV E Gorod: 1520 mm/s  MV Peak E Gordo; Mean;  Antegrade Flow: 1410 mm/s  Peak Grad; Mean; Regurgitant Flow: 26 mm[Hg]  Vmax; Mean; Regurgitant Flow: 2560 mm/s  Vmax; Regurgitant Flow: 2290 mm/s  Atrial Darby Area;: 2690 mm2  Atrial Darby Area; Mean; Mean value chosen;: 2690 mm2  Atrial Darby Distance;: 6 6 cm  Atrial Darby Distance; Mean; Mean value chosen;: 6 6 cm  Atrial Darby Volume;: 91 3 cm3  Atrial Darby Volume; Mean; Mean value chosen;: 91 3 cm3  Distance;: 4 4 cm  Distance; Mean; Mean value chosen;: 4 4 cm  Distance; User chosen value;: 1 5 cm    [facility]    Prepared and electronically signed by    Sandra Killian MD  Signed 06-Aug-2020 14:08:06       ======================================================    Lab Results   Component Value Date    WBC 12 30 (H) 08/07/2020    HGB 13 2 08/07/2020    HCT 41 2 08/07/2020    MCV 92 08/07/2020     08/07/2020      Lab Results   Component Value Date    SODIUM 137 08/07/2020    K 4 3 08/07/2020     08/07/2020    CO2 26 08/07/2020    BUN 19 08/07/2020    CREATININE 0 93 08/07/2020    GLUC 117 08/07/2020    CALCIUM 9 2 08/07/2020      Lab Results   Component Value Date    HGBA1C 7 1 (H) 08/06/2020      No results found for: CHOL  Lab Results   Component Value Date    HDL 49 08/06/2020    HDL 60 11/15/2018    HDL 56 05/09/2018     Lab Results   Component Value Date    LDLCALC 112 (H) 08/06/2020    LDLCALC 135 (H) 11/15/2018    LDLCALC 125 (H) 05/09/2018     Lab Results   Component Value Date    TRIG 142 08/06/2020    TRIG 81 11/15/2018    TRIG 103 05/09/2018     No results found for: [place], [place]   Lab Results   Component Value Date    INR 1 03 08/05/2020    INR 1 08 07/17/2018    PROTIME 13 3 08/05/2020    PROTIME 11 4 07/17/2018        Imaging:   I have personally reviewed pertinent reports  Portions of the record may have been created with voice recognition software  Occasional wrong word or "sound a like" substitutions may have occurred due to the inherent limitations of voice recognition software   Read the chart carefully and recognize, using context, where substitutions have occurred

## 2020-10-05 ENCOUNTER — OFFICE VISIT (OUTPATIENT)
Dept: FAMILY MEDICINE CLINIC | Facility: CLINIC | Age: 60
End: 2020-10-05

## 2020-10-05 VITALS
TEMPERATURE: 97.6 F | HEART RATE: 96 BPM | RESPIRATION RATE: 16 BRPM | DIASTOLIC BLOOD PRESSURE: 80 MMHG | OXYGEN SATURATION: 98 % | BODY MASS INDEX: 47.37 KG/M2 | SYSTOLIC BLOOD PRESSURE: 112 MMHG | WEIGHT: 259 LBS

## 2020-10-05 DIAGNOSIS — E11.9 TYPE 2 DIABETES MELLITUS WITHOUT COMPLICATION, WITHOUT LONG-TERM CURRENT USE OF INSULIN (HCC): Primary | ICD-10-CM

## 2020-10-05 DIAGNOSIS — Z23 NEED FOR INFLUENZA VACCINATION: ICD-10-CM

## 2020-10-05 DIAGNOSIS — Z23 NEED FOR VIRAL IMMUNIZATION: ICD-10-CM

## 2020-10-05 PROCEDURE — 3725F SCREEN DEPRESSION PERFORMED: CPT | Performed by: FAMILY MEDICINE

## 2020-10-05 PROCEDURE — 90682 RIV4 VACC RECOMBINANT DNA IM: CPT

## 2020-10-05 PROCEDURE — 90471 IMMUNIZATION ADMIN: CPT

## 2020-10-05 PROCEDURE — 99214 OFFICE O/P EST MOD 30 MIN: CPT | Performed by: FAMILY MEDICINE

## 2020-10-05 PROCEDURE — 4004F PT TOBACCO SCREEN RCVD TLK: CPT | Performed by: FAMILY MEDICINE

## 2020-10-05 RX ORDER — ZOSTER VACCINE RECOMBINANT, ADJUVANTED 50 MCG/0.5
0.5 KIT INTRAMUSCULAR ONCE
Qty: 1 EACH | Refills: 1 | Status: SHIPPED | OUTPATIENT
Start: 2020-10-05 | End: 2020-10-05

## 2020-10-05 RX ORDER — LANCETS
EACH MISCELLANEOUS
Qty: 100 EACH | Refills: 3 | Status: SHIPPED | OUTPATIENT
Start: 2020-10-05 | End: 2020-10-12

## 2020-10-05 RX ORDER — BLOOD SUGAR DIAGNOSTIC
1 STRIP MISCELLANEOUS DAILY
Qty: 100 EACH | Refills: 3 | Status: SHIPPED | OUTPATIENT
Start: 2020-10-05

## 2020-10-05 RX ORDER — BLOOD-GLUCOSE METER
EACH MISCELLANEOUS DAILY
Qty: 1 KIT | Refills: 0 | Status: SHIPPED | OUTPATIENT
Start: 2020-10-05

## 2020-10-06 RX ORDER — LANCETS
EACH MISCELLANEOUS DAILY
Qty: 100 EACH | Refills: 3 | Status: SHIPPED | OUTPATIENT
Start: 2020-10-06 | End: 2020-10-12

## 2020-10-06 RX ORDER — BLOOD-GLUCOSE METER
EACH MISCELLANEOUS DAILY
Qty: 1 KIT | Refills: 0 | Status: SHIPPED | OUTPATIENT
Start: 2020-10-06

## 2020-10-10 ENCOUNTER — TELEPHONE (OUTPATIENT)
Dept: OTHER | Facility: OTHER | Age: 60
End: 2020-10-10

## 2020-10-12 DIAGNOSIS — E11.9 TYPE 2 DIABETES MELLITUS WITHOUT COMPLICATION, WITHOUT LONG-TERM CURRENT USE OF INSULIN (HCC): Primary | ICD-10-CM

## 2020-10-12 RX ORDER — LANCETS
EACH MISCELLANEOUS DAILY
Qty: 100 EACH | Refills: 5 | Status: SHIPPED | OUTPATIENT
Start: 2020-10-12

## 2021-01-28 ENCOUNTER — TELEPHONE (OUTPATIENT)
Dept: NEUROLOGY | Facility: CLINIC | Age: 61
End: 2021-01-28

## 2021-01-28 NOTE — TELEPHONE ENCOUNTER
Patient cancelled appointment she is stating she is doing well and will call if she needs an appointment

## 2021-03-09 PROBLEM — I48.19 PERSISTENT ATRIAL FIBRILLATION (HCC): Status: ACTIVE | Noted: 2021-03-09

## 2021-03-10 ENCOUNTER — OFFICE VISIT (OUTPATIENT)
Dept: CARDIOLOGY CLINIC | Facility: CLINIC | Age: 61
End: 2021-03-10
Payer: COMMERCIAL

## 2021-03-10 VITALS
SYSTOLIC BLOOD PRESSURE: 142 MMHG | BODY MASS INDEX: 49.76 KG/M2 | DIASTOLIC BLOOD PRESSURE: 80 MMHG | OXYGEN SATURATION: 97 % | TEMPERATURE: 94.8 F | HEIGHT: 62 IN | HEART RATE: 94 BPM | WEIGHT: 270.4 LBS

## 2021-03-10 DIAGNOSIS — R94.31 QT PROLONGATION: ICD-10-CM

## 2021-03-10 DIAGNOSIS — I48.19 PERSISTENT ATRIAL FIBRILLATION (HCC): Primary | ICD-10-CM

## 2021-03-10 DIAGNOSIS — E66.01 CLASS 3 SEVERE OBESITY DUE TO EXCESS CALORIES WITHOUT SERIOUS COMORBIDITY WITH BODY MASS INDEX (BMI) OF 40.0 TO 44.9 IN ADULT (HCC): ICD-10-CM

## 2021-03-10 DIAGNOSIS — E11.9 TYPE 2 DIABETES MELLITUS WITHOUT COMPLICATION, WITHOUT LONG-TERM CURRENT USE OF INSULIN (HCC): ICD-10-CM

## 2021-03-10 DIAGNOSIS — I10 BENIGN ESSENTIAL HYPERTENSION: ICD-10-CM

## 2021-03-10 DIAGNOSIS — Z86.73 HISTORY OF CVA (CEREBROVASCULAR ACCIDENT): ICD-10-CM

## 2021-03-10 PROCEDURE — 3008F BODY MASS INDEX DOCD: CPT | Performed by: INTERNAL MEDICINE

## 2021-03-10 PROCEDURE — 3079F DIAST BP 80-89 MM HG: CPT | Performed by: INTERNAL MEDICINE

## 2021-03-10 PROCEDURE — 99214 OFFICE O/P EST MOD 30 MIN: CPT | Performed by: INTERNAL MEDICINE

## 2021-03-10 PROCEDURE — 3077F SYST BP >= 140 MM HG: CPT | Performed by: INTERNAL MEDICINE

## 2021-03-10 PROCEDURE — 4004F PT TOBACCO SCREEN RCVD TLK: CPT | Performed by: INTERNAL MEDICINE

## 2021-03-10 RX ORDER — ACETAMINOPHEN 325 MG/1
650 TABLET ORAL EVERY 6 HOURS PRN
COMMUNITY
End: 2021-03-10 | Stop reason: CLARIF

## 2021-03-10 RX ORDER — ACETAMINOPHEN 650 MG/1
650 SUPPOSITORY RECTAL EVERY 4 HOURS PRN
COMMUNITY
End: 2021-03-10 | Stop reason: CLARIF

## 2021-03-10 NOTE — LETTER
March 10, 2021     [place], [address]  [address] [place]    Patient: Krista Myers   YOB: 1960   Date of Visit: 3/10/2021       Dear Dr Yanci Hernandez:    Thank you for referring Krista Myers to me for evaluation  Below are my notes for this consultation  If you have questions, please do not hesitate to call me  I look forward to following your patient along with you  Sincerely,        Surya Ludwig MD        CC: No Recipients  Surya Ludwig MD  3/10/2021 11:22 AM  Sign when Signing Visit                                             Cardiology Follow Up    Krista Myers  1960  [id number]  [address]  [address] [facility] [address] [phone]    1  Persistent atrial fibrillation ([address] )     2  Benign essential hypertension     3  History of CVA (cerebrovascular accident)     4  Type 2 diabetes mellitus without complication, without long-term current use of insulin ([facility])     5  QT prolongation     6  Class 3 severe obesity due to excess calories without serious comorbidity with body mass index (BMI) of 40 0 to 44 9 in adult [facility])         Patient was 1st seen in the hospital where she presented with new onset atrial fibrillation with a rapid ventricular response  She had not been anticoagulated  She was placed on Eliquis and rate control was marginally achieved with metoprolol 100 mg b i d  One month later she was cardioverted on Eliquis and placed on Multaq  In 12/20/2018 Discussed with patient that there is an increasing amount of evidence that recurrence of atrial fibrillation and ability to control atrial fibrillation is weight related  If patient's lose weight, they usually stay in sinus rhythm  If they gain weight, atrial fibrillation has a higher likelihood of recurring and a higher likelihood of having difficulty with control      08/06/2020 lipid profile:  Cholesterol 189, triglycerides 142, HDL 49, LDL calculated 112    Interval History:  Patient has no cardiac complaints  Patient denies chest discomfort or shortness of breath  Patient has no palpitations  Patient denies symptoms of dizziness, lightheadedness or near-syncope/syncope  Patient denies leg edema  Patient denies symptoms of orthopnea or paroxysmal nocturnal dyspnea  She is having difficulty with depression  She is going to talk to her primary care physician about resuming Wellbutrin  Discussion/Summary:    Return in 6 months      Patient Active Problem List   Diagnosis    Benign essential hypertension    HLD (hyperlipidemia)    Pancreatitis    Type II diabetes mellitus (HCC)    PAF (paroxysmal atrial fibrillation) (HCC)    Demand ischemia of myocardium (HCC)    QT prolongation    History of cardioversion    Class 3 severe obesity without serious comorbidity in adult ([place] [address] )    Snoring    Nocturnal leg movements    History of CVA (cerebrovascular accident)    Persistent atrial fibrillation (HCC)     Past Medical History:   Diagnosis Date    Anxiety     Arthritis     Cirrhosis of liver (HCC)     Diabetes mellitus ([place] [address] )     HLD (hyperlipidemia)     Hypertension     Irregular heart beat     Psychiatric disorder     Depression    Stroke ([place] [address] )     Tobacco dependence      Social History     Socioeconomic History    Marital status: Single     Spouse name: Not on file    Number of children: Not on file    Years of education: Not on file    Highest education level: Not on file   Occupational History    Not on file   Social Needs    Financial resource strain: Not on file    Food insecurity     Worry: Not on file     Inability: Not on file    Transportation needs     Medical: Not on file     Non-medical: Not on file   Tobacco Use    Smoking status: Current Every Day Smoker     Packs/day: 0 50     Types: Cigarettes    Smokeless tobacco: Never Used   Substance and Sexual Activity    Alcohol use: Yes     Binge frequency: Monthly Comment: 1 botttle of vodka over 4 days/denies since 7/18    Drug use: No    Sexual activity: Not Currently   Lifestyle    Physical activity     Days per week: Not on file     Minutes per session: Not on file    Stress: Not on file   Relationships    Social connections     Talks on phone: Not on file     Gets together: Not on file     Attends Rastafari service: Not on file     Active member of club or organization: Not on file     Attends meetings of clubs or organizations: Not on file     Relationship status: Not on file    Intimate partner violence     Fear of current or ex partner: Not on file     Emotionally abused: Not on file     Physically abused: Not on file     Forced sexual activity: Not on file   Other Topics Concern    Not on file   Social History Narrative    Not on file      Family History   Problem Relation Age of Onset    Diabetes Mother     Diabetes Father     Diabetes Brother     Hypertension Brother      Past Surgical History:   Procedure Laterality Date    APPENDECTOMY      BUNIONECTOMY Right     CARPAL TUNNEL RELEASE      CHOLECYSTECTOMY      IR STROKE ALERT  8/5/2020       Current Outpatient Medications:     Accu-Chek Softclix Lancets lancets, by Other route daily, Disp: 100 each, Rfl: 5    acetaminophen (TYLENOL) 325 mg tablet, Take 650 mg by mouth every 6 (six) hours as needed for mild pain, Disp: , Rfl:     acetaminophen (TYLENOL) 650 mg suppository, Insert 650 mg into the rectum every 4 (four) hours as needed for mild pain, Disp: , Rfl:     apixaban (ELIQUIS) 5 mg, Take 1 tablet (5 mg total) by mouth 2 (two) times a day, Disp: 60 tablet, Rfl: 5    atorvastatin (LIPITOR) 80 mg tablet, Take 1 tablet (80 mg total) by mouth every evening, Disp: 30 tablet, Rfl: 11    Blood Glucose Monitoring Suppl (Accu-Chek Guide) w/Device KIT, by Does not apply route daily, Disp: 1 kit, Rfl: 0    Blood Glucose Monitoring Suppl (OneTouch Verio) w/Device KIT, by Does not apply route daily, Disp: 1 kit, Rfl: 0    glucose blood (OneTouch Verio) test strip, 1 each by Other route daily Use as instructed, Disp: 100 each, Rfl: 3    glucose blood test strip, 1 each by Other route daily Use as instructed, Disp: 100 each, Rfl: 3    lisinopril (ZESTRIL) 20 mg tablet, Take 2 tablets (40 mg total) by mouth daily, Disp: 60 tablet, Rfl: 11    Melatonin 10 MG TABS, Take 30 mg by mouth daily at bedtime as needed, Disp: , Rfl:     metFORMIN (GLUCOPHAGE) 500 mg tablet, Take 1 tablet (500 mg total) by mouth 2 (two) times a day with meals, Disp: 180 tablet, Rfl: 1    metoprolol tartrate (LOPRESSOR) 100 mg tablet, Take 1 tablet (100 mg total) by mouth every 12 (twelve) hours, Disp: 60 tablet, Rfl: 11    buPROPion (WELLBUTRIN) 75 mg tablet, Take 1 tablet (75 mg total) by mouth 2 (two) times a day (Patient not taking: Reported on 8/5/2020), Disp: 180 tablet, Rfl: 0    rOPINIRole (REQUIP) 1 mg tablet, Take 0 5 tablets (0 5 mg total) by mouth daily at bedtime (Patient not taking: Reported on 3/10/2021), Disp: 90 tablet, Rfl: 0  Allergies   Allergen Reactions    Augmentin [Amoxicillin-Pot Clavulanate]      Yeast infection       No results found for this visit on 03/10/21  Review of Systems:  Review of Systems   Respiratory: Negative for cough, choking, chest tightness, shortness of breath and wheezing  Cardiovascular: Negative for chest pain, palpitations and leg swelling  Musculoskeletal: Negative for gait problem  Skin: Negative for rash  Neurological: Negative for dizziness, tremors, syncope, weakness, light-headedness, numbness and headaches  Psychiatric/Behavioral: Negative for agitation and behavioral problems  The patient is not hyperactive  Physical Exam:  /80   Pulse 94   Temp (!) 94 8 °F (34 9 °C)   Ht 5' 2" (1 575 m)   Wt 123 kg (270 lb 6 4 oz) Comment: per pt in wheelchair  SpO2 97%   BMI 49 46 kg/m²   Physical Exam  Vitals signs reviewed     Constitutional: General: She is not in acute distress  Appearance: She is well-developed  Comments:   Morbidly obese, BMI 49 5   HENT:      Head: Normocephalic and atraumatic  Neck:      Thyroid: No thyromegaly  Vascular: No carotid bruit or JVD  Cardiovascular:      Rate and Rhythm: Normal rate and regular rhythm  Heart sounds: Normal heart sounds  No murmur  No friction rub  No gallop  Pulmonary:      Effort: No respiratory distress  Breath sounds: No wheezing, rhonchi or rales  Abdominal:      General: Bowel sounds are normal       Palpations: Abdomen is soft  Musculoskeletal:      Right lower leg: No edema  Left lower leg: No edema  Skin:     General: Skin is warm and dry  Neurological:      General: No focal deficit present  Mental Status: She is alert and oriented to person, place, and time  Psychiatric:         Mood and Affect: Mood normal          Behavior: Behavior normal          Thought Content:  Thought content normal          Judgment: Judgment normal          --------------------------------------------------------------------------------  ECHO:   Results for orders placed during the hospital encounter of 20   Echo complete with contrast if indicated    Narrative [facility]  [address]  [phone]    Transthoracic Echocardiogram  2D, M-mode, Doppler, and Color Doppler    Study date:  06-Aug-2020    Patient: Yariel Rich  MR number: [id number]  Account number: [de-identified]  : 1960  Age: 64 years  Gender: Female  Status: Inpatient  Location: Bedside  Height: 63 in  Weight: 261 4 lb  BP: 148/ 96 mmHg    Indications: CVA    Diagnoses: I63 50 - Cerebral infarction due to unspecified occlusion or stenosis of unspecified cerebral artery    Sonographer:  [address]  Primary Physician:  Shakila El MD  Referring Physician:  Sho Her MD  Group:  [facility]  Interpreting Physician:  Jerri David MD    SUMMARY    PROCEDURE INFORMATION:  This was a technically difficult study  LEFT VENTRICLE:  Systolic function was normal  Ejection fraction was estimated to be 58 %  There were no regional wall motion abnormalities  Wall thickness was markedly increased  There was severe concentric hypertrophy  RIGHT VENTRICLE:  The size was at the upper limits of normal     LEFT ATRIUM:  The atrium was mildly to moderately dilated  RIGHT ATRIUM:  The atrium was mildly to moderately dilated  MITRAL VALVE:  There was mild annular calcification  There was mild regurgitation  TRICUSPID VALVE:  There was trace regurgitation  Estimated peak PA pressure was 40 mmHg  The findings suggest mild pulmonary hypertension  HISTORY: PRIOR HISTORY: AF, HTN, DM II    PROCEDURE: The procedure was performed at the bedside  This was a routine study  The transthoracic approach was used  The study included complete 2D imaging, M-mode, complete spectral Doppler, and color Doppler  Echocardiographic views  were limited due to high windows and lung interference  This was a technically difficult study  LEFT VENTRICLE: Size was normal  Systolic function was normal  Ejection fraction was estimated to be 58 %  There were no regional wall motion abnormalities  Wall thickness was markedly increased  There was severe concentric hypertrophy  DOPPLER: Transmitral flow pattern: atrial fibrillation  The study was not technically sufficient to allow evaluation of LV diastolic function  RIGHT VENTRICLE: The size was at the upper limits of normal  Systolic function was low normal     LEFT ATRIUM: The atrium was mildly to moderately dilated  RIGHT ATRIUM: The atrium was mildly to moderately dilated  MITRAL VALVE: There was mild annular calcification  Valve structure was normal  There was normal leaflet separation  DOPPLER: The transmitral velocity was within the normal range  There was no evidence for stenosis  There was mild  regurgitation  AORTIC VALVE: The valve was trileaflet  Leaflets exhibited normal cuspal separation and sclerosis  DOPPLER: Transaortic velocity was within the normal range  There was no evidence for stenosis  There was no regurgitation  TRICUSPID VALVE: The valve structure was normal  There was normal leaflet separation  DOPPLER: The transtricuspid velocity was within the normal range  There was no evidence for stenosis  There was trace regurgitation  Estimated peak PA  pressure was 40 mmHg  The findings suggest mild pulmonary hypertension  PULMONIC VALVE: Leaflets exhibited normal thickness, no calcification, and normal cuspal separation  DOPPLER: The transpulmonic velocity was within the normal range  There was no regurgitation  PERICARDIUM: There was no pericardial effusion  The pericardium was normal in appearance  AORTA: The root exhibited normal size  SYSTEM MEASUREMENT TABLES    2D mode  Aortic Root Diameter; User chosen value; 2D mode;: 2 6 cm  LA/Ao (2D): 1 58  Left Atrium Jonn-posterior Systolic Dimension; User chosen value; 2D mode;: 4 1 cm  EDV (2D-Cubed): 76 8 cm3  EF (2D-Cubed): 71 7 %  ESV (2D-Cubed): 21 7 cm3  FS (2D-Cubed): 34 4 %  FS (2D-Teich): 34 4 %  IVS/LVPW (2D): 1 06  Interventricular Septum Diastolic Thickness; User chosen value; 2D mode;: 1 96 cm  Left Ventricle Internal End Diastolic Dimension; User chosen value; 2D mode;: 4 25 cm  Left Ventricle Internal Systolic Dimension; User chosen value; 2D mode;: 2 79 cm  Left Ventricle Posterior Wall Diastolic Thickness; User chosen value; 2D mode;: 1 85 cm  Left Ventricular Ejection Fraction; Teichholz; 2D mode;: 63 7 %  Left Ventricular End Diastolic Volume; Teichholz; 2D mode;: 80 8 cm3  Left Ventricular End Systolic Volume; Teichholz; 2D mode;: 29 3 cm3  SI (2D-Cubed): 25 4 ml/m2  SV (2D-Cubed): 55 1 cm3  Stroke Index;  Teichholz; 2D mode;: 23 7 ml/m2  Stroke Volume; Ryne; 2D mode;: 51 5 cm3    Apical four chamber  Left Atrium MOD Diam; Most recent value chosen; End Systole; Apical four chamber;: 1 57 cm  Left Atrium Systolic Area; Most recent value chosen; Method of Disks, Single Plane; 2D mode; Apical four chamber;: 3040 mm2  Left Atrium Systolic Volume Index; Method of Disks, Single Plane; 2D mode; Apical four chamber;: 44 7 ml/m2  Left Atrium Systolic Volume; Most recent value chosen; Method of Disks, Single Plane; 2D mode; Apical four chamber;: 97 cm3  Left Atrium systolic major axis; Most recent value chosen; Method of Disks, Single Plane; 2D mode; Apical four chamber;: 7 77 cm  LV MOD Diam; End Diastole; (A4C): 1 17 cm  LV MOD Diam; Recent value; End Diastole (A4C): 1 2 cm  LV MOD Diam; Recent value; End Systole (A4C): 0 82 cm  LVEF MOD A4C: 58 %  Left Ventricle diastolic major axis; Most recent value chosen; Method of Disks, Single Plane; 2D mode; Apical four chamber;: 7 33 cm  Left Ventricle systolic major axis; Most recent value chosen; Method of Disks, Single Plane; 2D mode; Apical four chamber;: 6 22 cm  Left Ventricular Diastolic Area; Most recent value chosen; Method of Disks, Single Plane; 2D mode; Apical four chamber;: 2490 mm2  Left Ventricular End Diastolic Volume; Most recent value chosen; Method of Disks, Single Plane; 2D mode; Apical four chamber;: 71 cm3  Left Ventricular End Systolic Volume; Most recent value chosen; Method of Disks, Single Plane; 2D mode; Apical four chamber;: 30 cm3  Left Ventricular Systolic Area; Most recent value chosen; Method of Disks, Single Plane; 2D mode;  Apical four chamber;: 1500 mm2  SI (A4C): 18 9 ml/m2  SV MOD A4C: 41 cm3    Tissue Doppler Imaging  LV Peak Early Mancini Tissue Gordo; Lateral MA (TDI): 118 mm/s  LV Peak Early Mancini Tissue Gordo; Mean; Lateral MA (TDI): 92 mm/s  LV Peak Early Mancini Tissue Gordo; Medial MA (TDI): 84 4 mm/s  Left Ventricular Peak Early Diastolic Tissue Velocity; Mean; Mean value chosen; Medial Mitral Annulus; Tissue Doppler Imaging;: 75 1 mm/s    Unspecified Scan Mode  Peak Grad; Mean; Antegrade Flow: 4 mm[Hg]  Vmax; Antegrade Flow: 977 mm/s  Vmax; Mean; Antegrade Flow: 997 mm/s  LVOT Peak Grad; Mean: 3 mm[Hg]  LVOT Vmax: 784 mm/s  LVOT Vmax; Mean: 918 mm/s  MV Peak Gordo/LV Peak Tissue Gordo E-Wave; Lateral MA: 15 3  MV Peak Gordo/LV Peak Tissue Gordo E-Wave; Medial MA: 18 8  DT; Antegrade Flow: 222 ms  DT; Mean; Antegrade Flow: 191 ms  MV E Gordo: 1520 mm/s  MV Peak E Gordo; Mean;  Antegrade Flow: 1410 mm/s  Peak Grad; Mean; Regurgitant Flow: 26 mm[Hg]  Vmax; Mean; Regurgitant Flow: 2560 mm/s  Vmax; Regurgitant Flow: 2290 mm/s  Atrial Darby Area;: 2690 mm2  Atrial Darby Area; Mean; Mean value chosen;: 2690 mm2  Atrial Darby Distance;: 6 6 cm  Atrial Darby Distance; Mean; Mean value chosen;: 6 6 cm  Atrial Darby Volume;: 91 3 cm3  Atrial Darby Volume; Mean; Mean value chosen;: 91 3 cm3  Distance;: 4 4 cm  Distance; Mean; Mean value chosen;: 4 4 cm  Distance; User chosen value;: 1 5 cm    [facility] Accredited [facility]chocardi[facility] Laboratory    Prepared and electronically signed by    Jacqueline Cox MD  Signed 06-Aug-2020 14:08:06         ======================================================    Lab Results   Component Value Date    WBC 12 30 (H) 08/07/2020    HGB 13 2 08/07/2020    HCT 41 2 08/07/2020    MCV 92 08/07/2020     08/07/2020      Lab Results   Component Value Date    SODIUM 137 08/07/2020    K 4 3 08/07/2020     08/07/2020    CO2 26 08/07/2020    BUN 19 08/07/2020    CREATININE 0 93 08/07/2020    GLUC 117 08/07/2020    CALCIUM 9 2 08/07/2020      Lab Results   Component Value Date    HGBA1C 7 1 (H) 08/06/2020      No results found for: CHOL  Lab Results   Component Value Date    HDL 49 08/06/2020    HDL 60 11/15/2018    HDL 56 05/09/2018     Lab Results   Component Value Date    LDLCALC 112 (H) 08/06/2020    LDLCALC 135 (H) 11/15/2018    LDLCALC 125 (H) 05/09/2018     Lab Results   Component Value Date    TRIG 142 08/06/2020    TRIG 81 11/15/2018    TRIG 103 05/09/2018     No results found for: [place], [place]   Lab Results   Component Value Date    INR 1 03 08/05/2020    INR 1 08 07/17/2018    PROTIME 13 3 08/05/2020    PROTIME 11 4 07/17/2018        Imaging:   I have personally reviewed pertinent reports  Portions of the record may have been created with voice recognition software  Occasional wrong word or "sound a like" substitutions may have occurred due to the inherent limitations of voice recognition software  Read the chart carefully and recognize, using context, where substitutions have occurred

## 2021-03-10 NOTE — PROGRESS NOTES
Cardiology Follow Up    Queenie Lux  1960  [id number]  [address] [facility] [id number] [phone]    1  Persistent atrial fibrillation ([address] )     2  Benign essential hypertension     3  History of CVA (cerebrovascular accident)     4  Type 2 diabetes mellitus without complication, without long-term current use of insulin (HCC)     5  QT prolongation     6  Class 3 severe obesity due to excess calories without serious comorbidity with body mass index (BMI) of 40 0 to 44 9 in adult [facility])         Patient was 1st seen in the hospital where she presented with new onset atrial fibrillation with a rapid ventricular response  She had not been anticoagulated  She was placed on Eliquis and rate control was marginally achieved with metoprolol 100 mg b i d  One month later she was cardioverted on Eliquis and placed on Multaq  In 12/20/2018 Discussed with patient that there is an increasing amount of evidence that recurrence of atrial fibrillation and ability to control atrial fibrillation is weight related  If patient's lose weight, they usually stay in sinus rhythm  If they gain weight, atrial fibrillation has a higher likelihood of recurring and a higher likelihood of having difficulty with control  08/06/2020 lipid profile:  Cholesterol 189, triglycerides 142, HDL 49, LDL calculated 112    Interval History:  Patient has no cardiac complaints  Patient denies chest discomfort or shortness of breath  Patient has no palpitations  Patient denies symptoms of dizziness, lightheadedness or near-syncope/syncope  Patient denies leg edema  Patient denies symptoms of orthopnea or paroxysmal nocturnal dyspnea  She is having difficulty with depression  She is going to talk to her primary care physician about resuming Wellbutrin  Discussion/Summary:   1  Return in 6 months    2  EKG on return    Patient Active Problem List   Diagnosis    Benign essential hypertension    HLD (hyperlipidemia)    Pancreatitis    Type II diabetes mellitus (HCC)    PAF (paroxysmal atrial fibrillation) (HCC)    Demand ischemia of myocardium (HCC)    QT prolongation    History of cardioversion    Class 3 severe obesity without serious comorbidity in adult ([place] [address] )    Snoring    Nocturnal leg movements    History of CVA (cerebrovascular accident)    Persistent atrial fibrillation (HCC)     Past Medical History:   Diagnosis Date    Anxiety     Arthritis     Cirrhosis of liver (HCC)     Diabetes mellitus ([place] [address] )     HLD (hyperlipidemia)     Hypertension     Irregular heart beat     Psychiatric disorder     Depression    Stroke ([place] [address] )     Tobacco dependence      Social History     Socioeconomic History    Marital status: Single     Spouse name: Not on file    Number of children: Not on file    Years of education: Not on file    Highest education level: Not on file   Occupational History    Not on file   Social Needs    Financial resource strain: Not on file    Food insecurity     Worry: Not on file     Inability: Not on file    Transportation needs     Medical: Not on file     Non-medical: Not on file   Tobacco Use    Smoking status: Current Every Day Smoker     Packs/day: 0 50     Types: Cigarettes    Smokeless tobacco: Never Used   Substance and Sexual Activity    Alcohol use: Yes     Binge frequency: Monthly     Comment: 1 botttle of vodka over 4 days/denies since 7/18    Drug use: No    Sexual activity: Not Currently   Lifestyle    Physical activity     Days per week: Not on file     Minutes per session: Not on file    Stress: Not on file   Relationships    Social connections     Talks on phone: Not on file     Gets together: Not on file     Attends [employer] service: Not on file     Active member of club or organization: Not on file     Attends meetings of clubs or organizations: Not on file     Relationship status: Not on file    Intimate partner violence     Fear of current or ex partner: Not on file     Emotionally abused: Not on file     Physically abused: Not on file     Forced sexual activity: Not on file   Other Topics Concern    Not on file   Social History Narrative    Not on file      Family History   Problem Relation Age of Onset    Diabetes Mother     Diabetes Father     Diabetes Brother     Hypertension Brother      Past Surgical History:   Procedure Laterality Date    APPENDECTOMY      BUNIONECTOMY Right     CARPAL TUNNEL RELEASE      CHOLECYSTECTOMY      IR STROKE ALERT  8/5/2020       Current Outpatient Medications:     Accu-Chek Softclix Lancets lancets, by Other route daily, Disp: 100 each, Rfl: 5    acetaminophen (TYLENOL) 325 mg tablet, Take 650 mg by mouth every 6 (six) hours as needed for mild pain, Disp: , Rfl:     acetaminophen (TYLENOL) 650 mg suppository, Insert 650 mg into the rectum every 4 (four) hours as needed for mild pain, Disp: , Rfl:     apixaban (ELIQUIS) 5 mg, Take 1 tablet (5 mg total) by mouth 2 (two) times a day, Disp: 60 tablet, Rfl: 5    atorvastatin (LIPITOR) 80 mg tablet, Take 1 tablet (80 mg total) by mouth every evening, Disp: 30 tablet, Rfl: 11    Blood Glucose Monitoring Suppl (Accu-Chek Guide) w/Device KIT, by Does not apply route daily, Disp: 1 kit, Rfl: 0    Blood Glucose Monitoring Suppl (OneTouch Verio) w/Device KIT, by Does not apply route daily, Disp: 1 kit, Rfl: 0    glucose blood (OneTouch Verio) test strip, 1 each by Other route daily Use as instructed, Disp: 100 each, Rfl: 3    glucose blood test strip, 1 each by Other route daily Use as instructed, Disp: 100 each, Rfl: 3    lisinopril (ZESTRIL) 20 mg tablet, Take 2 tablets (40 mg total) by mouth daily, Disp: 60 tablet, Rfl: 11    Melatonin 10 MG TABS, Take 30 mg by mouth daily at bedtime as needed, Disp: , Rfl:     metFORMIN (GLUCOPHAGE) 500 mg tablet, Take 1 tablet (500 mg total) by mouth 2 (two) times a day with meals, Disp: 180 tablet, Rfl: 1    metoprolol tartrate (LOPRESSOR) 100 mg tablet, Take 1 tablet (100 mg total) by mouth every 12 (twelve) hours, Disp: 60 tablet, Rfl: 11    buPROPion (WELLBUTRIN) 75 mg tablet, Take 1 tablet (75 mg total) by mouth 2 (two) times a day (Patient not taking: Reported on 8/5/2020), Disp: 180 tablet, Rfl: 0    rOPINIRole (REQUIP) 1 mg tablet, Take 0 5 tablets (0 5 mg total) by mouth daily at bedtime (Patient not taking: Reported on 3/10/2021), Disp: 90 tablet, Rfl: 0  Allergies   Allergen Reactions    Augmentin [Amoxicillin-Pot Clavulanate]      Yeast infection       No results found for this visit on 03/10/21  Review of Systems:  Review of Systems   Respiratory: Negative for cough, choking, chest tightness, shortness of breath and wheezing  Cardiovascular: Negative for chest pain, palpitations and leg swelling  Musculoskeletal: Negative for gait problem  Skin: Negative for rash  Neurological: Negative for dizziness, tremors, syncope, weakness, light-headedness, numbness and headaches  Psychiatric/Behavioral: Negative for agitation and behavioral problems  The patient is not hyperactive  Physical Exam:  /80   Pulse 94   Temp (!) 94 8 °F (34 9 °C)   Ht 5' 2" (1 575 m)   Wt 123 kg (270 lb 6 4 oz) Comment: per pt in wheelchair  SpO2 97%   BMI 49 46 kg/m²   Physical Exam  Vitals signs reviewed  Constitutional:       General: She is not in acute distress  Appearance: She is well-developed  Comments:   Morbidly obese, BMI 49 5   HENT:      Head: Normocephalic and atraumatic  Neck:      Thyroid: No thyromegaly  Vascular: No carotid bruit or JVD  Cardiovascular:      Rate and Rhythm: Normal rate and regular rhythm  Heart sounds: Normal heart sounds  No murmur  No friction rub  No gallop  Pulmonary:      Effort: No respiratory distress  Breath sounds: No wheezing, rhonchi or rales     Abdominal:      General: Bowel sounds are normal       Palpations: Abdomen is soft  Musculoskeletal:      Right lower leg: No edema  Left lower leg: No edema  Skin:     General: Skin is warm and dry  Neurological:      General: No focal deficit present  Mental Status: She is alert and oriented to person, place, and time  Psychiatric:         Mood and Affect: Mood normal          Behavior: Behavior normal          Thought Content: Thought content normal          Judgment: Judgment normal          --------------------------------------------------------------------------------  ECHO:   Results for orders placed during the hospital encounter of 20   Echo complete with contrast if indicated    Narrative [address]  [facility] [place] [address] [facility]  [phone]    Transthoracic Echocardiogram  2D, M-mode, Doppler, and Color Doppler    Study date:  06-Aug-2020    Patient: Yariel Rich  MR number: [id number]  Account number: [de-identified]  : 1960  Age: 64 years  Gender: Female  Status: Inpatient  Location: Bedside  Height: 63 in  Weight: 261 4 lb  BP: 148/ 96 mmHg    Indications: CVA    Diagnoses: I63 50 - Cerebral infarction due to unspecified occlusion or stenosis of unspecified cerebral artery    Sonographer:  [address]  Primary Physician:  Shakila El MD  Referring Physician:  Sho Her MD  Group:  [facility]  Interpreting Physician:  Mohsen Lechuga MD    SUMMARY    PROCEDURE INFORMATION:  This was a technically difficult study  LEFT VENTRICLE:  Systolic function was normal  Ejection fraction was estimated to be 58 %  There were no regional wall motion abnormalities  Wall thickness was markedly increased  There was severe concentric hypertrophy  RIGHT VENTRICLE:  The size was at the upper limits of normal     LEFT ATRIUM:  The atrium was mildly to moderately dilated      RIGHT ATRIUM:  The atrium was mildly to moderately dilated  MITRAL VALVE:  There was mild annular calcification  There was mild regurgitation  TRICUSPID VALVE:  There was trace regurgitation  Estimated peak PA pressure was 40 mmHg  The findings suggest mild pulmonary hypertension  HISTORY: PRIOR HISTORY: AF, HTN, DM II    PROCEDURE: The procedure was performed at the bedside  This was a routine study  The transthoracic approach was used  The study included complete 2D imaging, M-mode, complete spectral Doppler, and color Doppler  Echocardiographic views  were limited due to high windows and lung interference  This was a technically difficult study  LEFT VENTRICLE: Size was normal  Systolic function was normal  Ejection fraction was estimated to be 58 %  There were no regional wall motion abnormalities  Wall thickness was markedly increased  There was severe concentric hypertrophy  DOPPLER: Transmitral flow pattern: atrial fibrillation  The study was not technically sufficient to allow evaluation of LV diastolic function  RIGHT VENTRICLE: The size was at the upper limits of normal  Systolic function was low normal     LEFT ATRIUM: The atrium was mildly to moderately dilated  RIGHT ATRIUM: The atrium was mildly to moderately dilated  MITRAL VALVE: There was mild annular calcification  Valve structure was normal  There was normal leaflet separation  DOPPLER: The transmitral velocity was within the normal range  There was no evidence for stenosis  There was mild  regurgitation  AORTIC VALVE: The valve was trileaflet  Leaflets exhibited normal cuspal separation and sclerosis  DOPPLER: Transaortic velocity was within the normal range  There was no evidence for stenosis  There was no regurgitation  TRICUSPID VALVE: The valve structure was normal  There was normal leaflet separation  DOPPLER: The transtricuspid velocity was within the normal range  There was no evidence for stenosis  There was trace regurgitation   Estimated peak PA  pressure was 40 mmHg  The findings suggest mild pulmonary hypertension  PULMONIC VALVE: Leaflets exhibited normal thickness, no calcification, and normal cuspal separation  DOPPLER: The transpulmonic velocity was within the normal range  There was no regurgitation  PERICARDIUM: There was no pericardial effusion  The pericardium was normal in appearance  AORTA: The root exhibited normal size  SYSTEM MEASUREMENT TABLES    2D mode  Aortic Root Diameter; User chosen value; 2D mode;: 2 6 cm  LA/Ao (2D): 1 58  Left Atrium Jonn-posterior Systolic Dimension; User chosen value; 2D mode;: 4 1 cm  EDV (2D-Cubed): 76 8 cm3  EF (2D-Cubed): 71 7 %  ESV (2D-Cubed): 21 7 cm3  FS (2D-Cubed): 34 4 %  FS (2D-Teich): 34 4 %  IVS/LVPW (2D): 1 06  Interventricular Septum Diastolic Thickness; User chosen value; 2D mode;: 1 96 cm  Left Ventricle Internal End Diastolic Dimension; User chosen value; 2D mode;: 4 25 cm  Left Ventricle Internal Systolic Dimension; User chosen value; 2D mode;: 2 79 cm  Left Ventricle Posterior Wall Diastolic Thickness; User chosen value; 2D mode;: 1 85 cm  Left Ventricular Ejection Fraction; Teichholz; 2D mode;: 63 7 %  Left Ventricular End Diastolic Volume; Teichholz; 2D mode;: 80 8 cm3  Left Ventricular End Systolic Volume; Teichholz; 2D mode;: 29 3 cm3  SI (2D-Cubed): 25 4 ml/m2  SV (2D-Cubed): 55 1 cm3  Stroke Index; Teichholz; 2D mode;: 23 7 ml/m2  Stroke Volume; Teichholz; 2D mode;: 51 5 cm3    Apical four chamber  Left Atrium MOD Diam; Most recent value chosen; End Systole; Apical four chamber;: 1 57 cm  Left Atrium Systolic Area; Most recent value chosen; Method of Disks, Single Plane; 2D mode; Apical four chamber;: 3040 mm2  Left Atrium Systolic Volume Index; Method of Disks, Single Plane; 2D mode; Apical four chamber;: 44 7 ml/m2  Left Atrium Systolic Volume; Most recent value chosen; Method of Disks, Single Plane; 2D mode;  Apical four chamber;: 97 cm3  Left Atrium systolic major axis; Most recent value chosen; Method of Disks, Single Plane; 2D mode; Apical four chamber;: 7 77 cm  LV MOD Diam; End Diastole; (A4C): 1 17 cm  LV MOD Diam; Recent value; End Diastole (A4C): 1 2 cm  LV MOD Diam; Recent value; End Systole (A4C): 0 82 cm  LVEF MOD A4C: 58 %  Left Ventricle diastolic major axis; Most recent value chosen; Method of Disks, Single Plane; 2D mode; Apical four chamber;: 7 33 cm  Left Ventricle systolic major axis; Most recent value chosen; Method of Disks, Single Plane; 2D mode; Apical four chamber;: 6 22 cm  Left Ventricular Diastolic Area; Most recent value chosen; Method of Disks, Single Plane; 2D mode; Apical four chamber;: 2490 mm2  Left Ventricular End Diastolic Volume; Most recent value chosen; Method of Disks, Single Plane; 2D mode; Apical four chamber;: 71 cm3  Left Ventricular End Systolic Volume; Most recent value chosen; Method of Disks, Single Plane; 2D mode; Apical four chamber;: 30 cm3  Left Ventricular Systolic Area; Most recent value chosen; Method of Disks, Single Plane; 2D mode; Apical four chamber;: 1500 mm2  SI (A4C): 18 9 ml/m2  SV MOD A4C: 41 cm3    Tissue Doppler Imaging  LV Peak Early Mancini Tissue Gordo; Lateral MA (TDI): 118 mm/s  LV Peak Early Mancini Tissue Gordo; Mean; Lateral MA (TDI): 92 mm/s  LV Peak Early Mancini Tissue Gordo; Medial MA (TDI): 84 4 mm/s  Left Ventricular Peak Early Diastolic Tissue Velocity; Mean; Mean value chosen; Medial Mitral Annulus; Tissue Doppler Imaging;: 75 1 mm/s    Unspecified Scan Mode  Peak Grad; Mean; Antegrade Flow: 4 mm[Hg]  Vmax; Antegrade Flow: 977 mm/s  Vmax; Mean; Antegrade Flow: 997 mm/s  LVOT Peak Grad; Mean: 3 mm[Hg]  LVOT Vmax: 784 mm/s  LVOT Vmax; Mean: 918 mm/s  MV Peak Gordo/LV Peak Tissue Gordo E-Wave; Lateral MA: 15 3  MV Peak Gordo/LV Peak Tissue Gordo E-Wave; Medial MA: 18 8  DT; Antegrade Flow: 222 ms  DT; Mean; Antegrade Flow: 191 ms  MV E Gordo: 1520 mm/s  MV Peak E Gordo; Mean;  Antegrade Flow: 1410 mm/s  Peak Grad; Mean; Regurgitant Flow: 26 mm[Hg]  Vmax; Mean; Regurgitant Flow: 2560 mm/s  Vmax; Regurgitant Flow: 2290 mm/s  Atrial Darby Area;: 2690 mm2  Atrial Darby Area; Mean; Mean value chosen;: 2690 mm2  Atrial Darby Distance;: 6 6 cm  Atrial Dabry Distance; Mean; Mean value chosen;: 6 6 cm  Atrial Darby Volume;: 91 3 cm3  Atrial Darby Volume; Mean; Mean value chosen;: 91 3 cm3  Distance;: 4 4 cm  Distance; Mean; Mean value chosen;: 4 4 cm  Distance; User chosen value;: 1 5 cm    [facility] Accredited Echocardi[facility]    Prepared and electronically signed by    Lola Martinez MD  Signed 06-Aug-2020 14:08:06         ======================================================    Lab Results   Component Value Date    WBC 12 30 (H) 08/07/2020    HGB 13 2 08/07/2020    HCT 41 2 08/07/2020    MCV 92 08/07/2020     08/07/2020      Lab Results   Component Value Date    SODIUM 137 08/07/2020    K 4 3 08/07/2020     08/07/2020    CO2 26 08/07/2020    BUN 19 08/07/2020    CREATININE 0 93 08/07/2020    GLUC 117 08/07/2020    CALCIUM 9 2 08/07/2020      Lab Results   Component Value Date    HGBA1C 7 1 (H) 08/06/2020      No results found for: CHOL  Lab Results   Component Value Date    HDL 49 08/06/2020    HDL 60 11/15/2018    HDL 56 05/09/2018     Lab Results   Component Value Date    LDLCALC 112 (H) 08/06/2020    LDLCALC 135 (H) 11/15/2018    LDLCALC 125 (H) 05/09/2018     Lab Results   Component Value Date    TRIG 142 08/06/2020    TRIG 81 11/15/2018    TRIG 103 05/09/2018     No results found for: [place], [place]   Lab Results   Component Value Date    INR 1 03 08/05/2020    INR 1 08 07/17/2018    PROTIME 13 3 08/05/2020    PROTIME 11 4 07/17/2018        Imaging:   I have personally reviewed pertinent reports  Portions of the record may have been created with voice recognition software   Occasional wrong word or "sound a like" substitutions may have occurred due to the inherent limitations of voice recognition software  Read the chart carefully and recognize, using context, where substitutions have occurred

## 2021-05-13 DIAGNOSIS — E11.9 TYPE 2 DIABETES MELLITUS WITHOUT COMPLICATION, WITHOUT LONG-TERM CURRENT USE OF INSULIN (HCC): ICD-10-CM

## 2021-05-18 DIAGNOSIS — E11.9 TYPE 2 DIABETES MELLITUS WITHOUT COMPLICATION, WITHOUT LONG-TERM CURRENT USE OF INSULIN (HCC): ICD-10-CM

## 2021-05-26 ENCOUNTER — APPOINTMENT (OUTPATIENT)
Dept: LAB | Facility: CLINIC | Age: 61
End: 2021-05-26
Payer: COMMERCIAL

## 2021-05-26 ENCOUNTER — OFFICE VISIT (OUTPATIENT)
Dept: FAMILY MEDICINE CLINIC | Facility: CLINIC | Age: 61
End: 2021-05-26

## 2021-05-26 ENCOUNTER — TELEPHONE (OUTPATIENT)
Dept: FAMILY MEDICINE CLINIC | Facility: CLINIC | Age: 61
End: 2021-05-26

## 2021-05-26 VITALS
DIASTOLIC BLOOD PRESSURE: 88 MMHG | OXYGEN SATURATION: 94 % | HEART RATE: 101 BPM | BODY MASS INDEX: 51.58 KG/M2 | WEIGHT: 280.3 LBS | TEMPERATURE: 97.9 F | RESPIRATION RATE: 24 BRPM | HEIGHT: 62 IN | SYSTOLIC BLOOD PRESSURE: 136 MMHG

## 2021-05-26 DIAGNOSIS — I10 BENIGN ESSENTIAL HYPERTENSION: ICD-10-CM

## 2021-05-26 DIAGNOSIS — E11.9 TYPE 2 DIABETES MELLITUS WITHOUT COMPLICATION, WITHOUT LONG-TERM CURRENT USE OF INSULIN (HCC): Primary | ICD-10-CM

## 2021-05-26 DIAGNOSIS — F41.9 ANXIETY: ICD-10-CM

## 2021-05-26 DIAGNOSIS — E66.01 MORBID OBESITY DUE TO EXCESS CALORIES (HCC): ICD-10-CM

## 2021-05-26 DIAGNOSIS — E11.9 TYPE 2 DIABETES MELLITUS WITHOUT COMPLICATION, WITHOUT LONG-TERM CURRENT USE OF INSULIN (HCC): ICD-10-CM

## 2021-05-26 DIAGNOSIS — Z11.4 ENCOUNTER FOR SCREENING FOR HIV: ICD-10-CM

## 2021-05-26 DIAGNOSIS — E11.8 TYPE 2 DIABETES MELLITUS WITH COMPLICATION (HCC): ICD-10-CM

## 2021-05-26 DIAGNOSIS — K70.30 ALCOHOLIC CIRRHOSIS OF LIVER WITHOUT ASCITES (HCC): ICD-10-CM

## 2021-05-26 LAB
25(OH)D3 SERPL-MCNC: 9.4 NG/ML (ref 30–100)
ALBUMIN SERPL BCP-MCNC: 3.7 G/DL (ref 3.5–5)
ALP SERPL-CCNC: 148 U/L (ref 46–116)
ALT SERPL W P-5'-P-CCNC: 20 U/L (ref 12–78)
ANION GAP SERPL CALCULATED.3IONS-SCNC: 4 MMOL/L (ref 4–13)
AST SERPL W P-5'-P-CCNC: 10 U/L (ref 5–45)
BASOPHILS # BLD AUTO: 0.08 THOUSANDS/ΜL (ref 0–0.1)
BASOPHILS NFR BLD AUTO: 1 % (ref 0–1)
BILIRUB SERPL-MCNC: 1.05 MG/DL (ref 0.2–1)
BUN SERPL-MCNC: 21 MG/DL (ref 5–25)
CALCIUM SERPL-MCNC: 10.1 MG/DL (ref 8.3–10.1)
CHLORIDE SERPL-SCNC: 100 MMOL/L (ref 100–108)
CHOLEST SERPL-MCNC: 139 MG/DL (ref 50–200)
CO2 SERPL-SCNC: 32 MMOL/L (ref 21–32)
CREAT SERPL-MCNC: 0.85 MG/DL (ref 0.6–1.3)
CREAT UR-MCNC: 166 MG/DL
EOSINOPHIL # BLD AUTO: 0.1 THOUSAND/ΜL (ref 0–0.61)
EOSINOPHIL NFR BLD AUTO: 1 % (ref 0–6)
ERYTHROCYTE [DISTWIDTH] IN BLOOD BY AUTOMATED COUNT: 13.2 % (ref 11.6–15.1)
GFR SERPL CREATININE-BSD FRML MDRD: 75 ML/MIN/1.73SQ M
GLUCOSE P FAST SERPL-MCNC: 211 MG/DL (ref 65–99)
HCT VFR BLD AUTO: 44.4 % (ref 34.8–46.1)
HDLC SERPL-MCNC: 47 MG/DL
HGB BLD-MCNC: 14.2 G/DL (ref 11.5–15.4)
IMM GRANULOCYTES # BLD AUTO: 0.09 THOUSAND/UL (ref 0–0.2)
IMM GRANULOCYTES NFR BLD AUTO: 1 % (ref 0–2)
LDLC SERPL CALC-MCNC: 72 MG/DL (ref 0–100)
LYMPHOCYTES # BLD AUTO: 1.87 THOUSANDS/ΜL (ref 0.6–4.47)
LYMPHOCYTES NFR BLD AUTO: 17 % (ref 14–44)
MCH RBC QN AUTO: 29.5 PG (ref 26.8–34.3)
MCHC RBC AUTO-ENTMCNC: 32 G/DL (ref 31.4–37.4)
MCV RBC AUTO: 92 FL (ref 82–98)
MICROALBUMIN UR-MCNC: 659 MG/L (ref 0–20)
MICROALBUMIN/CREAT 24H UR: 397 MG/G CREATININE (ref 0–30)
MONOCYTES # BLD AUTO: 0.67 THOUSAND/ΜL (ref 0.17–1.22)
MONOCYTES NFR BLD AUTO: 6 % (ref 4–12)
NEUTROPHILS # BLD AUTO: 8.27 THOUSANDS/ΜL (ref 1.85–7.62)
NEUTS SEG NFR BLD AUTO: 74 % (ref 43–75)
NONHDLC SERPL-MCNC: 92 MG/DL
NRBC BLD AUTO-RTO: 0 /100 WBCS
PLATELET # BLD AUTO: 280 THOUSANDS/UL (ref 149–390)
PMV BLD AUTO: 10.8 FL (ref 8.9–12.7)
POTASSIUM SERPL-SCNC: 5 MMOL/L (ref 3.5–5.3)
PROT SERPL-MCNC: 7.5 G/DL (ref 6.4–8.2)
RBC # BLD AUTO: 4.82 MILLION/UL (ref 3.81–5.12)
SL AMB POCT HEMOGLOBIN AIC: 9.3 (ref ?–6.5)
SODIUM SERPL-SCNC: 136 MMOL/L (ref 136–145)
TRIGL SERPL-MCNC: 101 MG/DL
TSH SERPL DL<=0.05 MIU/L-ACNC: 2.37 UIU/ML (ref 0.36–3.74)
WBC # BLD AUTO: 11.08 THOUSAND/UL (ref 4.31–10.16)

## 2021-05-26 PROCEDURE — 3075F SYST BP GE 130 - 139MM HG: CPT | Performed by: FAMILY MEDICINE

## 2021-05-26 PROCEDURE — 3062F POS MACROALBUMINURIA REV: CPT | Performed by: FAMILY MEDICINE

## 2021-05-26 PROCEDURE — 82043 UR ALBUMIN QUANTITATIVE: CPT

## 2021-05-26 PROCEDURE — 80061 LIPID PANEL: CPT

## 2021-05-26 PROCEDURE — 87389 HIV-1 AG W/HIV-1&-2 AB AG IA: CPT

## 2021-05-26 PROCEDURE — 3079F DIAST BP 80-89 MM HG: CPT | Performed by: FAMILY MEDICINE

## 2021-05-26 PROCEDURE — 80053 COMPREHEN METABOLIC PANEL: CPT

## 2021-05-26 PROCEDURE — 82306 VITAMIN D 25 HYDROXY: CPT

## 2021-05-26 PROCEDURE — 36415 COLL VENOUS BLD VENIPUNCTURE: CPT

## 2021-05-26 PROCEDURE — 83036 HEMOGLOBIN GLYCOSYLATED A1C: CPT | Performed by: FAMILY MEDICINE

## 2021-05-26 PROCEDURE — 85025 COMPLETE CBC W/AUTO DIFF WBC: CPT

## 2021-05-26 PROCEDURE — 82570 ASSAY OF URINE CREATININE: CPT

## 2021-05-26 PROCEDURE — 3008F BODY MASS INDEX DOCD: CPT | Performed by: FAMILY MEDICINE

## 2021-05-26 PROCEDURE — 84443 ASSAY THYROID STIM HORMONE: CPT

## 2021-05-26 PROCEDURE — 99214 OFFICE O/P EST MOD 30 MIN: CPT | Performed by: FAMILY MEDICINE

## 2021-05-26 PROCEDURE — 3046F HEMOGLOBIN A1C LEVEL >9.0%: CPT | Performed by: FAMILY MEDICINE

## 2021-05-26 RX ORDER — BUPROPION HYDROCHLORIDE 75 MG/1
75 TABLET ORAL 2 TIMES DAILY
Qty: 180 TABLET | Refills: 1 | Status: SHIPPED | OUTPATIENT
Start: 2021-05-26 | End: 2022-01-03

## 2021-05-26 NOTE — PROGRESS NOTES
Assessment/Plan:    No problem-specific Assessment & Plan notes found for this encounter  Diagnoses and all orders for this visit:    Type 2 diabetes mellitus without complication, without long-term current use of insulin (HCC)  -     POCT hemoglobin A1c  -     sitaGLIPtin-metFORMIN (JANUMET)  MG per tablet; Take 1 tablet by mouth 2 (two) times a day with meals  -     CBC and differential; Future  -     Comprehensive metabolic panel; Future  -     Lipid panel; Future  -     Microalbumin / creatinine urine ratio; Future  -     TSH, 3rd generation with Free T4 reflex; Future    Anxiety  -     buPROPion (WELLBUTRIN) 75 mg tablet; Take 1 tablet (75 mg total) by mouth 2 (two) times a day    Benign essential hypertension    Morbid obesity due to excess calories (HCC)  -     buPROPion (WELLBUTRIN) 75 mg tablet; Take 1 tablet (75 mg total) by mouth 2 (two) times a day  -     CBC and differential; Future  -     Comprehensive metabolic panel; Future  -     Lipid panel; Future  -     Microalbumin / creatinine urine ratio; Future  -     TSH, 3rd generation with Free T4 reflex; Future  -     Vitamin D 25 hydroxy; Future    Encounter for screening for HIV  -     HIV 1/2 Antigen/Antibody (4th Generation) w Reflex [facility]; Future    Alcoholic cirrhosis of liver without ascites ([facility] )    Type 2 diabetes mellitus with complication ([facility] )        Stop Metformin  Start Janumet   Subjective:      Patient ID: Jonny Mina is a 63 y o  female  63 y o female with multiple medical conditions, atrial fibrillation, DM with neuropathy, HTN, s/p CVA, depression here today for f/u  Has not been checking BG at home  Denies symptoms of hypoglycemia  Has increased thirst and increased urinary frequency  Admits has not been following any diet    Checks feet regularly  Currently concenred with anxiety, states feels constantly anxious which makes her snack more on chips and crunchy foods        The following portions of the patient's history were reviewed and updated as appropriate:   She  has a past medical history of Anxiety, Arthritis, Cirrhosis of liver ([facility] ), Diabetes mellitus ([place] [address] ), HLD (hyperlipidemia), Hypertension, Irregular heart beat, Psychiatric disorder, Stroke ([place] [address] ), and Tobacco dependence  She   Patient Active Problem List    Diagnosis Date Noted    Persistent atrial fibrillation ([place] [address] ) 03/09/2021    History of CVA (cerebrovascular accident) 08/05/2020    Snoring 02/14/2019    Nocturnal leg movements 02/14/2019    Class 3 severe obesity without serious comorbidity in adult [facility]) 11/15/2018    History of cardioversion 09/27/2018    QT prolongation 07/19/2018    PAF (paroxysmal atrial fibrillation) ([place] [address] ) 07/17/2018    Demand ischemia of myocardium ([place] [address] ) 07/17/2018    Pancreatitis 93/22/0071    Alcoholic cirrhosis of liver without ascites ([place] [address] ) 08/13/2012    Benign essential hypertension 08/13/2012    HLD (hyperlipidemia) 08/13/2012    Type II diabetes mellitus ([place] [address] ) 08/13/2012     She  has a past surgical history that includes Appendectomy; Carpal tunnel release; Cholecystectomy; Bunionectomy (Right); and IR stroke alert (8/5/2020)  Her family history includes Diabetes in her brother, father, and mother; Hypertension in her brother  She  reports that she has been smoking cigarettes  She has been smoking about 0 50 packs per day  She has never used smokeless tobacco  She reports current alcohol use  She reports that she does not use drugs    Current Outpatient Medications   Medication Sig Dispense Refill    apixaban (ELIQUIS) 5 mg Take 1 tablet (5 mg total) by mouth 2 (two) times a day 60 tablet 5    atorvastatin (LIPITOR) 80 mg tablet Take 1 tablet (80 mg total) by mouth every evening 30 tablet 11    lisinopril (ZESTRIL) 20 mg tablet Take 2 tablets (40 mg total) by mouth daily 60 tablet 11    Melatonin 10 MG TABS Take 30 mg by mouth daily at bedtime as needed      metoprolol tartrate (LOPRESSOR) 100 mg tablet Take 1 tablet (100 mg total) by mouth every 12 (twelve) hours 60 tablet 11    Accu-Chek Softclix Lancets lancets by Other route daily 100 each 5    Blood Glucose Monitoring Suppl (Accu-Chek Guide) w/Device KIT by Does not apply route daily 1 kit 0    Blood Glucose Monitoring Suppl (OneTouch Verio) w/Device KIT by Does not apply route daily 1 kit 0    buPROPion (WELLBUTRIN) 75 mg tablet Take 1 tablet (75 mg total) by mouth 2 (two) times a day 180 tablet 1    glucose blood (OneTouch Verio) test strip 1 each by Other route daily Use as instructed 100 each 3    glucose blood test strip 1 each by Other route daily Use as instructed 100 each 3    sitaGLIPtin-metFORMIN (JANUMET)  MG per tablet Take 1 tablet by mouth 2 (two) times a day with meals 180 tablet 1     No current facility-administered medications for this visit        Current Outpatient Medications on File Prior to Visit   Medication Sig    apixaban (ELIQUIS) 5 mg Take 1 tablet (5 mg total) by mouth 2 (two) times a day    atorvastatin (LIPITOR) 80 mg tablet Take 1 tablet (80 mg total) by mouth every evening    lisinopril (ZESTRIL) 20 mg tablet Take 2 tablets (40 mg total) by mouth daily    Melatonin 10 MG TABS Take 30 mg by mouth daily at bedtime as needed    metoprolol tartrate (LOPRESSOR) 100 mg tablet Take 1 tablet (100 mg total) by mouth every 12 (twelve) hours    [DISCONTINUED] metFORMIN (GLUCOPHAGE) 500 mg tablet TAKE ONE TABLET BY MOUTH TWICE DAILY WITH MEALS     Accu-Chek Softclix Lancets lancets by Other route daily    Blood Glucose Monitoring Suppl (Accu-Chek Guide) w/Device KIT by Does not apply route daily    Blood Glucose Monitoring Suppl (OneTouch Verio) w/Device KIT by Does not apply route daily    glucose blood (OneTouch Verio) test strip 1 each by Other route daily Use as instructed    glucose blood test strip 1 each by Other route daily Use as instructed    [DISCONTINUED] buPROPion (WELLBUTRIN) 75 mg tablet Take 1 tablet (75 mg total) by mouth 2 (two) times a day (Patient not taking: Reported on 8/5/2020)     No current facility-administered medications on file prior to visit       Review of Systems   Endocrine: Positive for polydipsia, polyphagia and polyuria  Psychiatric/Behavioral: Positive for sleep disturbance  The patient is nervous/anxious  All other systems reviewed and are negative  Objective:      /88 (BP Location: Right arm, Patient Position: Sitting, Cuff Size: Adult)   Pulse 101   Temp 97 9 °F (36 6 °C) (Temporal)   Resp (!) 24   Ht 5' 2" (1 575 m)   Wt 127 kg (280 lb 4 8 oz)   SpO2 94%   BMI 51 27 kg/m²          Physical Exam  Vitals signs and nursing note reviewed  Constitutional:       Appearance: She is well-developed  HENT:      Head: Normocephalic  Right Ear: External ear normal       Left Ear: External ear normal       Nose: Nose normal    Eyes:      Conjunctiva/sclera: Conjunctivae normal       Pupils: Pupils are equal, round, and reactive to light  Neck:      Musculoskeletal: Normal range of motion and neck supple  Thyroid: No thyromegaly  Cardiovascular:      Rate and Rhythm: Normal rate and regular rhythm  Heart sounds: Normal heart sounds  Pulmonary:      Effort: Pulmonary effort is normal       Breath sounds: Normal breath sounds  Abdominal:      Palpations: Abdomen is soft  Tenderness: There is no abdominal tenderness  There is no guarding or rebound  Musculoskeletal: Normal range of motion  Skin:     General: Skin is dry  Neurological:      Mental Status: She is alert and oriented to person, place, and time  Deep Tendon Reflexes: Reflexes are normal and symmetric

## 2021-05-26 NOTE — TELEPHONE ENCOUNTER
Tobi from the pharmacy asking if it is okay to dispense wellbutrin due to interactions it has with metropolol since it could cause her BP and HR to be lower than expected

## 2021-05-27 ENCOUNTER — TELEPHONE (OUTPATIENT)
Dept: FAMILY MEDICINE CLINIC | Facility: CLINIC | Age: 61
End: 2021-05-27

## 2021-05-27 LAB — HIV 1+2 AB+HIV1 P24 AG SERPL QL IA: NORMAL

## 2021-05-27 NOTE — TELEPHONE ENCOUNTER
Tobi from pharmacy called stating wellbutrin and metropolol can cause reactions in patient if taken together so he wanted to make sure it is correct before dispensing   Thank you

## 2021-06-01 ENCOUNTER — RA CDI HCC (OUTPATIENT)
Dept: OTHER | Facility: HOSPITAL | Age: 61
End: 2021-06-01

## 2021-06-01 NOTE — PROGRESS NOTES
[facility]  coding opportunities          Chart reviewed, no opportunity found: CHART REVIEWED, NO OPPORTUNITY FOUND              Patients insurance company: [facility] (Medicare Advantage and Commercial)

## 2021-06-04 NOTE — ANESTHESIA PREPROCEDURE EVALUATION
Procedure:  IR STROKE ALERT    Relevant Problems   CARDIO   (+) Benign essential hypertension   (+) HLD (hyperlipidemia)   (+) PAF (paroxysmal atrial fibrillation) (HCC)      ENDO   (+) Hypothyroidism   (+) Type II diabetes mellitus (HCC)      GI/HEPATIC   (+) Alcoholic cirrhosis (HCC)   (+) Pancreatitis      NEURO/PSYCH   (+) Anxiety   (+) CVA (cerebral vascular accident) ([address] )             Anesthesia Plan  ASA Score- 4 Emergent    Anesthesia Type- general with ASA Monitors  Additional Monitors:   Airway Plan: ETT  Plan Factors-    Induction- rapid sequence induction  Postoperative Plan-     Informed Consent-   I personally reviewed this patient with the CRNA  Discussed and agreed on the Anesthesia Plan with the CRNA  Ladan Beltran 10

## 2021-06-07 ENCOUNTER — TELEMEDICINE (OUTPATIENT)
Dept: FAMILY MEDICINE CLINIC | Facility: CLINIC | Age: 61
End: 2021-06-07

## 2021-06-07 DIAGNOSIS — I10 BENIGN ESSENTIAL HYPERTENSION: Primary | ICD-10-CM

## 2021-06-07 DIAGNOSIS — E55.9 VITAMIN D DEFICIENCY: ICD-10-CM

## 2021-06-07 DIAGNOSIS — F41.9 ANXIETY: ICD-10-CM

## 2021-06-07 PROCEDURE — 4004F PT TOBACCO SCREEN RCVD TLK: CPT | Performed by: FAMILY MEDICINE

## 2021-06-07 PROCEDURE — 99213 OFFICE O/P EST LOW 20 MIN: CPT | Performed by: FAMILY MEDICINE

## 2021-06-07 RX ORDER — ERGOCALCIFEROL 1.25 MG/1
50000 CAPSULE ORAL WEEKLY
Qty: 12 CAPSULE | Refills: 1 | Status: SHIPPED | OUTPATIENT
Start: 2021-06-07

## 2021-06-07 NOTE — PROGRESS NOTES
Virtual Brief Visit    Assessment/Plan:    Problem List Items Addressed This Visit        Cardiovascular and Mediastinum    Benign essential hypertension - Primary       Other    Vitamin D deficiency    Relevant Medications    ergocalciferol (VITAMIN D2) 50,000 units    Anxiety        BW done reviewed and discussed with patient  Urine microalbumin has considerably increased compared to prior, discussed it is probably due to her weight gain and increased blood sugar  FLP at goal for a diabetic patient  Vitamin D level considerably low at 9 4 restart ergocalciferol 50,000 IU once weekly             Reason for visit is No chief complaint on file  Encounter provider Karl Prieto MD    Provider located at [address] [address] [phone]    Recent Visits  No visits were found meeting these conditions  Showing recent visits within past 7 days and meeting all other requirements     Future Appointments  No visits were found meeting these conditions  Showing future appointments within next 150 days and meeting all other requirements        After connecting through [employer] and patient was informed that this is a secure, HIPAA-compliant platform  She agrees to proceed  , the patient was identified by name and date of birth  Madina Reynolds was informed that this is a telemedicine visit and that the visit is being conducted through [facility] and patient was informed that this is a secure, HIPAA-compliant platform  She agrees to proceed     My office door was closed  No one else was in the room  She acknowledged consent and understanding of privacy and security of the platform  The patient has agreed to participate and understands she can discontinue the visit at any time  Patient is aware this is a billable service       Subjective    Madina Reynolds is a 63 y o  female morbidly obese with DM, HTN and A fib, seen in the office 2 weeks ago,   Anish Igor was increased and Wellbutrin was restarted for her anxiety  She states she is doing better  Her energy has improved, her foot pain has decreased   Denies symptoms of hypo or hyperglycemia   HPI     Past Medical History:   Diagnosis Date    Anxiety     Arthritis     [facility] )     Diabetes mellitus ([place] [address] )     HLD (hyperlipidemia)     Hypertension     Irregular heart beat     Psychiatric disorder     Depression    Stroke ([place] [address] )     Tobacco dependence        Past Surgical History:   Procedure Laterality Date    APPENDECTOMY      BUNIONECTOMY Right     CARPAL TUNNEL RELEASE      CHOLECYSTECTOMY      IR STROKE ALERT  8/5/2020       Current Outpatient Medications   Medication Sig Dispense Refill    Accu-Chek Softclix Lancets lancets by Other route daily 100 each 5    apixaban (ELIQUIS) 5 mg Take 1 tablet (5 mg total) by mouth 2 (two) times a day 60 tablet 5    atorvastatin (LIPITOR) 80 mg tablet Take 1 tablet (80 mg total) by mouth every evening 30 tablet 11    Blood Glucose Monitoring Suppl (Accu-Chek Guide) w/Device KIT by Does not apply route daily 1 kit 0    Blood Glucose Monitoring Suppl (OneTouch Verio) w/Device KIT by Does not apply route daily 1 kit 0    buPROPion (WELLBUTRIN) 75 mg tablet Take 1 tablet (75 mg total) by mouth 2 (two) times a day 180 tablet 1    ergocalciferol (VITAMIN D2) 50,000 units Take 1 capsule (50,000 Units total) by mouth once a week 12 capsule 1    glucose blood (OneTouch Verio) test strip 1 each by Other route daily Use as instructed 100 each 3    glucose blood test strip 1 each by Other route daily Use as instructed 100 each 3    lisinopril (ZESTRIL) 20 mg tablet Take 2 tablets (40 mg total) by mouth daily 60 tablet 11    Melatonin 10 MG TABS Take 30 mg by mouth daily at bedtime as needed      metoprolol tartrate (LOPRESSOR) 100 mg tablet Take 1 tablet (100 mg total) by mouth every 12 (twelve) hours 60 tablet 11    sitaGLIPtin-metFORMIN (JANUMET)  MG per tablet Take 1 tablet by mouth 2 (two) times a day with meals 180 tablet 1     No current facility-administered medications for this visit  Allergies   Allergen Reactions    Augmentin [Amoxicillin-Pot Clavulanate]      Yeast infection       Review of Systems   Musculoskeletal: Positive for arthralgias  Psychiatric/Behavioral: The patient is nervous/anxious  All other systems reviewed and are negative  There were no vitals filed for this visit  I spent 15 minutes directly with the patient during this visit    VIRTUAL VISIT DISCLAIMER    Kathie Dacosta acknowledges that she has consented to an online visit or consultation  She understands that the online visit is based solely on information provided by her, and that, in the absence of a face-to-face physical evaluation by the physician, the diagnosis she receives is both limited and provisional in terms of accuracy and completeness  This is not intended to replace a full medical face-to-face evaluation by the physician  Kathie Dacosta understands and accepts these terms  It was my intent to perform this visit via video technology but the patient was not able to do a video connection so the visit was completed via audio telephone only

## 2021-07-27 ENCOUNTER — RA CDI HCC (OUTPATIENT)
Dept: OTHER | Facility: HOSPITAL | Age: 61
End: 2021-07-27

## 2021-07-27 NOTE — PROGRESS NOTES
[facility]  coding opportunities             Chart reviewed, (number of) suggestions sent to provider: 1     Problem listed updated  Provider Accepted, (number of) suggestions accepted: 1               Patients insurance company: [employer] (Medicare Advantage and [employer])           Re: Niko Manual    Based on clinical documentation indicated in the medical record, it appears that the patient may have the following condition:    E11 40 - Type 2 diabetes mellitus with diabetic neuropathy    This condition is documented in the subjective of the progress note dated 5/26/2021; however, it was not coded  If this is correct, please document, assess, and code at your next visit on 8/3/2021    [place] [address]  coding opportunities             Chart reviewed, (number of) suggestions sent to provider: 1                  Patients insurance company: [employer] (Medicare Advantage and [employer])

## 2021-08-03 ENCOUNTER — OFFICE VISIT (OUTPATIENT)
Dept: FAMILY MEDICINE CLINIC | Facility: CLINIC | Age: 61
End: 2021-08-03

## 2021-08-03 VITALS
HEART RATE: 103 BPM | BODY MASS INDEX: 50.33 KG/M2 | OXYGEN SATURATION: 97 % | HEIGHT: 62 IN | RESPIRATION RATE: 20 BRPM | WEIGHT: 273.5 LBS | SYSTOLIC BLOOD PRESSURE: 128 MMHG | TEMPERATURE: 97.7 F | DIASTOLIC BLOOD PRESSURE: 80 MMHG

## 2021-08-03 DIAGNOSIS — E11.9 TYPE 2 DIABETES MELLITUS WITHOUT COMPLICATION, WITHOUT LONG-TERM CURRENT USE OF INSULIN (HCC): ICD-10-CM

## 2021-08-03 DIAGNOSIS — E55.9 VITAMIN D DEFICIENCY: Primary | ICD-10-CM

## 2021-08-03 PROCEDURE — 99213 OFFICE O/P EST LOW 20 MIN: CPT | Performed by: FAMILY MEDICINE

## 2021-08-03 PROCEDURE — 3079F DIAST BP 80-89 MM HG: CPT | Performed by: FAMILY MEDICINE

## 2021-08-03 PROCEDURE — 4004F PT TOBACCO SCREEN RCVD TLK: CPT | Performed by: FAMILY MEDICINE

## 2021-08-03 PROCEDURE — 3074F SYST BP LT 130 MM HG: CPT | Performed by: FAMILY MEDICINE

## 2021-08-03 PROCEDURE — 3008F BODY MASS INDEX DOCD: CPT | Performed by: FAMILY MEDICINE

## 2021-08-03 NOTE — PROGRESS NOTES
Assessment/Plan:    No problem-specific Assessment & Plan notes found for this encounter  Diagnoses and all orders for this visit:    Vitamin D deficiency  -     Vitamin D 25 hydroxy; Future    Type 2 diabetes mellitus without complication, without long-term current use of insulin (HCC)  -     sitaGLIPtin-metFORMIN (JANUMET)  MG per tablet; Take 1 tablet by mouth 2 (two) times a day with meals  -     Hemoglobin A1C; Future  -     Vitamin D 25 hydroxy; Future  -     Comprehensive metabolic panel; Future          Subjective:      Patient ID: Min Guevara is a 63 y o  female  62 yo female with uncontrolled DM, here today for follow up  Denies symptoms of hypo or hyperglycemia  States her BG is "better"  Checks BG about twice a week, states FBG has been in the high 100 when she checks  Has been trying to follow a low carb diet  Checks feet weekly  Fell 2 months ago hurt her toes, now doing better  States she was going up the step at the front entrance of her niece's house, tripped and fell and hyperextended her toes  Had pain, swelling and bruising, which has since resolved  Hip: r hip pops, hurts, not radiated, improves with ibuprofen 5/10, worse with going up and down steps       The following portions of the patient's history were reviewed and updated as appropriate:   She  has a past medical history of Anxiety, Arthritis, Cirrhosis of liver ([address] ), Diabetes mellitus ([address] ), HLD (hyperlipidemia), Hypertension, Irregular heart beat, Psychiatric disorder, Stroke ([address] ), and Tobacco dependence    She   Patient Active Problem List    Diagnosis Date Noted    Vitamin D deficiency 06/07/2021    Anxiety 06/07/2021    Persistent atrial fibrillation ([address] ) 03/09/2021    History of CVA (cerebrovascular accident) 08/05/2020    Snoring 02/14/2019    Nocturnal leg movements 02/14/2019    Class 3 severe obesity without serious comorbidity in adult [facility]) 11/15/2018    History of cardioversion 09/27/2018    QT prolongation 07/19/2018    PAF (paroxysmal atrial fibrillation) ([place] [address] ) 07/17/2018    Demand ischemia of myocardium ([place] [address] ) 07/17/2018    Pancreatitis 26/08/2164    Alcoholic cirrhosis of liver without ascites ([place] [address] ) 08/13/2012    Benign essential hypertension 08/13/2012    HLD (hyperlipidemia) 08/13/2012    Type 2 diabetes mellitus with diabetic neuropathy ([place] [address] ) 08/13/2012     She  has a past surgical history that includes Appendectomy; Carpal tunnel release; Cholecystectomy; Bunionectomy (Right); and IR stroke alert (8/5/2020)  Her family history includes Diabetes in her brother, father, and mother; Hypertension in her brother  She  reports that she has been smoking cigarettes  She has been smoking about 0 50 packs per day  She has never used smokeless tobacco  She reports current alcohol use  She reports that she does not use drugs    Current Outpatient Medications   Medication Sig Dispense Refill    apixaban (ELIQUIS) 5 mg Take 1 tablet (5 mg total) by mouth 2 (two) times a day 60 tablet 5    atorvastatin (LIPITOR) 80 mg tablet Take 1 tablet (80 mg total) by mouth every evening 30 tablet 11    buPROPion (WELLBUTRIN) 75 mg tablet Take 1 tablet (75 mg total) by mouth 2 (two) times a day 180 tablet 1    ergocalciferol (VITAMIN D2) 50,000 units Take 1 capsule (50,000 Units total) by mouth once a week 12 capsule 1    lisinopril (ZESTRIL) 20 mg tablet Take 2 tablets (40 mg total) by mouth daily 60 tablet 11    Melatonin 10 MG TABS Take 30 mg by mouth daily at bedtime as needed      metoprolol tartrate (LOPRESSOR) 100 mg tablet Take 1 tablet (100 mg total) by mouth every 12 (twelve) hours 60 tablet 11    sitaGLIPtin-metFORMIN (JANUMET)  MG per tablet Take 1 tablet by mouth 2 (two) times a day with meals 180 tablet 1    Accu-Chek Softclix Lancets lancets by Other route daily 100 each 5    Blood Glucose Monitoring Suppl (Accu-Chek Guide) w/Device KIT by Does not apply route daily 1 kit 0    Blood Glucose Monitoring Suppl (OneTouch Verio) w/Device KIT by Does not apply route daily 1 kit 0    glucose blood (OneTouch Verio) test strip 1 each by Other route daily Use as instructed 100 each 3    glucose blood test strip 1 each by Other route daily Use as instructed 100 each 3     No current facility-administered medications for this visit  Current Outpatient Medications on File Prior to Visit   Medication Sig    apixaban (ELIQUIS) 5 mg Take 1 tablet (5 mg total) by mouth 2 (two) times a day    atorvastatin (LIPITOR) 80 mg tablet Take 1 tablet (80 mg total) by mouth every evening    buPROPion (WELLBUTRIN) 75 mg tablet Take 1 tablet (75 mg total) by mouth 2 (two) times a day    ergocalciferol (VITAMIN D2) 50,000 units Take 1 capsule (50,000 Units total) by mouth once a week    lisinopril (ZESTRIL) 20 mg tablet Take 2 tablets (40 mg total) by mouth daily    Melatonin 10 MG TABS Take 30 mg by mouth daily at bedtime as needed    metoprolol tartrate (LOPRESSOR) 100 mg tablet Take 1 tablet (100 mg total) by mouth every 12 (twelve) hours    [DISCONTINUED] sitaGLIPtin-metFORMIN (JANUMET)  MG per tablet Take 1 tablet by mouth 2 (two) times a day with meals    Accu-Chek Softclix Lancets lancets by Other route daily    Blood Glucose Monitoring Suppl (Accu-Chek Guide) w/Device KIT by Does not apply route daily    Blood Glucose Monitoring Suppl (OneTouch Verio) w/Device KIT by Does not apply route daily    glucose blood (OneTouch Verio) test strip 1 each by Other route daily Use as instructed    glucose blood test strip 1 each by Other route daily Use as instructed     No current facility-administered medications on file prior to visit       Review of Systems   Musculoskeletal: Positive for arthralgias (as per HPI)  All other systems reviewed and are negative          Objective:      /80 (BP Location: Left arm, Patient Position: Sitting, Cuff Size: Adult)   Pulse 103   Temp 97 7 °F (36 5 °C) (Temporal)   Resp 20   Ht 5' 2" (1 575 m)   Wt 124 kg (273 lb 8 oz)   SpO2 97%   BMI 50 02 kg/m²          Physical Exam  Vitals and nursing note reviewed  Constitutional:       Appearance: She is well-developed  HENT:      Head: Normocephalic  Right Ear: External ear normal       Left Ear: External ear normal       Nose: Nose normal    Eyes:      Conjunctiva/sclera: Conjunctivae normal       Pupils: Pupils are equal, round, and reactive to light  Neck:      Thyroid: No thyromegaly  Cardiovascular:      Rate and Rhythm: Normal rate and regular rhythm  Heart sounds: Normal heart sounds  Pulmonary:      Effort: Pulmonary effort is normal       Breath sounds: Normal breath sounds  Abdominal:      Palpations: Abdomen is soft  Tenderness: There is no abdominal tenderness  There is no guarding or rebound  Musculoskeletal:         General: Normal range of motion  Cervical back: Normal range of motion and neck supple  Skin:     General: Skin is dry  Neurological:      Mental Status: She is alert and oriented to person, place, and time  Deep Tendon Reflexes: Reflexes are normal and symmetric

## 2021-09-29 DIAGNOSIS — I48.91 NEW ONSET ATRIAL FIBRILLATION (HCC): ICD-10-CM

## 2021-09-30 ENCOUNTER — OFFICE VISIT (OUTPATIENT)
Dept: CARDIOLOGY CLINIC | Facility: CLINIC | Age: 61
End: 2021-09-30
Payer: COMMERCIAL

## 2021-09-30 VITALS
WEIGHT: 274 LBS | HEART RATE: 94 BPM | DIASTOLIC BLOOD PRESSURE: 86 MMHG | BODY MASS INDEX: 50.12 KG/M2 | SYSTOLIC BLOOD PRESSURE: 114 MMHG

## 2021-09-30 DIAGNOSIS — E78.2 MIXED HYPERLIPIDEMIA: ICD-10-CM

## 2021-09-30 DIAGNOSIS — I48.19 PERSISTENT ATRIAL FIBRILLATION (HCC): Primary | ICD-10-CM

## 2021-09-30 DIAGNOSIS — E11.40 TYPE 2 DIABETES MELLITUS WITH DIABETIC NEUROPATHY, WITHOUT LONG-TERM CURRENT USE OF INSULIN (HCC): ICD-10-CM

## 2021-09-30 DIAGNOSIS — E66.01 CLASS 3 SEVERE OBESITY DUE TO EXCESS CALORIES WITHOUT SERIOUS COMORBIDITY WITH BODY MASS INDEX (BMI) OF 40.0 TO 44.9 IN ADULT (HCC): ICD-10-CM

## 2021-09-30 DIAGNOSIS — R94.31 QT PROLONGATION: ICD-10-CM

## 2021-09-30 DIAGNOSIS — I10 BENIGN ESSENTIAL HYPERTENSION: ICD-10-CM

## 2021-09-30 DIAGNOSIS — Z86.73 HISTORY OF CVA (CEREBROVASCULAR ACCIDENT): ICD-10-CM

## 2021-09-30 PROCEDURE — 99214 OFFICE O/P EST MOD 30 MIN: CPT | Performed by: INTERNAL MEDICINE

## 2021-09-30 PROCEDURE — 93000 ELECTROCARDIOGRAM COMPLETE: CPT | Performed by: INTERNAL MEDICINE

## 2021-09-30 RX ORDER — APIXABAN 5 MG/1
TABLET, FILM COATED ORAL
Qty: 60 TABLET | Refills: 11 | Status: SHIPPED | OUTPATIENT
Start: 2021-09-30 | End: 2021-10-17 | Stop reason: SDUPTHER

## 2021-09-30 NOTE — PROGRESS NOTES
[facility]  [id number] [facility]   [phone]  [phone]  Phone#  [phone]   Fax#  [phone]  *-*-*-*-*-*-*-*-*-*-*-*-*-*-*-*-*-*-*-*-*-*-*-*-*-*-*-*-*-*-*-*-*-*-*-*-*-*-*-*-*-*-*-*-*-*-*-*-*-*-*-*-*-*                                   Cardiology Follow Up      ENCOUNTER DATE: 21 10:15 AM  PATIENT NAME: Mack Jacques   : 1960    MRN: [id number]  AGE:61 y o  SEX: female  1986 Shivani North MD     PRIMARY CARE PHYSICIAN: Sri Rich MD    ACTIVE DIAGNOSIS THIS VISIT  1  Persistent atrial fibrillation (HCC)  POCT ECG   2  Mixed hyperlipidemia     3  Benign essential hypertension     4  Type 2 diabetes mellitus with diabetic neuropathy, without long-term current use of insulin (HCC)     5  QT prolongation     6  Class 3 severe obesity due to excess calories without serious comorbidity with body mass index (BMI) of 40 0 to 44 9 in adult ([facility] )     7  History of CVA (cerebrovascular accident)       ACTIVE PROBLEM LIST  Patient Active Problem List   Diagnosis    [facility] of [facility] without ascites [facility]    Benign essential hypertension    HLD (hyperlipidemia)    Pancreatitis    Type 2 diabetes mellitus with diabetic neuropathy (HCC)    Demand ischemia of myocardium (HCC)    QT prolongation    History of cardioversion    Class 3 severe obesity without serious comorbidity in adult ([facility] )    Snoring    Nocturnal leg movements    History of CVA (cerebrovascular accident)    Persistent [facility] fibrillation ([facility]    Vitamin D deficiency    Anxiety       CARDIOLOGY SPECIALTY COMMENTS  Patient was 1st seen in the hospital where she presented with new onset atrial fibrillation with a rapid ventricular response  She had not been anticoagulated  She was placed on Eliquis and rate control was marginally achieved with metoprolol 100 mg b i d  One month later she was cardioverted on Eliquis and placed on Multaq       In 2018 Discussed with patient that there is an increasing amount of evidence that recurrence of atrial fibrillation and ability to control atrial fibrillation is weight related  If patient's lose weight, they usually stay in sinus rhythm  If they gain weight, atrial fibrillation has a higher likelihood of recurring and a higher likelihood of having difficulty with control  INTERVAL HISTORY:          Patient has no cardiac complaints  Patient denies chest discomfort or shortness of breath  Patient has no palpitations  Patient denies symptoms of dizziness, lightheadedness or near-syncope/syncope  Patient denies leg edema  Patient denies symptoms of orthopnea or paroxysmal nocturnal dyspnea  DISCUSSION/PLAN:            1  Return in 6 months   2   EKG on return    Lab Studies:    Lab Results   Component Value Date    CHOLESTEROL 139 05/26/2021    CHOLESTEROL 189 08/06/2020    CHOLESTEROL 211 (H) 11/15/2018     Lab Results   Component Value Date    TRIG 101 05/26/2021    TRIG 142 08/06/2020    TRIG 81 11/15/2018     Lab Results   Component Value Date    HDL 47 05/26/2021    HDL 49 08/06/2020    HDL 60 11/15/2018     Lab Results   Component Value Date    LDLCALC 72 05/26/2021    LDLCALC 112 (H) 08/06/2020    LDLCALC 135 (H) 11/15/2018       Lab Results   Component Value Date    HGBA1C 9 3 (A) 05/26/2021      Lab Results   Component Value Date    EGFR 75 05/26/2021    EGFR 68 08/07/2020    EGFR 83 08/06/2020    SODIUM 136 05/26/2021    SODIUM 137 08/07/2020    SODIUM 143 08/06/2020    K 5 0 05/26/2021    K 4 3 08/07/2020    K 4 8 08/07/2020     05/26/2021     08/07/2020     (H) 08/06/2020    CO2 32 05/26/2021    CO2 26 08/07/2020    CO2 28 08/06/2020    BUN 21 05/26/2021    BUN 19 08/07/2020    BUN 17 08/06/2020    CREATININE 0 85 05/26/2021    CREATININE 0 93 08/07/2020    CREATININE 0 79 08/06/2020     Lab Results   Component Value Date    WBC 11 08 (H) 05/26/2021    WBC 12 30 (H) 08/07/2020    WBC 12 66 (H) 08/06/2020    HGB 14 2 05/26/2021    HGB 13 2 08/07/2020    HGB 13 1 08/06/2020    HCT 44 4 05/26/2021    HCT 41 2 08/07/2020    HCT 40 6 08/06/2020    MCV 92 05/26/2021    MCV 92 08/07/2020    MCV 92 08/06/2020    MCH 29 5 05/26/2021    MCH 29 5 08/07/2020    MCH 29 6 08/06/2020    MCHC 32 0 05/26/2021    MCHC 32 0 08/07/2020    MCHC 32 3 08/06/2020     05/26/2021     08/07/2020     08/06/2020      Lab Results   Component Value Date    CALCIUM 10 1 05/26/2021    CALCIUM 9 2 08/07/2020    CALCIUM 8 2 (L) 08/06/2020    AST 10 05/26/2021    AST 13 11/15/2018    AST 35 07/18/2018    ALT 20 05/26/2021    ALT 28 11/15/2018    ALT 33 07/18/2018    ALKPHOS 148 (H) 05/26/2021    ALKPHOS 95 11/15/2018    ALKPHOS 82 07/18/2018    MG 2 4 08/07/2020    MG 1 8 08/06/2020    MG 1 7 07/20/2018       Lab Results   Component Value Date    TROPONINI <0 02 08/05/2020    TROPONINI 0 07 (H) 07/17/2018    TROPONINI 0 07 (H) 07/17/2018     Results for orders placed or performed in visit on 09/30/21   POCT ECG    Narrative      Atrial fibrillation with a tendency towards a rapid ventricular response of 94 beats per minute  Poor R-wave progression  Nonspecific ST T wave changes  Abnormal EKG           Current Outpatient Medications:     Accu-Chek Softclix Lancets lancets, by Other route daily, Disp: 100 each, Rfl: 5    apixaban (ELIQUIS) 5 mg, Take 1 tablet (5 mg total) by mouth 2 (two) times a day, Disp: 60 tablet, Rfl: 5    atorvastatin (LIPITOR) 80 mg tablet, Take 1 tablet (80 mg total) by mouth every evening, Disp: 30 tablet, Rfl: 11    Blood Glucose Monitoring Suppl (Accu-Chek Guide) w/Device KIT, by Does not apply route daily, Disp: 1 kit, Rfl: 0    Blood Glucose Monitoring Suppl (OneTouch Verio) w/Device KIT, by Does not apply route daily, Disp: 1 kit, Rfl: 0    buPROPion (WELLBUTRIN) 75 mg tablet, Take 1 tablet (75 mg total) by mouth 2 (two) times a day, Disp: 180 tablet, Rfl: 1    ergocalciferol (VITAMIN D2) 50,000 units, Take 1 capsule (50,000 Units total) by mouth once a week, Disp: 12 capsule, Rfl: 1    glucose blood (OneTouch Verio) test strip, 1 each by Other route daily Use as instructed, Disp: 100 each, Rfl: 3    glucose blood test strip, 1 each by Other route daily Use as instructed, Disp: 100 each, Rfl: 3    lisinopril (ZESTRIL) 20 mg tablet, Take 2 tablets (40 mg total) by mouth daily, Disp: 60 tablet, Rfl: 11    Melatonin 10 MG TABS, Take 30 mg by mouth daily at bedtime as needed, Disp: , Rfl:     metoprolol tartrate (LOPRESSOR) 100 mg tablet, Take 1 tablet (100 mg total) by mouth every 12 (twelve) hours, Disp: 60 tablet, Rfl: 11    sitaGLIPtin-metFORMIN (JANUMET)  MG per tablet, Take 1 tablet by mouth 2 (two) times a day with meals, Disp: 180 tablet, Rfl: 1  Allergies   Allergen Reactions    Augmentin [Amoxicillin-Pot Clavulanate]      Yeast infection       Past Medical History:   Diagnosis Date    Anxiety     Arthritis     Cirrhosis of liver (HCC)     Diabetes mellitus ([facility] )     HLD (hyperlipidemia)     Hypertension     Irregular heart beat     Psychiatric disorder     Depression    Stroke ([place] [id number] )     Tobacco dependence      Social History     Socioeconomic History    Marital status: Single     Spouse name: Not on file    Number of children: Not on file    Years of education: Not on file    Highest education level: Not on file   Occupational History    Not on file   Tobacco Use    Smoking status: Current Every Day Smoker     Packs/day: 0 50     Types: Cigarettes    Smokeless tobacco: Never Used   Vaping Use    Vaping Use: Never used   Substance and Sexual Activity    Alcohol use: Yes     Comment: 1 botttle of vodka over 4 days/denies since 7/18    Drug use: No    Sexual activity: Not Currently   Other Topics Concern    Not on file   Social History Narrative    Not on file     Social Determinants of Health     Financial Resource Strain:     Difficulty of Paying Living Expenses: Food Insecurity:     Worried About Running Out of Food in the Last Year:     [address] in the Last Year:    Transportation Needs:     Lack of Transportation (Medical):  Lack of Transportation (Non-Medical):    Physical Activity:     Days of Exercise per Week:     Minutes of Exercise per Session:    Stress:     Feeling of Stress :    Social Connections:     Frequency of Communication with Friends and Family:     Frequency of Social Gatherings with Friends and Family:     Attends Church Services:     Active Member of Clubs or Organizations:     Attends Club or Organization Meetings:     Marital Status:    Intimate Partner Violence:     Fear of Current or Ex-Partner:     Emotionally Abused:     Physically Abused:     Sexually Abused:       Family History   Problem Relation Age of Onset    Diabetes Mother     Diabetes Father     Diabetes Brother     Hypertension Brother      Past Surgical History:   Procedure Laterality Date    APPENDECTOMY      BUNIONECTOMY Right     CARPAL TUNNEL RELEASE      CHOLECYSTECTOMY      IR STROKE ALERT  8/5/2020       PREVIOUS WEIGHTS:   Wt Readings from Last 10 Encounters:   09/30/21 124 kg (274 lb)   08/03/21 124 kg (273 lb 8 oz)   05/26/21 127 kg (280 lb 4 8 oz)   03/10/21 123 kg (270 lb 6 4 oz)   10/05/20 117 kg (259 lb)   09/18/20 115 kg (254 lb 3 2 oz)   09/08/20 117 kg (258 lb 6 4 oz)   08/06/20 119 kg (262 lb 5 6 oz)   08/05/20 119 kg (262 lb 12 6 oz)   08/05/20 114 kg (251 lb 12 3 oz)        Review of Systems:  Review of Systems   Respiratory: Negative for cough, choking, chest tightness, shortness of breath and wheezing  Cardiovascular: Negative for chest pain, palpitations and leg swelling  Musculoskeletal: Negative for gait problem  Skin: Negative for rash  Neurological: Negative for dizziness, tremors, syncope, weakness, light-headedness, numbness and headaches     Psychiatric/Behavioral: Negative for agitation and behavioral problems  The patient is not hyperactive  Physical Exam:  /86 (BP Location: Right arm, Patient Position: Sitting, Cuff Size: Large)   Pulse 94   Wt 124 kg (274 lb)   BMI 50 12 kg/m²     Physical Exam  Constitutional:       General: She is not in acute distress  Appearance: She is well-developed  She is obese  HENT:      Head: Normocephalic and atraumatic  Neck:      Thyroid: No thyromegaly  Vascular: No carotid bruit or JVD  Trachea: No tracheal deviation  Cardiovascular:      Rate and Rhythm: Normal rate  Rhythm irregular  Pulses: Normal pulses  Heart sounds: Normal heart sounds  No murmur heard  No friction rub  No gallop  Pulmonary:      Effort: Pulmonary effort is normal  No respiratory distress  Breath sounds: Normal breath sounds  No wheezing, rhonchi or rales  Chest:      Chest wall: No tenderness  Musculoskeletal:         General: Normal range of motion  Cervical back: Normal range of motion and neck supple  Right lower leg: No edema  Left lower leg: No edema  Skin:     General: Skin is warm and dry  Neurological:      General: No focal deficit present  Mental Status: She is alert and oriented to person, place, and time  Psychiatric:         Mood and Affect: Mood normal          Behavior: Behavior normal          Thought Content: Thought content normal          Judgment: Judgment normal        ======================================================  Imaging:   I have personally reviewed pertinent reports  Portions of the record may have been created with voice recognition software  Occasional wrong word or "sound a like" substitutions may have occurred due to the inherent limitations of voice recognition software  Read the chart carefully and recognize, using context, where substitutions have occurred      SIGNATURES:   Zain Knowles MD

## 2021-10-17 DIAGNOSIS — I48.91 NEW ONSET ATRIAL FIBRILLATION (HCC): ICD-10-CM

## 2021-10-17 DIAGNOSIS — E78.2 MIXED HYPERLIPIDEMIA: ICD-10-CM

## 2021-10-17 DIAGNOSIS — I10 BENIGN ESSENTIAL HYPERTENSION: ICD-10-CM

## 2021-10-17 PROCEDURE — 4010F ACE/ARB THERAPY RXD/TAKEN: CPT | Performed by: INTERNAL MEDICINE

## 2021-10-17 RX ORDER — LISINOPRIL 40 MG/1
40 TABLET ORAL DAILY
Qty: 90 TABLET | Refills: 3 | Status: SHIPPED | OUTPATIENT
Start: 2021-10-17

## 2021-10-17 RX ORDER — ATORVASTATIN CALCIUM 80 MG/1
80 TABLET, FILM COATED ORAL EVERY EVENING
Qty: 90 TABLET | Refills: 3 | Status: SHIPPED | OUTPATIENT
Start: 2021-10-17

## 2021-10-20 DIAGNOSIS — E11.9 TYPE 2 DIABETES MELLITUS WITHOUT COMPLICATION, WITHOUT LONG-TERM CURRENT USE OF INSULIN (HCC): ICD-10-CM

## 2021-10-20 RX ORDER — SITAGLIPTIN AND METFORMIN HYDROCHLORIDE 1000; 50 MG/1; MG/1
TABLET, FILM COATED ORAL
Qty: 180 TABLET | Refills: 1 | Status: SHIPPED | OUTPATIENT
Start: 2021-10-20 | End: 2022-02-01 | Stop reason: SDUPTHER

## 2021-10-28 ENCOUNTER — APPOINTMENT (OUTPATIENT)
Dept: LAB | Facility: HOSPITAL | Age: 61
End: 2021-10-28
Payer: COMMERCIAL

## 2021-10-28 DIAGNOSIS — E11.9 TYPE 2 DIABETES MELLITUS WITHOUT COMPLICATION, WITHOUT LONG-TERM CURRENT USE OF INSULIN (HCC): ICD-10-CM

## 2021-10-28 DIAGNOSIS — E55.9 VITAMIN D DEFICIENCY: ICD-10-CM

## 2021-10-28 LAB
25(OH)D3 SERPL-MCNC: 73.1 NG/ML (ref 30–100)
ALBUMIN SERPL BCP-MCNC: 3.8 G/DL (ref 3.5–5)
ALP SERPL-CCNC: 127 U/L (ref 46–116)
ALT SERPL W P-5'-P-CCNC: 22 U/L (ref 12–78)
ANION GAP SERPL CALCULATED.3IONS-SCNC: 11 MMOL/L (ref 4–13)
AST SERPL W P-5'-P-CCNC: 13 U/L (ref 5–45)
BILIRUB SERPL-MCNC: 0.95 MG/DL (ref 0.2–1)
BUN SERPL-MCNC: 21 MG/DL (ref 5–25)
CALCIUM SERPL-MCNC: 9.6 MG/DL (ref 8.3–10.1)
CHLORIDE SERPL-SCNC: 102 MMOL/L (ref 100–108)
CO2 SERPL-SCNC: 26 MMOL/L (ref 21–32)
CREAT SERPL-MCNC: 1.14 MG/DL (ref 0.6–1.3)
EST. AVERAGE GLUCOSE BLD GHB EST-MCNC: 146 MG/DL
GFR SERPL CREATININE-BSD FRML MDRD: 53 ML/MIN/1.73SQ M
GLUCOSE SERPL-MCNC: 107 MG/DL (ref 65–140)
HBA1C MFR BLD: 6.7 %
POTASSIUM SERPL-SCNC: 5 MMOL/L (ref 3.5–5.3)
PROT SERPL-MCNC: 7.5 G/DL (ref 6.4–8.2)
SODIUM SERPL-SCNC: 139 MMOL/L (ref 136–145)

## 2021-10-28 PROCEDURE — 80053 COMPREHEN METABOLIC PANEL: CPT

## 2021-10-28 PROCEDURE — 82306 VITAMIN D 25 HYDROXY: CPT

## 2021-10-28 PROCEDURE — 36415 COLL VENOUS BLD VENIPUNCTURE: CPT

## 2021-10-28 PROCEDURE — 83036 HEMOGLOBIN GLYCOSYLATED A1C: CPT

## 2021-10-28 PROCEDURE — 3044F HG A1C LEVEL LT 7.0%: CPT | Performed by: INTERNAL MEDICINE

## 2021-11-08 ENCOUNTER — TELEMEDICINE (OUTPATIENT)
Dept: FAMILY MEDICINE CLINIC | Facility: CLINIC | Age: 61
End: 2021-11-08

## 2021-11-08 DIAGNOSIS — N18.31 STAGE 3A CHRONIC KIDNEY DISEASE (HCC): Primary | ICD-10-CM

## 2021-11-08 PROCEDURE — 4004F PT TOBACCO SCREEN RCVD TLK: CPT | Performed by: FAMILY MEDICINE

## 2021-11-08 PROCEDURE — 99214 OFFICE O/P EST MOD 30 MIN: CPT | Performed by: FAMILY MEDICINE

## 2021-12-10 ENCOUNTER — APPOINTMENT (OUTPATIENT)
Dept: LAB | Facility: HOSPITAL | Age: 61
End: 2021-12-10
Payer: COMMERCIAL

## 2021-12-10 DIAGNOSIS — N18.31 STAGE 3A CHRONIC KIDNEY DISEASE (HCC): ICD-10-CM

## 2021-12-10 LAB
ANION GAP SERPL CALCULATED.3IONS-SCNC: 11 MMOL/L (ref 4–13)
BUN SERPL-MCNC: 21 MG/DL (ref 5–25)
CALCIUM SERPL-MCNC: 9.6 MG/DL (ref 8.3–10.1)
CHLORIDE SERPL-SCNC: 102 MMOL/L (ref 100–108)
CO2 SERPL-SCNC: 26 MMOL/L (ref 21–32)
CREAT SERPL-MCNC: 1.22 MG/DL (ref 0.6–1.3)
GFR SERPL CREATININE-BSD FRML MDRD: 49 ML/MIN/1.73SQ M
GLUCOSE SERPL-MCNC: 96 MG/DL (ref 65–140)
POTASSIUM SERPL-SCNC: 4.9 MMOL/L (ref 3.5–5.3)
SODIUM SERPL-SCNC: 139 MMOL/L (ref 136–145)

## 2021-12-10 PROCEDURE — 36415 COLL VENOUS BLD VENIPUNCTURE: CPT

## 2021-12-10 PROCEDURE — 80048 BASIC METABOLIC PNL TOTAL CA: CPT

## 2021-12-19 NOTE — ASSESSMENT & PLAN NOTE
-Patient currently alert oriented x3 and no focal neurological deficits on exam   Etiology likely cardioembolic is with history of AFib and noncompliance with medications   -Postop day 1 mechanical thrombectomy, was not a candidate for tPA  -Repeat MRI findings consistent with subacute nonhemorrhagic infarct in the left thalamus suspected bilateral punctate foci of restricted diffusion in the cerebellum suspicious of small embolic infarct    -Eliquis restarted today per neurology recommendations  Patient admits noncompliance with Eliquis which likely cause of stroke it is not a failure of Eliquis so that is why the decision was made to restart Eliquis and not change anticoagulation   -Continue atorvastatin  -Will continue to [facility] on telemetry  -Q 4 hours neuro checks  -smoking cessation counseling at discharge  -Blood pressure goal of 140-160 for 24-48 hours    -PT/OT no

## 2021-12-23 ENCOUNTER — TELEPHONE (OUTPATIENT)
Dept: FAMILY MEDICINE CLINIC | Facility: CLINIC | Age: 61
End: 2021-12-23

## 2022-02-01 ENCOUNTER — OFFICE VISIT (OUTPATIENT)
Dept: FAMILY MEDICINE CLINIC | Facility: CLINIC | Age: 62
End: 2022-02-01

## 2022-02-01 VITALS
WEIGHT: 275 LBS | RESPIRATION RATE: 19 BRPM | BODY MASS INDEX: 50.3 KG/M2 | OXYGEN SATURATION: 98 % | DIASTOLIC BLOOD PRESSURE: 82 MMHG | SYSTOLIC BLOOD PRESSURE: 132 MMHG | TEMPERATURE: 97.8 F | HEART RATE: 100 BPM

## 2022-02-01 DIAGNOSIS — Z11.59 NEED FOR HEPATITIS C SCREENING TEST: ICD-10-CM

## 2022-02-01 DIAGNOSIS — Z12.4 SCREENING FOR CERVICAL CANCER: ICD-10-CM

## 2022-02-01 DIAGNOSIS — E11.9 DIABETIC EYE EXAM (HCC): ICD-10-CM

## 2022-02-01 DIAGNOSIS — E11.40 TYPE 2 DIABETES MELLITUS WITH DIABETIC NEUROPATHY, UNSPECIFIED WHETHER LONG TERM INSULIN USE (HCC): ICD-10-CM

## 2022-02-01 DIAGNOSIS — Z00.00 ANNUAL PHYSICAL EXAM: ICD-10-CM

## 2022-02-01 DIAGNOSIS — E66.01 MORBID OBESITY WITH BMI OF 50.0-59.9, ADULT (HCC): ICD-10-CM
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
## 2022-02-01 DIAGNOSIS — Z01.00 DIABETIC EYE EXAM (HCC): ICD-10-CM

## 2022-02-01 DIAGNOSIS — E11.9 TYPE 2 DIABETES MELLITUS WITHOUT COMPLICATION, WITHOUT LONG-TERM CURRENT USE OF INSULIN (HCC): Primary | ICD-10-CM

## 2022-02-01 LAB — SL AMB POCT HEMOGLOBIN AIC: 6.4 (ref ?–6.5)

## 2022-02-01 PROCEDURE — 83036 HEMOGLOBIN GLYCOSYLATED A1C: CPT | Performed by: FAMILY MEDICINE

## 2022-02-01 PROCEDURE — 3079F DIAST BP 80-89 MM HG: CPT | Performed by: FAMILY MEDICINE

## 2022-02-01 PROCEDURE — 3044F HG A1C LEVEL LT 7.0%: CPT | Performed by: FAMILY MEDICINE

## 2022-02-01 PROCEDURE — 3075F SYST BP GE 130 - 139MM HG: CPT | Performed by: FAMILY MEDICINE

## 2022-02-01 PROCEDURE — 99396 PREV VISIT EST AGE 40-64: CPT | Performed by: FAMILY MEDICINE

## 2022-02-01 PROCEDURE — 4004F PT TOBACCO SCREEN RCVD TLK: CPT | Performed by: FAMILY MEDICINE

## 2022-02-01 RX ORDER — SITAGLIPTIN AND METFORMIN HYDROCHLORIDE 1000; 50 MG/1; MG/1
1 TABLET, FILM COATED ORAL 2 TIMES DAILY WITH MEALS
Qty: 180 TABLET | Refills: 1 | Status: SHIPPED | OUTPATIENT
Start: 2022-02-01 | End: 2022-07-26 | Stop reason: SDUPTHER

## 2022-02-01 NOTE — PATIENT INSTRUCTIONS

## 2022-02-01 NOTE — PROGRESS NOTES
[address] [facility] [place]    NAME: Johnathon Smith  AGE: 63 y o  SEX: female  : 1960     DATE: 2022     Assessment and Plan:     Problem List Items Addressed This Visit        Endocrine    Type 2 diabetes mellitus with diabetic neuropathy ([facility] [address] )      Other Visit Diagnoses     Type 2 diabetes mellitus without complication, without long-term current use of insulin ([facility] )    -  Primary    Relevant Orders    POCT hemoglobin A1c (Completed)    Fall, subsequent encounter        Relevant Orders    XR foot 3+ vw right    Need for hepatitis C screening test        Relevant Orders    Hepatitis C Antibody ([facility])        COVID 2021  Flu 11/10/2021  Immunizations and preventive care screenings were discussed with patient today  Appropriate education was printed on patient's after visit summary  Counseling:  · Exercise: the importance of regular exercise/physical activity was discussed  Recommend exercise 3-5 times per week for at least 30 minutes  No follow-ups on file  Chief Complaint:     Chief Complaint   Patient presents with    Diabetes     f/u      History of Present Illness:     Adult Annual Physical   Patient here for a comprehensive physical exam  The patient reports problems - foot pain  Diet and Physical Activity  · Diet/Nutrition: diabetic diet  · Exercise: no formal exercise  Depression Screening  PHQ-2/9 Depression Screening    Little interest or pleasure in doing things: 0 - not at all  Feeling down, depressed, or hopeless: 1 - several days  PHQ-2 Score: 1  PHQ-2 Interpretation: Negative depression screen       General Health  · Sleep: gets 4-6 hours of sleep on average  · Hearing: normal - bilateral   · Vision: most recent eye exam >1 year ago  · Dental: regular dental visits         /GYN Health  · Patient is: postmenopausal       Review of Systems:     Review of Systems Musculoskeletal:        Feet hurt   Skin:        Cyst on L ear   All other systems reviewed and are negative  Past Medical History:     Past Medical History:   Diagnosis Date    Anxiety     Arthritis     [facility]osis of liver ([facility] )     Diabetes mellitus ([place] [address] )     HLD (hyperlipidemia)     Hypertension     Irregular heart beat     Psychiatric disorder     Depression    Stroke ([place] [address] )     Tobacco dependence       Past Surgical History:     Past Surgical History:   Procedure Laterality Date    APPENDECTOMY      BUNIONECTOMY Right     CARPAL TUNNEL RELEASE      CHOLECYSTECTOMY      IR STROKE ALERT  8/5/2020      Social History:     Social History     Socioeconomic History    Marital status: Single     Spouse name: None    Number of children: None    Years of education: None    Highest education level: None   Occupational History    None   Tobacco Use    Smoking status: Current Every Day Smoker     Packs/day: 0 50     Types: Cigarettes    Smokeless tobacco: Never Used   Vaping Use    Vaping Use: Never used   Substance and Sexual Activity    Alcohol use: Yes     Comment: 1 botttle of vodka over 4 days/denies since 7/18    Drug use: No    Sexual activity: Not Currently   Other Topics Concern    None   Social History Narrative    None     Social Determinants of Health     Financial Resource Strain: Low Risk     Difficulty of Paying Living Expenses: Not hard at all   Food Insecurity: No Food Insecurity    Worried About Running Out of Food in the Last Year: Never true    [place] of Food in the Last Year: Never true   Transportation Needs: Unmet Transportation Needs    Lack of Transportation (Medical):  Yes    Lack of Transportation (Non-Medical): Yes   Physical Activity: Not on file   Stress: Not on file   Social Connections: Not on file   Intimate Partner Violence: Not on file   Housing Stability: Not on file      Family History:     Family History   Problem Relation Age of Onset    Diabetes Mother     Diabetes Father     Diabetes Brother     Hypertension Brother       Current Medications:     Current Outpatient Medications   Medication Sig Dispense Refill    Accu-Chek Softclix Lancets lancets by Other route daily 100 each 5    apixaban (Eliquis) 5 mg Take 1 tablet (5 mg total) by mouth 2 (two) times a day 60 tablet 11    atorvastatin (LIPITOR) 80 mg tablet Take 1 tablet (80 mg total) by mouth every evening 90 tablet 3    Blood Glucose Monitoring Suppl (Accu-Chek Guide) w/Device KIT by Does not apply route daily 1 kit 0    Blood Glucose Monitoring Suppl (OneTouch Verio) w/Device KIT by Does not apply route daily 1 kit 0    buPROPion (WELLBUTRIN) 75 mg tablet TAKE ONE TABLET BY MOUTH TWICE DAILY 60 tablet 0    ergocalciferol (VITAMIN D2) 50,000 units Take 1 capsule (50,000 Units total) by mouth once a week 12 capsule 1    glucose blood (OneTouch Verio) test strip 1 each by Other route daily Use as instructed 100 each 3    glucose blood test strip 1 each by Other route daily Use as instructed 100 each 3    Janumet  MG per tablet TAKE 1 TABLET BY MOUTH TWICE A DAY WITH MEALS 180 tablet 1    lisinopril (ZESTRIL) 40 mg tablet Take 1 tablet (40 mg total) by mouth daily Please note that pill has been changed to a 40 mg pill and only 1 tablet daily is the correct dose 90 tablet 3    Melatonin 10 MG TABS Take 30 mg by mouth daily at bedtime as needed      metoprolol tartrate (LOPRESSOR) 100 mg tablet TAKE ONE TABLET BY MOUTH EVERY TWELVE HOURS  180 tablet 3     No current facility-administered medications for this visit  Allergies:      Allergies   Allergen Reactions    Augmentin [Amoxicillin-Pot Clavulanate]      Yeast infection      Physical Exam:     /82 (BP Location: Left arm, Patient Position: Sitting, Cuff Size: Adult)   Pulse 100   Temp 97 8 °F (36 6 °C) (Temporal)   Resp 19   Wt 125 kg (275 lb)   SpO2 98%   BMI 50 30 kg/m²     Physical Exam  Vitals and nursing note reviewed  Constitutional:       General: She is not in acute distress  Appearance: She is well-developed  HENT:      Head: Normocephalic and atraumatic  Eyes:      Conjunctiva/sclera: Conjunctivae normal    Cardiovascular:      Rate and Rhythm: Normal rate and regular rhythm  Heart sounds: No murmur heard  Pulmonary:      Effort: Pulmonary effort is normal  No respiratory distress  Breath sounds: Normal breath sounds  Abdominal:      Palpations: Abdomen is soft  Tenderness: There is no abdominal tenderness  Musculoskeletal:         General: Tenderness present  Cervical back: Neck supple  Skin:     General: Skin is warm and dry  Neurological:      Mental Status: She is alert  MD Gail Swanson 70 BMI Counseling: Body mass index is 50 3 kg/m²  The BMI is above normal  Nutrition recommendations include reducing portion sizes, decreasing overall calorie intake, 3-5 servings of fruits/vegetables daily, reducing fast food intake, consuming healthier snacks, decreasing soda and/or juice intake, moderation in carbohydrate intake, increasing intake of lean protein, reducing intake of saturated fat and trans fat and reducing intake of cholesterol  Exercise recommendations include exercising 3-5 times per week and strength training exercises

## 2022-02-08 DIAGNOSIS — E66.01 MORBID OBESITY DUE TO EXCESS CALORIES (HCC): ICD-10-CM

## 2022-02-08 DIAGNOSIS — F41.9 ANXIETY: ICD-10-CM

## 2022-02-08 RX ORDER — BUPROPION HYDROCHLORIDE 75 MG/1
75 TABLET ORAL 2 TIMES DAILY
Qty: 60 TABLET | Refills: 0 | Status: SHIPPED | OUTPATIENT
Start: 2022-02-08 | End: 2022-03-31 | Stop reason: SDUPTHER

## 2022-03-28 ENCOUNTER — OFFICE VISIT (OUTPATIENT)
Dept: CARDIOLOGY CLINIC | Facility: CLINIC | Age: 62
End: 2022-03-28
Payer: COMMERCIAL

## 2022-03-28 VITALS
WEIGHT: 281 LBS | BODY MASS INDEX: 51.4 KG/M2 | HEART RATE: 109 BPM | DIASTOLIC BLOOD PRESSURE: 80 MMHG | SYSTOLIC BLOOD PRESSURE: 158 MMHG

## 2022-03-28 DIAGNOSIS — R06.83 SNORING: ICD-10-CM

## 2022-03-28 DIAGNOSIS — I48.19 PERSISTENT ATRIAL FIBRILLATION (HCC): Primary | ICD-10-CM

## 2022-03-28 DIAGNOSIS — E66.01 CLASS 3 SEVERE OBESITY DUE TO EXCESS CALORIES WITHOUT SERIOUS COMORBIDITY WITH BODY MASS INDEX (BMI) OF 40.0 TO 44.9 IN ADULT (HCC): ICD-10-CM

## 2022-03-28 DIAGNOSIS — R25.8 NOCTURNAL LEG MOVEMENTS: ICD-10-CM

## 2022-03-28 DIAGNOSIS — E78.2 MIXED HYPERLIPIDEMIA: ICD-10-CM

## 2022-03-28 DIAGNOSIS — E11.40 TYPE 2 DIABETES MELLITUS WITH DIABETIC NEUROPATHY, WITHOUT LONG-TERM CURRENT USE OF INSULIN (HCC): ICD-10-CM

## 2022-03-28 DIAGNOSIS — Z86.73 HISTORY OF CVA (CEREBROVASCULAR ACCIDENT): ICD-10-CM

## 2022-03-28 DIAGNOSIS — I10 BENIGN ESSENTIAL HYPERTENSION: ICD-10-CM

## 2022-03-28 PROCEDURE — 4004F PT TOBACCO SCREEN RCVD TLK: CPT | Performed by: INTERNAL MEDICINE

## 2022-03-28 PROCEDURE — 3079F DIAST BP 80-89 MM HG: CPT | Performed by: INTERNAL MEDICINE

## 2022-03-28 PROCEDURE — 99214 OFFICE O/P EST MOD 30 MIN: CPT | Performed by: INTERNAL MEDICINE

## 2022-03-28 PROCEDURE — 3077F SYST BP >= 140 MM HG: CPT | Performed by: INTERNAL MEDICINE

## 2022-03-28 PROCEDURE — 93000 ELECTROCARDIOGRAM COMPLETE: CPT | Performed by: INTERNAL MEDICINE

## 2022-03-28 NOTE — PROGRESS NOTES
[facility]  [address] [facility]   [address]  [id number]  Phone#  [phone]   Fax#  [phone]  *-*-*-*-*-*-*-*-*-*-*-*-*-*-*-*-*-*-*-*-*-*-*-*-*-*-*-*-*-*-*-*-*-*-*-*-*-*-*-*-*-*-*-*-*-*-*-*-*-*-*-*-*-*                                   Cardiology Follow Up      ENCOUNTER DATE: 22 11:13 AM  PATIENT NAME: Sagrario Olsen   : 1960    MRN: [id number]  AGE:61 y o  SEX: female  [address] Sh[address] MD     PRIMARY CARE PHYSICIAN: Jenny Little MD    ACTIVE DIAGNOSIS THIS VISIT  1  Persistent atrial fibrillation (HCC)  POCT ECG   2  Mixed hyperlipidemia     3  Benign essential hypertension     4  Type 2 diabetes mellitus with diabetic neuropathy, without long-term current use of insulin (HCC)     5  Class 3 severe obesity due to excess calories without serious comorbidity with body mass index (BMI) of 40 0 to 44 9 in adult ([facility] )     6  History of CVA (cerebrovascular accident)     7  Snoring     8  Nocturnal leg movements       ACTIVE PROBLEM LIST  Patient Active Problem List   Diagnosis    Alcoholic cirrhosis of liver without ascites ([facility] [facility] )    Benign essential hypertension    HLD (hyperlipidemia)    Pancreatitis    Type 2 diabetes mellitus with diabetic neuropathy (HCC)    Demand ischemia of myocardium (HCC)    QT prolongation    History of cardioversion    Class 3 severe obesity without serious comorbidity in adult ([facility] )    Snoring    Nocturnal leg movements    History of CVA (cerebrovascular accident)    [facility]    Vitamin D deficiency    Anxiety       CARDIOLOGY SPECIALTY COMMENTS  Patient was 1st seen in the hospital where she presented with new onset atrial fibrillation with a rapid ventricular response  She had not been anticoagulated  She was placed on Eliquis and rate control was marginally achieved with metoprolol 100 mg b i d  One month later she was cardioverted on Eliquis and placed on Multaq       In 2018 Discussed with patient that there is an increasing amount of evidence that recurrence of atrial fibrillation and ability to control atrial fibrillation is weight related  If patient's lose weight, they usually stay in sinus rhythm  If they gain weight, atrial fibrillation has a higher likelihood of recurring and a higher likelihood of having difficulty with control  08/05/2020 patient hospitalized at [facility] and transferred to [facility] with evidence of a cerebral vascular accident with infarct of left thalamus and punctate foci embolic infarcts of cerebellum  Patient was non compliant with her Eliquis  She was in atrial fibrillation with RVR  She was felt not to be a candidate for thrombolytics therapy or embolectomy and was treated conservatively  08/06/2020 echocardiogram: Normal LV function, EF 58%, severe concentric LVH  RV at the upper limits of normal  Biatrial enlargement  Estimated PA pressure is 40 mmHg  INTERVAL HISTORY:         patient in chronic atrial fibrillation on Eliquis  She is tachycardic with a resting heart rate of 109 beats per minute  She admits that she forgot to take her metoprolol this morning  Her blood pressure today is 158/80  Patient states that most the time she gets at home 140/76  At her last PCP visit she was 132/82  Patient denies chest discomfort or shortness of breath  Patient has no palpitations  Patient denies symptoms of dizziness, lightheadedness or near-syncope/syncope  Patient denies leg edema  Patient denies symptoms of orthopnea or paroxysmal nocturnal dyspnea  DISCUSSION/PLAN:            Return in 6 months      Lab Studies:    Lab Results   Component Value Date    CHOLESTEROL 139 05/26/2021    CHOLESTEROL 189 08/06/2020    CHOLESTEROL 211 (H) 11/15/2018     Lab Results   Component Value Date    TRIG 101 05/26/2021    TRIG 142 08/06/2020    TRIG 81 11/15/2018     Lab Results   Component Value Date    HDL 47 05/26/2021    HDL 49 08/06/2020    HDL 60 11/15/2018     Lab Results   Component Value Date    LDLCALC 72 05/26/2021    LDLCALC 112 (H) 08/06/2020    LDLCALC 135 (H) 11/15/2018       Lab Results   Component Value Date    HGBA1C 6 4 02/01/2022      Lab Results   Component Value Date    EGFR 49 12/10/2021    EGFR 53 10/28/2021    EGFR 75 05/26/2021    SODIUM 139 12/10/2021    SODIUM 139 10/28/2021    SODIUM 136 05/26/2021    K 4 9 12/10/2021    K 5 0 10/28/2021    K 5 0 05/26/2021     12/10/2021     10/28/2021     05/26/2021    CO2 26 12/10/2021    CO2 26 10/28/2021    CO2 32 05/26/2021    BUN 21 12/10/2021    BUN 21 10/28/2021    BUN 21 05/26/2021    CREATININE 1 22 12/10/2021    CREATININE 1 14 10/28/2021    CREATININE 0 85 05/26/2021     Lab Results   Component Value Date    WBC 11 08 (H) 05/26/2021    WBC 12 30 (H) 08/07/2020    WBC 12 66 (H) 08/06/2020    HGB 14 2 05/26/2021    HGB 13 2 08/07/2020    HGB 13 1 08/06/2020    HCT 44 4 05/26/2021    HCT 41 2 08/07/2020    HCT 40 6 08/06/2020    MCV 92 05/26/2021    MCV 92 08/07/2020    MCV 92 08/06/2020    MCH 29 5 05/26/2021    MCH 29 5 08/07/2020    MCH 29 6 08/06/2020    MCHC 32 0 05/26/2021    MCHC 32 0 08/07/2020    MCHC 32 3 08/06/2020     05/26/2021     08/07/2020     08/06/2020      Lab Results   Component Value Date    CALCIUM 9 6 12/10/2021    CALCIUM 9 6 10/28/2021    CALCIUM 10 1 05/26/2021    AST 13 10/28/2021    AST 10 05/26/2021    AST 13 11/15/2018    ALT 22 10/28/2021    ALT 20 05/26/2021    ALT 28 11/15/2018    ALKPHOS 127 (H) 10/28/2021    ALKPHOS 148 (H) 05/26/2021    ALKPHOS 95 11/15/2018    MG 2 4 08/07/2020    MG 1 8 08/06/2020    MG 1 7 07/20/2018       Lab Results   Component Value Date    TROPONINI <0 02 08/05/2020    TROPONINI 0 07 (H) 07/17/2018    TROPONINI 0 07 (H) 07/17/2018       Results for orders placed or performed in visit on 03/28/22   POCT ECG    Narrative      Atrial fibrillation with rapid ventricular response of 109 beats per minute  Poor R-wave progression    Abnormal EKG         Current Outpatient Medications:     Accu-Chek Softclix Lancets lancets, by Other route daily, Disp: 100 each, Rfl: 5    apixaban (Eliquis) 5 mg, Take 1 tablet (5 mg total) by mouth 2 (two) times a day, Disp: 60 tablet, Rfl: 11    atorvastatin (LIPITOR) 80 mg tablet, Take 1 tablet (80 mg total) by mouth every evening, Disp: 90 tablet, Rfl: 3    Blood Glucose Monitoring Suppl (Accu-Chek Guide) w/Device KIT, by Does not apply route daily, Disp: 1 kit, Rfl: 0    Blood Glucose Monitoring Suppl (OneTouch Verio) w/Device KIT, by Does not apply route daily, Disp: 1 kit, Rfl: 0    buPROPion (WELLBUTRIN) 75 mg tablet, Take 1 tablet (75 mg total) by mouth 2 (two) times a day, Disp: 60 tablet, Rfl: 0    glucose blood (OneTouch Verio) test strip, 1 each by Other route daily Use as instructed, Disp: 100 each, Rfl: 3    glucose blood test strip, 1 each by Other route daily Use as instructed, Disp: 100 each, Rfl: 3    lisinopril (ZESTRIL) 40 mg tablet, Take 1 tablet (40 mg total) by mouth daily Please note that pill has been changed to a 40 mg pill and only 1 tablet daily is the correct dose, Disp: 90 tablet, Rfl: 3    Melatonin 10 MG TABS, Take 30 mg by mouth daily at bedtime as needed, Disp: , Rfl:     metoprolol tartrate (LOPRESSOR) 100 mg tablet, TAKE ONE TABLET BY MOUTH EVERY TWELVE HOURS , Disp: 180 tablet, Rfl: 3    sitaGLIPtin-metFORMIN (Janumet)  MG per tablet, Take 1 tablet by mouth 2 (two) times a day with meals, Disp: 180 tablet, Rfl: 1    ergocalciferol (VITAMIN D2) 50,000 units, Take 1 capsule (50,000 Units total) by mouth once a week (Patient not taking: Reported on 3/28/2022 ), Disp: 12 capsule, Rfl: 1  Allergies   Allergen Reactions    Augmentin [Amoxicillin-Pot Clavulanate]      Yeast infection       Past Medical History:   Diagnosis Date    Anxiety     Arthritis     Cirrhosis of liver ([facility] [address] )     Diabetes mellitus ([place] [address] )     HLD (hyperlipidemia)     Hypertension     Irregular heart beat     Psychiatric disorder     Depression    Stroke ([place] [address] )     Tobacco dependence      Social History     Socioeconomic History    Marital status: Single     Spouse name: Not on file    Number of children: Not on file    Years of education: Not on file    Highest education level: Not on file   Occupational History    Not on file   Tobacco Use    Smoking status: Current Every Day Smoker     Packs/day: 0 50     Types: Cigarettes    Smokeless tobacco: Never Used   Vaping Use    Vaping Use: Never used   Substance and Sexual Activity    Alcohol use: Yes     Comment: 1 botttle of vodka over 4 days/denies since 7/18    Drug use: No    Sexual activity: Not Currently   Other Topics Concern    Not on file   Social History Narrative    Not on file     Social Determinants of Health     Financial Resource Strain: Low Risk     Difficulty of Paying Living Expenses: Not hard at all   Food Insecurity: No Food Insecurity    Worried About Running Out of Food in the Last Year: Never true    [place] of Food in the Last Year: Never true   Transportation Needs: Unmet Transportation Needs    Lack of Transportation (Medical):  Yes    Lack of Transportation (Non-Medical): Yes   Physical Activity: Not on file   Stress: Not on file   Social Connections: Not on file   Intimate Partner Violence: Not on file   Housing Stability: Not on file      Family History   Problem Relation Age of Onset    Diabetes Mother     Diabetes Father     Diabetes Brother     Hypertension Brother      Past Surgical History:   Procedure Laterality Date    APPENDECTOMY      BUNIONECTOMY Right     CARPAL TUNNEL RELEASE      CHOLECYSTECTOMY      IR STROKE ALERT  8/5/2020       PREVIOUS WEIGHTS:   Wt Readings from Last 10 Encounters:   03/28/22 127 kg (281 lb)   02/01/22 125 kg (275 lb)   09/30/21 124 kg (274 lb)   08/03/21 124 kg (273 lb 8 oz)   05/26/21 127 kg (280 lb 4 8 oz)   03/10/21 123 kg (270 lb 6 4 oz)   10/05/20 117 kg (259 lb)   09/18/20 115 kg (254 lb 3 2 oz)   09/08/20 117 kg (258 lb 6 4 oz)   08/06/20 119 kg (262 lb 5 6 oz)        Review of Systems:  Review of Systems   Respiratory: Negative for cough, choking, chest tightness, shortness of breath and wheezing  Cardiovascular: Negative for chest pain, palpitations and leg swelling  Musculoskeletal: Negative for gait problem  Skin: Negative for rash  Neurological: Negative for dizziness, tremors, syncope, weakness, light-headedness, numbness and headaches  Psychiatric/Behavioral: Negative for agitation and behavioral problems  The patient is not hyperactive  Physical Exam:  /80 (BP Location: Left arm, Patient Position: Sitting, Cuff Size: Large)   Pulse (!) 109   Wt 127 kg (281 lb)   BMI 51 40 kg/m²     Physical Exam  Constitutional:       General: She is not in acute distress  Appearance: She is well-developed  She is obese  HENT:      Head: Normocephalic and atraumatic  Neck:      Thyroid: No thyromegaly  Vascular: No carotid bruit or JVD  Trachea: No tracheal deviation  Cardiovascular:      Rate and Rhythm: Normal rate and regular rhythm  Pulses: Normal pulses  Heart sounds: Normal heart sounds  No murmur heard  No friction rub  No gallop  Pulmonary:      Effort: Pulmonary effort is normal  No respiratory distress  Breath sounds: Normal breath sounds  No wheezing, rhonchi or rales  Chest:      Chest wall: No tenderness  Abdominal:      General: Bowel sounds are normal  There is no distension  Palpations: Abdomen is soft  Musculoskeletal:         General: Normal range of motion  Cervical back: Normal range of motion and neck supple  Right lower leg: No edema  Left lower leg: No edema  Skin:     General: Skin is warm and dry  Neurological:      General: No focal deficit present        Mental Status: She is alert and oriented to person, place, and time  Psychiatric:         Mood and Affect: Mood normal          Behavior: Behavior normal          Thought Content: Thought content normal          Judgment: Judgment normal        ======================================================  Imaging:   I have personally reviewed pertinent reports  Portions of the record may have been created with voice recognition software  Occasional wrong word or "sound a like" substitutions may have occurred due to the inherent limitations of voice recognition software  Read the chart carefully and recognize, using context, where substitutions have occurred      SIGNATURES:   Varun Cornell MD

## 2022-03-31 DIAGNOSIS — E66.01 MORBID OBESITY DUE TO EXCESS CALORIES (HCC): ICD-10-CM

## 2022-03-31 DIAGNOSIS — F41.9 ANXIETY: ICD-10-CM

## 2022-03-31 RX ORDER — BUPROPION HYDROCHLORIDE 75 MG/1
75 TABLET ORAL 2 TIMES DAILY
Qty: 60 TABLET | Refills: 0 | Status: SHIPPED | OUTPATIENT
Start: 2022-03-31 | End: 2022-05-09 | Stop reason: SDUPTHER

## 2022-05-09 DIAGNOSIS — F41.9 ANXIETY: ICD-10-CM

## 2022-05-09 DIAGNOSIS — E66.01 MORBID OBESITY DUE TO EXCESS CALORIES (HCC): ICD-10-CM

## 2022-05-09 RX ORDER — BUPROPION HYDROCHLORIDE 75 MG/1
75 TABLET ORAL 2 TIMES DAILY
Qty: 60 TABLET | Refills: 0 | Status: SHIPPED | OUTPATIENT
Start: 2022-05-09 | End: 2022-06-15 | Stop reason: SDUPTHER

## 2022-05-13 ENCOUNTER — APPOINTMENT (OUTPATIENT)
Dept: LAB | Facility: HOSPITAL | Age: 62
End: 2022-05-13
Payer: COMMERCIAL

## 2022-05-13 DIAGNOSIS — Z11.59 NEED FOR HEPATITIS C SCREENING TEST: ICD-10-CM

## 2022-05-13 LAB — HCV AB SER QL: NORMAL

## 2022-05-13 PROCEDURE — 36415 COLL VENOUS BLD VENIPUNCTURE: CPT

## 2022-05-13 PROCEDURE — 86803 HEPATITIS C AB TEST: CPT

## 2022-05-24 ENCOUNTER — RA CDI HCC (OUTPATIENT)
Dept: OTHER | Facility: HOSPITAL | Age: 62
End: 2022-05-24

## 2022-05-24 NOTE — PROGRESS NOTES
[facility]  coding opportunities          Chart Reviewed number of suggestions sent to Provider: 1   E11 [id number]    Patients Insurance        Commercial Insurance: [employer]

## 2022-06-15 DIAGNOSIS — E66.01 MORBID OBESITY DUE TO EXCESS CALORIES (HCC): ICD-10-CM

## 2022-06-15 DIAGNOSIS — F41.9 ANXIETY: ICD-10-CM

## 2022-06-15 RX ORDER — BUPROPION HYDROCHLORIDE 75 MG/1
75 TABLET ORAL 2 TIMES DAILY
Qty: 60 TABLET | Refills: 0 | Status: SHIPPED | OUTPATIENT
Start: 2022-06-15 | End: 2022-07-19 | Stop reason: SDUPTHER

## 2022-07-19 DIAGNOSIS — E66.01 MORBID OBESITY DUE TO EXCESS CALORIES (HCC): ICD-10-CM

## 2022-07-19 DIAGNOSIS — F41.9 ANXIETY: ICD-10-CM

## 2022-07-19 RX ORDER — BUPROPION HYDROCHLORIDE 75 MG/1
75 TABLET ORAL 2 TIMES DAILY
Qty: 60 TABLET | Refills: 0 | Status: SHIPPED | OUTPATIENT
Start: 2022-07-19 | End: 2022-07-26

## 2022-07-26 ENCOUNTER — OFFICE VISIT (OUTPATIENT)
Dept: FAMILY MEDICINE CLINIC | Facility: CLINIC | Age: 62
End: 2022-07-26

## 2022-07-26 VITALS
SYSTOLIC BLOOD PRESSURE: 128 MMHG | TEMPERATURE: 97.7 F | WEIGHT: 279 LBS | DIASTOLIC BLOOD PRESSURE: 84 MMHG | HEART RATE: 108 BPM | BODY MASS INDEX: 51.03 KG/M2 | OXYGEN SATURATION: 98 % | RESPIRATION RATE: 19 BRPM

## 2022-07-26 DIAGNOSIS — E55.9 VITAMIN D DEFICIENCY: ICD-10-CM

## 2022-07-26 DIAGNOSIS — E66.01 MORBID OBESITY DUE TO EXCESS CALORIES (HCC): ICD-10-CM

## 2022-07-26 DIAGNOSIS — E11.9 TYPE 2 DIABETES MELLITUS WITHOUT COMPLICATION, WITHOUT LONG-TERM CURRENT USE OF INSULIN (HCC): ICD-10-CM

## 2022-07-26 DIAGNOSIS — E66.01 MORBID OBESITY (HCC): ICD-10-CM

## 2022-07-26 DIAGNOSIS — N18.31 STAGE 3A CHRONIC KIDNEY DISEASE (HCC): ICD-10-CM

## 2022-07-26 DIAGNOSIS — E11.40 TYPE 2 DIABETES MELLITUS WITH DIABETIC NEUROPATHY, WITHOUT LONG-TERM CURRENT USE OF INSULIN (HCC): Primary | ICD-10-CM

## 2022-07-26 DIAGNOSIS — F41.9 ANXIETY: ICD-10-CM

## 2022-07-26 DIAGNOSIS — Z12.31 ENCOUNTER FOR SCREENING MAMMOGRAM FOR BREAST CANCER: ICD-10-CM

## 2022-07-26 LAB — SL AMB POCT HEMOGLOBIN AIC: 6.5 (ref ?–6.5)

## 2022-07-26 PROCEDURE — 3044F HG A1C LEVEL LT 7.0%: CPT | Performed by: FAMILY MEDICINE

## 2022-07-26 PROCEDURE — 3074F SYST BP LT 130 MM HG: CPT | Performed by: FAMILY MEDICINE

## 2022-07-26 PROCEDURE — 3079F DIAST BP 80-89 MM HG: CPT | Performed by: FAMILY MEDICINE

## 2022-07-26 PROCEDURE — 83036 HEMOGLOBIN GLYCOSYLATED A1C: CPT | Performed by: FAMILY MEDICINE

## 2022-07-26 PROCEDURE — 99214 OFFICE O/P EST MOD 30 MIN: CPT | Performed by: FAMILY MEDICINE

## 2022-07-26 PROCEDURE — 4004F PT TOBACCO SCREEN RCVD TLK: CPT | Performed by: FAMILY MEDICINE

## 2022-07-26 RX ORDER — SITAGLIPTIN AND METFORMIN HYDROCHLORIDE 1000; 50 MG/1; MG/1
1 TABLET, FILM COATED ORAL 2 TIMES DAILY WITH MEALS
Qty: 180 TABLET | Refills: 1 | Status: SHIPPED | OUTPATIENT
Start: 2022-07-26

## 2022-07-26 RX ORDER — BUPROPION HYDROCHLORIDE 75 MG/1
75 TABLET ORAL 2 TIMES DAILY
Qty: 180 TABLET | Refills: 3 | Status: SHIPPED | OUTPATIENT
Start: 2022-07-26

## 2022-07-26 RX ORDER — TOPIRAMATE 50 MG/1
50 TABLET, FILM COATED ORAL EVERY 12 HOURS SCHEDULED
Qty: 180 TABLET | Refills: 1 | Status: SHIPPED | OUTPATIENT
Start: 2022-07-26

## 2022-07-26 NOTE — PROGRESS NOTES
Assessment/Plan:    No problem-specific Assessment & Plan notes found for this encounter  Diagnoses and all orders for this visit:    Type 2 diabetes mellitus with diabetic neuropathy, without long-term current use of insulin (HCC)  -     POCT hemoglobin A1c  -     CBC and differential; Future  -     Comprehensive metabolic panel; Future  -     Lipid panel; Future  -     Microalbumin / creatinine urine ratio; Future  -     TSH, 3rd generation with Free T4 reflex; Future  -     Vitamin D 25 hydroxy; Future    Anxiety  -     buPROPion (WELLBUTRIN) 75 mg tablet; Take 1 tablet (75 mg total) by mouth 2 (two) times a day    Morbid obesity due to excess calories (HCC)  -     buPROPion (WELLBUTRIN) 75 mg tablet; Take 1 tablet (75 mg total) by mouth 2 (two) times a day    Type 2 diabetes mellitus without complication, without long-term current use of insulin (HCC)  -     sitaGLIPtin-metFORMIN (Janumet)  MG per tablet; Take 1 tablet by mouth 2 (two) times a day with meals    Morbid obesity (HCC)  -     topiramate (Topamax) 50 MG tablet; Take 1 tablet (50 mg total) by mouth every 12 (twelve) hours    Encounter for screening mammogram for breast cancer    Vitamin D deficiency  -     Vitamin D 25 hydroxy; Future    Stage 3a chronic kidney disease (HCC)  -     Microalbumin / creatinine urine ratio; Future          Subjective:      Patient ID: Tania Brooke is a 63 y o  female      62 yo female with known DM with neuropathy, A fib, here today for follow up  Currently concerned with difficulty loosing weight, states tried to follow low carb diet, however states she can follow a diet for several weeks than gets anxious and tends to overeat       The following portions of the patient's history were reviewed and updated as appropriate:   She  has a past medical history of Anxiety, Arthritis, Cirrhosis of liver ([address] ), Diabetes mellitus ([facility] [address] ), HLD (hyperlipidemia), Hypertension, Irregular heart beat, Psychiatric disorder, Stroke [facility]), and Tobacco dependence  She   Patient Active Problem List    Diagnosis Date Noted    Vitamin D deficiency 06/07/2021    Anxiety 06/07/2021    Persistent atrial fibrillation ([facility] ) 03/09/2021    History of CVA (cerebrovascular accident) 08/05/2020    Snoring 02/14/2019    Nocturnal leg movements 02/14/2019    Class 3 severe obesity without serious comorbidity in adult [facility]) 11/15/2018    History of cardioversion 09/27/2018    QT prolongation 07/19/2018    Demand ischemia of myocardium ([facility] ) 07/17/2018    Pancreatitis 89/74/0295    Alcoholic cirrhosis of liver without ascites ([place] [address] ) 08/13/2012    Benign essential hypertension 08/13/2012    HLD (hyperlipidemia) 08/13/2012    Type 2 diabetes mellitus with diabetic neuropathy ([place] [address] ) 08/13/2012     She  has a past surgical history that includes Appendectomy; Carpal tunnel release; Cholecystectomy; Bunionectomy (Right); and IR stroke alert (8/5/2020)  Her family history includes Diabetes in her brother, father, and mother; Hypertension in her brother  She  reports that she has been smoking cigarettes  She has been smoking about 0 50 packs per day  She has never used smokeless tobacco  She reports current alcohol use  She reports that she does not use drugs    Current Outpatient Medications   Medication Sig Dispense Refill    buPROPion (WELLBUTRIN) 75 mg tablet Take 1 tablet (75 mg total) by mouth 2 (two) times a day 180 tablet 3    sitaGLIPtin-metFORMIN (Janumet)  MG per tablet Take 1 tablet by mouth 2 (two) times a day with meals 180 tablet 1    topiramate (Topamax) 50 MG tablet Take 1 tablet (50 mg total) by mouth every 12 (twelve) hours 180 tablet 1    Accu-Chek Softclix Lancets lancets by Other route daily 100 each 5    apixaban (Eliquis) 5 mg Take 1 tablet (5 mg total) by mouth 2 (two) times a day 60 tablet 11    atorvastatin (LIPITOR) 80 mg tablet Take 1 tablet (80 mg total) by mouth every evening 90 tablet 3    Blood Glucose Monitoring Suppl (Accu-Chek Guide) w/Device KIT by Does not apply route daily 1 kit 0    Blood Glucose Monitoring Suppl (OneTouch Verio) w/Device KIT by Does not apply route daily 1 kit 0    ergocalciferol (VITAMIN D2) 50,000 units Take 1 capsule (50,000 Units total) by mouth once a week (Patient not taking: Reported on 3/28/2022 ) 12 capsule 1    glucose blood (OneTouch Verio) test strip 1 each by Other route daily Use as instructed 100 each 3    glucose blood test strip 1 each by Other route daily Use as instructed 100 each 3    lisinopril (ZESTRIL) 40 mg tablet Take 1 tablet (40 mg total) by mouth daily Please note that pill has been changed to a 40 mg pill and only 1 tablet daily is the correct dose 90 tablet 3    Melatonin 10 MG TABS Take 30 mg by mouth daily at bedtime as needed      metoprolol tartrate (LOPRESSOR) 100 mg tablet TAKE ONE TABLET BY MOUTH EVERY TWELVE HOURS  180 tablet 3     No current facility-administered medications for this visit       Current Outpatient Medications on File Prior to Visit   Medication Sig    Accu-Chek Softclix Lancets lancets by Other route daily    apixaban (Eliquis) 5 mg Take 1 tablet (5 mg total) by mouth 2 (two) times a day    atorvastatin (LIPITOR) 80 mg tablet Take 1 tablet (80 mg total) by mouth every evening    Blood Glucose Monitoring Suppl (Accu-Chek Guide) w/Device KIT by Does not apply route daily    Blood Glucose Monitoring Suppl (OneTouch Verio) w/Device KIT by Does not apply route daily    ergocalciferol (VITAMIN D2) 50,000 units Take 1 capsule (50,000 Units total) by mouth once a week (Patient not taking: Reported on 3/28/2022 )    glucose blood (OneTouch Verio) test strip 1 each by Other route daily Use as instructed    glucose blood test strip 1 each by Other route daily Use as instructed    lisinopril (ZESTRIL) 40 mg tablet Take 1 tablet (40 mg total) by mouth daily Please note that pill has been changed to a 40 mg pill and only 1 tablet daily is the correct dose    Melatonin 10 MG TABS Take 30 mg by mouth daily at bedtime as needed    metoprolol tartrate (LOPRESSOR) 100 mg tablet TAKE ONE TABLET BY MOUTH EVERY TWELVE HOURS     [DISCONTINUED] buPROPion (WELLBUTRIN) 75 mg tablet Take 1 tablet (75 mg total) by mouth 2 (two) times a day    [DISCONTINUED] sitaGLIPtin-metFORMIN (Janumet)  MG per tablet Take 1 tablet by mouth 2 (two) times a day with meals     No current facility-administered medications on file prior to visit       Review of Systems   Musculoskeletal: Positive for arthralgias and back pain  All other systems reviewed and are negative  Objective:      /84 (BP Location: Left arm, Patient Position: Sitting, Cuff Size: Adult)   Pulse (!) 108   Temp 97 7 °F (36 5 °C) (Temporal)   Resp 19   Wt 127 kg (279 lb)   SpO2 98%   BMI 51 03 kg/m²          Physical Exam  Vitals and nursing note reviewed  Constitutional:       Appearance: She is well-developed  HENT:      Head: Normocephalic  Right Ear: External ear normal       Left Ear: External ear normal       Nose: Nose normal    Eyes:      Conjunctiva/sclera: Conjunctivae normal       Pupils: Pupils are equal, round, and reactive to light  Neck:      Thyroid: No thyromegaly  Cardiovascular:      Rate and Rhythm: Normal rate and regular rhythm  Heart sounds: Normal heart sounds  Pulmonary:      Effort: Pulmonary effort is normal       Breath sounds: Normal breath sounds  Abdominal:      Palpations: Abdomen is soft  Tenderness: There is no abdominal tenderness  There is no guarding or rebound  Musculoskeletal:         General: Normal range of motion  Cervical back: Normal range of motion and neck supple  Skin:     General: Skin is dry  Neurological:      Mental Status: She is alert and oriented to person, place, and time  Deep Tendon Reflexes: Reflexes are normal and symmetric

## 2022-09-06 DIAGNOSIS — E11.9 TYPE 2 DIABETES MELLITUS WITHOUT COMPLICATION, WITHOUT LONG-TERM CURRENT USE OF INSULIN (HCC): ICD-10-CM

## 2022-09-08 RX ORDER — SITAGLIPTIN AND METFORMIN HYDROCHLORIDE 1000; 50 MG/1; MG/1
1 TABLET, FILM COATED ORAL 2 TIMES DAILY WITH MEALS
Qty: 180 TABLET | Refills: 1 | OUTPATIENT
Start: 2022-09-08

## 2022-10-11 DIAGNOSIS — E78.2 MIXED HYPERLIPIDEMIA: ICD-10-CM

## 2022-10-11 DIAGNOSIS — I48.91 NEW ONSET ATRIAL FIBRILLATION (HCC): ICD-10-CM

## 2022-10-11 RX ORDER — ATORVASTATIN CALCIUM 80 MG/1
80 TABLET, FILM COATED ORAL EVERY EVENING
Qty: 90 TABLET | Refills: 3 | Status: SHIPPED | OUTPATIENT
Start: 2022-10-11

## 2022-10-19 DIAGNOSIS — I10 BENIGN ESSENTIAL HYPERTENSION: ICD-10-CM

## 2022-10-22 RX ORDER — LISINOPRIL 40 MG/1
TABLET ORAL
Qty: 90 TABLET | Refills: 3 | Status: SHIPPED | OUTPATIENT
Start: 2022-10-22

## 2022-11-03 ENCOUNTER — APPOINTMENT (OUTPATIENT)
Dept: LAB | Facility: HOSPITAL | Age: 62
End: 2022-11-03

## 2022-11-03 DIAGNOSIS — E11.40 TYPE 2 DIABETES MELLITUS WITH DIABETIC NEUROPATHY, WITHOUT LONG-TERM CURRENT USE OF INSULIN (HCC): ICD-10-CM

## 2022-11-03 DIAGNOSIS — E55.9 VITAMIN D DEFICIENCY: ICD-10-CM

## 2022-11-03 DIAGNOSIS — N18.31 STAGE 3A CHRONIC KIDNEY DISEASE (HCC): ICD-10-CM

## 2022-11-03 LAB
25(OH)D3 SERPL-MCNC: 26.9 NG/ML (ref 30–100)
ALBUMIN SERPL BCP-MCNC: 3.5 G/DL (ref 3.5–5)
ALP SERPL-CCNC: 130 U/L (ref 46–116)
ALT SERPL W P-5'-P-CCNC: 24 U/L (ref 12–78)
ANION GAP SERPL CALCULATED.3IONS-SCNC: 5 MMOL/L (ref 4–13)
AST SERPL W P-5'-P-CCNC: 12 U/L (ref 5–45)
BASOPHILS # BLD AUTO: 0.07 THOUSANDS/ÂΜL (ref 0–0.1)
BASOPHILS NFR BLD AUTO: 1 % (ref 0–1)
BILIRUB SERPL-MCNC: 1.07 MG/DL (ref 0.2–1)
BUN SERPL-MCNC: 33 MG/DL (ref 5–25)
CALCIUM SERPL-MCNC: 10 MG/DL (ref 8.3–10.1)
CHLORIDE SERPL-SCNC: 109 MMOL/L (ref 96–108)
CHOLEST SERPL-MCNC: 112 MG/DL
CO2 SERPL-SCNC: 24 MMOL/L (ref 21–32)
CREAT SERPL-MCNC: 1.2 MG/DL (ref 0.6–1.3)
EOSINOPHIL # BLD AUTO: 0.11 THOUSAND/ÂΜL (ref 0–0.61)
EOSINOPHIL NFR BLD AUTO: 1 % (ref 0–6)
ERYTHROCYTE [DISTWIDTH] IN BLOOD BY AUTOMATED COUNT: 13.4 % (ref 11.6–15.1)
GFR SERPL CREATININE-BSD FRML MDRD: 49 ML/MIN/1.73SQ M
GLUCOSE P FAST SERPL-MCNC: 130 MG/DL (ref 65–99)
HCT VFR BLD AUTO: 42 % (ref 34.8–46.1)
HDLC SERPL-MCNC: 46 MG/DL
HGB BLD-MCNC: 13.4 G/DL (ref 11.5–15.4)
IMM GRANULOCYTES # BLD AUTO: 0.12 THOUSAND/UL (ref 0–0.2)
IMM GRANULOCYTES NFR BLD AUTO: 1 % (ref 0–2)
LDLC SERPL CALC-MCNC: 42 MG/DL (ref 0–100)
LYMPHOCYTES # BLD AUTO: 2.07 THOUSANDS/ÂΜL (ref 0.6–4.47)
LYMPHOCYTES NFR BLD AUTO: 16 % (ref 14–44)
MCH RBC QN AUTO: 29.2 PG (ref 26.8–34.3)
MCHC RBC AUTO-ENTMCNC: 31.9 G/DL (ref 31.4–37.4)
MCV RBC AUTO: 92 FL (ref 82–98)
MONOCYTES # BLD AUTO: 0.84 THOUSAND/ÂΜL (ref 0.17–1.22)
MONOCYTES NFR BLD AUTO: 7 % (ref 4–12)
NEUTROPHILS # BLD AUTO: 9.51 THOUSANDS/ÂΜL (ref 1.85–7.62)
NEUTS SEG NFR BLD AUTO: 74 % (ref 43–75)
NONHDLC SERPL-MCNC: 66 MG/DL
NRBC BLD AUTO-RTO: 0 /100 WBCS
PLATELET # BLD AUTO: 258 THOUSANDS/UL (ref 149–390)
PMV BLD AUTO: 10.2 FL (ref 8.9–12.7)
POTASSIUM SERPL-SCNC: 5.3 MMOL/L (ref 3.5–5.3)
PROT SERPL-MCNC: 7.8 G/DL (ref 6.4–8.4)
RBC # BLD AUTO: 4.59 MILLION/UL (ref 3.81–5.12)
SODIUM SERPL-SCNC: 138 MMOL/L (ref 135–147)
TRIGL SERPL-MCNC: 121 MG/DL
TSH SERPL DL<=0.05 MIU/L-ACNC: 1.78 UIU/ML (ref 0.45–4.5)
WBC # BLD AUTO: 12.72 THOUSAND/UL (ref 4.31–10.16)

## 2022-11-04 ENCOUNTER — OFFICE VISIT (OUTPATIENT)
Dept: CARDIOLOGY CLINIC | Facility: CLINIC | Age: 62
End: 2022-11-04

## 2022-11-04 VITALS
HEART RATE: 88 BPM | BODY MASS INDEX: 49.6 KG/M2 | DIASTOLIC BLOOD PRESSURE: 74 MMHG | SYSTOLIC BLOOD PRESSURE: 124 MMHG | WEIGHT: 271.2 LBS

## 2022-11-04 DIAGNOSIS — I48.19 PERSISTENT ATRIAL FIBRILLATION (HCC): Primary | ICD-10-CM

## 2022-11-04 DIAGNOSIS — J96.01 ACUTE RESPIRATORY FAILURE WITH HYPOXIA (HCC): ICD-10-CM

## 2022-11-04 DIAGNOSIS — I48.91 NEW ONSET ATRIAL FIBRILLATION (HCC): ICD-10-CM

## 2022-11-04 DIAGNOSIS — E78.2 MIXED HYPERLIPIDEMIA: ICD-10-CM

## 2022-11-04 DIAGNOSIS — E66.01 CLASS 3 SEVERE OBESITY DUE TO EXCESS CALORIES WITHOUT SERIOUS COMORBIDITY WITH BODY MASS INDEX (BMI) OF 40.0 TO 44.9 IN ADULT (HCC): ICD-10-CM

## 2022-11-04 DIAGNOSIS — I24.8 DEMAND ISCHEMIA OF MYOCARDIUM (HCC): ICD-10-CM

## 2022-11-04 DIAGNOSIS — I10 BENIGN ESSENTIAL HYPERTENSION: ICD-10-CM

## 2022-11-04 DIAGNOSIS — E11.40 TYPE 2 DIABETES MELLITUS WITH DIABETIC NEUROPATHY, WITHOUT LONG-TERM CURRENT USE OF INSULIN (HCC): ICD-10-CM

## 2022-11-04 DIAGNOSIS — K70.30 ALCOHOLIC CIRRHOSIS OF LIVER WITHOUT ASCITES (HCC): ICD-10-CM

## 2022-11-04 DIAGNOSIS — Z86.73 HISTORY OF CVA (CEREBROVASCULAR ACCIDENT): ICD-10-CM

## 2022-11-04 DIAGNOSIS — Z98.890 HISTORY OF CARDIOVERSION: ICD-10-CM

## 2022-11-04 DIAGNOSIS — R06.83 SNORING: ICD-10-CM

## 2022-11-04 RX ORDER — METOPROLOL TARTRATE 100 MG/1
100 TABLET ORAL EVERY 12 HOURS
Qty: 180 TABLET | Refills: 3 | Status: SHIPPED | OUTPATIENT
Start: 2022-11-04

## 2022-11-04 NOTE — PROGRESS NOTES
[facility]  [address] [facility], [address] [facility] [address]  Phone#  [phone]   Fax#  [phone]  *-*-*-*-*-*-*-*-*-*-*-*-*-*-*-*-*-*-*-*-*-*-*-*-*-*-*-*-*-*-*-*-*-*-*-*-*-*-*-*-*-*-*-*-*-*-*-*-*-*-*-*-*-*                                   Cardiology Follow Up      ENCOUNTER DATE: 22 12:30 PM  PATIENT NAME: Elise Brown   : 1960    MRN: [id number]  AGE:62 y o  SEX: female  Emmanuel Hassan 83: Brayan Pierre MD    ACTIVE DIAGNOSIS THIS VISIT  1  Persistent atrial fibrillation ([address] )     2  Mixed hyperlipidemia     3  Demand ischemia of myocardium ([address] )     4  Alcoholic cirrhosis of liver without ascites ([address] )     5  Benign essential hypertension     6  Type 2 diabetes mellitus with diabetic neuropathy, without long-term current use of insulin ([address] )     7  History of cardioversion     8  Class 3 severe obesity due to excess calories without serious comorbidity with body mass index (BMI) of 40 0 to 44 9 in adult ([address] )     9  Snoring     10  History of CVA (cerebrovascular accident)     6  New onset atrial fibrillation (HCC)  metoprolol tartrate (LOPRESSOR) 100 mg tablet   12   Acute respiratory failure with hypoxia ([address] )       ACTIVE PROBLEM LIST  Patient Active Problem List   Diagnosis   • Alcoholic cirrhosis of liver without ascites ([address] )   • Benign essential hypertension   • HLD (hyperlipidemia)   • Pancreatitis   • Type 2 diabetes mellitus with diabetic neuropathy (HCC)   • Demand ischemia of myocardium (HCC)   • QT prolongation   • History of cardioversion   • Class 3 severe obesity without serious comorbidity in adult [facility])   • Snoring   • Nocturnal leg movements   • History of CVA (cerebrovascular accident)   • Persistent atrial fibrillation (HCC)   • Vitamin D deficiency   • Anxiety   • Acute respiratory failure with hypoxia ([address] )       CARDIOLOGY SPECIALTY COMMENTS  Patient was 1st seen in the hospital where she presented with new onset atrial fibrillation with a rapid ventricular response  She had not been anticoagulated  She was placed on Eliquis and rate control was marginally achieved with metoprolol 100 mg b i d  One month later she was cardioverted on Eliquis and placed on Multaq  In 12/20/2018 Discussed with patient that there is an increasing amount of evidence that recurrence of atrial fibrillation and ability to control atrial fibrillation is weight related  If patient's lose weight, they usually stay in sinus rhythm  If they gain weight, atrial fibrillation has a higher likelihood of recurring and a higher likelihood of having difficulty with control  08/05/2020 patient hospitalized at [facility] and transferred to [facility] with evidence of a cerebral vascular accident with infarct of left thalamus and punctate foci embolic infarcts of cerebellum  Patient was non compliant with her Eliquis  She was in atrial fibrillation with RVR  She was felt not to be a candidate for thrombolytics therapy or embolectomy and was treated conservatively  08/06/2020 echocardiogram: Normal LV function, EF 58%, severe concentric LVH  RV at the upper limits of normal  Biatrial enlargement  Estimated PA pressure is 40 mmHg  INTERVAL HISTORY:        Patient with permanent atrial fibrillation feels good and has no complaints  Patient denies chest discomfort or shortness of breath  Patient has no palpitations  Patient denies symptoms of dizziness, lightheadedness or near-syncope/syncope  Patient denies leg edema  Patient denies symptoms of orthopnea or paroxysmal nocturnal dyspnea  On auscultation her heart rate seemed on the fast side at approximately 98 beats per minute  She denies any dyspnea on exertion  Patient's last lipid profile November 2022 demonstrated triglycerides of 121 and an LDL of 42  Patient's blood pressure today is 124/74      DISCUSSION/PLAN:          · Continue present medications  · Return in 6 months  · EKG on return    Lab Studies:    Lab Results   Component Value Date    CHOLESTEROL 112 11/03/2022    CHOLESTEROL 139 05/26/2021    CHOLESTEROL 189 08/06/2020     Lab Results   Component Value Date    TRIG 121 11/03/2022    TRIG 101 05/26/2021    TRIG 142 08/06/2020     Lab Results   Component Value Date    HDL 46 (L) 11/03/2022    HDL 47 05/26/2021    HDL 49 08/06/2020     Lab Results   Component Value Date    LDLCALC 42 11/03/2022    LDLCALC 72 05/26/2021    LDLCALC 112 (H) 08/06/2020       Lab Results   Component Value Date    HGBA1C 6 5 07/26/2022      Lab Results   Component Value Date    EGFR 49 11/03/2022    EGFR 49 12/10/2021    EGFR 53 10/28/2021    SODIUM 138 11/03/2022    SODIUM 139 12/10/2021    SODIUM 139 10/28/2021    K 5 3 11/03/2022    K 4 9 12/10/2021    K 5 0 10/28/2021     (H) 11/03/2022     12/10/2021     10/28/2021    CO2 24 11/03/2022    CO2 26 12/10/2021    CO2 26 10/28/2021    BUN 33 (H) 11/03/2022    BUN 21 12/10/2021    BUN 21 10/28/2021    CREATININE 1 20 11/03/2022    CREATININE 1 22 12/10/2021    CREATININE 1 14 10/28/2021     Lab Results   Component Value Date    WBC 12 72 (H) 11/03/2022    WBC 11 08 (H) 05/26/2021    WBC 12 30 (H) 08/07/2020    HGB 13 4 11/03/2022    HGB 14 2 05/26/2021    HGB 13 2 08/07/2020    HCT 42 0 11/03/2022    HCT 44 4 05/26/2021    HCT 41 2 08/07/2020    MCV 92 11/03/2022    MCV 92 05/26/2021    MCV 92 08/07/2020    MCH 29 2 11/03/2022    MCH 29 5 05/26/2021    MCH 29 5 08/07/2020    MCHC 31 9 11/03/2022    MCHC 32 0 05/26/2021    MCHC 32 0 08/07/2020     11/03/2022     05/26/2021     08/07/2020      Lab Results   Component Value Date    CALCIUM 10 0 11/03/2022    CALCIUM 9 6 12/10/2021    CALCIUM 9 6 10/28/2021    AST 12 11/03/2022    AST 13 10/28/2021    AST 10 05/26/2021    ALT 24 11/03/2022    ALT 22 10/28/2021    ALT 20 05/26/2021    ALKPHOS 130 (H) 11/03/2022    ALKPHOS 127 (H) 10/28/2021    ALKPHOS 148 (H) 05/26/2021    MG 2 4 08/07/2020    MG 1 8 08/06/2020    MG 1 7 07/20/2018     No results found for this visit on 11/04/22        Current Outpatient Medications:   •  Accu-Chek Softclix Lancets lancets, by Other route daily, Disp: 100 each, Rfl: 5  •  apixaban (Eliquis) 5 mg, Take 1 tablet (5 mg total) by mouth 2 (two) times a day, Disp: 60 tablet, Rfl: 11  •  atorvastatin (LIPITOR) 80 mg tablet, Take 1 tablet (80 mg total) by mouth every evening, Disp: 90 tablet, Rfl: 3  •  Blood Glucose Monitoring Suppl (Accu-Chek Guide) w/Device KIT, by Does not apply route daily, Disp: 1 kit, Rfl: 0  •  Blood Glucose Monitoring Suppl (OneTouch Verio) w/Device KIT, by Does not apply route daily, Disp: 1 kit, Rfl: 0  •  buPROPion (WELLBUTRIN) 75 mg tablet, Take 1 tablet (75 mg total) by mouth 2 (two) times a day, Disp: 180 tablet, Rfl: 3  •  glucose blood (OneTouch Verio) test strip, 1 each by Other route daily Use as instructed, Disp: 100 each, Rfl: 3  •  glucose blood test strip, 1 each by Other route daily Use as instructed, Disp: 100 each, Rfl: 3  •  lisinopril (ZESTRIL) 40 mg tablet, Take 1 tablet daily, Disp: 90 tablet, Rfl: 3  •  Melatonin 10 MG TABS, Take 30 mg by mouth daily at bedtime as needed, Disp: , Rfl:   •  metoprolol tartrate (LOPRESSOR) 100 mg tablet, Take 1 tablet (100 mg total) by mouth every 12 (twelve) hours, Disp: 180 tablet, Rfl: 3  •  sitaGLIPtin-metFORMIN (Janumet)  MG per tablet, Take 1 tablet by mouth 2 (two) times a day with meals, Disp: 180 tablet, Rfl: 1  •  topiramate (Topamax) 50 MG tablet, Take 1 tablet (50 mg total) by mouth every 12 (twelve) hours, Disp: 180 tablet, Rfl: 1  •  ergocalciferol (VITAMIN D2) 50,000 units, Take 1 capsule (50,000 Units total) by mouth once a week (Patient not taking: No sig reported), Disp: 12 capsule, Rfl: 1  Allergies   Allergen Reactions   • Augmentin [Amoxicillin-Pot Clavulanate]      Yeast infection       Past Medical History:   Diagnosis Date   • Anxiety • Arthritis    • Cirrhosis of liver (HCC)    • Diabetes mellitus ([facility] )    • HLD (hyperlipidemia)    • Hypertension    • Irregular heart beat    • Psychiatric disorder     Depression   • Stroke [facility])    • Tobacco dependence      Social History     Socioeconomic History   • Marital status: Single     Spouse name: Not on file   • Number of children: Not on file   • Years of education: Not on file   • Highest education level: Not on file   Occupational History   • Not on file   Tobacco Use   • Smoking status: Current Every Day Smoker     Packs/day: 0 50     Types: Cigarettes   • Smokeless tobacco: Never Used   Vaping Use   • Vaping Use: Never used   Substance and Sexual Activity   • Alcohol use: Yes     Comment: 1 botttle of vodka over 4 days/denies since 7/18   • Drug use: No   • Sexual activity: Not Currently   Other Topics Concern   • Not on file   Social History Narrative   • Not on file     Social Determinants of Health     Financial Resource Strain: Low Risk    • Difficulty of Paying Living Expenses: Not hard at all   Food Insecurity: No Food Insecurity   • Worried About Running Out of Food in the Last Year: Never true   • Ran Out of Food in the Last Year: Never true   Transportation Needs: Unmet Transportation Needs   • Lack of Transportation (Medical):  Yes   • Lack of Transportation (Non-Medical): Yes   Physical Activity: Not on file   Stress: Not on file   Social Connections: Not on file   Intimate Partner Violence: Not on file   Housing Stability: Not on file      Family History   Problem Relation Age of Onset   • Diabetes Mother    • Diabetes Father    • Diabetes Brother    • Hypertension Brother      Past Surgical History:   Procedure Laterality Date   • APPENDECTOMY     • BUNIONECTOMY Right    • CARPAL TUNNEL RELEASE     • CHOLECYSTECTOMY     • IR STROKE ALERT  8/5/2020       PREVIOUS WEIGHTS:   Wt Readings from Last 10 Encounters:   11/04/22 123 kg (271 lb 3 2 oz)   07/26/22 127 kg (279 lb)   03/28/22 127 kg (281 lb)   02/01/22 125 kg (275 lb)   09/30/21 124 kg (274 lb)   08/03/21 124 kg (273 lb 8 oz)   05/26/21 127 kg (280 lb 4 8 oz)   03/10/21 123 kg (270 lb 6 4 oz)   10/05/20 117 kg (259 lb)   09/18/20 115 kg (254 lb 3 2 oz)        Review of Systems:  Review of Systems   Respiratory: Negative for cough, choking, chest tightness, shortness of breath and wheezing  Cardiovascular: Negative for chest pain, palpitations and leg swelling  Musculoskeletal: Negative for gait problem  Skin: Negative for rash  Neurological: Negative for dizziness, tremors, syncope, weakness, light-headedness, numbness and headaches  Psychiatric/Behavioral: Negative for agitation and behavioral problems  The patient is not hyperactive  Physical Exam:  /74 (BP Location: Left arm, Patient Position: Sitting, Cuff Size: Large)   Pulse 88   Wt 123 kg (271 lb 3 2 oz)   BMI 49 60 kg/m²     Physical Exam  Constitutional:       General: She is not in acute distress  Appearance: She is well-developed  HENT:      Head: Normocephalic and atraumatic  Neck:      Thyroid: No thyromegaly  Vascular: No carotid bruit or JVD  Trachea: No tracheal deviation  Cardiovascular:      Rate and Rhythm: Normal rate and regular rhythm  Pulses: Normal pulses  Heart sounds: Normal heart sounds  No murmur heard  No friction rub  No gallop  Pulmonary:      Effort: Pulmonary effort is normal  No respiratory distress  Breath sounds: Normal breath sounds  No wheezing, rhonchi or rales  Chest:      Chest wall: No tenderness  Musculoskeletal:         General: Normal range of motion  Cervical back: Normal range of motion and neck supple  Right lower leg: No edema  Left lower leg: No edema  Skin:     General: Skin is warm and dry  Neurological:      General: No focal deficit present  Mental Status: She is alert and oriented to person, place, and time     Psychiatric:         Mood and Affect: Mood normal          Behavior: Behavior normal          Thought Content: Thought content normal          Judgment: Judgment normal          ======================================================  Imaging:   I have personally reviewed pertinent reports  Portions of the record may have been created with voice recognition software  Occasional wrong word or "sound a like" substitutions may have occurred due to the inherent limitations of voice recognition software  Read the chart carefully and recognize, using context, where substitutions have occurred      SIGNATURES:   Teagan Naqvi

## 2022-12-21 ENCOUNTER — OFFICE VISIT (OUTPATIENT)
Dept: FAMILY MEDICINE CLINIC | Facility: CLINIC | Age: 62
End: 2022-12-21

## 2022-12-21 VITALS
RESPIRATION RATE: 18 BRPM | OXYGEN SATURATION: 95 % | HEART RATE: 90 BPM | SYSTOLIC BLOOD PRESSURE: 128 MMHG | WEIGHT: 273 LBS | BODY MASS INDEX: 49.93 KG/M2 | DIASTOLIC BLOOD PRESSURE: 88 MMHG | TEMPERATURE: 97.6 F

## 2022-12-21 DIAGNOSIS — E55.9 VITAMIN D DEFICIENCY: ICD-10-CM

## 2022-12-21 DIAGNOSIS — E11.9 TYPE 2 DIABETES MELLITUS WITHOUT COMPLICATION, WITHOUT LONG-TERM CURRENT USE OF INSULIN (HCC): Primary | ICD-10-CM

## 2022-12-21 LAB — SL AMB POCT HEMOGLOBIN AIC: 6.6 (ref ?–6.5)

## 2022-12-21 RX ORDER — SITAGLIPTIN AND METFORMIN HYDROCHLORIDE 1000; 50 MG/1; MG/1
1 TABLET, FILM COATED ORAL 2 TIMES DAILY WITH MEALS
Qty: 180 TABLET | Refills: 1 | Status: SHIPPED | OUTPATIENT
Start: 2022-12-21

## 2022-12-21 RX ORDER — ERGOCALCIFEROL 1.25 MG/1
50000 CAPSULE ORAL WEEKLY
Qty: 12 CAPSULE | Refills: 1 | Status: SHIPPED | OUTPATIENT
Start: 2022-12-21

## 2022-12-21 NOTE — PROGRESS NOTES
Name: Lidia Ibanez      : 1960      MRN: [id number]  Encounter Provider: Royal Hughes MD  Encounter Date: 2022   Encounter department: [address] [place] [address]     1  Type 2 diabetes mellitus without complication, without long-term current use of insulin ([facility])  Assessment & Plan:    Lab Results   Component Value Date    HGBA1C 6 6 (A) 2022   Stable  Continue Janumet  Encouraged patient to consider weight loss surgery or Medical weight loss     Orders:  -     POCT hemoglobin A1c  -     sitaGLIPtin-metFORMIN (Janumet)  MG per tablet; Take 1 tablet by mouth 2 (two) times a day with meals    2  Vitamin D deficiency  -     ergocalciferol (VITAMIN D2) 50,000 units; Take 1 capsule (50,000 Units total) by mouth once a week           Subjective      62 yo female with multiple medical comorbid conditions, including CAD, DM, HTN, Afib here today for follow up   Patient is obese and has been trying to loose weight  Topamax for weight loss caused bloating and abdominal pain  She was able to take it for 6 weeks and lost 5 pounds which she regained once she stopped taking it       Review of Systems   All other systems reviewed and are negative        Current Outpatient Medications on File Prior to Visit   Medication Sig   • Accu-Chek Softclix Lancets lancets by Other route daily   • apixaban (Eliquis) 5 mg Take 1 tablet (5 mg total) by mouth 2 (two) times a day   • atorvastatin (LIPITOR) 80 mg tablet Take 1 tablet (80 mg total) by mouth every evening   • Blood Glucose Monitoring Suppl (Accu-Chek Guide) w/Device KIT by Does not apply route daily   • Blood Glucose Monitoring Suppl (OneTouch Verio) w/Device KIT by Does not apply route daily   • buPROPion (WELLBUTRIN) 75 mg tablet Take 1 tablet (75 mg total) by mouth 2 (two) times a day   • glucose blood (OneTouch Verio) test strip 1 each by Other route daily Use as instructed   • glucose blood test strip 1 each by Other route daily Use as instructed   • lisinopril (ZESTRIL) 40 mg tablet Take 1 tablet daily   • Melatonin 10 MG TABS Take 30 mg by mouth daily at bedtime as needed   • metoprolol tartrate (LOPRESSOR) 100 mg tablet Take 1 tablet (100 mg total) by mouth every 12 (twelve) hours   • [DISCONTINUED] ergocalciferol (VITAMIN D2) 50,000 units Take 1 capsule (50,000 Units total) by mouth once a week (Patient not taking: No sig reported)   • [DISCONTINUED] sitaGLIPtin-metFORMIN (Janumet)  MG per tablet Take 1 tablet by mouth 2 (two) times a day with meals   • [DISCONTINUED] topiramate (Topamax) 50 MG tablet Take 1 tablet (50 mg total) by mouth every 12 (twelve) hours       Objective     /88 (BP Location: Left arm, Patient Position: Sitting, Cuff Size: Standard)   Pulse 90   Temp 97 6 °F (36 4 °C) (Temporal)   Resp 18   Wt 124 kg (273 lb)   SpO2 95%   BMI 49 93 kg/m²     Physical Exam  Vitals and nursing note reviewed  Constitutional:       Appearance: She is well-developed  HENT:      Head: Normocephalic  Right Ear: External ear normal       Left Ear: External ear normal       Nose: Nose normal    Eyes:      Conjunctiva/sclera: Conjunctivae normal       Pupils: Pupils are equal, round, and reactive to light  Neck:      Thyroid: No thyromegaly  Cardiovascular:      Rate and Rhythm: Normal rate and regular rhythm  Heart sounds: Normal heart sounds  Pulmonary:      Effort: Pulmonary effort is normal       Breath sounds: Normal breath sounds  Abdominal:      Palpations: Abdomen is soft  Tenderness: There is no abdominal tenderness  There is no guarding or rebound  Musculoskeletal:         General: Normal range of motion  Cervical back: Normal range of motion and neck supple  Skin:     General: Skin is dry  Neurological:      Mental Status: She is alert and oriented to person, place, and time  Deep Tendon Reflexes: Reflexes are normal and symmetric         Orest Batters Zi Queen MD

## 2022-12-21 NOTE — ASSESSMENT & PLAN NOTE
Lab Results   Component Value Date    HGBA1C 6 6 (A) 12/21/2022   Stable  Continue Janumet  Encouraged patient to consider weight loss surgery or Medical weight loss

## 2023-05-22 NOTE — ASSESSMENT & PLAN NOTE
BMI 47 98  BMI Counseling: Body mass index is 47 98 kg/m²  The BMI is above normal  Nutrition recommendations include reducing portion sizes, decreasing overall calorie intake, 3-5 servings of fruits/vegetables daily, reducing fast food intake, consuming healthier snacks and decreasing soda and/or juice intake  Exercise recommendations include moderate aerobic physical activity for 150 minutes/week  Use Enhanced Medication Counseling?: No High Dose Vitamin A Pregnancy And Lactation Text: High dose vitamin A therapy is contraindicated during pregnancy and breast feeding. Tetracycline Counseling: Patient counseled regarding possible photosensitivity and increased risk for sunburn.  Patient instructed to avoid sunlight, if possible.  When exposed to sunlight, patients should wear protective clothing, sunglasses, and sunscreen.  The patient was instructed to call the office immediately if the following severe adverse effects occur:  hearing changes, easy bruising/bleeding, severe headache, or vision changes.  The patient verbalized understanding of the proper use and possible adverse effects of tetracycline.  All of the patient's questions and concerns were addressed. Patient understands to avoid pregnancy while on therapy due to potential birth defects. Benzoyl Peroxide Pregnancy And Lactation Text: This medication is Pregnancy Category C. It is unknown if benzoyl peroxide is excreted in breast milk. Topical Sulfur Applications Counseling: Topical Sulfur Counseling: Patient counseled that this medication may cause skin irritation or allergic reactions.  In the event of skin irritation, the patient was advised to reduce the amount of the drug applied or use it less frequently.   The patient verbalized understanding of the proper use and possible adverse effects of topical sulfur application.  All of the patient's questions and concerns were addressed. Isotretinoin Pregnancy And Lactation Text: This medication is Pregnancy Category X and is considered extremely dangerous during pregnancy. It is unknown if it is excreted in breast milk. Dapsone Counseling: I discussed with the patient the risks of dapsone including but not limited to hemolytic anemia, agranulocytosis, rashes, methemoglobinemia, kidney failure, peripheral neuropathy, headaches, GI upset, and liver toxicity.  Patients who start dapsone require monitoring including baseline LFTs and weekly CBCs for the first month, then every month thereafter.  The patient verbalized understanding of the proper use and possible adverse effects of dapsone.  All of the patient's questions and concerns were addressed. Spironolactone Counseling: Patient advised regarding risks of diarrhea, abdominal pain, hyperkalemia, birth defects (for female patients), liver toxicity and renal toxicity. The patient may need blood work to monitor liver and kidney function and potassium levels while on therapy. The patient verbalized understanding of the proper use and possible adverse effects of spironolactone.  All of the patient's questions and concerns were addressed. Azelaic Acid Pregnancy And Lactation Text: This medication is considered safe during pregnancy and breast feeding. Topical Clindamycin Counseling: Patient counseled that this medication may cause skin irritation or allergic reactions.  In the event of skin irritation, the patient was advised to reduce the amount of the drug applied or use it less frequently.   The patient verbalized understanding of the proper use and possible adverse effects of clindamycin.  All of the patient's questions and concerns were addressed. Topical Retinoid counseling:  Patient advised to apply a pea-sized amount only at bedtime and wait 30 minutes after washing their face before applying.  If too drying, patient may add a non-comedogenic moisturizer. The patient verbalized understanding of the proper use and possible adverse effects of retinoids.  All of the patient's questions and concerns were addressed. Bactrim Counseling:  I discussed with the patient the risks of sulfa antibiotics including but not limited to GI upset, allergic reaction, drug rash, diarrhea, dizziness, photosensitivity, and yeast infections.  Rarely, more serious reactions can occur including but not limited to aplastic anemia, agranulocytosis, methemoglobinemia, blood dyscrasias, liver or kidney failure, lung infiltrates or desquamative/blistering drug rashes. Topical Sulfur Applications Pregnancy And Lactation Text: This medication is Pregnancy Category C and has an unknown safety profile during pregnancy. It is unknown if this topical medication is excreted in breast milk. Doxycycline Pregnancy And Lactation Text: This medication is Pregnancy Category D and not consider safe during pregnancy. It is also excreted in breast milk but is considered safe for shorter treatment courses. Minocycline Counseling: Patient advised regarding possible photosensitivity and discoloration of the teeth, skin, lips, tongue and gums.  Patient instructed to avoid sunlight, if possible.  When exposed to sunlight, patients should wear protective clothing, sunglasses, and sunscreen.  The patient was instructed to call the office immediately if the following severe adverse effects occur:  hearing changes, easy bruising/bleeding, severe headache, or vision changes.  The patient verbalized understanding of the proper use and possible adverse effects of minocycline.  All of the patient's questions and concerns were addressed. Tetracycline Pregnancy And Lactation Text: This medication is Pregnancy Category D and not consider safe during pregnancy. It is also excreted in breast milk. Winlevi Pregnancy And Lactation Text: This medication is considered safe during pregnancy and breastfeeding. Tazorac Counseling:  Patient advised that medication is irritating and drying.  Patient may need to apply sparingly and wash off after an hour before eventually leaving it on overnight.  The patient verbalized understanding of the proper use and possible adverse effects of tazorac.  All of the patient's questions and concerns were addressed. Erythromycin Pregnancy And Lactation Text: This medication is Pregnancy Category B and is considered safe during pregnancy. It is also excreted in breast milk. Birth Control Pills Counseling: Birth Control Pill Counseling: I discussed with the patient the potential side effects of OCPs including but not limited to increased risk of stroke, heart attack, thrombophlebitis, deep venous thrombosis, hepatic adenomas, breast changes, GI upset, headaches, and depression.  The patient verbalized understanding of the proper use and possible adverse effects of OCPs. All of the patient's questions and concerns were addressed. Sarecycline Counseling: Patient advised regarding possible photosensitivity and discoloration of the teeth, skin, lips, tongue and gums.  Patient instructed to avoid sunlight, if possible.  When exposed to sunlight, patients should wear protective clothing, sunglasses, and sunscreen.  The patient was instructed to call the office immediately if the following severe adverse effects occur:  hearing changes, easy bruising/bleeding, severe headache, or vision changes.  The patient verbalized understanding of the proper use and possible adverse effects of sarecycline.  All of the patient's questions and concerns were addressed. Aklief Pregnancy And Lactation Text: It is unknown if this medication is safe to use during pregnancy.  It is unknown if this medication is excreted in breast milk.  Breastfeeding women should use the topical cream on the smallest area of the skin for the shortest time needed while breastfeeding.  Do not apply to nipple and areola. Azelaic Acid Counseling: Patient counseled that medicine may cause skin irritation and to avoid applying near the eyes.  In the event of skin irritation, the patient was advised to reduce the amount of the drug applied or use it less frequently.   The patient verbalized understanding of the proper use and possible adverse effects of azelaic acid.  All of the patient's questions and concerns were addressed. Birth Control Pills Pregnancy And Lactation Text: This medication should be avoided if pregnant and for the first 30 days post-partum. Tazorac Pregnancy And Lactation Text: This medication is not safe during pregnancy. It is unknown if this medication is excreted in breast milk. Isotretinoin Counseling: Patient should get monthly blood tests, not donate blood, not drive at night if vision affected, not share medication, and not undergo elective surgery for 6 months after tx completed. Side effects reviewed, pt to contact office should one occur. Topical Clindamycin Pregnancy And Lactation Text: This medication is Pregnancy Category B and is considered safe during pregnancy. It is unknown if it is excreted in breast milk. Spironolactone Pregnancy And Lactation Text: This medication can cause feminization of the male fetus and should be avoided during pregnancy. The active metabolite is also found in breast milk. Benzoyl Peroxide Counseling: Patient counseled that medicine may cause skin irritation and bleach clothing.  In the event of skin irritation, the patient was advised to reduce the amount of the drug applied or use it less frequently.   The patient verbalized understanding of the proper use and possible adverse effects of benzoyl peroxide.  All of the patient's questions and concerns were addressed. Dapsone Pregnancy And Lactation Text: This medication is Pregnancy Category C and is not considered safe during pregnancy or breast feeding. High Dose Vitamin A Counseling: Side effects reviewed, pt to contact office should one occur. Azithromycin Counseling:  I discussed with the patient the risks of azithromycin including but not limited to GI upset, allergic reaction, drug rash, diarrhea, and yeast infections. Detail Level: Zone Bactrim Pregnancy And Lactation Text: This medication is Pregnancy Category D and is known to cause fetal risk.  It is also excreted in breast milk. Erythromycin Counseling:  I discussed with the patient the risks of erythromycin including but not limited to GI upset, allergic reaction, drug rash, diarrhea, increase in liver enzymes, and yeast infections. Doxycycline Counseling:  Patient counseled regarding possible photosensitivity and increased risk for sunburn.  Patient instructed to avoid sunlight, if possible.  When exposed to sunlight, patients should wear protective clothing, sunglasses, and sunscreen.  The patient was instructed to call the office immediately if the following severe adverse effects occur:  hearing changes, easy bruising/bleeding, severe headache, or vision changes.  The patient verbalized understanding of the proper use and possible adverse effects of doxycycline.  All of the patient's questions and concerns were addressed. Azithromycin Pregnancy And Lactation Text: This medication is considered safe during pregnancy and is also secreted in breast milk. Winlevi Counseling:  I discussed with the patient the risks of topical clascoterone including but not limited to erythema, scaling, itching, and stinging. Patient voiced their understanding. Aklief counseling:  Patient advised to apply a pea-sized amount only at bedtime and wait 30 minutes after washing their face before applying.  If too drying, patient may add a non-comedogenic moisturizer.  The most commonly reported side effects including irritation, redness, scaling, dryness, stinging, burning, itching, and increased risk of sunburn.  The patient verbalized understanding of the proper use and possible adverse effects of retinoids.  All of the patient's questions and concerns were addressed. Topical Retinoid Pregnancy And Lactation Text: This medication is Pregnancy Category C. It is unknown if this medication is excreted in breast milk.

## 2023-05-24 ENCOUNTER — OFFICE VISIT (OUTPATIENT)
Dept: CARDIOLOGY CLINIC | Facility: CLINIC | Age: 63
End: 2023-05-24

## 2023-05-24 VITALS
BODY MASS INDEX: 49.61 KG/M2 | SYSTOLIC BLOOD PRESSURE: 136 MMHG | HEART RATE: 92 BPM | DIASTOLIC BLOOD PRESSURE: 70 MMHG | WEIGHT: 280 LBS | HEIGHT: 63 IN

## 2023-05-24 DIAGNOSIS — E66.01 CLASS 3 SEVERE OBESITY DUE TO EXCESS CALORIES WITHOUT SERIOUS COMORBIDITY IN ADULT, UNSPECIFIED BMI (HCC): ICD-10-CM

## 2023-05-24 DIAGNOSIS — K70.30 ALCOHOLIC CIRRHOSIS OF LIVER WITHOUT ASCITES (HCC): ICD-10-CM

## 2023-05-24 DIAGNOSIS — Z86.73 HISTORY OF CVA (CEREBROVASCULAR ACCIDENT): ICD-10-CM

## 2023-05-24 DIAGNOSIS — E78.00 PURE HYPERCHOLESTEROLEMIA: ICD-10-CM

## 2023-05-24 DIAGNOSIS — R06.83 SNORING: ICD-10-CM

## 2023-05-24 DIAGNOSIS — E11.40 TYPE 2 DIABETES MELLITUS WITH DIABETIC NEUROPATHY, WITHOUT LONG-TERM CURRENT USE OF INSULIN (HCC): ICD-10-CM

## 2023-05-24 DIAGNOSIS — I48.19 PERSISTENT ATRIAL FIBRILLATION (HCC): Primary | ICD-10-CM

## 2023-05-24 DIAGNOSIS — Z98.890 HISTORY OF CARDIOVERSION: ICD-10-CM

## 2023-05-24 DIAGNOSIS — I10 BENIGN ESSENTIAL HYPERTENSION: ICD-10-CM

## 2023-05-24 NOTE — PROGRESS NOTES
[facility]  [address] [facility]   [address]  [id number]  Phone#  [phone]   Fax#  [phone]  *-*-*-*-*-*-*-*-*-*-*-*-*-*-*-*-*-*-*-*-*-*-*-*-*-*-*-*-*-*-*-*-*-*-*-*-*-*-*-*-*-*-*-*-*-*-*-*-*-*-*-*-*-*                                   Cardiology Follow Up      ENCOUNTER DATE: 23 11:39 AM  PATIENT NAME: Geovanna Amezquita   : 1960    MRN: [id number]  AGE:63 y o  SEX: female  [address] MD     PRIMARY CARE PHYSICIAN: Carlos Mendoza MD    ACTIVE DIAGNOSIS THIS VISIT  1  Persistent atrial fibrillation (HCC)  POCT ECG      2  Pure hypercholesterolemia        3  Benign essential hypertension        4  History of cardioversion        5  History of CVA (cerebrovascular accident)        6  Alcoholic cirrhosis of liver without ascites ([address] )        7  Type 2 diabetes mellitus with diabetic neuropathy, without long-term current use of insulin ([address] )        8  Snoring        9  Class 3 severe obesity due to excess calories without serious comorbidity in adult, unspecified BMI ([address] )          ACTIVE PROBLEM LIST  Patient Active Problem List   Diagnosis   • Alcoholic cirrhosis of liver without ascites ([address] )   • Benign essential hypertension   • HLD (hyperlipidemia)   • Pancreatitis   • Type 2 diabetes mellitus with diabetic neuropathy (HCC)   • Demand ischemia of myocardium (HCC)   • QT prolongation   • History of cardioversion   • Class 3 severe obesity without serious comorbidity in adult [facility])   • Snoring   • Nocturnal leg movements   • History of CVA (cerebrovascular accident)   • Persistent atrial fibrillation (HCC)   • Vitamin D deficiency   • Anxiety   • Acute respiratory failure with hypoxia (HCC)   • Type 2 diabetes mellitus without complication, without long-term current use of insulin ([address] )       CARDIOLOGY SPECIALTY COMMENTS  Patient was 1st seen in the hospital where she presented with new onset atrial fibrillation with a rapid ventricular response   She had not been anticoagulated  She was placed on Eliquis and rate control was marginally achieved with metoprolol 100 mg b i d  One month later she was cardioverted on Eliquis and placed on Multaq  In 12/20/2018 Discussed with patient that there is an increasing amount of evidence that recurrence of atrial fibrillation and ability to control atrial fibrillation is weight related  If patient's lose weight, they usually stay in sinus rhythm  If they gain weight, atrial fibrillation has a higher likelihood of recurring and a higher likelihood of having difficulty with control  08/05/2020 patient hospitalized at [facility] and transferred to [address] [place] [address] with evidence of a cerebral vascular accident with infarct of left thalamus and punctate foci embolic infarcts of cerebellum  Patient was non compliant with her Eliquis  She was in atrial fibrillation with RVR  She was felt not to be a candidate for thrombolytics therapy or embolectomy and was treated conservatively  08/06/2020 echocardiogram: Normal LV function, EF 58%, severe concentric LVH  RV at the upper limits of normal  Biatrial enlargement  Estimated PA pressure is 40 mmHg  INTERVAL HISTORY:        Patient with chronic atrial fibrillation returns  Her heart rate is in the low 90s resting  Considered adding Cardizem  but she is totally asymptomatic  She is unaware of her irregular heartbeat/atrial fibrillation  She does not feel she gets exceptionally short of breath more than she should considering that she is still a smoker  Her blood pressure is acceptable and her cholesterol is excellent  DISCUSSION/PLAN:          Return in 6 months    EKG on return    Lab Studies:    Lab Results   Component Value Date    CHOLESTEROL 112 11/03/2022    CHOLESTEROL 139 05/26/2021    CHOLESTEROL 189 08/06/2020     Lab Results   Component Value Date    TRIG 121 11/03/2022    TRIG 101 05/26/2021    TRIG 142 08/06/2020     Lab Results   Component Value Date    HDL 46 (L) 11/03/2022    HDL 47 05/26/2021    HDL 49 08/06/2020     Lab Results   Component Value Date    LDLCALC 42 11/03/2022    LDLCALC 72 05/26/2021    LDLCALC 112 (H) 08/06/2020       Lab Results   Component Value Date    HGBA1C 6 6 (A) 12/21/2022      Lab Results   Component Value Date    BUN 33 (H) 11/03/2022    BUN 21 12/10/2021    BUN 21 10/28/2021     (H) 11/03/2022     12/10/2021     10/28/2021    CO2 24 11/03/2022    CO2 26 12/10/2021    CO2 26 10/28/2021    CREATININE 1 20 11/03/2022    CREATININE 1 22 12/10/2021    CREATININE 1 14 10/28/2021    EGFR 49 11/03/2022    EGFR 49 12/10/2021    EGFR 53 10/28/2021    K 5 3 11/03/2022    K 4 9 12/10/2021    K 5 0 10/28/2021    SODIUM 138 11/03/2022    SODIUM 139 12/10/2021    SODIUM 139 10/28/2021     Lab Results   Component Value Date    HCT 42 0 11/03/2022    HCT 44 4 05/26/2021    HCT 41 2 08/07/2020    HGB 13 4 11/03/2022    HGB 14 2 05/26/2021    HGB 13 2 08/07/2020    MCH 29 2 11/03/2022    MCH 29 5 05/26/2021    MCH 29 5 08/07/2020    MCHC 31 9 11/03/2022    MCHC 32 0 05/26/2021    MCHC 32 0 08/07/2020    MCV 92 11/03/2022    MCV 92 05/26/2021    MCV 92 08/07/2020     11/03/2022     05/26/2021     08/07/2020    WBC 12 72 (H) 11/03/2022    WBC 11 08 (H) 05/26/2021    WBC 12 30 (H) 08/07/2020      Lab Results   Component Value Date    ALKPHOS 130 (H) 11/03/2022    ALKPHOS 127 (H) 10/28/2021    ALKPHOS 148 (H) 05/26/2021    ALT 24 11/03/2022    ALT 22 10/28/2021    ALT 20 05/26/2021    AST 12 11/03/2022    AST 13 10/28/2021    AST 10 05/26/2021    CALCIUM 10 0 11/03/2022    CALCIUM 9 6 12/10/2021    CALCIUM 9 6 10/28/2021    MG 2 4 08/07/2020    MG 1 8 08/06/2020    MG 1 7 07/20/2018       Results for orders placed or performed in visit on 05/24/23   POCT ECG    Narrative    Atrial fibrillation with a ventricular response of 92 bpm   Poor R wave progression  Abnormal EKG           Current Outpatient Medications:   •  Accu-Chek Softclix Lancets lancets, by Other route daily, Disp: 100 each, Rfl: 5  •  apixaban (Eliquis) 5 mg, Take 1 tablet (5 mg total) by mouth 2 (two) times a day, Disp: 60 tablet, Rfl: 11  •  atorvastatin (LIPITOR) 80 mg tablet, Take 1 tablet (80 mg total) by mouth every evening, Disp: 90 tablet, Rfl: 3  •  Blood Glucose Monitoring Suppl (Accu-Chek Guide) w/Device KIT, by Does not apply route daily, Disp: 1 kit, Rfl: 0  •  Blood Glucose Monitoring Suppl (OneTouch Verio) w/Device KIT, by Does not apply route daily, Disp: 1 kit, Rfl: 0  •  buPROPion (WELLBUTRIN) 75 mg tablet, Take 1 tablet (75 mg total) by mouth 2 (two) times a day, Disp: 180 tablet, Rfl: 3  •  glucose blood (OneTouch Verio) test strip, 1 each by Other route daily Use as instructed, Disp: 100 each, Rfl: 3  •  glucose blood test strip, 1 each by Other route daily Use as instructed, Disp: 100 each, Rfl: 3  •  lisinopril (ZESTRIL) 40 mg tablet, Take 1 tablet daily, Disp: 90 tablet, Rfl: 3  •  Melatonin 10 MG TABS, Take 30 mg by mouth daily at bedtime as needed, Disp: , Rfl:   •  metoprolol tartrate (LOPRESSOR) 100 mg tablet, Take 1 tablet (100 mg total) by mouth every 12 (twelve) hours, Disp: 180 tablet, Rfl: 3  •  sitaGLIPtin-metFORMIN (Janumet)  MG per tablet, Take 1 tablet by mouth 2 (two) times a day with meals, Disp: 180 tablet, Rfl: 1  •  ergocalciferol (VITAMIN D2) 50,000 units, Take 1 capsule (50,000 Units total) by mouth once a week (Patient not taking: Reported on 5/24/2023), Disp: 12 capsule, Rfl: 1  Allergies   Allergen Reactions   • Augmentin [Amoxicillin-Pot Clavulanate]      Yeast infection       Past Medical History:   Diagnosis Date   • Anxiety    • Arthritis    • Cirrhosis of liver (HCC)    • Diabetes mellitus ([facility] )    • HLD (hyperlipidemia)    • Hypertension    • Irregular heart beat    • Psychiatric disorder     Depression   • Stroke [facility])    • Tobacco dependence      Social History     Socioeconomic History   • Marital status: Single     Spouse name: Not on file   • Number of children: Not on file   • Years of education: Not on file   • Highest education level: Not on file   Occupational History   • Not on file   Tobacco Use   • Smoking status: Every Day     Packs/day: 0 50     Types: Cigarettes   • Smokeless tobacco: Never   Vaping Use   • Vaping Use: Never used   Substance and Sexual Activity   • Alcohol use: Yes     Comment: 1 botttle of vodka over 4 days/denies since 7/18   • Drug use: No   • Sexual activity: Not Currently   Other Topics Concern   • Not on file   Social History Narrative   • Not on file     Social Determinants of Health     Financial Resource Strain: Not on file   Food Insecurity: Not on file   Transportation Needs: Unmet Transportation Needs (2/1/2022)    PRAPARE - Transportation    • Lack of Transportation (Medical): Yes    • Lack of Transportation (Non-Medical): Yes   Physical Activity: Not on file   Stress: Not on file   Social Connections: Not on file   Intimate Partner Violence: Not on file   Housing Stability: Not on file      Family History   Problem Relation Age of Onset   • Diabetes Mother    • Diabetes Father    • Diabetes Brother    • Hypertension Brother      Past Surgical History:   Procedure Laterality Date   • APPENDECTOMY     • BUNIONECTOMY Right    • CARPAL TUNNEL RELEASE     • CHOLECYSTECTOMY     • IR STROKE ALERT  8/5/2020       PREVIOUS WEIGHTS:   Wt Readings from Last 10 Encounters:   05/24/23 127 kg (280 lb)   12/21/22 124 kg (273 lb)   11/04/22 123 kg (271 lb 3 2 oz)   07/26/22 127 kg (279 lb)   03/28/22 127 kg (281 lb)   02/01/22 125 kg (275 lb)   09/30/21 124 kg (274 lb)   08/03/21 124 kg (273 lb 8 oz)   05/26/21 127 kg (280 lb 4 8 oz)   03/10/21 123 kg (270 lb 6 4 oz)        Review of Systems:  Review of Systems   Respiratory: Negative for cough, choking, chest tightness, shortness of breath, wheezing and stridor      Cardiovascular: Negative for chest pain, "palpitations and leg swelling  Musculoskeletal: Negative for gait problem  Skin: Negative for rash  Neurological: Negative for dizziness, tremors, syncope, weakness, light-headedness, numbness and headaches  Psychiatric/Behavioral: Negative for agitation and behavioral problems  The patient is not hyperactive  Physical Exam:  /70   Pulse 92   Ht 5' 3\" (1 6 m)   Wt 127 kg (280 lb)   BMI 49 60 kg/m²     Physical Exam  Constitutional:       General: She is not in acute distress  Appearance: She is well-developed  HENT:      Head: Normocephalic and atraumatic  Neck:      Thyroid: No thyromegaly  Vascular: No carotid bruit or JVD  Trachea: No tracheal deviation  Cardiovascular:      Rate and Rhythm: Normal rate  Rhythm irregularly irregular  Pulses: Normal pulses  Heart sounds: Normal heart sounds  No murmur heard  No friction rub  No gallop  Pulmonary:      Effort: Pulmonary effort is normal  No respiratory distress  Breath sounds: Normal breath sounds  No wheezing, rhonchi or rales  Chest:      Chest wall: No tenderness  Musculoskeletal:         General: Normal range of motion  Cervical back: Normal range of motion and neck supple  Right lower leg: No edema  Left lower leg: No edema  Skin:     General: Skin is warm and dry  Neurological:      General: No focal deficit present  Mental Status: She is alert and oriented to person, place, and time  Psychiatric:         Mood and Affect: Mood normal          Behavior: Behavior normal          Thought Content: Thought content normal          Judgment: Judgment normal          ======================================================  Imaging:   I have personally reviewed pertinent reports  I spent 30 minutes on the patient's office visit  This time was spent on the day of the visit  I had direct contact with the patient in the office on the day of the visit   Greater than 50% of " "the total time was spent obtaining a history, examining patient, answering all patient questions, arranging and discussing plan of care with patient as well as directly providing instructions  Additional time then spent on orders and office chart  Portions of the record may have been created with voice recognition software  Occasional wrong word or \"sound a like\" substitutions may have occurred due to the inherent limitations of voice recognition software  Read the chart carefully and recognize, using context, where substitutions have occurred      SIGNATURES:   Nayana Tirado MD   "

## 2023-07-19 ENCOUNTER — TELEPHONE (OUTPATIENT)
Dept: OTHER | Facility: OTHER | Age: 63
End: 2023-07-19

## 2023-07-19 ENCOUNTER — TELEPHONE (OUTPATIENT)
Dept: FAMILY MEDICINE CLINIC | Facility: CLINIC | Age: 63
End: 2023-07-19

## 2023-07-19 DIAGNOSIS — E66.01 MORBID OBESITY DUE TO EXCESS CALORIES (HCC): ICD-10-CM

## 2023-07-19 DIAGNOSIS — F41.9 ANXIETY: ICD-10-CM

## 2023-07-19 DIAGNOSIS — E11.9 TYPE 2 DIABETES MELLITUS WITHOUT COMPLICATION, WITHOUT LONG-TERM CURRENT USE OF INSULIN (HCC): ICD-10-CM

## 2023-07-19 RX ORDER — SITAGLIPTIN AND METFORMIN HYDROCHLORIDE 1000; 50 MG/1; MG/1
1 TABLET, FILM COATED ORAL 2 TIMES DAILY WITH MEALS
Qty: 180 TABLET | Refills: 1 | Status: SHIPPED | OUTPATIENT
Start: 2023-07-19

## 2023-07-19 RX ORDER — BUPROPION HYDROCHLORIDE 75 MG/1
75 TABLET ORAL 2 TIMES DAILY
Qty: 180 TABLET | Refills: 3 | Status: SHIPPED | OUTPATIENT
Start: 2023-07-19

## 2023-07-19 NOTE — TELEPHONE ENCOUNTER
Refill sent for Janumet and Wellbutrin which are the 2 that seem to be due for refills, since she did not leave any more information if there is anything else she needs I would need specific information

## 2023-11-06 DIAGNOSIS — I48.91 NEW ONSET ATRIAL FIBRILLATION (HCC): ICD-10-CM

## 2023-11-15 DIAGNOSIS — I10 BENIGN ESSENTIAL HYPERTENSION: ICD-10-CM

## 2023-11-15 RX ORDER — LISINOPRIL 40 MG/1
TABLET ORAL
Qty: 90 TABLET | Refills: 1 | Status: SHIPPED | OUTPATIENT
Start: 2023-11-15

## 2023-12-04 DIAGNOSIS — I48.91 NEW ONSET ATRIAL FIBRILLATION (HCC): ICD-10-CM

## 2023-12-28 DIAGNOSIS — I48.91 NEW ONSET ATRIAL FIBRILLATION (HCC): ICD-10-CM

## 2023-12-28 NOTE — TELEPHONE ENCOUNTER
Spoke with patient, advised she is overdue for follow up, transferred to  to schedule.    Patient has enough medication till 1/4/24

## 2023-12-29 RX ORDER — METOPROLOL TARTRATE 100 MG/1
100 TABLET ORAL EVERY 12 HOURS
Qty: 180 TABLET | Refills: 3 | Status: SHIPPED | OUTPATIENT
Start: 2023-12-29

## 2024-01-02 DIAGNOSIS — F41.9 ANXIETY: ICD-10-CM

## 2024-01-02 DIAGNOSIS — E66.01 MORBID OBESITY DUE TO EXCESS CALORIES (HCC): ICD-10-CM

## 2024-01-02 RX ORDER — BUPROPION HYDROCHLORIDE 75 MG/1
75 TABLET ORAL 2 TIMES DAILY
Qty: 180 TABLET | Refills: 3 | OUTPATIENT
Start: 2024-01-02

## 2024-01-08 ENCOUNTER — TELEPHONE (OUTPATIENT)
Dept: FAMILY MEDICINE CLINIC | Facility: CLINIC | Age: 64
End: 2024-01-08

## 2024-01-09 DIAGNOSIS — E66.01 MORBID OBESITY DUE TO EXCESS CALORIES (HCC): ICD-10-CM

## 2024-01-09 DIAGNOSIS — E11.9 TYPE 2 DIABETES MELLITUS WITHOUT COMPLICATION, WITHOUT LONG-TERM CURRENT USE OF INSULIN (HCC): ICD-10-CM

## 2024-01-09 DIAGNOSIS — E55.9 VITAMIN D DEFICIENCY: Primary | ICD-10-CM

## 2024-01-12 DIAGNOSIS — E78.2 MIXED HYPERLIPIDEMIA: ICD-10-CM

## 2024-01-12 NOTE — TELEPHONE ENCOUNTER
Patient left vm on rx line requesting refill for atorvastatin to go to [facility]

## 2024-01-13 RX ORDER — ATORVASTATIN CALCIUM 80 MG/1
80 TABLET, FILM COATED ORAL EVERY EVENING
Qty: 90 TABLET | Refills: 3 | Status: SHIPPED | OUTPATIENT
Start: 2024-01-13

## 2024-01-17 ENCOUNTER — RA CDI HCC (OUTPATIENT)
Dept: OTHER | Facility: HOSPITAL | Age: 64
End: 2024-01-17

## 2024-01-17 NOTE — PROGRESS NOTES
HCC coding opportunities          Chart Reviewed number of suggestions sent to Provider: 2  E11.36  E66.01     Patients Insurance        Commercial Insurance: [employer]

## 2024-01-23 ENCOUNTER — OFFICE VISIT (OUTPATIENT)
Dept: FAMILY MEDICINE CLINIC | Facility: CLINIC | Age: 64
End: 2024-01-23

## 2024-01-23 VITALS
DIASTOLIC BLOOD PRESSURE: 70 MMHG | BODY MASS INDEX: 51.09 KG/M2 | HEART RATE: 97 BPM | OXYGEN SATURATION: 96 % | SYSTOLIC BLOOD PRESSURE: 120 MMHG | TEMPERATURE: 98 F | WEIGHT: 288.4 LBS

## 2024-01-23 DIAGNOSIS — E11.9 TYPE 2 DIABETES MELLITUS WITHOUT COMPLICATION, WITHOUT LONG-TERM CURRENT USE OF INSULIN (HCC): ICD-10-CM

## 2024-01-23 DIAGNOSIS — I48.19 PERSISTENT ATRIAL FIBRILLATION (HCC): ICD-10-CM

## 2024-01-23 DIAGNOSIS — Z23 ENCOUNTER FOR IMMUNIZATION: ICD-10-CM

## 2024-01-23 DIAGNOSIS — E11.40 TYPE 2 DIABETES MELLITUS WITH DIABETIC NEUROPATHY, WITHOUT LONG-TERM CURRENT USE OF INSULIN (HCC): Primary | ICD-10-CM

## 2024-01-23 DIAGNOSIS — E66.01 MORBID OBESITY DUE TO EXCESS CALORIES (HCC): ICD-10-CM

## 2024-01-23 DIAGNOSIS — K70.30 ALCOHOLIC CIRRHOSIS OF LIVER WITHOUT ASCITES (HCC): ICD-10-CM

## 2024-01-23 DIAGNOSIS — F41.9 ANXIETY: ICD-10-CM

## 2024-01-23 DIAGNOSIS — E78.00 PURE HYPERCHOLESTEROLEMIA: ICD-10-CM

## 2024-01-23 PROCEDURE — 99214 OFFICE O/P EST MOD 30 MIN: CPT | Performed by: FAMILY MEDICINE

## 2024-01-23 PROCEDURE — 90677 PCV20 VACCINE IM: CPT | Performed by: FAMILY MEDICINE

## 2024-01-23 PROCEDURE — 90471 IMMUNIZATION ADMIN: CPT | Performed by: FAMILY MEDICINE

## 2024-01-23 RX ORDER — BUPROPION HYDROCHLORIDE 75 MG/1
75 TABLET ORAL 2 TIMES DAILY
Qty: 180 TABLET | Refills: 3 | Status: SHIPPED | OUTPATIENT
Start: 2024-01-23

## 2024-01-23 RX ORDER — SITAGLIPTIN AND METFORMIN HYDROCHLORIDE 1000; 50 MG/1; MG/1
1 TABLET, FILM COATED ORAL 2 TIMES DAILY WITH MEALS
Qty: 180 TABLET | Refills: 1 | Status: SHIPPED | OUTPATIENT
Start: 2024-01-23

## 2024-01-23 RX ORDER — GABAPENTIN 300 MG/1
300 CAPSULE ORAL 2 TIMES DAILY
Qty: 180 CAPSULE | Refills: 1 | Status: SHIPPED | OUTPATIENT
Start: 2024-01-23

## 2024-01-23 NOTE — PROGRESS NOTES
Assessment/Plan:    Type 2 diabetes mellitus with diabetic neuropathy (HCC)    Lab Results   Component Value Date    HGBA1C 6.6 (A) 12/21/2022   Start Gabapentin. Start with 300 mg qHs for 2 weeks, if still having symptoms can increase to BID    Type 2 diabetes mellitus without complication, without long-term current use of insulin (HCC)    Lab Results   Component Value Date    HGBA1C 6.6 (A) 12/21/2022     Alcoholic cirrhosis of liver without ascites (HCC)  Denies any ETOH use  Follow LFTs     Persistent atrial fibrillation (HCC)  Follows with Cardiology  Rate controlled  Continue Metoprolol        Diagnoses and all orders for this visit:    Type 2 diabetes mellitus with diabetic neuropathy, without long-term current use of insulin (HCC)  -     gabapentin (Neurontin) 300 mg capsule; Take 1 capsule (300 mg total) by mouth 2 (two) times a day    Type 2 diabetes mellitus without complication, without long-term current use of insulin (HCC)  -     sitaGLIPtin-metFORMIN (Janumet)  MG per tablet; Take 1 tablet by mouth 2 (two) times a day with meals    Persistent atrial fibrillation (HCC)    Pure hypercholesterolemia    Encounter for immunization  -     Pneumococcal Conjugate Vaccine 20-valent (Pcv20)    Alcoholic cirrhosis of liver without ascites (HCC)    Anxiety  -     buPROPion (WELLBUTRIN) 75 mg tablet; Take 1 tablet (75 mg total) by mouth 2 (two) times a day    Morbid obesity due to excess calories (HCC)  -     buPROPion (WELLBUTRIN) 75 mg tablet; Take 1 tablet (75 mg total) by mouth 2 (two) times a day          Subjective:      Patient ID: Lynda Boyd is a 63 y.o. female.    Diabetes  She presents for her follow-up diabetic visit. She has type 2 diabetes mellitus. No MedicAlert identification noted. Her disease course has been stable. There are no hypoglycemic associated symptoms. Pertinent negatives for hypoglycemia include no seizures. Associated symptoms include foot paresthesias. Pertinent negatives for  diabetes include no chest pain. There are no hypoglycemic complications. Symptoms are worsening. Risk factors for coronary artery disease include dyslipidemia, hypertension, sedentary lifestyle, post-menopausal and obesity. Current diabetic treatment includes oral agent (dual therapy). She is compliant with treatment all of the time. She is following a generally healthy diet. When asked about meal planning, she reported none. She has not had a previous visit with a dietitian. She participates in exercise intermittently. An ACE inhibitor/angiotensin II receptor blocker is being taken.       The following portions of the patient's history were reviewed and updated as appropriate: She  has a past medical history of Anxiety, Arthritis, Cirrhosis of liver (HCC), Diabetes mellitus (HCC), HLD (hyperlipidemia), Hypertension, Irregular heart beat, Psychiatric disorder, Stroke (HCC), and Tobacco dependence.  She   Patient Active Problem List    Diagnosis Date Noted   • Type 2 diabetes mellitus without complication, without long-term current use of insulin (HCC) 12/21/2022   • Acute respiratory failure with hypoxia (HCC) 11/04/2022   • Vitamin D deficiency 06/07/2021   • Anxiety 06/07/2021   • Persistent atrial fibrillation (HCC) 03/09/2021   • History of CVA (cerebrovascular accident) 08/05/2020   • Snoring 02/14/2019   • Nocturnal leg movements 02/14/2019   • Class 3 severe obesity without serious comorbidity in adult (HCC) 11/15/2018   • History of cardioversion 09/27/2018   • QT prolongation 07/19/2018   • Demand ischemia of myocardium 07/17/2018   • Pancreatitis 08/24/2015   • Alcoholic cirrhosis of liver without ascites (HCC) 08/13/2012   • Benign essential hypertension 08/13/2012   • HLD (hyperlipidemia) 08/13/2012   • Type 2 diabetes mellitus with diabetic neuropathy (HCC) 08/13/2012     She  has a past surgical history that includes Appendectomy; Carpal tunnel release; Cholecystectomy; Bunionectomy (Right); and IR  stroke alert (8/5/2020).  Her family history includes Diabetes in her brother, father, and mother; Hypertension in her brother.  She  reports that she has been smoking cigarettes. She has never used smokeless tobacco. She reports that she does not currently use alcohol. She reports that she does not use drugs.  Current Outpatient Medications   Medication Sig Dispense Refill   • buPROPion (WELLBUTRIN) 75 mg tablet Take 1 tablet (75 mg total) by mouth 2 (two) times a day 180 tablet 3   • gabapentin (Neurontin) 300 mg capsule Take 1 capsule (300 mg total) by mouth 2 (two) times a day 180 capsule 1   • sitaGLIPtin-metFORMIN (Janumet)  MG per tablet Take 1 tablet by mouth 2 (two) times a day with meals 180 tablet 1   • Accu-Chek Softclix Lancets lancets by Other route daily 100 each 5   • apixaban (Eliquis) 5 mg Take 1 tablet (5 mg total) by mouth 2 (two) times a day 180 tablet 2   • atorvastatin (LIPITOR) 80 mg tablet Take 1 tablet (80 mg total) by mouth every evening 90 tablet 3   • Blood Glucose Monitoring Suppl (Accu-Chek Guide) w/Device KIT by Does not apply route daily 1 kit 0   • Blood Glucose Monitoring Suppl (OneTouch Verio) w/Device KIT by Does not apply route daily 1 kit 0   • ergocalciferol (VITAMIN D2) 50,000 units Take 1 capsule (50,000 Units total) by mouth once a week (Patient not taking: Reported on 5/24/2023) 12 capsule 1   • glucose blood (OneTouch Verio) test strip 1 each by Other route daily Use as instructed 100 each 3   • glucose blood test strip 1 each by Other route daily Use as instructed 100 each 3   • lisinopril (ZESTRIL) 40 mg tablet TAKE ONE TABLET BY MOUTH ONCE DAILY 90 tablet 1   • Melatonin 10 MG TABS Take 30 mg by mouth daily at bedtime as needed     • metoprolol tartrate (LOPRESSOR) 100 mg tablet Take 1 tablet (100 mg total) by mouth every 12 (twelve) hours 180 tablet 3     No current facility-administered medications for this visit.     Current Outpatient Medications on File  Prior to Visit   Medication Sig   • Accu-Chek Softclix Lancets lancets by Other route daily   • apixaban (Eliquis) 5 mg Take 1 tablet (5 mg total) by mouth 2 (two) times a day   • atorvastatin (LIPITOR) 80 mg tablet Take 1 tablet (80 mg total) by mouth every evening   • Blood Glucose Monitoring Suppl (Accu-Chek Guide) w/Device KIT by Does not apply route daily   • Blood Glucose Monitoring Suppl (OneTouch Verio) w/Device KIT by Does not apply route daily   • ergocalciferol (VITAMIN D2) 50,000 units Take 1 capsule (50,000 Units total) by mouth once a week (Patient not taking: Reported on 5/24/2023)   • glucose blood (OneTouch Verio) test strip 1 each by Other route daily Use as instructed   • glucose blood test strip 1 each by Other route daily Use as instructed   • lisinopril (ZESTRIL) 40 mg tablet TAKE ONE TABLET BY MOUTH ONCE DAILY   • Melatonin 10 MG TABS Take 30 mg by mouth daily at bedtime as needed   • metoprolol tartrate (LOPRESSOR) 100 mg tablet Take 1 tablet (100 mg total) by mouth every 12 (twelve) hours   • [DISCONTINUED] buPROPion (WELLBUTRIN) 75 mg tablet Take 1 tablet (75 mg total) by mouth 2 (two) times a day   • [DISCONTINUED] sitaGLIPtin-metFORMIN (Janumet)  MG per tablet Take 1 tablet by mouth 2 (two) times a day with meals     No current facility-administered medications on file prior to visit.     She is allergic to augmentin [amoxicillin-pot clavulanate]..    Review of Systems   Constitutional:  Negative for chills and fever.   HENT:  Negative for ear pain and sore throat.    Eyes:  Negative for pain and visual disturbance.   Respiratory:  Negative for cough and shortness of breath.    Cardiovascular:  Negative for chest pain and palpitations.   Gastrointestinal:  Negative for abdominal pain and vomiting.   Genitourinary:  Negative for dysuria and hematuria.   Musculoskeletal:  Negative for arthralgias and back pain.   Skin:  Negative for color change and rash.   Neurological:  Negative  for seizures and syncope.        Tingling feet     All other systems reviewed and are negative.        Objective:      /70 (BP Location: Left arm, Patient Position: Sitting, Cuff Size: Large)   Pulse 97   Temp 98 °F (36.7 °C) (Temporal)   Wt 131 kg (288 lb 6.4 oz)   SpO2 96%   BMI 51.09 kg/m²          Physical Exam  Vitals and nursing note reviewed.   Constitutional:       Appearance: She is well-developed.   HENT:      Head: Normocephalic.      Right Ear: External ear normal.      Left Ear: External ear normal.      Nose: Nose normal.   Eyes:      Conjunctiva/sclera: Conjunctivae normal.      Pupils: Pupils are equal, round, and reactive to light.   Neck:      Thyroid: No thyromegaly.   Cardiovascular:      Rate and Rhythm: Normal rate and regular rhythm.      Heart sounds: Normal heart sounds.   Pulmonary:      Effort: Pulmonary effort is normal.      Breath sounds: Normal breath sounds.   Abdominal:      Palpations: Abdomen is soft.      Tenderness: There is no abdominal tenderness. There is no guarding or rebound.   Musculoskeletal:         General: Normal range of motion.      Cervical back: Normal range of motion and neck supple.   Skin:     General: Skin is dry.   Neurological:      Mental Status: She is alert and oriented to person, place, and time.      Deep Tendon Reflexes: Reflexes are normal and symmetric.

## 2024-01-23 NOTE — ASSESSMENT & PLAN NOTE
Lab Results   Component Value Date    HGBA1C 6.6 (A) 12/21/2022   Start Gabapentin. Start with 300 mg qHs for 2 weeks, if still having symptoms can increase to BID

## 2024-01-31 ENCOUNTER — APPOINTMENT (OUTPATIENT)
Dept: LAB | Facility: HOSPITAL | Age: 64
End: 2024-01-31
Payer: COMMERCIAL

## 2024-01-31 DIAGNOSIS — E11.9 TYPE 2 DIABETES MELLITUS WITHOUT COMPLICATION, WITHOUT LONG-TERM CURRENT USE OF INSULIN (HCC): ICD-10-CM

## 2024-01-31 DIAGNOSIS — E66.01 MORBID OBESITY DUE TO EXCESS CALORIES (HCC): ICD-10-CM

## 2024-01-31 DIAGNOSIS — E55.9 VITAMIN D DEFICIENCY: ICD-10-CM

## 2024-01-31 LAB
25(OH)D3 SERPL-MCNC: 24.4 NG/ML (ref 30–100)
ALBUMIN SERPL BCP-MCNC: 4.4 G/DL (ref 3.5–5)
ALP SERPL-CCNC: 97 U/L (ref 34–104)
ALT SERPL W P-5'-P-CCNC: 18 U/L (ref 7–52)
ANION GAP SERPL CALCULATED.3IONS-SCNC: 7 MMOL/L
AST SERPL W P-5'-P-CCNC: 16 U/L (ref 13–39)
BASOPHILS # BLD AUTO: 0.06 THOUSANDS/ÂΜL (ref 0–0.1)
BASOPHILS NFR BLD AUTO: 1 % (ref 0–1)
BILIRUB SERPL-MCNC: 1.2 MG/DL (ref 0.2–1)
BUN SERPL-MCNC: 24 MG/DL (ref 5–25)
CALCIUM SERPL-MCNC: 9.9 MG/DL (ref 8.4–10.2)
CHLORIDE SERPL-SCNC: 103 MMOL/L (ref 96–108)
CHOLEST SERPL-MCNC: 107 MG/DL
CO2 SERPL-SCNC: 27 MMOL/L (ref 21–32)
CREAT SERPL-MCNC: 1.01 MG/DL (ref 0.6–1.3)
CREAT UR-MCNC: 238.7 MG/DL
EOSINOPHIL # BLD AUTO: 0.08 THOUSAND/ÂΜL (ref 0–0.61)
EOSINOPHIL NFR BLD AUTO: 1 % (ref 0–6)
ERYTHROCYTE [DISTWIDTH] IN BLOOD BY AUTOMATED COUNT: 13.6 % (ref 11.6–15.1)
GFR SERPL CREATININE-BSD FRML MDRD: 60 ML/MIN/1.73SQ M
GLUCOSE P FAST SERPL-MCNC: 119 MG/DL (ref 65–99)
HCT VFR BLD AUTO: 41.6 % (ref 34.8–46.1)
HDLC SERPL-MCNC: 43 MG/DL
HGB BLD-MCNC: 13.4 G/DL (ref 11.5–15.4)
IMM GRANULOCYTES # BLD AUTO: 0.07 THOUSAND/UL (ref 0–0.2)
IMM GRANULOCYTES NFR BLD AUTO: 1 % (ref 0–2)
LDLC SERPL CALC-MCNC: 41 MG/DL (ref 0–100)
LYMPHOCYTES # BLD AUTO: 2.02 THOUSANDS/ÂΜL (ref 0.6–4.47)
LYMPHOCYTES NFR BLD AUTO: 16 % (ref 14–44)
MCH RBC QN AUTO: 28.9 PG (ref 26.8–34.3)
MCHC RBC AUTO-ENTMCNC: 32.2 G/DL (ref 31.4–37.4)
MCV RBC AUTO: 90 FL (ref 82–98)
MICROALBUMIN UR-MCNC: 1918.9 MG/L
MICROALBUMIN/CREAT 24H UR: 804 MG/G CREATININE (ref 0–30)
MONOCYTES # BLD AUTO: 0.59 THOUSAND/ÂΜL (ref 0.17–1.22)
MONOCYTES NFR BLD AUTO: 5 % (ref 4–12)
NEUTROPHILS # BLD AUTO: 9.67 THOUSANDS/ÂΜL (ref 1.85–7.62)
NEUTS SEG NFR BLD AUTO: 76 % (ref 43–75)
NONHDLC SERPL-MCNC: 64 MG/DL
NRBC BLD AUTO-RTO: 0 /100 WBCS
PLATELET # BLD AUTO: 274 THOUSANDS/UL (ref 149–390)
PMV BLD AUTO: 10.1 FL (ref 8.9–12.7)
POTASSIUM SERPL-SCNC: 5 MMOL/L (ref 3.5–5.3)
PROT SERPL-MCNC: 7.3 G/DL (ref 6.4–8.4)
RBC # BLD AUTO: 4.64 MILLION/UL (ref 3.81–5.12)
SODIUM SERPL-SCNC: 137 MMOL/L (ref 135–147)
TRIGL SERPL-MCNC: 113 MG/DL
TSH SERPL DL<=0.05 MIU/L-ACNC: 2.51 UIU/ML (ref 0.45–4.5)
WBC # BLD AUTO: 12.49 THOUSAND/UL (ref 4.31–10.16)

## 2024-01-31 PROCEDURE — 82570 ASSAY OF URINE CREATININE: CPT

## 2024-01-31 PROCEDURE — 82043 UR ALBUMIN QUANTITATIVE: CPT

## 2024-01-31 PROCEDURE — 83036 HEMOGLOBIN GLYCOSYLATED A1C: CPT

## 2024-01-31 PROCEDURE — 85025 COMPLETE CBC W/AUTO DIFF WBC: CPT

## 2024-01-31 PROCEDURE — 80061 LIPID PANEL: CPT

## 2024-01-31 PROCEDURE — 80053 COMPREHEN METABOLIC PANEL: CPT

## 2024-01-31 PROCEDURE — 84443 ASSAY THYROID STIM HORMONE: CPT

## 2024-01-31 PROCEDURE — 36415 COLL VENOUS BLD VENIPUNCTURE: CPT

## 2024-01-31 PROCEDURE — 82306 VITAMIN D 25 HYDROXY: CPT

## 2024-02-01 LAB
EST. AVERAGE GLUCOSE BLD GHB EST-MCNC: 163 MG/DL
HBA1C MFR BLD: 7.3 %

## 2024-02-02 ENCOUNTER — TELEPHONE (OUTPATIENT)
Dept: FAMILY MEDICINE CLINIC | Facility: CLINIC | Age: 64
End: 2024-02-02

## 2024-02-02 NOTE — TELEPHONE ENCOUNTER
6 month supply sent in July This note was copied from a baby's chart.  CONSULT - LACTATION  1 Baby Girl (Tami Seaman 1 days female MRN: [id number]    [facility] NURSERY Room / Bed: (N)/(N) Encounter: [id number]    Maternal Information     MOTHER:  Faith Seaman  Maternal Age: 31 y.o.   OB History: # 1 - Date: 07/08/15, Sex: Female, Weight: 3062 g (6 lb 12 oz), GA: 39w0d, Delivery: Vaginal, Spontaneous, Apgar1: None, Apgar5: None, Living: Living, Birth Comments: None    # 2A - Date: 24, Sex: Female, Weight: 2240 g (4 lb 15 oz), GA: 35w0d, Delivery: , Low Transverse, Apgar1: 8, Apgar5: 9, Living: Living, Birth Comments: None  # 2B - Date: 24, Sex: Male, Weight: 2555 g (5 lb 10.1 oz), GA: 35w0d, Delivery: , Low Transverse, Apgar1: 8, Apgar5: 9, Living: Living, Birth Comments: None   Previouse breast reduction surgery? No    Lactation history:   Has patient previously breast fed: No   How long had patient previously breast fed:     Previous breast feeding complications:     History reviewed. No pertinent surgical history.     Birth information:  YOB: 2022   Time of birth: 9:39 PM   Sex: female   Delivery type: , Low Transverse   Birth Weight: 2240 g (4 lb 15 oz)   Percent of Weight Change: 0%     Gestational Age: 35w0d   [unfilled]    Assessment     Breast and nipple assessment:  bilateral large breasts. High turgor bilaterally of areolas     [place] Assessment: normal assessment    Feeding assessment: feeding well  LATCH:  Latch: Grasps breast, tongue down, lips flanged, rhythmic sucking   Audible Swallowing: Spontaneous and intermittent (24 hours old)   Type of Nipple: Everted (After stimulation)   Comfort (Breast/Nipple): Soft/non-tender   Hold (Positioning): Partial assist, teach one side, mother does other, staff holds   LATCH Score: 9           24 1030   Lactation Consultation   Reason for Consult 20;15 min;10 minute    Lactation Consultant Total Time 45   Risk Factors NICU infant;LPI;Multiples   Maternal Information   Has mother  before? No   LATCH Documentation   Latch 2   Audible Swallowing 2   Type of Nipple 2   Comfort (Breast/Nipple) 2   Hold (Positioning) 1   LATCH Score 9   Having latch problems? No   Position(s) Used Football   Breasts/Nipples   Date Pumping Initiated 02/02/24   Time Pumping Initiated 1041   Left Breast Soft  (large breasts)   Right Breast Soft  (large breasts)   Left Nipple Everted  (high tugor of areola)   Right Nipple Everted  (hihg tugor of areola)   Breastfeeding Status Yes   Breastfeeding Progress Not yet established   Breast Pump   Pump 3  (Medela Pump)   Pump Review/Education Setup, frequency, and cleaning;Milk storage   Initiated by MARION Cavazos   Date Initiated 02/02/24   Patient Follow-Up   Lactation Consult Status 2   Follow-Up Type Inpatient;Call as needed   Other OB Lactation Documentation    Additional Problem Noted Mom states baby is latching to right breast, not latching to left breast. Reviewed proper position and latch. Baby cueing, positioned mom in football hold on left breast. Baby actively sucking with swallows. Set mom up with pumping for baby in NICU. Mixed feeding plan for twins  (RSB Booklet reviewed)     Feeding recommendations: breast feed on demand twin A and then pump after each nursing session.     Mom states baby is latching to right breast, not latching to left breast. Reviewed proper position and latch. Baby cueing, positioned mom and baby for football hold on left breast. Baby latched deeply, she is actively sucking with swallows. Hand expression demonstrated with teach back on right breast, effective for drops. Set mom up with pumping for baby in NICU. Encouraged mom to nurse baby at breast first and then to pump after nursing for baby in NICU. Cycled through a pumping session with mom. 0.17mLs collected.  Reviewed breast milk storage guidelines. Phone  number provided, encouraged to call for further assistance if needed.     Information on hand expression given. Discussed benefits of knowing how to manually express breast including stimulating milk supply, softening nipple for latch and evacuating breast in the event of engorgement.     Provided demonstration, education and support of deep latch to breast by placing the nipple to the nose, dragging down to chin to achieve a wide latch. Bring baby to the breast, not breast to baby. Move your shoulders down and away from your ears. Look for ear, shoulder, hip alignment. Baby's upper and lower lip should be flanged on the breast.    Information given and discussed on breastfeeding a late  infant.  Discussed sleepiness, maintaining body temperature, the lack of stamina necessitating shorter feedings. Encouraged feeding every 2-3 hours around the clock followed by hand expressing/pumping.    Pumping:   - When pumping, begin in stimulation mode (high cycle, low vacuum) until milk begins to express. Change pump to expression mode (low cycle, high vacuum). Use hands on pumping techniques to assist with milk transfer. When milk stops expressing, change back to stimulation mode. When milk begins to flow, change to expression mode. You may cycle pump up to three times in a pumping session.  Instructions given on pumping.  Discussed when to start, frequency, different pumps available versus manual expression.    Met with mother. Provided mother with Ready, Set, Baby booklet which contained information on:  Hand expression with access to QR codes to review hand expression.  Positioning and latch reviewed as well as showing images of other feeding positions.  Discussed the properties of a good latch in any position.   Feeding on cue and what that means for recognizing infant's hunger, s/s that baby is getting enough milk and some s/s that breastfeeding dyad may need further help  Skin to Skin contact an benefits to mom  and baby  Avoidance of pacifiers for the first month discussed.   Gave information on common concerns, what to expect the first few weeks after delivery, preparing for other caregivers, and how partners can help. Resources for support also provided.    Anabella Santos 2/2/2024 11:27 AM

## 2024-02-10 PROBLEM — R94.31 QT PROLONGATION: Status: RESOLVED | Noted: 2018-07-19 | Resolved: 2024-02-10

## 2024-02-14 ENCOUNTER — OFFICE VISIT (OUTPATIENT)
Dept: CARDIOLOGY CLINIC | Facility: CLINIC | Age: 64
End: 2024-02-14
Payer: COMMERCIAL

## 2024-02-14 VITALS
DIASTOLIC BLOOD PRESSURE: 84 MMHG | BODY MASS INDEX: 50.49 KG/M2 | WEIGHT: 285 LBS | HEART RATE: 87 BPM | SYSTOLIC BLOOD PRESSURE: 138 MMHG

## 2024-02-14 DIAGNOSIS — E78.00 PURE HYPERCHOLESTEROLEMIA: ICD-10-CM

## 2024-02-14 DIAGNOSIS — K70.30 ALCOHOLIC CIRRHOSIS OF LIVER WITHOUT ASCITES (HCC): ICD-10-CM

## 2024-02-14 DIAGNOSIS — I48.19 PERSISTENT ATRIAL FIBRILLATION (HCC): Primary | ICD-10-CM

## 2024-02-14 DIAGNOSIS — I10 BENIGN ESSENTIAL HYPERTENSION: ICD-10-CM

## 2024-02-14 DIAGNOSIS — J96.01 ACUTE RESPIRATORY FAILURE WITH HYPOXIA (HCC): ICD-10-CM

## 2024-02-14 DIAGNOSIS — Z86.73 HISTORY OF CVA (CEREBROVASCULAR ACCIDENT): ICD-10-CM

## 2024-02-14 DIAGNOSIS — E66.01 CLASS 3 SEVERE OBESITY DUE TO EXCESS CALORIES WITHOUT SERIOUS COMORBIDITY WITH BODY MASS INDEX (BMI) OF 50.0 TO 59.9 IN ADULT (HCC): ICD-10-CM

## 2024-02-14 DIAGNOSIS — E11.9 TYPE 2 DIABETES MELLITUS WITHOUT COMPLICATION, WITHOUT LONG-TERM CURRENT USE OF INSULIN (HCC): ICD-10-CM

## 2024-02-14 DIAGNOSIS — Z92.89 HISTORY OF CARDIOVERSION: ICD-10-CM

## 2024-02-14 DIAGNOSIS — E11.40 TYPE 2 DIABETES MELLITUS WITH DIABETIC NEUROPATHY, WITHOUT LONG-TERM CURRENT USE OF INSULIN (HCC): ICD-10-CM

## 2024-02-14 PROCEDURE — 99214 OFFICE O/P EST MOD 30 MIN: CPT | Performed by: INTERNAL MEDICINE

## 2024-02-14 PROCEDURE — 93000 ELECTROCARDIOGRAM COMPLETE: CPT | Performed by: INTERNAL MEDICINE

## 2024-02-14 NOTE — PROGRESS NOTES
[facility]  [address] [place] [address]  Phone#  [phone]   Fax#  [phone]  *-*-*-*-*-*-*-*-*-*-*-*-*-*-*-*-*-*-*-*-*-*-*-*-*-*-*-*-*-*-*-*-*-*-*-*-*-*-*-*-*-*-*-*-*-*-*-*-*-*-*-*-*-*                                   Cardiology Follow Up      ENCOUNTER DATE: 24 12:44 PM  PATIENT NAME: Lynda Boyd   : 1960    MRN: [id number]  AGE:63 y.o.      SEX: female  ENCOUNTER PROVIDER:Rogers Menendez MD     PRIMARY CARE PHYSICIAN: Millie Griggs MD    ACTIVE DIAGNOSIS THIS VISIT  1. Persistent atrial fibrillation (HCC)  POCT ECG    Holter monitor      2. Pure hypercholesterolemia        3. Benign essential hypertension        4. History of CVA (cerebrovascular accident)        5. History of cardioversion        6. Class 3 severe obesity due to excess calories without serious comorbidity with body mass index (BMI) of 50.0 to 59.9 in adult (HCC)        7. Alcoholic cirrhosis of liver without ascites (HCC)        8. Acute respiratory failure with hypoxia (HCC)        9. Type 2 diabetes mellitus without complication, without long-term current use of insulin (HCC)        10. Type 2 diabetes mellitus with diabetic neuropathy, without long-term current use of insulin (HCC)          ACTIVE PROBLEM LIST  Patient Active Problem List   Diagnosis    Alcoholic cirrhosis of liver without ascites (HCC)    Benign essential hypertension    HLD (hyperlipidemia)    Pancreatitis    Type 2 diabetes mellitus with diabetic neuropathy (HCC)    Demand ischemia of myocardium    History of cardioversion    Class 3 severe obesity without serious comorbidity in adult (HCC)    Snoring    Nocturnal leg movements    History of CVA (cerebrovascular accident)    Persistent atrial fibrillation (HCC)    Vitamin D deficiency    Anxiety    Acute respiratory failure with hypoxia (HCC)    Type 2 diabetes mellitus without complication, without long-term current use of insulin (HCC)         CARDIOLOGY SPECIALTY  COMMENTS  Patient was 1st seen in the hospital where she presented with new onset atrial fibrillation with a rapid ventricular response. She had not been anticoagulated. She was placed on Eliquis and rate control was marginally achieved with metoprolol 100 mg b.i.d.. One month later she was cardioverted on Eliquis and placed on Multaq.     In 12/20/2018 Discussed with patient that there is an increasing amount of evidence that recurrence of atrial fibrillation and ability to control atrial fibrillation is weight related. If patient's lose weight, they usually stay in sinus rhythm. If they gain weight, atrial fibrillation has a higher likelihood of recurring and a higher likelihood of having difficulty with control.    08/05/2020 patient hospitalized at [facility] and transferred to [facility] with evidence of a cerebral vascular accident with infarct of left thalamus and punctate foci embolic infarcts of cerebellum.  Patient was non compliant with her Eliquis.  She was in atrial fibrillation with RVR.  She was felt not to be a candidate for thrombolytics therapy or embolectomy and was treated conservatively.    08/06/2020 echocardiogram: Normal LV function, EF 58%, severe concentric LVH. RV at the upper limits of normal. Biatrial enlargement. Estimated PA pressure is 40 mmHg.    INTERVAL HISTORY:        Patient denies cardiac symptoms other than some shortness of breath on exertion most likely related to her weight.  She has no chest pain.  She is in chronic atrial fibrillation.  Her resting heart rate was 87 bpm.    DISCUSSION/PLAN:          Holter monitor to evaluate her range and heart rate in atrial fibrillation and whether she should have more negative trial chronotropic drugs.  She is already on metoprolol 100 mg every 12 hours but is not on Cardizem  Return in 4 months  If Holter monitor requires medication alteration, give patient a call and move up her appointment    Lab Studies:    Lab Results    Component Value Date    CHOLESTEROL 107 01/31/2024    CHOLESTEROL 112 11/03/2022    CHOLESTEROL 139 05/26/2021     Lab Results   Component Value Date    TRIG 113 01/31/2024    TRIG 121 11/03/2022    TRIG 101 05/26/2021     Lab Results   Component Value Date    HDL 43 (L) 01/31/2024    HDL 46 (L) 11/03/2022    HDL 47 05/26/2021     Lab Results   Component Value Date    LDLCALC 41 01/31/2024    LDLCALC 42 11/03/2022    LDLCALC 72 05/26/2021     Lab Results   Component Value Date    HGBA1C 7.3 (H) 01/31/2024      Lab Results   Component Value Date    EGFR 60 01/31/2024    EGFR 49 11/03/2022    EGFR 49 12/10/2021    SODIUM 137 01/31/2024    SODIUM 138 11/03/2022    SODIUM 139 12/10/2021    K 5.0 01/31/2024    K 5.3 11/03/2022    K 4.9 12/10/2021     01/31/2024     (H) 11/03/2022     12/10/2021    CO2 27 01/31/2024    CO2 24 11/03/2022    CO2 26 12/10/2021    BUN 24 01/31/2024    BUN 33 (H) 11/03/2022    BUN 21 12/10/2021    CREATININE 1.01 01/31/2024    CREATININE 1.20 11/03/2022    CREATININE 1.22 12/10/2021     Lab Results   Component Value Date    WBC 12.49 (H) 01/31/2024    WBC 12.72 (H) 11/03/2022    WBC 11.08 (H) 05/26/2021    HGB 13.4 01/31/2024    HGB 13.4 11/03/2022    HGB 14.2 05/26/2021    HCT 41.6 01/31/2024    HCT 42.0 11/03/2022    HCT 44.4 05/26/2021    MCV 90 01/31/2024    MCV 92 11/03/2022    MCV 92 05/26/2021    MCH 28.9 01/31/2024    MCH 29.2 11/03/2022    MCH 29.5 05/26/2021    MCHC 32.2 01/31/2024    MCHC 31.9 11/03/2022    MCHC 32.0 05/26/2021     01/31/2024     11/03/2022     05/26/2021      Lab Results   Component Value Date    CALCIUM 9.9 01/31/2024    CALCIUM 10.0 11/03/2022    CALCIUM 9.6 12/10/2021    AST 16 01/31/2024    AST 12 11/03/2022    AST 13 10/28/2021    ALT 18 01/31/2024    ALT 24 11/03/2022    ALT 22 10/28/2021    ALKPHOS 97 01/31/2024    ALKPHOS 130 (H) 11/03/2022    ALKPHOS 127 (H) 10/28/2021    MG 2.4 08/07/2020    MG 1.8 08/06/2020    MG  1.7 07/20/2018       Results for orders placed or performed in visit on 02/14/24   POCT ECG    Narrative    Atrial fibrillation with a ventricular response 87 bpm.  Cannot rule out a prior anterior septal myocardial infarction.  QT/QTc interval 368/442 ms.  Abnormal EKG.         Current Outpatient Medications:     Accu-Chek Softclix Lancets lancets, by Other route daily, Disp: 100 each, Rfl: 5    apixaban (Eliquis) 5 mg, Take 1 tablet (5 mg total) by mouth 2 (two) times a day, Disp: 180 tablet, Rfl: 2    atorvastatin (LIPITOR) 80 mg tablet, Take 1 tablet (80 mg total) by mouth every evening, Disp: 90 tablet, Rfl: 3    Blood Glucose Monitoring Suppl (Accu-Chek Guide) w/Device KIT, by Does not apply route daily, Disp: 1 kit, Rfl: 0    Blood Glucose Monitoring Suppl (OneTouch Verio) w/Device KIT, by Does not apply route daily, Disp: 1 kit, Rfl: 0    buPROPion (WELLBUTRIN) 75 mg tablet, Take 1 tablet (75 mg total) by mouth 2 (two) times a day, Disp: 180 tablet, Rfl: 3    gabapentin (Neurontin) 300 mg capsule, Take 1 capsule (300 mg total) by mouth 2 (two) times a day, Disp: 180 capsule, Rfl: 1    glucose blood (OneTouch Verio) test strip, 1 each by Other route daily Use as instructed, Disp: 100 each, Rfl: 3    glucose blood test strip, 1 each by Other route daily Use as instructed, Disp: 100 each, Rfl: 3    lisinopril (ZESTRIL) 40 mg tablet, TAKE ONE TABLET BY MOUTH ONCE DAILY, Disp: 90 tablet, Rfl: 1    Melatonin 10 MG TABS, Take 30 mg by mouth daily at bedtime as needed, Disp: , Rfl:     metoprolol tartrate (LOPRESSOR) 100 mg tablet, Take 1 tablet (100 mg total) by mouth every 12 (twelve) hours, Disp: 180 tablet, Rfl: 3    sitaGLIPtin-metFORMIN (Janumet)  MG per tablet, Take 1 tablet by mouth 2 (two) times a day with meals, Disp: 180 tablet, Rfl: 1    ergocalciferol (VITAMIN D2) 50,000 units, Take 1 capsule (50,000 Units total) by mouth once a week (Patient not taking: Reported on 5/24/2023), Disp: 12 capsule,  Rfl: 1  Allergies   Allergen Reactions    Augmentin [Amoxicillin-Pot Clavulanate]      Yeast infection       Past Medical History:   Diagnosis Date    Anxiety     Arthritis     Cirrhosis of liver (HCC)     Diabetes mellitus (HCC)     HLD (hyperlipidemia)     Hypertension     Irregular heart beat     Psychiatric disorder     Depression    Stroke (HCC)     Tobacco dependence      Social History     Socioeconomic History    Marital status: Single     Spouse name: Not on file    Number of children: Not on file    Years of education: Not on file    Highest education level: Not on file   Occupational History    Not on file   Tobacco Use    Smoking status: Every Day     Current packs/day: 0.50     Types: Cigarettes    Smokeless tobacco: Never   Vaping Use    Vaping status: Never Used   Substance and Sexual Activity    Alcohol use: Not Currently     Comment: 1 botttle of vodka over 4 days/denies since 7/18    Drug use: No    Sexual activity: Not Currently   Other Topics Concern    Not on file   Social History Narrative    Not on file     Social Determinants of Health     Financial Resource Strain: Low Risk  (2/1/2022)    Overall Financial Resource Strain (CARDIA)     Difficulty of Paying Living Expenses: Not hard at all   Food Insecurity: No Food Insecurity (2/1/2022)    Hunger Vital Sign     Worried About Running Out of Food in the Last Year: Never true     Ran Out of Food in the Last Year: Never true   Transportation Needs: Unmet Transportation Needs (2/1/2022)    PRAPARE - Transportation     Lack of Transportation (Medical): Yes     Lack of Transportation (Non-Medical): Yes   Physical Activity: Not on file   Stress: Not on file   Social Connections: Not on file   Intimate Partner Violence: Not on file   Housing Stability: Not on file      Family History   Problem Relation Age of Onset    Diabetes Mother     Diabetes Father     Diabetes Brother     Hypertension Brother      Past Surgical History:   Procedure Laterality  Date    APPENDECTOMY      BUNIONECTOMY Right     CARPAL TUNNEL RELEASE      CHOLECYSTECTOMY      IR STROKE ALERT  8/5/2020       PREVIOUS WEIGHTS:   Wt Readings from Last 10 Encounters:   02/14/24 129 kg (285 lb)   01/23/24 131 kg (288 lb 6.4 oz)   05/24/23 127 kg (280 lb)   12/21/22 124 kg (273 lb)   11/04/22 123 kg (271 lb 3.2 oz)   07/26/22 127 kg (279 lb)   03/28/22 127 kg (281 lb)   02/01/22 125 kg (275 lb)   09/30/21 124 kg (274 lb)   08/03/21 124 kg (273 lb 8 oz)        Review of Systems:  Review of Systems   Respiratory:  Positive for shortness of breath. Negative for cough, choking, chest tightness and wheezing.    Cardiovascular:  Negative for chest pain, palpitations and leg swelling.   Musculoskeletal:  Negative for gait problem.   Skin:  Negative for rash.   Neurological:  Negative for dizziness, tremors, syncope, weakness, light-headedness, numbness and headaches.   Psychiatric/Behavioral:  Negative for agitation and behavioral problems. The patient is not hyperactive.        Physical Exam:  /84 (BP Location: Left arm, Patient Position: Sitting, Cuff Size: Large)   Pulse 87   Wt 129 kg (285 lb)   BMI 50.49 kg/m²     Physical Exam  Constitutional:       General: She is not in acute distress.     Appearance: She is well-developed. She is obese.   HENT:      Head: Normocephalic and atraumatic.   Neck:      Thyroid: No thyromegaly.      Vascular: No carotid bruit or JVD.      Trachea: No tracheal deviation.   Cardiovascular:      Rate and Rhythm: Normal rate and regular rhythm.      Pulses: Normal pulses.      Heart sounds: Normal heart sounds. No murmur heard.     No friction rub. No gallop.   Pulmonary:      Effort: Pulmonary effort is normal. No respiratory distress.      Breath sounds: Normal breath sounds. No wheezing, rhonchi or rales.   Chest:      Chest wall: No tenderness.   Abdominal:      General: Bowel sounds are normal. There is no distension.      Palpations: Abdomen is soft.       "Tenderness: There is no abdominal tenderness.   Musculoskeletal:         General: Normal range of motion.      Cervical back: Normal range of motion and neck supple.      Right lower leg: No edema.      Left lower leg: No edema.   Skin:     General: Skin is warm and dry.   Neurological:      General: No focal deficit present.      Mental Status: She is alert and oriented to person, place, and time.      Gait: Gait normal.   Psychiatric:         Mood and Affect: Mood normal.         Behavior: Behavior normal.         Thought Content: Thought content normal.         Judgment: Judgment normal.       ======================================================  Imaging:   I have personally reviewed pertinent reports.      Portions of the record may have been created with voice recognition software. Occasional wrong word or \"sound a like\" substitutions may have occurred due to the inherent limitations of voice recognition software. Read the chart carefully and recognize, using context, where substitutions have occurred.    SIGNATURES:   Rogers Menendez MD   "

## 2024-02-26 ENCOUNTER — HOSPITAL ENCOUNTER (OUTPATIENT)
Dept: NON INVASIVE DIAGNOSTICS | Facility: HOSPITAL | Age: 64
Discharge: HOME | End: 2024-02-26
Attending: INTERNAL MEDICINE
Payer: COMMERCIAL

## 2024-02-26 DIAGNOSIS — I48.19 PERSISTENT ATRIAL FIBRILLATION (HCC): ICD-10-CM

## 2024-02-26 PROCEDURE — 93226 XTRNL ECG REC<48 HR SCAN A/R: CPT

## 2024-02-26 PROCEDURE — 93225 XTRNL ECG REC<48 HRS REC: CPT

## 2024-03-02 PROCEDURE — 93227 XTRNL ECG REC<48 HR R&I: CPT

## 2024-03-24 PROBLEM — I24.89 DEMAND ISCHEMIA OF MYOCARDIUM: Status: RESOLVED | Noted: 2018-07-17 | Resolved: 2024-03-24

## 2024-04-28 NOTE — ASSESSMENT & PLAN NOTE
Lab Results   Component Value Date    CHOLESTEROL 107 01/31/2024     Lab Results   Component Value Date    TRIG 113 01/31/2024     Lab Results   Component Value Date    HDL 43 (L) 01/31/2024     Lab Results   Component Value Date    LDLCALC 41 01/31/2024     Atorvastatin 80 mg daily

## 2024-04-28 NOTE — ASSESSMENT & PLAN NOTE
08/05/2020 patient hospitalized at [facility] and transferred to [facility] with evidence of a cerebral vascular accident with infarct of left thalamus and punctate foci embolic infarcts of cerebellum. Patient was non compliant with her Eliquis. She was in atrial fibrillation with RVR. She was felt not to be a candidate for thrombolytics therapy or embolectomy and was treated conservatively.

## 2024-04-28 NOTE — ASSESSMENT & PLAN NOTE
5/24/2023 136/70  1/23/2024 120/70  2/14/2024 138/84    Lisinopril 40 mg daily  Metoprolol tartrate 100 mg every 12 hours

## 2024-04-28 NOTE — ASSESSMENT & PLAN NOTE
With paroxysmal atrial fibrillation now has persistent atrial fibrillation after gaining a significant amount of weight.    08/06/2020 echocardiogram: Normal LV function, EF 58%, severe concentric LVH. RV at the upper limits of normal. Biatrial enlargement. Estimated PA pressure is 40 mmHg.     2/26/2024 Holter monitor: Rhythm atrial fibrillation 100%. Heart rate ranged from  bpm and average 99 bpm. The average heart rate is on the fast side. No significant ectopy.     Eliquis 5 mg 2 times a day  Metoprolol tartrate 100 mg every 12 hours

## 2024-05-02 ENCOUNTER — OFFICE VISIT (OUTPATIENT)
Dept: CARDIOLOGY CLINIC | Facility: CLINIC | Age: 64
End: 2024-05-02
Payer: COMMERCIAL

## 2024-05-02 VITALS
WEIGHT: 290 LBS | DIASTOLIC BLOOD PRESSURE: 64 MMHG | SYSTOLIC BLOOD PRESSURE: 130 MMHG | HEART RATE: 88 BPM | BODY MASS INDEX: 51.37 KG/M2

## 2024-05-02 DIAGNOSIS — E11.9 TYPE 2 DIABETES MELLITUS WITHOUT COMPLICATION, WITHOUT LONG-TERM CURRENT USE OF INSULIN (HCC): ICD-10-CM

## 2024-05-02 DIAGNOSIS — I10 BENIGN ESSENTIAL HYPERTENSION: ICD-10-CM

## 2024-05-02 DIAGNOSIS — E78.00 PURE HYPERCHOLESTEROLEMIA: ICD-10-CM

## 2024-05-02 DIAGNOSIS — Z86.73 HISTORY OF CVA (CEREBROVASCULAR ACCIDENT): ICD-10-CM

## 2024-05-02 DIAGNOSIS — I48.19 PERSISTENT ATRIAL FIBRILLATION (HCC): Primary | ICD-10-CM

## 2024-05-02 PROCEDURE — 99214 OFFICE O/P EST MOD 30 MIN: CPT | Performed by: INTERNAL MEDICINE

## 2024-05-02 PROCEDURE — 3075F SYST BP GE 130 - 139MM HG: CPT | Performed by: INTERNAL MEDICINE

## 2024-05-02 PROCEDURE — 3078F DIAST BP <80 MM HG: CPT | Performed by: INTERNAL MEDICINE

## 2024-05-02 NOTE — PROGRESS NOTES
[facility]  [address] [place] [address]  Phone#  [phone]   Fax#  [phone]  *-*-*-*-*-*-*-*-*-*-*-*-*-*-*-*-*-*-*-*-*-*-*-*-*-*-*-*-*-*-*-*-*-*-*-*-*-*-*-*-*-*-*-*-*-*-*-*-*-*-*-*-*-*                                   Cardiology Follow Up      ENCOUNTER DATE: 24 12:31 PM  PATIENT NAME: Lynda Boyd   : 1960    MRN: [id number]  AGE:64 y.o.      SEX: female  ENCOUNTER PROVIDER:Rogers Menendez MD     PRIMARY CARE PHYSICIAN: Millie Griggs MD    CARDIOLOGY SPECIALTY COMMENTS  Patient was 1st seen in the hospital where she presented with new onset atrial fibrillation with a rapid ventricular response. She had not been anticoagulated. She was placed on Eliquis and rate control was marginally achieved with metoprolol 100 mg b.i.d.. One month later she was cardioverted on Eliquis and placed on Multaq.     In 2018 Discussed with patient that there is an increasing amount of evidence that recurrence of atrial fibrillation and ability to control atrial fibrillation is weight related. If patient's lose weight, they usually stay in sinus rhythm. If they gain weight, atrial fibrillation has a higher likelihood of recurring and a higher likelihood of having difficulty with control.    2020 patient hospitalized at [facility] and transferred to [facility] with evidence of a cerebral vascular accident with infarct of left thalamus and punctate foci embolic infarcts of cerebellum.  Patient was non compliant with her Eliquis.  She was in atrial fibrillation with RVR.  She was felt not to be a candidate for thrombolytics therapy or embolectomy and was treated conservatively.    2020 echocardiogram: Normal LV function, EF 58%, severe concentric LVH. RV at the upper limits of normal. Biatrial enlargement. Estimated PA pressure is 40 mmHg.    2024 Holter monitor: Rhythm atrial fibrillation 100%.  Heart rate ranged from  bpm and average 99 bpm.   The average heart rate is on the fast side.  No significant ectopy.    ACTIVE PROBLEM LIST  Patient Active Problem List   Diagnosis    Alcoholic cirrhosis of liver without ascites (HCC)    Benign essential hypertension    Pure hypercholesterolemia    Pancreatitis    Type 2 diabetes mellitus with diabetic neuropathy (HCC)    History of cardioversion    Class 3 severe obesity without serious comorbidity in adult (HCC)    Snoring    Nocturnal leg movements    History of CVA (cerebrovascular accident)    Persistent atrial fibrillation (HCC)    Vitamin D deficiency    Anxiety    Acute respiratory failure with hypoxia (HCC)    Type 2 diabetes mellitus without complication, without long-term current use of insulin (HCC)       ACTIVE DIAGNOSIS AND PLAN    1. Persistent atrial fibrillation (HCC)  Assessment & Plan:  With paroxysmal atrial fibrillation now has persistent atrial fibrillation after gaining a significant amount of weight.    08/06/2020 echocardiogram: Normal LV function, EF 58%, severe concentric LVH. RV at the upper limits of normal. Biatrial enlargement. Estimated PA pressure is 40 mmHg.     2/26/2024 Holter monitor: Rhythm atrial fibrillation 100%. Heart rate ranged from  bpm and average 99 bpm. The average heart rate is on the fast side. No significant ectopy.     Eliquis 5 mg 2 times a day  Metoprolol tartrate 100 mg every 12 hours      2. Pure hypercholesterolemia  Assessment & Plan:  Lab Results   Component Value Date    CHOLESTEROL 107 01/31/2024     Lab Results   Component Value Date    TRIG 113 01/31/2024     Lab Results   Component Value Date    HDL 43 (L) 01/31/2024     Lab Results   Component Value Date    LDLCALC 41 01/31/2024     Atorvastatin 80 mg daily        3. Benign essential hypertension  Assessment & Plan:  5/24/2023 136/70  1/23/2024 120/70  2/14/2024 138/84    Lisinopril 40 mg daily  Metoprolol tartrate 100 mg every 12 hours      4. History of CVA (cerebrovascular  accident)  Assessment & Plan:  08/05/2020 patient hospitalized at [facility] and transferred to [facility] with evidence of a cerebral vascular accident with infarct of left thalamus and punctate foci embolic infarcts of cerebellum. Patient was non compliant with her Eliquis. She was in atrial fibrillation with RVR. She was felt not to be a candidate for thrombolytics therapy or embolectomy and was treated conservatively.       5. Type 2 diabetes mellitus without complication, without long-term current use of insulin ([facility])  Assessment & Plan:    Lab Results   Component Value Date    HGBA1C 7.3 (H) 01/31/2024     Deferred management to PCP         INTERVAL HISTORY:        Patient had a 48-hour Holter monitor.  The monitor did not demonstrate that her average heart rate is on the high side at 99 bpm although her range and heart rates are normal.  Patient is asymptomatic and has no complaints. She denies chest discomfort or shortness of breath.  She has no palpitations.  She denies symptoms of dizziness, lightheadedness or near-syncope/syncope.  She denies leg edema.  She denies symptoms of orthopnea or paroxysmal nocturnal dyspnea.      DISCUSSION/PLAN:          Since patient was completely asymptomatic, I did not choose to change her medications.  Return in 6 months  EKG on return    No results found for this visit on 05/02/24.      Lab Studies:    Lab Results   Component Value Date    CHOLESTEROL 107 01/31/2024    CHOLESTEROL 112 11/03/2022    CHOLESTEROL 139 05/26/2021     Lab Results   Component Value Date    TRIG 113 01/31/2024    TRIG 121 11/03/2022    TRIG 101 05/26/2021     Lab Results   Component Value Date    HDL 43 (L) 01/31/2024    HDL 46 (L) 11/03/2022    HDL 47 05/26/2021     Lab Results   Component Value Date    LDLCALC 41 01/31/2024    LDLCALC 42 11/03/2022    LDLCALC 72 05/26/2021         Lab Results   Component Value Date    HGBA1C 7.3 (H) 01/31/2024    HGBA1C 6.6 (A) 12/21/2022    HGBA1C  6.5 07/26/2022      Lab Results   Component Value Date    EGFR 60 01/31/2024    EGFR 49 11/03/2022    EGFR 49 12/10/2021    SODIUM 137 01/31/2024    SODIUM 138 11/03/2022    SODIUM 139 12/10/2021    K 5.0 01/31/2024    K 5.3 11/03/2022    K 4.9 12/10/2021     01/31/2024     (H) 11/03/2022     12/10/2021    CO2 27 01/31/2024    CO2 24 11/03/2022    CO2 26 12/10/2021    BUN 24 01/31/2024    BUN 33 (H) 11/03/2022    BUN 21 12/10/2021    CREATININE 1.01 01/31/2024    CREATININE 1.20 11/03/2022    CREATININE 1.22 12/10/2021    MG 2.4 08/07/2020    MG 1.8 08/06/2020    MG 1.7 07/20/2018     Lab Results   Component Value Date    WBC 12.49 (H) 01/31/2024    WBC 12.72 (H) 11/03/2022    WBC 11.08 (H) 05/26/2021    HGB 13.4 01/31/2024    HGB 13.4 11/03/2022    HGB 14.2 05/26/2021    HCT 41.6 01/31/2024    HCT 42.0 11/03/2022    HCT 44.4 05/26/2021    MCV 90 01/31/2024    MCV 92 11/03/2022    MCV 92 05/26/2021    MCH 28.9 01/31/2024    MCH 29.2 11/03/2022    MCH 29.5 05/26/2021    MCHC 32.2 01/31/2024    MCHC 31.9 11/03/2022    MCHC 32.0 05/26/2021     01/31/2024     11/03/2022     05/26/2021      Lab Results   Component Value Date    CALCIUM 9.9 01/31/2024    CALCIUM 10.0 11/03/2022    CALCIUM 9.6 12/10/2021    ALB 4.4 01/31/2024    ALB 3.5 11/03/2022    ALB 3.8 10/28/2021    TP 7.3 01/31/2024    TP 7.8 11/03/2022    TP 7.5 10/28/2021    AST 16 01/31/2024    AST 12 11/03/2022    AST 13 10/28/2021    ALT 18 01/31/2024    ALT 24 11/03/2022    ALT 22 10/28/2021    ALKPHOS 97 01/31/2024    ALKPHOS 130 (H) 11/03/2022    ALKPHOS 127 (H) 10/28/2021         Current Outpatient Medications:     Accu-Chek Softclix Lancets lancets, by Other route daily, Disp: 100 each, Rfl: 5    apixaban (Eliquis) 5 mg, Take 1 tablet (5 mg total) by mouth 2 (two) times a day, Disp: 180 tablet, Rfl: 2    atorvastatin (LIPITOR) 80 mg tablet, Take 1 tablet (80 mg total) by mouth every evening, Disp: 90 tablet, Rfl: 3     Blood Glucose Monitoring Suppl (Accu-Chek Guide) w/Device KIT, by Does not apply route daily, Disp: 1 kit, Rfl: 0    Blood Glucose Monitoring Suppl (OneTouch Verio) w/Device KIT, by Does not apply route daily, Disp: 1 kit, Rfl: 0    buPROPion (WELLBUTRIN) 75 mg tablet, Take 1 tablet (75 mg total) by mouth 2 (two) times a day, Disp: 180 tablet, Rfl: 3    gabapentin (Neurontin) 300 mg capsule, Take 1 capsule (300 mg total) by mouth 2 (two) times a day, Disp: 180 capsule, Rfl: 1    glucose blood (OneTouch Verio) test strip, 1 each by Other route daily Use as instructed, Disp: 100 each, Rfl: 3    glucose blood test strip, 1 each by Other route daily Use as instructed, Disp: 100 each, Rfl: 3    lisinopril (ZESTRIL) 40 mg tablet, TAKE ONE TABLET BY MOUTH ONCE DAILY, Disp: 90 tablet, Rfl: 1    Melatonin 10 MG TABS, Take 30 mg by mouth daily at bedtime as needed, Disp: , Rfl:     metoprolol tartrate (LOPRESSOR) 100 mg tablet, Take 1 tablet (100 mg total) by mouth every 12 (twelve) hours, Disp: 180 tablet, Rfl: 3    sitaGLIPtin-metFORMIN (Janumet)  MG per tablet, Take 1 tablet by mouth 2 (two) times a day with meals, Disp: 180 tablet, Rfl: 1    ergocalciferol (VITAMIN D2) 50,000 units, Take 1 capsule (50,000 Units total) by mouth once a week (Patient not taking: Reported on 5/24/2023), Disp: 12 capsule, Rfl: 1  Allergies   Allergen Reactions    Augmentin [Amoxicillin-Pot Clavulanate]      Yeast infection       Past Medical History:   Diagnosis Date    Anxiety     Arthritis     Cirrhosis of liver (HCC)     Diabetes mellitus (HCC)     HLD (hyperlipidemia)     Hypertension     Irregular heart beat     Psychiatric disorder     Depression    Stroke (HCC)     Tobacco dependence      Social History     Socioeconomic History    Marital status: Single     Spouse name: Not on file    Number of children: Not on file    Years of education: Not on file    Highest education level: Not on file   Occupational History    Not on file    Tobacco Use    Smoking status: Every Day     Current packs/day: 0.50     Types: Cigarettes    Smokeless tobacco: Never   Vaping Use    Vaping status: Never Used   Substance and Sexual Activity    Alcohol use: Not Currently     Comment: 1 botttle of vodka over 4 days/denies since 7/18    Drug use: No    Sexual activity: Not Currently   Other Topics Concern    Not on file   Social History Narrative    Not on file     Social Determinants of Health     Financial Resource Strain: Low Risk  (2/1/2022)    Overall Financial Resource Strain (CARDIA)     Difficulty of Paying Living Expenses: Not hard at all   Food Insecurity: No Food Insecurity (2/1/2022)    Hunger Vital Sign     Worried About Running Out of Food in the Last Year: Never true     Ran Out of Food in the Last Year: Never true   Transportation Needs: Unmet Transportation Needs (2/1/2022)    PRAPARE - Transportation     Lack of Transportation (Medical): Yes     Lack of Transportation (Non-Medical): Yes   Physical Activity: Not on file   Stress: Not on file   Social Connections: Not on file   Intimate Partner Violence: Not on file   Housing Stability: Not on file      Family History   Problem Relation Age of Onset    Diabetes Mother     Diabetes Father     Diabetes Brother     Hypertension Brother      Past Surgical History:   Procedure Laterality Date    APPENDECTOMY      BUNIONECTOMY Right     CARPAL TUNNEL RELEASE      CHOLECYSTECTOMY      IR STROKE ALERT  8/5/2020       PREVIOUS WEIGHTS:   Wt Readings from Last 10 Encounters:   05/02/24 132 kg (290 lb)   02/14/24 129 kg (285 lb)   01/23/24 131 kg (288 lb 6.4 oz)   05/24/23 127 kg (280 lb)   12/21/22 124 kg (273 lb)   11/04/22 123 kg (271 lb 3.2 oz)   07/26/22 127 kg (279 lb)   03/28/22 127 kg (281 lb)   02/01/22 125 kg (275 lb)   09/30/21 124 kg (274 lb)        Review of Systems:  Review of Systems   Respiratory:  Negative for cough, choking, chest tightness, shortness of breath and wheezing.     Cardiovascular:  Negative for chest pain, palpitations and leg swelling.   Musculoskeletal:  Negative for gait problem.   Skin:  Negative for rash.   Neurological:  Negative for dizziness, tremors, syncope, weakness, light-headedness, numbness and headaches.   Psychiatric/Behavioral:  Negative for agitation and behavioral problems. The patient is not hyperactive.        Physical Exam:  /64 (BP Location: Right arm, Patient Position: Sitting, Cuff Size: Large)   Pulse 88   Wt 132 kg (290 lb)   BMI 51.37 kg/m²     Physical Exam  Constitutional:       General: She is not in acute distress.     Appearance: She is well-developed. She is obese.   HENT:      Head: Normocephalic and atraumatic.   Neck:      Thyroid: No thyromegaly.      Vascular: No carotid bruit or JVD.      Trachea: No tracheal deviation.   Cardiovascular:      Rate and Rhythm: Normal rate and regular rhythm.      Pulses: Normal pulses.      Heart sounds: Normal heart sounds. No murmur heard.     No friction rub. No gallop.   Pulmonary:      Effort: Pulmonary effort is normal. No respiratory distress.      Breath sounds: Normal breath sounds. No wheezing, rhonchi or rales.   Chest:      Chest wall: No tenderness.   Musculoskeletal:         General: Normal range of motion.      Cervical back: Normal range of motion and neck supple.      Right lower leg: No edema.      Left lower leg: No edema.   Skin:     General: Skin is warm and dry.   Neurological:      General: No focal deficit present.      Mental Status: She is alert and oriented to person, place, and time.      Gait: Gait normal.   Psychiatric:         Mood and Affect: Mood normal.         Behavior: Behavior normal.         Thought Content: Thought content normal.         Judgment: Judgment normal.       ======================================================  Imaging:   I have personally reviewed pertinent reports.      Portions of the record may have been created with voice recognition  "software. Occasional wrong word or \"sound a like\" substitutions may have occurred due to the inherent limitations of voice recognition software. Read the chart carefully and recognize, using context, where substitutions have occurred.    SIGNATURES:   Rogers Menendez MD   "

## 2024-05-20 ENCOUNTER — TELEPHONE (OUTPATIENT)
Dept: FAMILY MEDICINE CLINIC | Facility: CLINIC | Age: 64
End: 2024-05-20

## 2024-05-20 NOTE — TELEPHONE ENCOUNTER
first attempt to contact patient. left message to return my call on answering machine      Please assist in rescheduling 6/5/24 appt

## 2024-06-12 ENCOUNTER — OFFICE VISIT (OUTPATIENT)
Dept: FAMILY MEDICINE CLINIC | Facility: CLINIC | Age: 64
End: 2024-06-12

## 2024-06-12 VITALS
SYSTOLIC BLOOD PRESSURE: 148 MMHG | WEIGHT: 287 LBS | RESPIRATION RATE: 18 BRPM | BODY MASS INDEX: 47.82 KG/M2 | DIASTOLIC BLOOD PRESSURE: 90 MMHG | HEIGHT: 65 IN | HEART RATE: 87 BPM | TEMPERATURE: 97.8 F | OXYGEN SATURATION: 98 %

## 2024-06-12 DIAGNOSIS — K70.30 ALCOHOLIC CIRRHOSIS OF LIVER WITHOUT ASCITES (HCC): ICD-10-CM

## 2024-06-12 DIAGNOSIS — F33.0 MILD EPISODE OF RECURRENT MAJOR DEPRESSIVE DISORDER (HCC): ICD-10-CM

## 2024-06-12 DIAGNOSIS — I10 BENIGN ESSENTIAL HYPERTENSION: Primary | ICD-10-CM

## 2024-06-12 DIAGNOSIS — E55.9 VITAMIN D INSUFFICIENCY: ICD-10-CM

## 2024-06-12 DIAGNOSIS — M79.89 SWELLING OF RIGHT FOOT: ICD-10-CM

## 2024-06-12 DIAGNOSIS — E11.9 TYPE 2 DIABETES MELLITUS WITHOUT COMPLICATION, WITHOUT LONG-TERM CURRENT USE OF INSULIN (HCC): ICD-10-CM

## 2024-06-12 LAB — SL AMB POCT HEMOGLOBIN AIC: 7.1 (ref ?–6.5)

## 2024-06-12 PROCEDURE — 99214 OFFICE O/P EST MOD 30 MIN: CPT | Performed by: STUDENT IN AN ORGANIZED HEALTH CARE EDUCATION/TRAINING PROGRAM

## 2024-06-12 PROCEDURE — 83036 HEMOGLOBIN GLYCOSYLATED A1C: CPT | Performed by: STUDENT IN AN ORGANIZED HEALTH CARE EDUCATION/TRAINING PROGRAM

## 2024-06-12 RX ORDER — FLUOXETINE 10 MG/1
10 CAPSULE ORAL DAILY
Qty: 30 CAPSULE | Refills: 0 | Status: SHIPPED | OUTPATIENT
Start: 2024-06-12 | End: 2024-07-12

## 2024-06-12 NOTE — PROGRESS NOTES
Ambulatory Visit  Name: Lynda Boyd      : 1960      MRN: [id number]  Encounter Provider: Aleksandr Cruz MD  Encounter Date: 2024   Encounter department: [facility]    Assessment & Plan   1. Benign essential hypertension  Assessment & Plan:  Blood Pressure: 148/90   Continue current regimen of Lisinopril 40 mg daily and Metoprolol tartrate 100 mg every 12 hours.  Lifestyle management and LS diet advised,.  Keep Home BP logs for review at next follow up.  2. Type 2 diabetes mellitus without complication, without long-term current use of insulin (HCC)  Assessment & Plan:  Lab Results   Component Value Date    HGBA1C 7.1 (A) 2024   Medicated on sitagliptin-metformin  bid.   Continue management, lifestyle management advised  DM foot exam completed today - wnl  Orders:  -     POCT hemoglobin A1c  -     IRIS Diabetic eye exam  -     Diabetic foot exam  3. Vitamin D insufficiency  Assessment & Plan:  Last 2024 - .   Repeat labs to assess level currently and adjust therapy accordingly.  Completed high dose regimen and now would like to resume daily supplementation.  Orders:  -     Vitamin D 25 hydroxy; Future  -     Cholecalciferol (VITAMIN D3) 1,000 units tablet; Take 1 tablet (1,000 Units total) by mouth daily  4. Alcoholic cirrhosis of liver without ascites (HCC)  Assessment & Plan:  Remission x 4 years  5. Mild episode of recurrent major depressive disorder (HCC)  Assessment & Plan:  Patient overall doing well medicated on Wellbutrin 75 mg twice daily.  However lacking motivation in particular to cleaning activities.  Advised simple daily tasks such as dusting.  Patient would like additional medication and refuses counseling/therapy.  Will add fluoxetine 10 mg daily along with task oriented therapy.  - Review in 2 weeks for medication tolerance and improved self perception of function.  Orders:  -     FLUoxetine (PROzac) 10 mg capsule; Take 1  "capsule (10 mg total) by mouth daily  6. Swelling of right foot  Assessment & Plan:  Present for 2 days.  Denies any trauma.  Pain symmetrical to left foot and due to chronic neuropathy.  Denies calf tenderness or claudication.  Wells score [DVT] -1: Very low suspicion for DVT at this time.  - Recommend limb elevation cool compress and compressive bandage/compressive stockings for mild signs of venous insufficiency       History of Present Illness     Lynda Boyd is a very pleasant 64 y.o. female who has a PMH of CVA, DM, HTN, HLD, perisstent atrial fibrillation on DOAC anticoagulation, pancreatitis, obesity, anxiety and vit D deficiency and presents today to review chronic diseases with complaints of lack of motivation. Patient's chronic medical conditions are stable unless noted otherwise above. Patient reports compliance on medications prescribed with no side effects experienced at this time. This patient has had no admissions to hospital or medical emergencies since the last visit to our office. Patient has no further complaints other than what is mentioned below and in the ROS.        Review of Systems   Respiratory:  Negative for cough, chest tightness, shortness of breath and wheezing.    Cardiovascular:  Negative for chest pain and palpitations.   Gastrointestinal:  Negative for abdominal pain, constipation, diarrhea, nausea and vomiting.   Genitourinary:  Negative for dysuria.   Musculoskeletal:  Negative for arthralgias and myalgias.   Skin:  Positive for color change (Mild erythema and swelling to right forefoot dorsal.). Negative for rash.   Neurological:  Negative for dizziness and headaches.   Psychiatric/Behavioral:  Positive for sleep disturbance. Negative for behavioral problems, dysphoric mood and suicidal ideas.        Objective     /90 (BP Location: Left arm, Patient Position: Sitting, Cuff Size: Large)   Pulse 87   Temp 97.8 °F (36.6 °C) (Temporal)   Resp 18   Ht 5' 5\" (1.651 m)   Wt " 130 kg (287 lb)   SpO2 98%   BMI 47.76 kg/m²     Physical Exam  Vitals reviewed.   Constitutional:       Appearance: She is normal weight.   HENT:      Head: Normocephalic and atraumatic.      Right Ear: External ear normal.      Left Ear: External ear normal.      Nose: Nose normal.      Mouth/Throat:      Mouth: Mucous membranes are moist.      Pharynx: Oropharynx is clear.   Eyes:      Conjunctiva/sclera: Conjunctivae normal.   Cardiovascular:      Rate and Rhythm: Normal rate and regular rhythm.      Pulses: no weak pulses.           Dorsalis pedis pulses are 2+ on the right side and 2+ on the left side.        Posterior tibial pulses are 2+ on the right side and 2+ on the left side.      Heart sounds: Normal heart sounds. No murmur heard.     No friction rub. No gallop.   Pulmonary:      Effort: Pulmonary effort is normal. No respiratory distress.      Breath sounds: Normal breath sounds. No wheezing, rhonchi or rales.   Chest:      Chest wall: No tenderness.   Abdominal:      Palpations: Abdomen is soft.   Musculoskeletal:         General: Normal range of motion.      Cervical back: Normal range of motion.      Right lower leg: No edema.      Left lower leg: No edema.   Feet:      Right foot:      Skin integrity: No ulcer, skin breakdown, erythema, warmth, callus or dry skin.      Left foot:      Skin integrity: No ulcer, skin breakdown, erythema, warmth, callus or dry skin.   Lymphadenopathy:      Cervical: No cervical adenopathy.   Skin:     General: Skin is warm and dry.      Findings: No rash.   Neurological:      Mental Status: She is alert and oriented to person, place, and time. Mental status is at baseline.      Cranial Nerves: No cranial nerve deficit.      Motor: No weakness.      Coordination: Coordination normal.   Psychiatric:         Attention and Perception: Attention normal.         Mood and Affect: Mood and affect normal.         Speech: Speech normal.         Behavior: Behavior normal.  Behavior is cooperative.         Thought Content: Thought content normal.     Patient's shoes and socks removed.    Right Foot/Ankle   Right Foot Inspection  Skin Exam: skin normal and skin intact. No dry skin, no warmth, no callus, no erythema, no maceration, no abnormal color, no pre-ulcer, no ulcer and no callus.     Toe Exam: ROM and strength within normal limits, swelling (up to ankle), tenderness and erythema (mild).     Sensory   Monofilament testing: intact    Vascular  Capillary refills: < 3 seconds  The right DP pulse is 2+. The right PT pulse is 2+.     Left Foot/Ankle  Left Foot Inspection  Skin Exam: skin normal and skin intact. No dry skin, no warmth, no erythema, no maceration, normal color, no pre-ulcer, no ulcer and no callus.     Toe Exam: ROM and strength within normal limits and tenderness. No swelling.     Sensory   Monofilament testing: intact    Vascular  Capillary refills: < 3 seconds  The left DP pulse is 2+. The left PT pulse is 2+.     Assign Risk Category  No deformity present  No loss of protective sensation  No weak pulses  Risk: 0      Administrative Statements

## 2024-06-12 NOTE — ASSESSMENT & PLAN NOTE
Blood Pressure: 148/90   Continue current regimen of Lisinopril 40 mg daily and Metoprolol tartrate 100 mg every 12 hours.  Lifestyle management and LS diet advised,.  Keep Home BP logs for review at next follow up.

## 2024-06-12 NOTE — ASSESSMENT & PLAN NOTE
Last Jan 2024 - 24.   Repeat labs to assess level currently and adjust therapy accordingly.  Completed high dose regimen and now would like to resume daily supplementation.

## 2024-06-12 NOTE — ASSESSMENT & PLAN NOTE
Present for 2 days.  Denies any trauma.  Pain symmetrical to left foot and due to chronic neuropathy.  Denies calf tenderness or claudication.  Wells score [DVT] -1: Very low suspicion for DVT at this time.  - Recommend limb elevation cool compress and compressive bandage/compressive stockings for mild signs of venous insufficiency

## 2024-06-12 NOTE — ASSESSMENT & PLAN NOTE
Patient overall doing well medicated on Wellbutrin 75 mg twice daily.  However lacking motivation in particular to cleaning activities.  Advised simple daily tasks such as dusting.  Patient would like additional medication and refuses counseling/therapy.  Will add fluoxetine 10 mg daily along with task oriented therapy.  - Review in 2 weeks for medication tolerance and improved self perception of function.

## 2024-06-12 NOTE — ASSESSMENT & PLAN NOTE
Lab Results   Component Value Date    HGBA1C 7.1 (A) 06/12/2024   Medicated on sitagliptin-metformin 50/1000 bid.   Continue management, lifestyle management advised  DM foot exam completed today - wnl

## 2024-06-28 ENCOUNTER — OFFICE VISIT (OUTPATIENT)
Dept: FAMILY MEDICINE CLINIC | Facility: CLINIC | Age: 64
End: 2024-06-28

## 2024-06-28 VITALS
HEART RATE: 93 BPM | SYSTOLIC BLOOD PRESSURE: 138 MMHG | TEMPERATURE: 98 F | BODY MASS INDEX: 46.92 KG/M2 | DIASTOLIC BLOOD PRESSURE: 80 MMHG | WEIGHT: 281.6 LBS | HEIGHT: 65 IN | RESPIRATION RATE: 16 BRPM | OXYGEN SATURATION: 95 %

## 2024-06-28 DIAGNOSIS — E11.29 MICROALBUMINURIA DUE TO TYPE 2 DIABETES MELLITUS  (HCC): ICD-10-CM

## 2024-06-28 DIAGNOSIS — E11.40 TYPE 2 DIABETES MELLITUS WITH DIABETIC NEUROPATHY, WITHOUT LONG-TERM CURRENT USE OF INSULIN (HCC): ICD-10-CM

## 2024-06-28 DIAGNOSIS — R80.9 MICROALBUMINURIA DUE TO TYPE 2 DIABETES MELLITUS  (HCC): ICD-10-CM

## 2024-06-28 DIAGNOSIS — F17.210 SMOKING GREATER THAN 20 PACK YEARS: ICD-10-CM

## 2024-06-28 DIAGNOSIS — F33.0 MILD EPISODE OF RECURRENT MAJOR DEPRESSIVE DISORDER (HCC): Primary | ICD-10-CM

## 2024-06-28 PROCEDURE — 99213 OFFICE O/P EST LOW 20 MIN: CPT | Performed by: INTERNAL MEDICINE

## 2024-06-28 PROCEDURE — 99406 BEHAV CHNG SMOKING 3-10 MIN: CPT | Performed by: INTERNAL MEDICINE

## 2024-06-28 RX ORDER — FLUOXETINE 10 MG/1
10 CAPSULE ORAL DAILY
Qty: 90 CAPSULE | Refills: 0 | Status: SHIPPED | OUTPATIENT
Start: 2024-06-28 | End: 2024-09-26

## 2024-06-28 NOTE — PROGRESS NOTES
Ambulatory Visit  Name: Lynda Boyd      : 1960      MRN: [id number]  Encounter Provider: Elias Schoen, MD  Encounter Date: 2024   [facility]    Assessment & Plan   1. Mild episode of recurrent major depressive disorder (HCC)  Assessment & Plan:  Continue current medication regimen at this dosing no titration needed as symptoms stable  - Follow-up in 3 months for recheck and refill  - Follow-up sooner if patient finds increased depressive symptoms or adverse side effects of the medication develop  Orders:  -     FLUoxetine (PROzac) 10 mg capsule; Take 1 capsule (10 mg total) by mouth daily  2. Type 2 diabetes mellitus with diabetic neuropathy, without long-term current use of insulin (HCC)  Assessment & Plan:  Well-controlled at this time, continue current regiment  - Adding Jardiance for microalbuminuria as below  Lab Results   Component Value Date    HGBA1C 7.1 (A) 2024     Orders:  -     IRIS Diabetic eye exam  -     Empagliflozin (JARDIANCE) 10 MG TABS tablet; Take 1 tablet (10 mg total) by mouth daily  3. Microalbuminuria due to type 2 diabetes mellitus  (HCC)  Assessment & Plan:  Patient with significantly elevated microalbuminuria at last check in 2024.  Patient already on max dose of ACE.  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  - Starting Jardiance 10 mg daily to prevent kidney disease progression  Lab Results   Component Value Date    HGBA1C 7.1 (A) 2024     4. Smoking greater than 20 pack years  Assessment & Plan:  Patient not interested in quitting at this time  - 4 minutes of visit exclusively dedicated to smoking cessation  Orders:  -     CT lung screening program; Future; Expected date: 2024      Tobacco Cessation Counseling: Tobacco cessation counseling was provided. The patient is sincerely urged to quit consumption of tobacco. She is not ready to quit  tobacco. Medication options and side effects of medication discussed. Patient refused medication.         History of Present Illness     Patient is a 64-year-old female here for follow-up regarding starting Prozac for persistent depressive disorder    Patient reports she is feeling a bit better after 2 weeks of taking this medication.  She has no reported side effects.  She continues to take her Wellbutrin as well.        Review of Systems   Constitutional:  Negative for chills and fever.   HENT:  Negative for sore throat.    Respiratory:  Negative for shortness of breath.    Cardiovascular:  Negative for chest pain and palpitations.   Gastrointestinal:  Negative for abdominal pain.   Genitourinary:  Negative for dysuria.   Musculoskeletal:  Negative for myalgias.   Neurological:  Negative for weakness.     Past Medical History:   Diagnosis Date    Anxiety     Arthritis     Cirrhosis of liver (HCC)     Diabetes mellitus (HCC)     HLD (hyperlipidemia)     Hypertension     Irregular heart beat     Psychiatric disorder     Depression    Stroke (HCC)     Tobacco dependence      Past Surgical History:   Procedure Laterality Date    APPENDECTOMY      BUNIONECTOMY Right     CARPAL TUNNEL RELEASE      CHOLECYSTECTOMY      IR STROKE ALERT  8/5/2020     Family History   Problem Relation Age of Onset    Diabetes Mother     Diabetes Father     Diabetes Brother     Hypertension Brother      Social History     Tobacco Use    Smoking status: Every Day     Current packs/day: 0.50     Types: Cigarettes     Passive exposure: Current    Smokeless tobacco: Never   Vaping Use    Vaping status: Never Used   Substance and Sexual Activity    Alcohol use: Not Currently     Comment: 1 botttle of vodka over 4 days/denies since 7/18    Drug use: No    Sexual activity: Not Currently     Current Outpatient Medications on File Prior to Visit   Medication Sig    atorvastatin (LIPITOR) 80 mg tablet Take 1 tablet (80 mg total) by mouth every evening     buPROPion (WELLBUTRIN) 75 mg tablet Take 1 tablet (75 mg total) by mouth 2 (two) times a day    lisinopril (ZESTRIL) 40 mg tablet TAKE ONE TABLET BY MOUTH ONCE DAILY    sitaGLIPtin-metFORMIN (Janumet)  MG per tablet Take 1 tablet by mouth 2 (two) times a day with meals    Accu-Chek Softclix Lancets lancets by Other route daily    apixaban (Eliquis) 5 mg Take 1 tablet (5 mg total) by mouth 2 (two) times a day    Blood Glucose Monitoring Suppl (Accu-Chek Guide) w/Device KIT by Does not apply route daily    Blood Glucose Monitoring Suppl (OneTouch Verio) w/Device KIT by Does not apply route daily    Cholecalciferol (VITAMIN D3) 1,000 units tablet Take 1 tablet (1,000 Units total) by mouth daily    ergocalciferol (VITAMIN D2) 50,000 units Take 1 capsule (50,000 Units total) by mouth once a week (Patient not taking: Reported on 5/24/2023)    gabapentin (Neurontin) 300 mg capsule Take 1 capsule (300 mg total) by mouth 2 (two) times a day    glucose blood (OneTouch Verio) test strip 1 each by Other route daily Use as instructed    glucose blood test strip 1 each by Other route daily Use as instructed    Melatonin 10 MG TABS Take 30 mg by mouth daily at bedtime as needed    metoprolol tartrate (LOPRESSOR) 100 mg tablet Take 1 tablet (100 mg total) by mouth every 12 (twelve) hours     Allergies   Allergen Reactions    Augmentin [Amoxicillin-Pot Clavulanate]      Yeast infection     Immunization History   Administered Date(s) Administered    COVID-19 MODERNA VACC 0.5 ML IM 04/15/2021, 05/13/2021, 11/17/2021    Hep A, ped/adol, 2 dose 09/21/2012, 12/27/2012    Hep B, adult 09/21/2012, 12/27/2012, 08/22/2014    INFLUENZA 09/21/2012, 10/29/2014, 09/20/2018    Influenza Quadrivalent Preservative Free 3 years and older IM 11/10/2021    Influenza, recombinant, quadrivalent,injectable, preservative free 10/05/2020    Pneumococcal Conjugate Vaccine 20-valent (Pcv20), Polysace 01/23/2024    Pneumococcal Polysaccharide PPV23  "09/21/2012    Tdap 06/12/2014     Objective     /80 (BP Location: Right arm, Patient Position: Sitting)   Pulse 93   Temp 98 °F (36.7 °C) (Temporal)   Resp 16   Ht 5' 5\" (1.651 m)   Wt 128 kg (281 lb 9.6 oz)   SpO2 95%   BMI 46.86 kg/m²     Physical Exam  Constitutional:       General: She is not in acute distress.     Appearance: Normal appearance.   Cardiovascular:      Rate and Rhythm: Normal rate.   Pulmonary:      Effort: No respiratory distress.   Musculoskeletal:         General: Normal range of motion.      Cervical back: Normal range of motion.   Neurological:      General: No focal deficit present.      Mental Status: She is alert and oriented to person, place, and time.       Portions of the record were created with voice recognition software.  Occasional wrong word or \"sound a like\" substitutions may have occurred due to the inherent limitations of voice recognition software.  Read the chart carefully and recognize, using context, where substitutions have occurred.      Administrative Statements         "

## 2024-06-29 PROBLEM — N18.31 CKD STAGE 3A, GFR 45-59 ML/MIN (HCC): Status: ACTIVE | Noted: 2024-06-29

## 2024-06-29 PROBLEM — F17.210 SMOKING GREATER THAN 20 PACK YEARS: Status: ACTIVE | Noted: 2024-06-29

## 2024-06-29 NOTE — ASSESSMENT & PLAN NOTE
Continue current medication regimen at this dosing no titration needed as symptoms stable  - Follow-up in 3 months for recheck and refill  - Follow-up sooner if patient finds increased depressive symptoms or adverse side effects of the medication develop

## 2024-06-29 NOTE — ASSESSMENT & PLAN NOTE
Patient with significantly elevated microalbuminuria at last check in January 2024.  Patient already on max dose of ACE.  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  - Starting Jardiance 10 mg daily to prevent kidney disease progression  Lab Results   Component Value Date    HGBA1C 7.1 (A) 06/12/2024

## 2024-06-29 NOTE — ASSESSMENT & PLAN NOTE
Patient not interested in quitting at this time  - 4 minutes of visit exclusively dedicated to smoking cessation

## 2024-06-29 NOTE — ASSESSMENT & PLAN NOTE
Well-controlled at this time, continue current regiment  - Adding Jardiance for microalbuminuria as below  Lab Results   Component Value Date    HGBA1C 7.1 (A) 06/12/2024

## 2024-08-23 DIAGNOSIS — E11.9 TYPE 2 DIABETES MELLITUS WITHOUT COMPLICATION, WITHOUT LONG-TERM CURRENT USE OF INSULIN (HCC): ICD-10-CM

## 2024-08-23 RX ORDER — SITAGLIPTIN AND METFORMIN HYDROCHLORIDE 1000; 50 MG/1; MG/1
1 TABLET, FILM COATED ORAL 2 TIMES DAILY WITH MEALS
Qty: 180 TABLET | Refills: 1 | Status: SHIPPED | OUTPATIENT
Start: 2024-08-23

## 2024-08-23 NOTE — TELEPHONE ENCOUNTER
Pt called nurse line requesting a refill for the following medications.    sitaGLIPtin-metFORMIN (Janumet)  MG per tablet     Script has been sent to pts pharmacy of choice.

## 2024-09-04 DIAGNOSIS — I48.91 NEW ONSET ATRIAL FIBRILLATION (HCC): ICD-10-CM

## 2024-09-04 NOTE — TELEPHONE ENCOUNTER
Reason for call:   [x] Refill   [] Prior Auth  [] Other:     Office:   [] PCP/Provider -   [x] Specialty/Provider - Cardio      Medication: apixaban (Eliquis) 5 mg Take 1 tablet (5 mg total) by mouth 2 (two) times a day       Pharmacy: [id number] - [place] - [address]      Does the patient have enough for 3 days?   [x] Yes   [] No - Send as HP to POD

## 2024-10-28 ENCOUNTER — OFFICE VISIT (OUTPATIENT)
Dept: FAMILY MEDICINE CLINIC | Facility: CLINIC | Age: 64
End: 2024-10-28

## 2024-10-28 ENCOUNTER — APPOINTMENT (OUTPATIENT)
Dept: LAB | Facility: CLINIC | Age: 64
End: 2024-10-28
Payer: COMMERCIAL

## 2024-10-28 VITALS
DIASTOLIC BLOOD PRESSURE: 86 MMHG | RESPIRATION RATE: 16 BRPM | OXYGEN SATURATION: 97 % | BODY MASS INDEX: 43.49 KG/M2 | HEIGHT: 65 IN | SYSTOLIC BLOOD PRESSURE: 146 MMHG | HEART RATE: 86 BPM | WEIGHT: 261 LBS | TEMPERATURE: 98 F

## 2024-10-28 DIAGNOSIS — E55.9 VITAMIN D INSUFFICIENCY: ICD-10-CM

## 2024-10-28 DIAGNOSIS — E66.01 MORBID OBESITY DUE TO EXCESS CALORIES (HCC): ICD-10-CM

## 2024-10-28 DIAGNOSIS — I10 BENIGN ESSENTIAL HYPERTENSION: ICD-10-CM

## 2024-10-28 DIAGNOSIS — F41.9 ANXIETY: ICD-10-CM

## 2024-10-28 DIAGNOSIS — F17.210 SMOKING GREATER THAN 20 PACK YEARS: ICD-10-CM

## 2024-10-28 DIAGNOSIS — L98.9 SKIN LESION: ICD-10-CM

## 2024-10-28 DIAGNOSIS — E11.9 TYPE 2 DIABETES MELLITUS WITHOUT COMPLICATION, WITHOUT LONG-TERM CURRENT USE OF INSULIN (HCC): Primary | ICD-10-CM

## 2024-10-28 DIAGNOSIS — Z23 ENCOUNTER FOR IMMUNIZATION: ICD-10-CM

## 2024-10-28 DIAGNOSIS — F33.0 MILD EPISODE OF RECURRENT MAJOR DEPRESSIVE DISORDER (HCC): ICD-10-CM

## 2024-10-28 DIAGNOSIS — E11.9 TYPE 2 DIABETES MELLITUS WITHOUT COMPLICATION, WITHOUT LONG-TERM CURRENT USE OF INSULIN (HCC): ICD-10-CM

## 2024-10-28 LAB
25(OH)D3 SERPL-MCNC: 33.2 NG/ML (ref 30–100)
ANION GAP SERPL CALCULATED.3IONS-SCNC: 11 MMOL/L (ref 4–13)
BUN SERPL-MCNC: 15 MG/DL (ref 5–25)
CALCIUM SERPL-MCNC: 10.2 MG/DL (ref 8.4–10.2)
CHLORIDE SERPL-SCNC: 103 MMOL/L (ref 96–108)
CO2 SERPL-SCNC: 26 MMOL/L (ref 21–32)
CREAT SERPL-MCNC: 0.98 MG/DL (ref 0.6–1.3)
GFR SERPL CREATININE-BSD FRML MDRD: 62 ML/MIN/1.73SQ M
GLUCOSE P FAST SERPL-MCNC: 106 MG/DL (ref 65–99)
LEFT EYE DIABETIC RETINOPATHY: NORMAL
LEFT EYE IMAGE QUALITY: NORMAL
LEFT EYE MACULAR EDEMA: NORMAL
LEFT EYE OTHER RETINOPATHY: NORMAL
POTASSIUM SERPL-SCNC: 4.5 MMOL/L (ref 3.5–5.3)
RIGHT EYE DIABETIC RETINOPATHY: NORMAL
RIGHT EYE IMAGE QUALITY: NORMAL
RIGHT EYE MACULAR EDEMA: NORMAL
RIGHT EYE OTHER RETINOPATHY: NORMAL
SEVERITY (EYE EXAM): NORMAL
SL AMB POCT HEMOGLOBIN AIC: 6 (ref ?–6.5)
SODIUM SERPL-SCNC: 140 MMOL/L (ref 135–147)

## 2024-10-28 PROCEDURE — 90715 TDAP VACCINE 7 YRS/> IM: CPT

## 2024-10-28 PROCEDURE — 82306 VITAMIN D 25 HYDROXY: CPT

## 2024-10-28 PROCEDURE — 88341 IMHCHEM/IMCYTCHM EA ADD ANTB: CPT | Performed by: STUDENT IN AN ORGANIZED HEALTH CARE EDUCATION/TRAINING PROGRAM

## 2024-10-28 PROCEDURE — 36415 COLL VENOUS BLD VENIPUNCTURE: CPT

## 2024-10-28 PROCEDURE — 88313 SPECIAL STAINS GROUP 2: CPT | Performed by: STUDENT IN AN ORGANIZED HEALTH CARE EDUCATION/TRAINING PROGRAM

## 2024-10-28 PROCEDURE — 88342 IMHCHEM/IMCYTCHM 1ST ANTB: CPT | Performed by: STUDENT IN AN ORGANIZED HEALTH CARE EDUCATION/TRAINING PROGRAM

## 2024-10-28 PROCEDURE — 83036 HEMOGLOBIN GLYCOSYLATED A1C: CPT | Performed by: FAMILY MEDICINE

## 2024-10-28 PROCEDURE — 90471 IMMUNIZATION ADMIN: CPT

## 2024-10-28 PROCEDURE — 11302 SHAVE SKIN LESION 1.1-2.0 CM: CPT | Performed by: FAMILY MEDICINE

## 2024-10-28 PROCEDURE — 99214 OFFICE O/P EST MOD 30 MIN: CPT | Performed by: FAMILY MEDICINE

## 2024-10-28 PROCEDURE — 88305 TISSUE EXAM BY PATHOLOGIST: CPT | Performed by: STUDENT IN AN ORGANIZED HEALTH CARE EDUCATION/TRAINING PROGRAM

## 2024-10-28 PROCEDURE — 80048 BASIC METABOLIC PNL TOTAL CA: CPT

## 2024-10-28 RX ORDER — HYDROCHLOROTHIAZIDE 12.5 MG/1
12.5 TABLET ORAL DAILY
Qty: 30 TABLET | Refills: 3 | Status: SHIPPED | OUTPATIENT
Start: 2024-10-28

## 2024-10-28 RX ORDER — FLUOXETINE 10 MG/1
10 CAPSULE ORAL DAILY
Qty: 90 CAPSULE | Refills: 1 | Status: SHIPPED | OUTPATIENT
Start: 2024-10-28 | End: 2025-04-26

## 2024-10-28 RX ORDER — BUPROPION HYDROCHLORIDE 150 MG/1
150 TABLET, EXTENDED RELEASE ORAL 2 TIMES DAILY
Qty: 180 TABLET | Refills: 1 | Status: SHIPPED | OUTPATIENT
Start: 2024-10-28

## 2024-10-28 NOTE — ASSESSMENT & PLAN NOTE
Blood pressure not controlled at this time, starting hydrochlorothiazide as below  - Follow-up in 1 month for blood pressure check and BMP  Orders:    hydroCHLOROthiazide 12.5 mg tablet; Take 1 tablet (12.5 mg total) by mouth daily

## 2024-10-28 NOTE — ASSESSMENT & PLAN NOTE
Increasing Wellbutrin to 150 mg twice daily from 75 mg twice daily for improved benefit in stopping smoking  - Patient declines nicotine replacement therapy at this time  Orders:    buPROPion (Wellbutrin SR) 150 mg 12 hr tablet; Take 1 tablet (150 mg total) by mouth 2 (two) times a day

## 2024-10-28 NOTE — PROGRESS NOTES
Ambulatory Visit  Name: Lynda Boyd      : 1960      MRN: [id number]  Encounter Provider: Elias Schoen, MD  Encounter Date: 10/28/2024   Encounter department: [facility]    Assessment & Plan  Type 2 diabetes mellitus without complication, without long-term current use of insulin (HCC)  1 point drop in patient's A1c is since adding Jardiance to patient's medication regimen, pt also losing weight since being on the medication  - Iris exam at this visit  - Checking BMP after starting Jardiance at last visit  Lab Results   Component Value Date    HGBA1C 6.0 10/28/2024       Orders:    POCT hemoglobin A1c    IRIS Diabetic eye exam    Basic metabolic panel; Future    Benign essential hypertension  Blood pressure not controlled at this time, starting hydrochlorothiazide as below  - Follow-up in 1 month for blood pressure check and BMP  Orders:    hydroCHLOROthiazide 12.5 mg tablet; Take 1 tablet (12.5 mg total) by mouth daily    Morbid obesity due to excess calories (HCC)    Patient losing weight, she thinks this is due to the Jardiance.  She does not want to start an GLP-1 at this time, continue to monitor weight at future visits.  Encouraged getting regular exercise as below     Smoking greater than 20 pack years  Increasing Wellbutrin to 150 mg twice daily from 75 mg twice daily for improved benefit in stopping smoking  - Patient declines nicotine replacement therapy at this time  Orders:    buPROPion (Wellbutrin SR) 150 mg 12 hr tablet; Take 1 tablet (150 mg total) by mouth 2 (two) times a day    Mild episode of recurrent major depressive disorder (HCC)  Patient stable on her Prozac dosage    Orders:    FLUoxetine (PROzac) 10 mg capsule; Take 1 capsule (10 mg total) by mouth daily    Encounter for immunization    Orders:    TDAP VACCINE GREATER THAN OR EQUAL TO 6YO IM    Skin lesion  Actinic keratosis versus SCC  Orders:    Tissue Exam; Future    Tissue Exam      Shave  lesion    Date/Time: 10/28/2024 10:00 AM    Performed by: Elias Schoen, MD  Authorized by: Elias Schoen, MD  [place] Protocol:  Consent: Verbal consent obtained. Written consent not obtained.  Consent given by: patient  Patient understanding: patient states understanding of the procedure being performed  Patient consent: the patient's understanding of the procedure matches consent given  Procedure consent: procedure consent matches procedure scheduled  Site marked: the operative site was marked  Patient identity confirmed: verbally with patient    Number of Lesions: 1  Lesion 1:     Body area: upper extremity    Upper extremity location: L lower arm    Initial size (mm): 15    Final defect size (mm): 18    Malignancy: malignancy unknown      Destruction method: shave removal        BMI Counseling: Body mass index is 43.43 kg/m². The BMI is above normal. Nutrition recommendations include decreasing portion sizes and limiting drinks that contain sugar. Exercise recommendations include moderate physical activity 150 minutes/week and exercising 3-5 times per week. No pharmacotherapy was ordered. Patient referred to PCP. Rationale for BMI follow-up plan is due to patient being overweight or obese.     Tobacco Cessation Counseling: Tobacco cessation counseling was provided. The patient is sincerely urged to quit consumption of tobacco. She is not ready to quit tobacco. Medication options not discussed. Patient agreed to medication.       History of Present Illness     Patient is a 64-year-old female here for follow-up multiple issues    Patient denies any new issues, she is still smoking although she has cut back after starting on the Wellbutrin.    Patient has a rash on her arm that has been there for about 1 year, she thinks it is grown.    Patient is desiring to lose weight, she is having difficulty exercising regularly.  She feels that the Jardiance is helping her lose weight, she has lost 20 pounds since initiation  "of the medication.          Review of Systems   Constitutional:  Negative for chills and fever.   HENT:  Negative for sore throat.    Respiratory:  Negative for shortness of breath.    Cardiovascular:  Negative for chest pain and palpitations.   Gastrointestinal:  Negative for abdominal pain.   Genitourinary:  Negative for dysuria.   Musculoskeletal:  Negative for myalgias.   Neurological:  Negative for weakness.           Objective     /86   Pulse 86   Temp 98 °F (36.7 °C) (Temporal)   Resp 16   Ht 5' 5\" (1.651 m)   Wt 118 kg (261 lb)   SpO2 97%   BMI 43.43 kg/m²     Physical Exam  Constitutional:       General: She is not in acute distress.     Appearance: Normal appearance.   Cardiovascular:      Rate and Rhythm: Normal rate. Rhythm irregular.      Heart sounds: Normal heart sounds.   Pulmonary:      Effort: No respiratory distress.   Musculoskeletal:         General: Normal range of motion.      Cervical back: Normal range of motion.   Skin:     Findings: Lesion (crusted red macule) present.   Neurological:      General: No focal deficit present.      Mental Status: She is alert and oriented to person, place, and time.         "

## 2024-10-28 NOTE — ASSESSMENT & PLAN NOTE
Patient stable on her Prozac dosage    Orders:    FLUoxetine (PROzac) 10 mg capsule; Take 1 capsule (10 mg total) by mouth daily

## 2024-10-29 ENCOUNTER — TELEPHONE (OUTPATIENT)
Dept: FAMILY MEDICINE CLINIC | Facility: CLINIC | Age: 64
End: 2024-10-29

## 2024-10-29 NOTE — TELEPHONE ENCOUNTER
[facility] called nurse line to confirm specimen collection for pt.     Left lower arm, skin lesion, tissue exam.

## 2024-10-31 PROCEDURE — 88305 TISSUE EXAM BY PATHOLOGIST: CPT | Performed by: STUDENT IN AN ORGANIZED HEALTH CARE EDUCATION/TRAINING PROGRAM

## 2024-10-31 PROCEDURE — 88313 SPECIAL STAINS GROUP 2: CPT | Performed by: STUDENT IN AN ORGANIZED HEALTH CARE EDUCATION/TRAINING PROGRAM

## 2024-10-31 PROCEDURE — 88341 IMHCHEM/IMCYTCHM EA ADD ANTB: CPT | Performed by: STUDENT IN AN ORGANIZED HEALTH CARE EDUCATION/TRAINING PROGRAM

## 2024-10-31 PROCEDURE — 88342 IMHCHEM/IMCYTCHM 1ST ANTB: CPT | Performed by: STUDENT IN AN ORGANIZED HEALTH CARE EDUCATION/TRAINING PROGRAM

## 2024-11-04 ENCOUNTER — OFFICE VISIT (OUTPATIENT)
Dept: CARDIOLOGY CLINIC | Facility: CLINIC | Age: 64
End: 2024-11-04
Payer: COMMERCIAL

## 2024-11-04 VITALS
HEIGHT: 65 IN | HEART RATE: 98 BPM | SYSTOLIC BLOOD PRESSURE: 138 MMHG | WEIGHT: 260.6 LBS | DIASTOLIC BLOOD PRESSURE: 80 MMHG | BODY MASS INDEX: 43.42 KG/M2 | OXYGEN SATURATION: 98 %

## 2024-11-04 DIAGNOSIS — E66.01 CLASS 3 SEVERE OBESITY DUE TO EXCESS CALORIES WITHOUT SERIOUS COMORBIDITY WITH BODY MASS INDEX (BMI) OF 40.0 TO 44.9 IN ADULT (HCC): ICD-10-CM

## 2024-11-04 DIAGNOSIS — E11.40 TYPE 2 DIABETES MELLITUS WITH DIABETIC NEUROPATHY, WITHOUT LONG-TERM CURRENT USE OF INSULIN (HCC): ICD-10-CM

## 2024-11-04 DIAGNOSIS — E66.813 CLASS 3 SEVERE OBESITY DUE TO EXCESS CALORIES WITHOUT SERIOUS COMORBIDITY WITH BODY MASS INDEX (BMI) OF 40.0 TO 44.9 IN ADULT (HCC): ICD-10-CM

## 2024-11-04 DIAGNOSIS — Z86.73 HISTORY OF CVA (CEREBROVASCULAR ACCIDENT): ICD-10-CM

## 2024-11-04 DIAGNOSIS — E78.00 PURE HYPERCHOLESTEROLEMIA: ICD-10-CM

## 2024-11-04 DIAGNOSIS — I48.19 PERSISTENT ATRIAL FIBRILLATION (HCC): Primary | ICD-10-CM

## 2024-11-04 DIAGNOSIS — I10 BENIGN ESSENTIAL HYPERTENSION: ICD-10-CM

## 2024-11-04 PROCEDURE — 99214 OFFICE O/P EST MOD 30 MIN: CPT | Performed by: INTERNAL MEDICINE

## 2024-11-04 PROCEDURE — 93000 ELECTROCARDIOGRAM COMPLETE: CPT | Performed by: INTERNAL MEDICINE

## 2024-11-04 RX ORDER — DILTIAZEM HYDROCHLORIDE 120 MG/1
120 CAPSULE, COATED, EXTENDED RELEASE ORAL DAILY
Qty: 90 CAPSULE | Refills: 3 | Status: SHIPPED | OUTPATIENT
Start: 2024-11-04

## 2024-11-04 NOTE — PROGRESS NOTES
[facility]  [address] [place] [address]  Phone#  [phone]   Fax#  [phone]  *-*-*-*-*-*-*-*-*-*-*-*-*-*-*-*-*-*-*-*-*-*-*-*-*-*-*-*-*-*-*-*-*-*-*-*-*-*-*-*-*-*-*-*-*-*-*-*-*-*-*-*-*-*                                   Cardiology Follow Up      ENCOUNTER DATE: 24 10:02 AM  PATIENT NAME: Lynda Boyd   : 1960    MRN: [id number]  AGE:64 y.o.      SEX: female  ENCOUNTER PROVIDER:Rogers Menendez MD     PRIMARY CARE PHYSICIAN: Aleksandr Cruz MD    CARDIOLOGY SPECIALTY COMMENTS  Patient was 1st seen in the hospital where she presented with new onset atrial fibrillation with a rapid ventricular response. She had not been anticoagulated. She was placed on Eliquis and rate control was marginally achieved with metoprolol 100 mg b.i.d.. One month later she was cardioverted on Eliquis and placed on Multaq.     In 2018 Discussed with patient that there is an increasing amount of evidence that recurrence of atrial fibrillation and ability to control atrial fibrillation is weight related. If patient's lose weight, they usually stay in sinus rhythm. If they gain weight, atrial fibrillation has a higher likelihood of recurring and a higher likelihood of having difficulty with control.    2020 patient hospitalized at [facility] and transferred to [facility] with evidence of a cerebral vascular accident with infarct of left thalamus and punctate foci embolic infarcts of cerebellum.  Patient was non compliant with her Eliquis.  She was in atrial fibrillation with RVR.  She was felt not to be a candidate for thrombolytics therapy or embolectomy and was treated conservatively.    2020 echocardiogram: Normal LV function, EF 58%, severe concentric LVH. RV at the upper limits of normal. Biatrial enlargement. Estimated PA pressure is 40 mmHg.    2024 Holter monitor: Rhythm atrial fibrillation 100%.  Heart rate ranged from  bpm and average 99  bpm.  The average heart rate is on the fast side.  No significant ectopy.    ACTIVE PROBLEM LIST  Patient Active Problem List   Diagnosis    Alcoholic cirrhosis (HCC)    Benign essential hypertension    Pure hypercholesterolemia    Pancreatitis    Type 2 diabetes mellitus with diabetic neuropathy ([facility])    History of cardioversion    Class 3 severe obesity without serious comorbidity in adult ([facility])    Snoring    Nocturnal leg movements    History of CVA (cerebrovascular accident)    Persistent atrial fibrillation ([facility])    Vitamin D deficiency    Anxiety    Acute respiratory failure with hypoxia ([facility])    Swelling of right foot    Mild episode of recurrent major depressive disorder ([facility])    Microalbuminuria due to type 2 diabetes mellitus  ([facility])    Hyperlipidemia    Smoking greater than 20 pack years    CKD stage 3a, GFR 45-59 ml/min ([facility])       ACTIVE DIAGNOSIS AND PLAN    1. Persistent atrial fibrillation ([facility])  Assessment & Plan:  With paroxysmal atrial fibrillation now has persistent atrial fibrillation after gaining a significant amount of weight.    08/06/2020 echocardiogram: Normal LV function, EF 58%, severe concentric LVH. RV at the upper limits of normal. Biatrial enlargement. Estimated PA pressure is 40 mmHg.     2/26/2024 Holter monitor: Rhythm atrial fibrillation 100%. Heart rate ranged from  bpm and average 99 bpm. The average heart rate is on the fast side. No significant ectopy.     Eliquis 5 mg 2 times a day  Metoprolol tartrate 100 mg every 12 hours  Orders:  -     POCT ECG  -     diltiazem (CARDIZEM CD) 120 mg 24 hr capsule; Take 1 capsule (120 mg total) by mouth daily  2. Pure hypercholesterolemia  Assessment & Plan:  Lab Results   Component Value Date    CHOLESTEROL 107 01/31/2024     Lab Results   Component Value Date    TRIG 113 01/31/2024     Lab Results   Component Value Date    HDL 43 (L) 01/31/2024     Lab Results   Component Value Date    LDLCALC 41 01/31/2024     Atorvastatin 80 mg  daily  3. Benign essential hypertension  Assessment & Plan:  5/24/2023 136/70  1/23/2024 120/70  2/14/2024 138/84  6/12/2024 148/90  6/28/2024 138/80   10/28/2024 146/86    Hydrochlorothiazide 12.5 mg daily  Lisinopril 40 mg daily  Metoprolol tartrate 100 mg every 12 hours  4. History of CVA (cerebrovascular accident)  Assessment & Plan:  08/05/2020 patient hospitalized at [facility] and transferred to [facility] with evidence of a cerebral vascular accident with infarct of left thalamus and punctate foci embolic infarcts of cerebellum. Patient was non compliant with her Eliquis. She was in atrial fibrillation with RVR. She was felt not to be a candidate for thrombolytics therapy or embolectomy and was treated conservatively.   5. Type 2 diabetes mellitus with diabetic neuropathy, without long-term current use of insulin ([facility])  Assessment & Plan:    Lab Results   Component Value Date    HGBA1C 6.0 10/28/2024     Defer management to PCP  6. Class 3 severe obesity due to excess calories without serious comorbidity with body mass index (BMI) of 40.0 to 44.9 in adult ([facility])  Assessment & Plan:  BMI 43.4  Recommend patient discuss with PCP use of a GLP-1 agonist for weight loss.     INTERVAL HISTORY:        Patient has no complaints. She denies chest discomfort or shortness of breath.  She has no palpitations.  She denies symptoms of dizziness, lightheadedness or near-syncope/syncope.  She denies leg edema.  She denies symptoms of orthopnea or paroxysmal nocturnal dyspnea.    Her EKG demonstrates her heart rate resting to be 98.  Would rather it be slower.  Her blood pressure is 138/80.  Her lipid profile is excellent with triglycerides of 113 and LDL of 41.    DISCUSSION/PLAN:          Continue medications as noted above  Begin Cardizem CD1 120 mg once daily to improve heart rate control and lower blood pressure  Return in 3 months  EKG on return    Results for orders placed or performed in visit on 11/04/24    POCT ECG    Narrative    Atrial fibrillation with a ventricular response of 98 bpm.  Abnormal EKG.         Lab Studies:    Lab Results   Component Value Date    CHOLESTEROL 107 01/31/2024    CHOLESTEROL 112 11/03/2022    CHOLESTEROL 139 05/26/2021     Lab Results   Component Value Date    TRIG 113 01/31/2024    TRIG 121 11/03/2022    TRIG 101 05/26/2021     Lab Results   Component Value Date    HDL 43 (L) 01/31/2024    HDL 46 (L) 11/03/2022    HDL 47 05/26/2021     Lab Results   Component Value Date    LDLCALC 41 01/31/2024    LDLCALC 42 11/03/2022    LDLCALC 72 05/26/2021     Lab Results   Component Value Date    HGBA1C 6.0 10/28/2024    HGBA1C 7.1 (A) 06/12/2024    HGBA1C 7.3 (H) 01/31/2024      Lab Results   Component Value Date    EGFR 62 10/28/2024    EGFR 60 01/31/2024    EGFR 49 11/03/2022    SODIUM 140 10/28/2024    SODIUM 137 01/31/2024    SODIUM 138 11/03/2022    K 4.5 10/28/2024    K 5.0 01/31/2024    K 5.3 11/03/2022     10/28/2024     01/31/2024     (H) 11/03/2022    CO2 26 10/28/2024    CO2 27 01/31/2024    CO2 24 11/03/2022    BUN 15 10/28/2024    BUN 24 01/31/2024    BUN 33 (H) 11/03/2022    CREATININE 0.98 10/28/2024    CREATININE 1.01 01/31/2024    CREATININE 1.20 11/03/2022    MG 2.4 08/07/2020    MG 1.8 08/06/2020    MG 1.7 07/20/2018     Lab Results   Component Value Date    WBC 12.49 (H) 01/31/2024    WBC 12.72 (H) 11/03/2022    WBC 11.08 (H) 05/26/2021    HGB 13.4 01/31/2024    HGB 13.4 11/03/2022    HGB 14.2 05/26/2021    HCT 41.6 01/31/2024    HCT 42.0 11/03/2022    HCT 44.4 05/26/2021    MCV 90 01/31/2024    MCV 92 11/03/2022    MCV 92 05/26/2021    MCH 28.9 01/31/2024    MCH 29.2 11/03/2022    MCH 29.5 05/26/2021    MCHC 32.2 01/31/2024    MCHC 31.9 11/03/2022    MCHC 32.0 05/26/2021     01/31/2024     11/03/2022     05/26/2021      Lab Results   Component Value Date    CALCIUM 10.2 10/28/2024    CALCIUM 9.9 01/31/2024    CALCIUM 10.0 11/03/2022     ALB 4.4 01/31/2024    ALB 3.5 11/03/2022    ALB 3.8 10/28/2021    TP 7.3 01/31/2024    TP 7.8 11/03/2022    TP 7.5 10/28/2021    AST 16 01/31/2024    AST 12 11/03/2022    AST 13 10/28/2021    ALT 18 01/31/2024    ALT 24 11/03/2022    ALT 22 10/28/2021    ALKPHOS 97 01/31/2024    ALKPHOS 130 (H) 11/03/2022    ALKPHOS 127 (H) 10/28/2021     Current Outpatient Medications:     Accu-Chek Softclix Lancets lancets, by Other route daily, Disp: 100 each, Rfl: 5    apixaban (Eliquis) 5 mg, Take 1 tablet (5 mg total) by mouth 2 (two) times a day, Disp: 180 tablet, Rfl: 1    atorvastatin (LIPITOR) 80 mg tablet, Take 1 tablet (80 mg total) by mouth every evening, Disp: 90 tablet, Rfl: 3    Blood Glucose Monitoring Suppl (Accu-Chek Guide) w/Device KIT, by Does not apply route daily, Disp: 1 kit, Rfl: 0    Blood Glucose Monitoring Suppl (OneTouch Verio) w/Device KIT, by Does not apply route daily, Disp: 1 kit, Rfl: 0    buPROPion (Wellbutrin SR) 150 mg 12 hr tablet, Take 1 tablet (150 mg total) by mouth 2 (two) times a day, Disp: 180 tablet, Rfl: 1    Cholecalciferol (VITAMIN D3) 1,000 units tablet, Take 1 tablet (1,000 Units total) by mouth daily, Disp: 90 tablet, Rfl: 1    diltiazem (CARDIZEM CD) 120 mg 24 hr capsule, Take 1 capsule (120 mg total) by mouth daily, Disp: 90 capsule, Rfl: 3    Empagliflozin (JARDIANCE) 10 MG TABS tablet, Take 1 tablet (10 mg total) by mouth daily, Disp: 90 tablet, Rfl: 3    FLUoxetine (PROzac) 10 mg capsule, Take 1 capsule (10 mg total) by mouth daily, Disp: 90 capsule, Rfl: 1    gabapentin (Neurontin) 300 mg capsule, Take 1 capsule (300 mg total) by mouth 2 (two) times a day, Disp: 180 capsule, Rfl: 1    glucose blood (OneTouch Verio) test strip, 1 each by Other route daily Use as instructed, Disp: 100 each, Rfl: 3    glucose blood test strip, 1 each by Other route daily Use as instructed, Disp: 100 each, Rfl: 3    hydroCHLOROthiazide 12.5 mg tablet, Take 1 tablet (12.5 mg total) by mouth daily,  Disp: 30 tablet, Rfl: 3    lisinopril (ZESTRIL) 40 mg tablet, TAKE ONE TABLET BY MOUTH ONCE DAILY, Disp: 90 tablet, Rfl: 1    Melatonin 10 MG TABS, Take 30 mg by mouth daily at bedtime as needed, Disp: , Rfl:     metoprolol tartrate (LOPRESSOR) 100 mg tablet, Take 1 tablet (100 mg total) by mouth every 12 (twelve) hours, Disp: 180 tablet, Rfl: 3    sitaGLIPtin-metFORMIN (Janumet)  MG per tablet, Take 1 tablet by mouth 2 (two) times a day with meals, Disp: 180 tablet, Rfl: 1  Allergies   Allergen Reactions    Augmentin [Amoxicillin-Pot Clavulanate]      Yeast infection       Past Medical History:   Diagnosis Date    Anxiety     Arthritis     Cirrhosis of liver (HCC)     Diabetes mellitus (HCC)     HLD (hyperlipidemia)     Hypertension     Irregular heart beat     Psychiatric disorder     Depression    Stroke (HCC)     Tobacco dependence      Social History     Socioeconomic History    Marital status: Single     Spouse name: Not on file    Number of children: Not on file    Years of education: Not on file    Highest education level: Not on file   Occupational History    Not on file   Tobacco Use    Smoking status: Every Day     Current packs/day: 0.50     Types: Cigarettes     Passive exposure: Current    Smokeless tobacco: Never   Vaping Use    Vaping status: Never Used   Substance and Sexual Activity    Alcohol use: Not Currently     Comment: 1 botttle of vodka over 4 days/denies since 7/18    Drug use: No    Sexual activity: Not Currently   Other Topics Concern    Not on file   Social History Narrative    Not on file     Social Determinants of Health     Financial Resource Strain: Low Risk  (6/12/2024)    Overall Financial Resource Strain (CARDIA)     Difficulty of Paying Living Expenses: Not very hard   Food Insecurity: No Food Insecurity (6/12/2024)    Nursing - Inadequate Food Risk Classification     Worried About Running Out of Food in the Last Year: Never true     Ran Out of Food in the Last Year: Never  "true     Ran Out of Food in the Last Year: Not on file   Transportation Needs: Unmet Transportation Needs (6/12/2024)    PRAPARE - Transportation     Lack of Transportation (Medical): Yes     Lack of Transportation (Non-Medical): Yes   Physical Activity: Not on file   Stress: Not on file   Social Connections: Not on file   Intimate Partner Violence: Not on file   Housing Stability: Low Risk  (6/12/2024)    Housing Stability Vital Sign     Unable to Pay for Housing in the Last Year: No     Number of Times Moved in the Last Year: 0     Homeless in the Last Year: No      Family History   Problem Relation Age of Onset    Diabetes Mother     Diabetes Father     Diabetes Brother     Hypertension Brother      Past Surgical History:   Procedure Laterality Date    APPENDECTOMY      BUNIONECTOMY Right     CARPAL TUNNEL RELEASE      CHOLECYSTECTOMY      IR STROKE ALERT  8/5/2020       PREVIOUS WEIGHTS:   Wt Readings from Last 10 Encounters:   11/04/24 118 kg (260 lb 9.6 oz)   10/28/24 118 kg (261 lb)   06/28/24 128 kg (281 lb 9.6 oz)   06/12/24 130 kg (287 lb)   05/02/24 132 kg (290 lb)   02/14/24 129 kg (285 lb)   01/23/24 131 kg (288 lb 6.4 oz)   05/24/23 127 kg (280 lb)   12/21/22 124 kg (273 lb)   11/04/22 123 kg (271 lb 3.2 oz)        Review of Systems:  Review of Systems   Respiratory:  Negative for cough, choking, chest tightness, shortness of breath and wheezing.    Cardiovascular:  Negative for chest pain, palpitations and leg swelling.   Musculoskeletal:  Negative for gait problem.   Skin:  Negative for rash.   Neurological:  Negative for dizziness, tremors, syncope, weakness, light-headedness, numbness and headaches.   Psychiatric/Behavioral:  Negative for agitation and behavioral problems. The patient is not hyperactive.        Physical Exam:  /80 (BP Location: Right arm, Patient Position: Sitting, Cuff Size: Large)   Pulse 98   Ht 5' 5\" (1.651 m)   Wt 118 kg (260 lb 9.6 oz)   SpO2 98%   BMI 43.37 kg/m² " "    Physical Exam  Constitutional:       General: She is not in acute distress.     Appearance: She is well-developed. She is morbidly obese.   HENT:      Head: Normocephalic and atraumatic.   Neck:      Thyroid: No thyromegaly.      Vascular: No carotid bruit or JVD.      Trachea: No tracheal deviation.   Cardiovascular:      Rate and Rhythm: Normal rate and regular rhythm.      Pulses: Normal pulses.      Heart sounds: Normal heart sounds. No murmur heard.     No friction rub. No gallop.   Pulmonary:      Effort: Pulmonary effort is normal. No respiratory distress.      Breath sounds: Normal breath sounds. No wheezing, rhonchi or rales.   Chest:      Chest wall: No tenderness.   Musculoskeletal:         General: Normal range of motion.      Cervical back: Normal range of motion and neck supple.      Right lower leg: No edema.      Left lower leg: No edema.   Skin:     General: Skin is warm and dry.   Neurological:      General: No focal deficit present.      Mental Status: She is alert and oriented to person, place, and time.   Psychiatric:         Mood and Affect: Mood normal.         Behavior: Behavior normal.         Thought Content: Thought content normal.         Judgment: Judgment normal.       ======================================================  Imaging:   Results Review Statement: No pertinent imaging studies reviewed.    Portions of the record may have been created with voice recognition software. Occasional wrong word or \"sound a like\" substitutions may have occurred due to the inherent limitations of voice recognition software. Read the chart carefully and recognize, using context, where substitutions have occurred.    SIGNATURES:   Rogers Menendez MD   "

## 2024-11-05 ENCOUNTER — TELEPHONE (OUTPATIENT)
Dept: FAMILY MEDICINE CLINIC | Facility: CLINIC | Age: 64
End: 2024-11-05

## 2024-11-05 NOTE — TELEPHONE ENCOUNTER
Pt called requesting results of tissue exam completed on 10/28/24.     Pt informed the tissue results as well as the other lab work completed on 10/28/24.     Pt expressed interest in getting the freezing and scraping done of the surrounding area of the tissue on the left forearm on 12/2/24     Appt switched to procedure appt on same day (12/2/24)

## 2024-11-25 DIAGNOSIS — I10 BENIGN ESSENTIAL HYPERTENSION: ICD-10-CM

## 2024-11-25 NOTE — TELEPHONE ENCOUNTER
Reason for call:   [x] Refill   [] Prior Auth  [] Other:     Office:   [] PCP/Provider -   [x] Specialty/Provider - cardio     Medication: lisinopril 40 mg, take one tablet by mouth once daily      Pharmacy: [facility] [address] [place]     Does the patient have enough for 3 days?   [x] Yes   [] No - Send as HP to POD

## 2024-11-26 RX ORDER — LISINOPRIL 40 MG/1
TABLET ORAL
Qty: 90 TABLET | Refills: 1 | Status: SHIPPED | OUTPATIENT
Start: 2024-11-26

## 2024-12-02 ENCOUNTER — PROCEDURE VISIT (OUTPATIENT)
Dept: FAMILY MEDICINE CLINIC | Facility: CLINIC | Age: 64
End: 2024-12-02

## 2024-12-02 VITALS
TEMPERATURE: 97.6 F | OXYGEN SATURATION: 97 % | WEIGHT: 254 LBS | RESPIRATION RATE: 16 BRPM | HEIGHT: 65 IN | HEART RATE: 90 BPM | BODY MASS INDEX: 42.32 KG/M2 | SYSTOLIC BLOOD PRESSURE: 132 MMHG | DIASTOLIC BLOOD PRESSURE: 80 MMHG

## 2024-12-02 DIAGNOSIS — L57.0 ACTINIC KERATOSIS DUE TO EXPOSURE TO SUNLIGHT: Primary | ICD-10-CM

## 2024-12-02 DIAGNOSIS — E11.40 TYPE 2 DIABETES MELLITUS WITH DIABETIC NEUROPATHY, WITHOUT LONG-TERM CURRENT USE OF INSULIN (HCC): ICD-10-CM

## 2024-12-02 DIAGNOSIS — I10 BENIGN ESSENTIAL HYPERTENSION: ICD-10-CM

## 2024-12-02 PROCEDURE — 99213 OFFICE O/P EST LOW 20 MIN: CPT | Performed by: INTERNAL MEDICINE

## 2024-12-02 PROCEDURE — 17004 DESTROY PREMAL LESIONS 15/>: CPT | Performed by: INTERNAL MEDICINE

## 2024-12-02 RX ORDER — GABAPENTIN 300 MG/1
300 CAPSULE ORAL 2 TIMES DAILY
Qty: 180 CAPSULE | Refills: 1 | Status: SHIPPED | OUTPATIENT
Start: 2024-12-02

## 2024-12-02 NOTE — ASSESSMENT & PLAN NOTE
18 lesions have undergone cryotherapy at this visit, plan to follow-up in 6 weeks for reevaluation

## 2024-12-02 NOTE — ASSESSMENT & PLAN NOTE
Patient understanding of side effects of nausea and vomiting, as well as risk of pancreatitis.  She is willing to take the risk due to benefits regarding her cardiovascular health and weight loss.  - Discontinuing Janumet, will hold off on prescribing metformin without the Januvia due to adequate A1c control  - Follow-up in 6 weeks for titration of semaglutide as patient tolerates    Lab Results   Component Value Date    HGBA1C 6.0 10/28/2024       Orders:    gabapentin (Neurontin) 300 mg capsule; Take 1 capsule (300 mg total) by mouth 2 (two) times a day    semaglutide, 0.25 or 0.5 mg/dose, (Ozempic, 0.25 or 0.5 MG/DOSE,) 2 mg/3 mL injection pen; 0.25 mg under the skin every 7 days for 4 doses (28 days), THEN 0.5 mg under the skin every 7 days

## 2024-12-02 NOTE — ASSESSMENT & PLAN NOTE
Blood pressure well-controlled at this visit, if patient loses weight and continues to drop her blood pressure will cut back on lisinopril dosing

## 2024-12-06 ENCOUNTER — TELEPHONE (OUTPATIENT)
Dept: FAMILY MEDICINE CLINIC | Facility: CLINIC | Age: 64
End: 2024-12-06

## 2024-12-06 DIAGNOSIS — E11.40 TYPE 2 DIABETES MELLITUS WITH DIABETIC NEUROPATHY, WITHOUT LONG-TERM CURRENT USE OF INSULIN (HCC): ICD-10-CM

## 2024-12-06 NOTE — TELEPHONE ENCOUNTER
Yes, my name is Cherry Boyd. My birth date is [birth year]. I am a diabetic. I am out of my diabetic diabetes medication because they were supposed to be getting authorization for Ozempic. I come to the pharmacist, they have no notification as to whether the Ozempic approved but then I need my Janumet which I have no more refills for the janumet. Could you please get back to me about this medicine? [phone] and yeah, I don't know. Thank you.

## 2025-01-06 DIAGNOSIS — E11.9 TYPE 2 DIABETES MELLITUS WITHOUT COMPLICATION, WITHOUT LONG-TERM CURRENT USE OF INSULIN (HCC): ICD-10-CM

## 2025-01-06 DIAGNOSIS — I48.91 NEW ONSET ATRIAL FIBRILLATION (HCC): ICD-10-CM

## 2025-01-06 RX ORDER — LANCETS
EACH MISCELLANEOUS DAILY
Qty: 100 EACH | Refills: 5 | Status: SHIPPED | OUTPATIENT
Start: 2025-01-06

## 2025-01-06 RX ORDER — BLOOD SUGAR DIAGNOSTIC
1 STRIP MISCELLANEOUS DAILY
Qty: 100 EACH | Refills: 3 | Status: SHIPPED | OUTPATIENT
Start: 2025-01-06

## 2025-01-06 NOTE — TELEPHONE ENCOUNTER
Reason for call:   [x] Refill   [] Prior Auth  [] Other:     Office:   [] PCP/Provider -   [x] Specialty/Provider - Cardiology     Medication: Metoprolol Tartrate     Dose/Frequency: 100 mg tablet taken by mouth once every 12 hours     Quantity: 180    Pharmacy: [id number] - [place] - [address] [phone]     Does the patient have enough for 3 days?   [x] Yes   [] No - Send as HP to POD

## 2025-01-07 RX ORDER — METOPROLOL TARTRATE 100 MG/1
100 TABLET ORAL EVERY 12 HOURS
Qty: 180 TABLET | Refills: 1 | Status: SHIPPED | OUTPATIENT
Start: 2025-01-07

## 2025-01-22 DIAGNOSIS — E78.2 MIXED HYPERLIPIDEMIA: ICD-10-CM

## 2025-01-23 RX ORDER — ATORVASTATIN CALCIUM 80 MG/1
80 TABLET, FILM COATED ORAL EVERY EVENING
Qty: 90 TABLET | Refills: 1 | Status: SHIPPED | OUTPATIENT
Start: 2025-01-23

## 2025-01-27 NOTE — TELEPHONE ENCOUNTER
Patient called requesting refill for atorvastatin (LIPITOR) 80 mg tablet. Patient made aware medication was sent on 1/23/25 for 90 with 1 refills to [id number] - [place] - [address]  pharmacy. Patient instructed to contact the pharmacy to obtain refills of medication. Patient verbalized understanding.

## 2025-02-02 NOTE — ASSESSMENT & PLAN NOTE
With paroxysmal atrial fibrillation now has persistent atrial fibrillation after gaining a significant amount of weight.    08/06/2020 echocardiogram: Normal LV function, EF 58%, severe concentric LVH. RV at the upper limits of normal. Biatrial enlargement. Estimated PA pressure is 40 mmHg.     2/26/2024 Holter monitor: Rhythm atrial fibrillation 100%. Heart rate ranged from  bpm and average 99 bpm. The average heart rate is on the fast side. No significant ectopy.     Eliquis 5 mg 2 times a day  Metoprolol tartrate 100 mg every 12 hours  Cardizem  mg daily

## 2025-02-02 NOTE — ASSESSMENT & PLAN NOTE
08/05/2020 patient hospitalized at [facility] and transferred to [facility] with evidence of a cerebral vascular accident with infarct of left thalamus and punctate foci embolic infarcts of cerebellum. Patient was non compliant with her Eliquis. She was in atrial fibrillation with RVR. She was felt not to be a candidate for thrombolytics therapy or embolectomy and was treated conservatively.

## 2025-02-02 NOTE — ASSESSMENT & PLAN NOTE
5/24/2023 136/70  1/23/2024 120/70  2/14/2024 138/84  6/12/2024 148/90  6/28/2024 138/80   10/28/2024 146/86  11/4/2024 138/80  12/2/2024 132/80    Hydrochlorothiazide 12.5 mg daily  Lisinopril 40 mg daily  Metoprolol tartrate 100 mg every 12 hours  Cardizem  mg daily

## 2025-02-04 ENCOUNTER — OFFICE VISIT (OUTPATIENT)
Dept: CARDIOLOGY CLINIC | Facility: CLINIC | Age: 65
End: 2025-02-04
Payer: COMMERCIAL

## 2025-02-04 VITALS
BODY MASS INDEX: 42.43 KG/M2 | SYSTOLIC BLOOD PRESSURE: 110 MMHG | DIASTOLIC BLOOD PRESSURE: 60 MMHG | HEART RATE: 76 BPM | WEIGHT: 255 LBS

## 2025-02-04 DIAGNOSIS — I10 BENIGN ESSENTIAL HYPERTENSION: ICD-10-CM

## 2025-02-04 DIAGNOSIS — E78.00 PURE HYPERCHOLESTEROLEMIA: ICD-10-CM

## 2025-02-04 DIAGNOSIS — E66.813 CLASS 3 SEVERE OBESITY DUE TO EXCESS CALORIES WITHOUT SERIOUS COMORBIDITY WITH BODY MASS INDEX (BMI) OF 40.0 TO 44.9 IN ADULT (HCC): ICD-10-CM

## 2025-02-04 DIAGNOSIS — I48.19 PERSISTENT ATRIAL FIBRILLATION (HCC): Primary | ICD-10-CM

## 2025-02-04 DIAGNOSIS — E11.40 TYPE 2 DIABETES MELLITUS WITH DIABETIC NEUROPATHY, WITHOUT LONG-TERM CURRENT USE OF INSULIN (HCC): ICD-10-CM

## 2025-02-04 DIAGNOSIS — E66.01 CLASS 3 SEVERE OBESITY DUE TO EXCESS CALORIES WITHOUT SERIOUS COMORBIDITY WITH BODY MASS INDEX (BMI) OF 40.0 TO 44.9 IN ADULT (HCC): ICD-10-CM

## 2025-02-04 DIAGNOSIS — Z86.73 HISTORY OF CVA (CEREBROVASCULAR ACCIDENT): ICD-10-CM

## 2025-02-04 PROCEDURE — 99214 OFFICE O/P EST MOD 30 MIN: CPT | Performed by: INTERNAL MEDICINE

## 2025-02-04 PROCEDURE — 93000 ELECTROCARDIOGRAM COMPLETE: CPT | Performed by: INTERNAL MEDICINE

## 2025-02-04 NOTE — PROGRESS NOTES
[facility]  [address] [place] [address]  Phone#  [phone]   Fax#  [phone]  *-*-*-*-*-*-*-*-*-*-*-*-*-*-*-*-*-*-*-*-*-*-*-*-*-*-*-*-*-*-*-*-*-*-*-*-*-*-*-*-*-*-*-*-*-*-*-*-*-*-*-*-*-*                                   Cardiology Follow Up      ENCOUNTER DATE: 25 11:19 AM  PATIENT NAME: Lynda Boyd   : 1960    MRN: [id number]  AGE:64 y.o.      SEX: female  ENCOUNTER PROVIDER:Rogers Menendez MD     PRIMARY CARE PHYSICIAN: Aleksandr Cruz MD    ACTIVE DIAGNOSIS AND PLAN    1. Persistent atrial fibrillation (HCC)  Assessment & Plan:  With paroxysmal atrial fibrillation now has persistent atrial fibrillation after gaining a significant amount of weight.    2020 echocardiogram: Normal LV function, EF 58%, severe concentric LVH. RV at the upper limits of normal. Biatrial enlargement. Estimated PA pressure is 40 mmHg.     2024 Holter monitor: Rhythm atrial fibrillation 100%. Heart rate ranged from  bpm and average 99 bpm. The average heart rate is on the fast side. No significant ectopy.     Eliquis 5 mg 2 times a day  Metoprolol tartrate 100 mg every 12 hours  Cardizem  mg daily  Orders:  -     POCT ECG  2. Pure hypercholesterolemia  Assessment & Plan:  Excellent lipid profile  Continue atorvastatin 80 mg daily  3. Benign essential hypertension  Assessment & Plan:  2023 136/70  2024 120/70  2024 138/84  2024 148/90  2024 138/80   10/28/2024 146/86  2024 138/80  2024 132/80    Hydrochlorothiazide 12.5 mg daily  Lisinopril 40 mg daily  Metoprolol tartrate 100 mg every 12 hours  Cardizem  mg daily  4. History of CVA (cerebrovascular accident)  Assessment & Plan:  2020 patient hospitalized at [facility] and transferred to [facility] with evidence of a cerebral vascular accident with infarct of left thalamus and punctate foci embolic infarcts of cerebellum. Patient was non compliant with  her Eliquis. She was in atrial fibrillation with RVR. She was felt not to be a candidate for thrombolytics therapy or embolectomy and was treated conservatively.   5. Class 3 severe obesity due to excess calories without serious comorbidity with body mass index (BMI) of 40.0 to 44.9 in adult ([facility])  Assessment & Plan:  BMI 43.4 --> 42.3    On Ozempic every 7 days  6. Type 2 diabetes mellitus with diabetic neuropathy, without long-term current use of insulin ([facility])  Assessment & Plan:    Lab Results   Component Value Date    HGBA1C 6.0 10/28/2024     Management deferred to PCP     INTERVAL HISTORY:        Patient with chronic atrial fibrillation returns.  She denies cardiac symptoms. She denies chest discomfort or shortness of breath.  She has no palpitations.  She denies symptoms of dizziness, lightheadedness or near-syncope/syncope.  She denies leg edema.  She denies symptoms of orthopnea or paroxysmal nocturnal dyspnea.  Her blood pressure is excellent.  Her last lipid profile is excellent.  Her lungs are clear and she has no pretibial edema.  She is on Ozempic every 7 days and has lost from a BMI of 43.4 to a BMI of 42.7.  She needs to be congratulated for this success and keep up the good work.      DISCUSSION/PLAN:          Continue medications as listed above  Return in 6 months.      CARDIOLOGY HISTORY AND TESTING  Patient was 1st seen in the hospital where she presented with new onset atrial fibrillation with a rapid ventricular response. She had not been anticoagulated. She was placed on Eliquis and rate control was marginally achieved with metoprolol 100 mg b.i.d.. One month later she was cardioverted on Eliquis and placed on Multaq.     In 12/20/2018 Discussed with patient that there is an increasing amount of evidence that recurrence of atrial fibrillation and ability to control atrial fibrillation is weight related. If patient's lose weight, they usually stay in sinus rhythm. If they gain weight, atrial  fibrillation has a higher likelihood of recurring and a higher likelihood of having difficulty with control.    08/05/2020 patient hospitalized at [facility] and transferred to [facility] with evidence of a cerebral vascular accident with infarct of left thalamus and punctate foci embolic infarcts of cerebellum.  Patient was non compliant with her Eliquis.  She was in atrial fibrillation with RVR.  She was felt not to be a candidate for thrombolytics therapy or embolectomy and was treated conservatively.    08/06/2020 echocardiogram: Normal LV function, EF 58%, severe concentric LVH. RV at the upper limits of normal. Biatrial enlargement. Estimated PA pressure is 40 mmHg.    2/26/2024 Holter monitor: Rhythm atrial fibrillation 100%.  Heart rate ranged from  bpm and average 99 bpm.  The average heart rate is on the fast side.  No significant ectopy.    ACTIVE PROBLEM LIST  Patient Active Problem List   Diagnosis    Alcoholic cirrhosis (HCC)    Benign essential hypertension    Pure hypercholesterolemia    Pancreatitis    Type 2 diabetes mellitus with diabetic neuropathy (HCC)    History of cardioversion    Class 3 severe obesity without serious comorbidity in adult (HCC)    Snoring    Nocturnal leg movements    History of CVA (cerebrovascular accident)    Persistent atrial fibrillation (HCC)    Vitamin D deficiency    Anxiety    Acute respiratory failure with hypoxia (HCC)    Swelling of right foot    Mild episode of recurrent major depressive disorder (HCC)    Microalbuminuria due to type 2 diabetes mellitus  (HCC)    Hyperlipidemia    Smoking greater than 20 pack years    CKD stage 3a, GFR 45-59 ml/min (HCC)    Actinic keratosis due to exposure to sunlight       TODAY'S EKG  Results for orders placed or performed in visit on 02/04/25   POCT ECG    Narrative    Atrial fibrillation with a ventricular response of 76 bpm.  Low voltage QRS.  QT/QTc interval 392/441 ms, normal QT interval.  Abnormal ECG.          Lab Studies:    Lab Results   Component Value Date    CHOLESTEROL 107 01/31/2024    CHOLESTEROL 112 11/03/2022    CHOLESTEROL 139 05/26/2021     Lab Results   Component Value Date    TRIG 113 01/31/2024    TRIG 121 11/03/2022    TRIG 101 05/26/2021     Lab Results   Component Value Date    HDL 43 (L) 01/31/2024    HDL 46 (L) 11/03/2022    HDL 47 05/26/2021     Lab Results   Component Value Date    LDLCALC 41 01/31/2024    LDLCALC 42 11/03/2022    LDLCALC 72 05/26/2021     Lab Results   Component Value Date    HGBA1C 6.0 10/28/2024    HGBA1C 7.1 (A) 06/12/2024    HGBA1C 7.3 (H) 01/31/2024      Lab Results   Component Value Date    EGFR 62 10/28/2024    EGFR 60 01/31/2024    EGFR 49 11/03/2022    SODIUM 140 10/28/2024    SODIUM 137 01/31/2024    SODIUM 138 11/03/2022    K 4.5 10/28/2024    K 5.0 01/31/2024    K 5.3 11/03/2022     10/28/2024     01/31/2024     (H) 11/03/2022    CO2 26 10/28/2024    CO2 27 01/31/2024    CO2 24 11/03/2022    BUN 15 10/28/2024    BUN 24 01/31/2024    BUN 33 (H) 11/03/2022    CREATININE 0.98 10/28/2024    CREATININE 1.01 01/31/2024    CREATININE 1.20 11/03/2022    MG 2.4 08/07/2020    MG 1.8 08/06/2020    MG 1.7 07/20/2018     Lab Results   Component Value Date    WBC 12.49 (H) 01/31/2024    WBC 12.72 (H) 11/03/2022    WBC 11.08 (H) 05/26/2021    HGB 13.4 01/31/2024    HGB 13.4 11/03/2022    HGB 14.2 05/26/2021    HCT 41.6 01/31/2024    HCT 42.0 11/03/2022    HCT 44.4 05/26/2021    MCV 90 01/31/2024    MCV 92 11/03/2022    MCV 92 05/26/2021    MCH 28.9 01/31/2024    MCH 29.2 11/03/2022    MCH 29.5 05/26/2021    MCHC 32.2 01/31/2024    MCHC 31.9 11/03/2022    MCHC 32.0 05/26/2021     01/31/2024     11/03/2022     05/26/2021      Lab Results   Component Value Date    CALCIUM 10.2 10/28/2024    CALCIUM 9.9 01/31/2024    CALCIUM 10.0 11/03/2022    ALB 4.4 01/31/2024    ALB 3.5 11/03/2022    ALB 3.8 10/28/2021    TP 7.3 01/31/2024    TP 7.8  11/03/2022    TP 7.5 10/28/2021    AST 16 01/31/2024    AST 12 11/03/2022    AST 13 10/28/2021    ALT 18 01/31/2024    ALT 24 11/03/2022    ALT 22 10/28/2021    ALKPHOS 97 01/31/2024    ALKPHOS 130 (H) 11/03/2022    ALKPHOS 127 (H) 10/28/2021     Current Outpatient Medications:     Accu-Chek Softclix Lancets lancets, Use daily, Disp: 100 each, Rfl: 5    apixaban (Eliquis) 5 mg, Take 1 tablet (5 mg total) by mouth 2 (two) times a day, Disp: 180 tablet, Rfl: 1    atorvastatin (LIPITOR) 80 mg tablet, Take 1 tablet (80 mg total) by mouth every evening, Disp: 90 tablet, Rfl: 1    Blood Glucose Monitoring Suppl (Accu-Chek Guide) w/Device KIT, by Does not apply route daily, Disp: 1 kit, Rfl: 0    Blood Glucose Monitoring Suppl (OneTouch Verio) w/Device KIT, by Does not apply route daily, Disp: 1 kit, Rfl: 0    buPROPion (Wellbutrin SR) 150 mg 12 hr tablet, Take 1 tablet (150 mg total) by mouth 2 (two) times a day, Disp: 180 tablet, Rfl: 1    Cholecalciferol (VITAMIN D3) 1,000 units tablet, Take 1 tablet (1,000 Units total) by mouth daily, Disp: 90 tablet, Rfl: 1    diltiazem (CARDIZEM CD) 120 mg 24 hr capsule, Take 1 capsule (120 mg total) by mouth daily, Disp: 90 capsule, Rfl: 3    Empagliflozin (JARDIANCE) 10 MG TABS tablet, Take 1 tablet (10 mg total) by mouth daily, Disp: 90 tablet, Rfl: 3    FLUoxetine (PROzac) 10 mg capsule, Take 1 capsule (10 mg total) by mouth daily, Disp: 90 capsule, Rfl: 1    gabapentin (Neurontin) 300 mg capsule, Take 1 capsule (300 mg total) by mouth 2 (two) times a day, Disp: 180 capsule, Rfl: 1    glucose blood (OneTouch Verio) test strip, Use 1 each daily Use as instructed, Disp: 100 each, Rfl: 3    hydroCHLOROthiazide 12.5 mg tablet, Take 1 tablet (12.5 mg total) by mouth daily, Disp: 30 tablet, Rfl: 3    lisinopril (ZESTRIL) 40 mg tablet, TAKE ONE TABLET BY MOUTH ONCE DAILY, Disp: 90 tablet, Rfl: 1    Melatonin 10 MG TABS, Take 30 mg by mouth daily at bedtime as needed, Disp: , Rfl:      metoprolol tartrate (LOPRESSOR) 100 mg tablet, Take 1 tablet (100 mg total) by mouth every 12 (twelve) hours, Disp: 180 tablet, Rfl: 1    semaglutide, 0.25 or 0.5 mg/dose, (Ozempic, 0.25 or 0.5 MG/DOSE,) 2 mg/3 mL injection pen, 0.25 mg under the skin every 7 days for 4 doses (28 days), THEN 0.5 mg under the skin every 7 days, Disp: 9 mL, Rfl: 0  Allergies   Allergen Reactions    Augmentin [Amoxicillin-Pot Clavulanate]      Yeast infection       Past Medical History:   Diagnosis Date    Anxiety     Arthritis     Cirrhosis of liver (HCC)     Diabetes mellitus (HCC)     HLD (hyperlipidemia)     Hypertension     Irregular heart beat     Psychiatric disorder     Depression    Stroke (HCC)     Tobacco dependence      Social History     Socioeconomic History    Marital status: Single     Spouse name: Not on file    Number of children: Not on file    Years of education: Not on file    Highest education level: Not on file   Occupational History    Not on file   Tobacco Use    Smoking status: Every Day     Current packs/day: 0.50     Types: Cigarettes     Passive exposure: Current    Smokeless tobacco: Never   Vaping Use    Vaping status: Never Used   Substance and Sexual Activity    Alcohol use: Not Currently     Comment: 1 botttle of vodka over 4 days/denies since 7/18    Drug use: No    Sexual activity: Not Currently   Other Topics Concern    Not on file   Social History Narrative    Not on file     Social Drivers of Health     Financial Resource Strain: Low Risk  (6/12/2024)    Overall Financial Resource Strain (CARDIA)     Difficulty of Paying Living Expenses: Not very hard   Food Insecurity: No Food Insecurity (6/12/2024)    Nursing - Inadequate Food Risk Classification     Worried About Running Out of Food in the Last Year: Never true     Ran Out of Food in the Last Year: Never true     Ran Out of Food in the Last Year: Not on file   Transportation Needs: Unmet Transportation Needs (6/12/2024)    PRAPARE -  Transportation     Lack of Transportation (Medical): Yes     Lack of Transportation (Non-Medical): Yes   Physical Activity: Not on file   Stress: Not on file   Social Connections: Not on file   Intimate Partner Violence: Not on file   Housing Stability: Low Risk  (6/12/2024)    Housing Stability Vital Sign     Unable to Pay for Housing in the Last Year: No     Number of Times Moved in the Last Year: 0     Homeless in the Last Year: No      Family History   Problem Relation Age of Onset    Diabetes Mother     Diabetes Father     Diabetes Brother     Hypertension Brother      Past Surgical History:   Procedure Laterality Date    APPENDECTOMY      BUNIONECTOMY Right     CARPAL TUNNEL RELEASE      CHOLECYSTECTOMY      IR STROKE ALERT  8/5/2020       PREVIOUS WEIGHTS:   Wt Readings from Last 10 Encounters:   02/04/25 116 kg (255 lb)   12/02/24 115 kg (254 lb)   11/04/24 118 kg (260 lb 9.6 oz)   10/28/24 118 kg (261 lb)   06/28/24 128 kg (281 lb 9.6 oz)   06/12/24 130 kg (287 lb)   05/02/24 132 kg (290 lb)   02/14/24 129 kg (285 lb)   01/23/24 131 kg (288 lb 6.4 oz)   05/24/23 127 kg (280 lb)        Review of Systems:  Review of Systems   Respiratory:  Negative for cough, choking, chest tightness, shortness of breath and wheezing.    Cardiovascular:  Negative for chest pain, palpitations and leg swelling.   Skin:  Negative for rash.   Neurological:  Negative for dizziness, tremors, syncope, weakness, light-headedness, numbness and headaches.   Psychiatric/Behavioral:  Negative for agitation and behavioral problems. The patient is not hyperactive.        Physical Exam:  /60 (BP Location: Right arm, Patient Position: Sitting, Cuff Size: Large)   Pulse 76   Wt 116 kg (255 lb)   BMI 42.43 kg/m²     Physical Exam  Constitutional:       General: She is not in acute distress.     Appearance: She is well-developed.   HENT:      Head: Normocephalic and atraumatic.   Neck:      Thyroid: No thyromegaly.      Vascular: No  "carotid bruit or JVD.      Trachea: No tracheal deviation.   Cardiovascular:      Rate and Rhythm: Normal rate and regular rhythm.      Pulses: Normal pulses.      Heart sounds: Normal heart sounds. No murmur heard.     No friction rub. No gallop.   Pulmonary:      Effort: Pulmonary effort is normal. No respiratory distress.      Breath sounds: Normal breath sounds. No wheezing, rhonchi or rales.   Chest:      Chest wall: No tenderness.   Musculoskeletal:         General: Normal range of motion.      Cervical back: Normal range of motion and neck supple.      Right lower leg: No edema.      Left lower leg: No edema.   Skin:     General: Skin is warm and dry.   Neurological:      General: No focal deficit present.      Mental Status: She is alert and oriented to person, place, and time.   Psychiatric:         Mood and Affect: Mood normal.         Behavior: Behavior normal.         Thought Content: Thought content normal.         Judgment: Judgment normal.       ======================================================  Imaging:   Results Review Statement: No pertinent imaging studies reviewed.    Portions of the record may have been created with voice recognition software. Occasional wrong word or \"sound a like\" substitutions may have occurred due to the inherent limitations of voice recognition software. Read the chart carefully and recognize, using context, where substitutions have occurred.    SIGNATURES:   Rogers Menendez MD   "

## 2025-02-07 ENCOUNTER — OFFICE VISIT (OUTPATIENT)
Dept: FAMILY MEDICINE CLINIC | Facility: CLINIC | Age: 65
End: 2025-02-07

## 2025-02-07 VITALS
OXYGEN SATURATION: 98 % | BODY MASS INDEX: 42.32 KG/M2 | HEIGHT: 65 IN | WEIGHT: 254 LBS | RESPIRATION RATE: 18 BRPM | TEMPERATURE: 98 F | SYSTOLIC BLOOD PRESSURE: 116 MMHG | HEART RATE: 84 BPM | DIASTOLIC BLOOD PRESSURE: 72 MMHG

## 2025-02-07 DIAGNOSIS — E11.40 TYPE 2 DIABETES MELLITUS WITH DIABETIC NEUROPATHY, WITHOUT LONG-TERM CURRENT USE OF INSULIN (HCC): Primary | ICD-10-CM

## 2025-02-07 DIAGNOSIS — I48.20 CHRONIC ATRIAL FIBRILLATION (HCC): ICD-10-CM

## 2025-02-07 DIAGNOSIS — I10 BENIGN ESSENTIAL HYPERTENSION: ICD-10-CM

## 2025-02-07 DIAGNOSIS — Z23 ENCOUNTER FOR IMMUNIZATION: ICD-10-CM

## 2025-02-07 LAB — SL AMB POCT HEMOGLOBIN AIC: 7 (ref ?–6.5)

## 2025-02-07 PROCEDURE — 83036 HEMOGLOBIN GLYCOSYLATED A1C: CPT

## 2025-02-07 PROCEDURE — 90471 IMMUNIZATION ADMIN: CPT

## 2025-02-07 PROCEDURE — 90750 HZV VACC RECOMBINANT IM: CPT

## 2025-02-07 PROCEDURE — 99213 OFFICE O/P EST LOW 20 MIN: CPT

## 2025-02-07 PROCEDURE — 99406 BEHAV CHNG SMOKING 3-10 MIN: CPT

## 2025-02-07 RX ORDER — LISINOPRIL AND HYDROCHLOROTHIAZIDE 12.5; 2 MG/1; MG/1
1 TABLET ORAL DAILY
Qty: 90 TABLET | Refills: 1 | Status: SHIPPED | OUTPATIENT
Start: 2025-02-07

## 2025-02-07 RX ORDER — BLOOD SUGAR DIAGNOSTIC
1 STRIP MISCELLANEOUS DAILY
Qty: 100 EACH | Refills: 3 | Status: SHIPPED | OUTPATIENT
Start: 2025-02-07 | End: 2025-02-07

## 2025-02-07 RX ORDER — LANCETS
EACH MISCELLANEOUS DAILY
Qty: 100 EACH | Refills: 5 | Status: SHIPPED | OUTPATIENT
Start: 2025-02-07

## 2025-02-07 RX ORDER — METOPROLOL TARTRATE 100 MG/1
100 TABLET ORAL EVERY 12 HOURS
Qty: 180 TABLET | Refills: 1 | Status: SHIPPED | OUTPATIENT
Start: 2025-02-07

## 2025-02-07 NOTE — ASSESSMENT & PLAN NOTE
Lab Results   Component Value Date    HGBA1C 6.0 10/28/2024   A1c currently well-controlled, 7.0 at this visit  - Getting up-to-date on diabetic screenings: Microalbumin  - Increasing semaglutide to 1 mg q. weekly as patient is tolerating quite well    Orders:    Albumin / creatinine urine ratio; Future    POCT hemoglobin A1c    semaglutide, 1 mg/dose, (Ozempic) 4 mg/3 mL injection pen; Inject 0.75 mL (1 mg total) under the skin once a week    Glucometer test strips    Accu-Chek Softclix Lancets lancets; Use daily

## 2025-02-07 NOTE — ASSESSMENT & PLAN NOTE
Patient experiencing symptoms of orthostasis likely due to combination of weight loss and addition of Jardiance as well as hydrochlorothiazide to help control blood pressures.  Patient did not follow-up for recheck recently.  - Decreasing lisinopril to 20 mg  - Patient to follow-up in 2 to 4 weeks if symptoms of orthostasis persist  - Otherwise patient to follow-up in 2 months for regular checkup  Orders:    metoprolol tartrate (LOPRESSOR) 100 mg tablet; Take 1 tablet (100 mg total) by mouth every 12 (twelve) hours    lisinopril-hydrochlorothiazide (PRINZIDE,ZESTORETIC) 20-12.5 MG per tablet; Take 1 tablet by mouth daily

## 2025-02-07 NOTE — PROGRESS NOTES
Name: Lynda Boyd      : 1960      MRN: [id number]  Encounter Provider: Elias Schoen, MD  Encounter Date: 2025   Encounter department: [facility]  :  Assessment & Plan  Type 2 diabetes mellitus with diabetic neuropathy, without long-term current use of insulin (HCC)    Lab Results   Component Value Date    HGBA1C 6.0 10/28/2024   A1c currently well-controlled, 7.0 at this visit  - Getting up-to-date on diabetic screenings: Microalbumin  - Increasing semaglutide to 1 mg q. weekly as patient is tolerating quite well    Orders:    Albumin / creatinine urine ratio; Future    POCT hemoglobin A1c    semaglutide, 1 mg/dose, (Ozempic) 4 mg/3 mL injection pen; Inject 0.75 mL (1 mg total) under the skin once a week    Glucometer test strips    Accu-Chek Softclix Lancets lancets; Use daily    Chronic atrial fibrillation (HCC)  Currently rate controlled  Orders:    metoprolol tartrate (LOPRESSOR) 100 mg tablet; Take 1 tablet (100 mg total) by mouth every 12 (twelve) hours    Benign essential hypertension  Patient experiencing symptoms of orthostasis likely due to combination of weight loss and addition of Jardiance as well as hydrochlorothiazide to help control blood pressures.  Patient did not follow-up for recheck recently.  - Decreasing lisinopril to 20 mg  - Patient to follow-up in 2 to 4 weeks if symptoms of orthostasis persist  - Otherwise patient to follow-up in 2 months for regular checkup  Orders:    metoprolol tartrate (LOPRESSOR) 100 mg tablet; Take 1 tablet (100 mg total) by mouth every 12 (twelve) hours    lisinopril-hydrochlorothiazide (PRINZIDE,ZESTORETIC) 20-12.5 MG per tablet; Take 1 tablet by mouth daily    Encounter for immunization    Orders:    Zoster Vaccine Recombinant IM          Depression Screening and Follow-up Plan: Patient was screened for depression during today's encounter. They screened negative with a PHQ-9 score of 4.    Tobacco Cessation  "Counseling: Tobacco cessation counseling was provided. The patient is sincerely urged to quit consumption of tobacco. She is not ready to quit tobacco. Medication options and side effects of medication discussed. Patient refused medication. 5 minutes of visit dedicated to discussion of smoking cessation      History of Present Illness   Patient is a 64-year-old female here for follow-up multiple medical issues    Patient reports she just started her Ozempic therapy due to difficulty getting it from the pharmacy.  She had denies any adverse side effects to her current dosing of 0.5 mg q. weekly.  She is willing to increase her medication dosing at this visit.    Patient is also reporting intermittent dizziness and orthostatic symptoms.  Sometimes she feels like she might pass out when standing up too quickly.      Review of Systems   Constitutional:  Negative for chills and fever.   HENT:  Negative for sore throat.    Respiratory:  Negative for shortness of breath.    Cardiovascular:  Negative for chest pain and palpitations.   Gastrointestinal:  Negative for abdominal pain.   Genitourinary:  Negative for dysuria.   Musculoskeletal:  Negative for myalgias.   Neurological:  Positive for light-headedness. Negative for weakness.       Objective   /72 (BP Location: Right arm, Patient Position: Sitting, Cuff Size: Large)   Pulse 84   Temp 98 °F (36.7 °C) (Temporal)   Resp 18   Ht 5' 5\" (1.651 m)   Wt 115 kg (254 lb)   SpO2 98%   BMI 42.27 kg/m²      Physical Exam  Constitutional:       General: She is not in acute distress.     Appearance: Normal appearance.   Cardiovascular:      Rate and Rhythm: Normal rate. Rhythm irregular.      Heart sounds: Normal heart sounds.   Pulmonary:      Effort: No respiratory distress.      Breath sounds: Normal breath sounds.   Musculoskeletal:         General: Normal range of motion.      Cervical back: Normal range of motion.   Neurological:      General: No focal deficit " present.      Mental Status: She is alert and oriented to person, place, and time.

## 2025-02-26 ENCOUNTER — OFFICE VISIT (OUTPATIENT)
Dept: FAMILY MEDICINE CLINIC | Facility: CLINIC | Age: 65
End: 2025-02-26

## 2025-02-26 VITALS
HEART RATE: 99 BPM | OXYGEN SATURATION: 97 % | DIASTOLIC BLOOD PRESSURE: 70 MMHG | RESPIRATION RATE: 16 BRPM | TEMPERATURE: 96.1 F | WEIGHT: 247 LBS | SYSTOLIC BLOOD PRESSURE: 118 MMHG | BODY MASS INDEX: 41.1 KG/M2

## 2025-02-26 DIAGNOSIS — R80.9 MICROALBUMINURIA DUE TO TYPE 2 DIABETES MELLITUS  (HCC): ICD-10-CM

## 2025-02-26 DIAGNOSIS — E11.29 MICROALBUMINURIA DUE TO TYPE 2 DIABETES MELLITUS  (HCC): ICD-10-CM

## 2025-02-26 DIAGNOSIS — Z71.6 ENCOUNTER FOR SMOKING CESSATION COUNSELING: ICD-10-CM

## 2025-02-26 DIAGNOSIS — Z00.00 ANNUAL PHYSICAL EXAM: Primary | ICD-10-CM

## 2025-02-26 DIAGNOSIS — E11.40 TYPE 2 DIABETES MELLITUS WITH DIABETIC NEUROPATHY, WITHOUT LONG-TERM CURRENT USE OF INSULIN (HCC): ICD-10-CM

## 2025-02-26 DIAGNOSIS — E66.01 MORBID OBESITY DUE TO EXCESS CALORIES (HCC): ICD-10-CM

## 2025-02-26 DIAGNOSIS — F17.210 SMOKING GREATER THAN 20 PACK YEARS: ICD-10-CM

## 2025-02-26 DIAGNOSIS — K70.30 ALCOHOLIC CIRRHOSIS OF LIVER WITHOUT ASCITES (HCC): ICD-10-CM

## 2025-02-26 PROCEDURE — 99396 PREV VISIT EST AGE 40-64: CPT | Performed by: STUDENT IN AN ORGANIZED HEALTH CARE EDUCATION/TRAINING PROGRAM

## 2025-02-26 PROCEDURE — 99214 OFFICE O/P EST MOD 30 MIN: CPT | Performed by: STUDENT IN AN ORGANIZED HEALTH CARE EDUCATION/TRAINING PROGRAM

## 2025-02-26 RX ORDER — NICOTINE 21 MG/24HR
1 PATCH, TRANSDERMAL 24 HOURS TRANSDERMAL EVERY 24 HOURS
Qty: 28 PATCH | Refills: 0 | Status: SHIPPED | OUTPATIENT
Start: 2025-02-26

## 2025-02-26 NOTE — PROGRESS NOTES
"Adult Annual Physical  Name: Lynda Boyd      : 1960      MRN: [id number]  Encounter Provider: Aleksandr Cruz MD  Encounter Date: 2025   Encounter department: [facility]    Assessment & Plan  Annual physical exam  Needs screening for breast cancer, lung cancer and colon cancer. Patient states \"will get to it\" and working with other PCP. Orders placed previously. Immunization uptodate and general health maintenance counselling provided as noted below.       Morbid obesity due to excess calories (HCC)  Prior Authorization Clinical Questions for Weight Management Pharmacotherapy    2. Does the patient have a diagnosis of obesity, confirmed by a BMI greater than or equal to 30 kg/m^2?: Yes  3. Does the patient have a BMI of greater than or equal to 27 kg/m^2 with at least one weight-related comorbidity/risk factor/complication (e.g. diabetes, dyslipidemia, coronary artery disease)?: Yes  4. Weight-related co-morbidities/risk factors: type 2 diabetes  9. Does the patient have a history of type 2 diabetes?: Yes  For renewals: Has the patient had a positive outcome with current weight management medication (i.e., change in body weight of at least 4-5% after 12-16 weeks on maximally tolerated dose)?: Yes     Baseline weight (in pounds): 290 lbs  Current weight (in pounds): 247 lbs  Weight loss percentage: -14.83%     Continue current regimen, positive effects noted with 14% weight loss in past 10 months    Orders:    Comprehensive metabolic panel; Future    Type 2 diabetes mellitus with diabetic neuropathy, without long-term current use of insulin (HCC)    Lab Results   Component Value Date    HGBA1C 7.0 (A) 2025     Continue current regimen of ozempic 1mg weekly with mild GI symptoms but tolerable.   Orders:    Comprehensive metabolic panel; Future    Albumin / creatinine urine ratio; Future    Alcoholic cirrhosis of liver without ascites (HCC)  No longer drinking " alcohol. Discussed benefits of weight loss and smoking cessation along with chronic disease control and commended on current successful efforts in this vein. Repeat CMP to assess liver function with elevated Tbili noted on last labs in 2024.       Microalbuminuria due to type 2 diabetes mellitus  (HCC)    Lab Results   Component Value Date    HGBA1C 7.0 (A) 02/07/2025       Orders:    Albumin / creatinine urine ratio; Future    Encounter for smoking cessation counseling  Counselled for 3 minutes. Currently taking half pack per day and using wellbutrin with dose uptitrated and tolerated. Patches worked in hospital. No formal structure to cessation but understands implication and need for cessation. Recommended cut down method and minimize exposure environments as much as possible as patient reports no smoking when in environment that smoking is not permitted or friends do not smoke. Will continue to follow and encourage. Patches ordered for combination therapy.   Orders:    nicotine (NICODERM CQ) 14 mg/24hr TD 24 hr patch; Place 1 patch on the skin over 24 hours every 24 hours    Smoking greater than 20 pack years    Orders:    nicotine (NICODERM CQ) 14 mg/24hr TD 24 hr patch; Place 1 patch on the skin over 24 hours every 24 hours      Immunizations and preventive care screenings were discussed with patient today. Appropriate education was printed on patient's after visit summary.    Counseling:  Alcohol/drug use: discussed moderation in alcohol intake, the recommendations for healthy alcohol use, and avoidance of illicit drug use.  Dental Health: discussed importance of regular tooth brushing, flossing, and dental visits.  Injury prevention: discussed safety/seat belts, safety helmets, smoke detectors, carbon monoxide detectors, and smoking near bedding or upholstery.  Sexual health: discussed sexually transmitted diseases, partner selection, use of condoms  Exercise: the importance of regular exercise/physical  activity was discussed. Recommend exercise 3-5 times per week for at least 30 minutes.          History of Present Illness     Adult Annual Physical:  Patient presents for annual physical.     Diet and Physical Activity:  - Diet/Nutrition: well balanced diet, limited junk food and consuming 3-5 servings of fruits/vegetables daily.  - Exercise: no formal exercise.    Depression Screening:    - PHQ-9 Score: 0    General Health:  - Sleep: sleeps well and 7-8 hours of sleep on average. uses melatonin  - Hearing: normal hearing bilateral ears.  - Vision: no vision problems.  - Dental: brushes teeth twice daily and no dental visits for > 1 year.    /GYN Health:    - Menopause: postmenopausal.   - History of STDs: no  - Contraception: barrier methods.      Advanced Care Planning:  Patient has a will    Review of Systems   Constitutional:  Negative for chills and fever.   HENT:  Negative for ear pain and sore throat.    Eyes:  Negative for pain and visual disturbance.   Respiratory:  Negative for cough and shortness of breath.    Cardiovascular:  Negative for chest pain and palpitations.   Gastrointestinal:  Negative for abdominal pain, constipation, diarrhea, nausea and vomiting.   Genitourinary:  Negative for dysuria and hematuria.   Musculoskeletal:  Negative for arthralgias and back pain.   Skin:  Negative for color change and rash.   Neurological:  Negative for syncope and light-headedness.   All other systems reviewed and are negative.    Medical History Reviewed by provider this encounter:     .  Current Outpatient Medications on File Prior to Visit   Medication Sig Dispense Refill    Accu-Chek Softclix Lancets lancets Use daily 100 each 5    apixaban (Eliquis) 5 mg Take 1 tablet (5 mg total) by mouth 2 (two) times a day 180 tablet 1    atorvastatin (LIPITOR) 80 mg tablet Take 1 tablet (80 mg total) by mouth every evening 90 tablet 1    Blood Glucose Monitoring Suppl (Accu-Chek Guide) w/Device KIT by Does not apply  route daily 1 kit 0    buPROPion (Wellbutrin SR) 150 mg 12 hr tablet Take 1 tablet (150 mg total) by mouth 2 (two) times a day 180 tablet 1    Cholecalciferol (VITAMIN D3) 1,000 units tablet Take 1 tablet (1,000 Units total) by mouth daily 90 tablet 1    diltiazem (CARDIZEM CD) 120 mg 24 hr capsule Take 1 capsule (120 mg total) by mouth daily 90 capsule 3    Empagliflozin (JARDIANCE) 10 MG TABS tablet Take 1 tablet (10 mg total) by mouth daily 90 tablet 3    FLUoxetine (PROzac) 10 mg capsule Take 1 capsule (10 mg total) by mouth daily 90 capsule 1    gabapentin (Neurontin) 300 mg capsule Take 1 capsule (300 mg total) by mouth 2 (two) times a day 180 capsule 1    lisinopril-hydrochlorothiazide (PRINZIDE,ZESTORETIC) 20-12.5 MG per tablet Take 1 tablet by mouth daily 90 tablet 1    Melatonin 10 MG TABS Take 30 mg by mouth daily at bedtime as needed      metoprolol tartrate (LOPRESSOR) 100 mg tablet Take 1 tablet (100 mg total) by mouth every 12 (twelve) hours 180 tablet 1    semaglutide, 1 mg/dose, (Ozempic) 4 mg/3 mL injection pen Inject 0.75 mL (1 mg total) under the skin once a week 9 mL 1     No current facility-administered medications on file prior to visit.      Social History     Tobacco Use    Smoking status: Every Day     Current packs/day: 0.50     Average packs/day: 0.5 packs/day for 45.2 years (22.6 ttl pk-yrs)     Types: Cigarettes     Start date: 1980     Passive exposure: Current    Smokeless tobacco: Never   Vaping Use    Vaping status: Never Used   Substance and Sexual Activity    Alcohol use: Not Currently     Comment: 1 botttle of vodka over 4 days/denies since 7/18    Drug use: No    Sexual activity: Not Currently       Objective   /70 (BP Location: Right arm, Patient Position: Sitting, Cuff Size: Large)   Pulse 99   Temp (!) 96.1 °F (35.6 °C) (Temporal)   Resp 16   Wt 112 kg (247 lb)   SpO2 97%   BMI 41.10 kg/m²     Physical Exam  Vitals and nursing note reviewed.   Constitutional:        General: She is not in acute distress.     Appearance: She is well-developed.   HENT:      Head: Normocephalic and atraumatic.      Right Ear: Tympanic membrane, ear canal and external ear normal.      Left Ear: Tympanic membrane, ear canal and external ear normal.      Nose: Nose normal.      Mouth/Throat:      Mouth: Mucous membranes are moist.      Pharynx: Oropharynx is clear.   Eyes:      Conjunctiva/sclera: Conjunctivae normal.   Cardiovascular:      Rate and Rhythm: Normal rate and regular rhythm.      Pulses: Normal pulses.      Heart sounds: Normal heart sounds. No murmur heard.     Comments: HR in regular rhythm today  Pulmonary:      Effort: Pulmonary effort is normal. No respiratory distress.      Breath sounds: Normal breath sounds.   Abdominal:      Palpations: Abdomen is soft.      Tenderness: There is no abdominal tenderness.   Musculoskeletal:         General: No swelling.      Cervical back: Neck supple.      Right lower leg: No edema.      Left lower leg: No edema.   Skin:     General: Skin is warm and dry.      Capillary Refill: Capillary refill takes less than 2 seconds.   Neurological:      Mental Status: She is alert. Mental status is at baseline.   Psychiatric:         Mood and Affect: Mood normal.

## 2025-02-26 NOTE — ASSESSMENT & PLAN NOTE
Lab Results   Component Value Date    HGBA1C 7.0 (A) 02/07/2025       Orders:    Albumin / creatinine urine ratio; Future

## 2025-02-26 NOTE — ASSESSMENT & PLAN NOTE
Lab Results   Component Value Date    HGBA1C 7.0 (A) 02/07/2025     Continue current regimen of ozempic 1mg weekly with mild GI symptoms but tolerable.   Orders:    Comprehensive metabolic panel; Future    Albumin / creatinine urine ratio; Future

## 2025-02-26 NOTE — ASSESSMENT & PLAN NOTE
Orders:    nicotine (NICODERM CQ) 14 mg/24hr TD 24 hr patch; Place 1 patch on the skin over 24 hours every 24 hours

## 2025-02-26 NOTE — ASSESSMENT & PLAN NOTE
No longer drinking alcohol. Discussed benefits of weight loss and smoking cessation along with chronic disease control and commended on current successful efforts in this vein. Repeat CMP to assess liver function with elevated Tbili noted on last labs in 2024.

## 2025-02-26 NOTE — PATIENT INSTRUCTIONS
"Patient Education     Routine physical for adults   The Basics   Written by the doctors and editors at [facility]   What is a physical? -- A physical is a routine visit, or \"check-up,\" with your doctor. You might also hear it called a \"wellness visit\" or \"preventive visit.\"  During each visit, the doctor will:   Ask about your physical and mental health   Ask about your habits, behaviors, and lifestyle   Do an exam   Give you vaccines if needed   Talk to you about any medicines you take   Give advice about your health   Answer your questions  Getting regular check-ups is an important part of taking care of your health. It can help your doctor find and treat any problems you have. But it's also important for preventing health problems.  A routine physical is different from a \"sick visit.\" A sick visit is when you see a doctor because of a health concern or problem. Since physicals are scheduled ahead of time, you can think about what you want to ask the doctor.  How often should I get a physical? -- It depends on your age and health. In general, for people age 21 years and older:   If you are younger than 50 years, you might be able to get a physical every 3 years.   If you are 50 years or older, your doctor might recommend a physical every year.  If you have an ongoing health condition, like diabetes or high blood pressure, your doctor will probably want to see you more often.  What happens during a physical? -- In general, each visit will include:   Physical exam - The doctor or nurse will check your height, weight, heart rate, and blood pressure. They will also look at your eyes and ears. They will ask about how you are feeling and whether you have any symptoms that bother you.   Medicines - It's a good idea to bring a list of all the medicines you take to each doctor visit. Your doctor will talk to you about your medicines and answer any questions. Tell them if you are having any side effects that bother you. You " "should also tell them if you are having trouble paying for any of your medicines.   Habits and behaviors - This includes:   Your diet   Your exercise habits   Whether you smoke, drink alcohol, or use drugs   Whether you are sexually active   Whether you feel safe at home  Your doctor will talk to you about things you can do to improve your health and lower your risk of health problems. They will also offer help and support. For example, if you want to quit smoking, they can give you advice and might prescribe medicines. If you want to improve your diet or get more physical activity, they can help you with this, too.   Lab tests, if needed - The tests you get will depend on your age and situation. For example, your doctor might want to check your:   Cholesterol   Blood sugar   Iron level   Vaccines - The recommended vaccines will depend on your age, health, and what vaccines you already had. Vaccines are very important because they can prevent certain serious or deadly infections.   Discussion of screening - \"Screening\" means checking for diseases or other health problems before they cause symptoms. Your doctor can recommend screening based on your age, risk, and preferences. This might include tests to check for:   Cancer, such as breast, prostate, cervical, ovarian, colorectal, prostate, lung, or skin cancer   Sexually transmitted infections, such as chlamydia and gonorrhea   Mental health conditions like depression and anxiety  Your doctor will talk to you about the different types of screening tests. They can help you decide which screenings to have. They can also explain what the results might mean.   Answering questions - The physical is a good time to ask the doctor or nurse questions about your health. If needed, they can refer you to other doctors or specialists, too.  Adults older than 65 years often need other care, too. As you get older, your doctor will talk to you about:   How to prevent falling at " home   Hearing or vision tests   Memory testing   How to take your medicines safely   Making sure that you have the help and support you need at home  All topics are updated as new evidence becomes available and our peer review process is complete.  This topic retrieved from [employer] on: May 02, 2024.  Topic 357541 Version 1.0  Release: 32.4.3 - C32.122  © 2024 UpToDate, Inc. and/or its affiliates. All rights reserved.  Consumer Information Use and Disclaimer   Disclaimer: This generalized information is a limited summary of diagnosis, treatment, and/or medication information. It is not meant to be comprehensive and should be used as a tool to help the user understand and/or assess potential diagnostic and treatment options. It does NOT include all information about conditions, treatments, medications, side effects, or risks that may apply to a specific patient. It is not intended to be medical advice or a substitute for the medical advice, diagnosis, or treatment of a health care provider based on the health care provider's examination and assessment of a patient's specific and unique circumstances. Patients must speak with a health care provider for complete information about their health, medical questions, and treatment options, including any risks or benefits regarding use of medications. This information does not endorse any treatments or medications as safe, effective, or approved for treating a specific patient. UpToDate, Inc. and its affiliates disclaim any warranty or liability relating to this information or the use thereof.The use of this information is governed by the Terms of Use, available at https://www.woltersLightTableuwer.com/en/know/clinical-effectiveness-terms. 2024© UpToDate, Inc. and its affiliates and/or licensors. All rights reserved.  Copyright   © 2024 UpToDate, Inc. and/or its affiliates. All rights reserved.

## 2025-03-07 DIAGNOSIS — I48.91 NEW ONSET ATRIAL FIBRILLATION (HCC): ICD-10-CM

## 2025-03-07 NOTE — TELEPHONE ENCOUNTER
Reason for call:   [x] Refill   [] Prior Auth  [] Other:     Office:   [] PCP/Provider -   [x] Specialty/Provider - Cardio Monroe/ Rogers Menendez MD    Medication: apixaban (Eliquis) 5 mg     Dose/Frequency: Take 1 tablet (5 mg total) by mouth 2 (two) times a day     Quantity: 180    Pharmacy: [id number] - [place] - [address] [facility] [address]   Does the patient have enough for 3 days?   [x] Yes   [] No - Send as HP to POD    Mail Away Pharmacy   Does the patient have enough for 10 days?   [] Yes   [] No - Send as HP to POD

## 2025-03-19 ENCOUNTER — RESULTS FOLLOW-UP (OUTPATIENT)
Dept: FAMILY MEDICINE CLINIC | Facility: CLINIC | Age: 65
End: 2025-03-19

## 2025-03-19 ENCOUNTER — APPOINTMENT (OUTPATIENT)
Dept: LAB | Facility: HOSPITAL | Age: 65
End: 2025-03-19
Payer: COMMERCIAL

## 2025-03-19 DIAGNOSIS — E11.40 TYPE 2 DIABETES MELLITUS WITH DIABETIC NEUROPATHY, WITHOUT LONG-TERM CURRENT USE OF INSULIN (HCC): ICD-10-CM

## 2025-03-19 DIAGNOSIS — N18.9 CHRONIC KIDNEY DISEASE, UNSPECIFIED CKD STAGE: ICD-10-CM

## 2025-03-19 DIAGNOSIS — E66.01 MORBID OBESITY DUE TO EXCESS CALORIES (HCC): ICD-10-CM

## 2025-03-19 DIAGNOSIS — E11.29 MICROALBUMINURIA DUE TO TYPE 2 DIABETES MELLITUS  (HCC): Primary | ICD-10-CM

## 2025-03-19 DIAGNOSIS — R80.9 MICROALBUMINURIA DUE TO TYPE 2 DIABETES MELLITUS  (HCC): Primary | ICD-10-CM

## 2025-03-19 LAB
ALBUMIN SERPL BCG-MCNC: 4.3 G/DL (ref 3.5–5)
ALP SERPL-CCNC: 94 U/L (ref 34–104)
ALT SERPL W P-5'-P-CCNC: 21 U/L (ref 7–52)
ANION GAP SERPL CALCULATED.3IONS-SCNC: 10 MMOL/L (ref 4–13)
AST SERPL W P-5'-P-CCNC: 18 U/L (ref 13–39)
BILIRUB SERPL-MCNC: 1.15 MG/DL (ref 0.2–1)
BUN SERPL-MCNC: 32 MG/DL (ref 5–25)
CALCIUM SERPL-MCNC: 10.1 MG/DL (ref 8.4–10.2)
CHLORIDE SERPL-SCNC: 102 MMOL/L (ref 96–108)
CO2 SERPL-SCNC: 24 MMOL/L (ref 21–32)
CREAT SERPL-MCNC: 1.45 MG/DL (ref 0.6–1.3)
GFR SERPL CREATININE-BSD FRML MDRD: 38 ML/MIN/1.73SQ M
GLUCOSE P FAST SERPL-MCNC: 142 MG/DL (ref 65–99)
POTASSIUM SERPL-SCNC: 3.7 MMOL/L (ref 3.5–5.3)
PROT SERPL-MCNC: 7.2 G/DL (ref 6.4–8.4)
SODIUM SERPL-SCNC: 136 MMOL/L (ref 135–147)

## 2025-03-19 PROCEDURE — 36415 COLL VENOUS BLD VENIPUNCTURE: CPT

## 2025-03-19 PROCEDURE — 80053 COMPREHEN METABOLIC PANEL: CPT

## 2025-04-02 NOTE — ASSESSMENT & PLAN NOTE
Lab Results   Component Value Date    HGBA1C 6.0 10/28/2024     Defer management to PCP  
08/05/2020 patient hospitalized at [facility] and transferred to [facility] with evidence of a cerebral vascular accident with infarct of left thalamus and punctate foci embolic infarcts of cerebellum. Patient was non compliant with her Eliquis. She was in atrial fibrillation with RVR. She was felt not to be a candidate for thrombolytics therapy or embolectomy and was treated conservatively.   
5/24/2023 136/70  1/23/2024 120/70  2/14/2024 138/84  6/12/2024 148/90  6/28/2024 138/80   10/28/2024 146/86    Hydrochlorothiazide 12.5 mg daily  Lisinopril 40 mg daily  Metoprolol tartrate 100 mg every 12 hours  
BMI 43.4  Recommend patient discuss with PCP use of a GLP-1 agonist for weight loss.  
Lab Results   Component Value Date    CHOLESTEROL 107 01/31/2024     Lab Results   Component Value Date    TRIG 113 01/31/2024     Lab Results   Component Value Date    HDL 43 (L) 01/31/2024     Lab Results   Component Value Date    LDLCALC 41 01/31/2024     Atorvastatin 80 mg daily  
With paroxysmal atrial fibrillation now has persistent atrial fibrillation after gaining a significant amount of weight.    08/06/2020 echocardiogram: Normal LV function, EF 58%, severe concentric LVH. RV at the upper limits of normal. Biatrial enlargement. Estimated PA pressure is 40 mmHg.     2/26/2024 Holter monitor: Rhythm atrial fibrillation 100%. Heart rate ranged from  bpm and average 99 bpm. The average heart rate is on the fast side. No significant ectopy.     Eliquis 5 mg 2 times a day  Metoprolol tartrate 100 mg every 12 hours  
Orthopedic

## 2025-04-07 ENCOUNTER — HOSPITAL ENCOUNTER (OUTPATIENT)
Dept: RADIOLOGY | Facility: HOSPITAL | Age: 65
Discharge: HOME | End: 2025-04-07
Payer: COMMERCIAL

## 2025-04-07 ENCOUNTER — OFFICE VISIT (OUTPATIENT)
Dept: FAMILY MEDICINE CLINIC | Facility: CLINIC | Age: 65
End: 2025-04-07

## 2025-04-07 ENCOUNTER — RESULTS FOLLOW-UP (OUTPATIENT)
Dept: FAMILY MEDICINE CLINIC | Facility: CLINIC | Age: 65
End: 2025-04-07

## 2025-04-07 VITALS
TEMPERATURE: 98 F | HEART RATE: 105 BPM | RESPIRATION RATE: 16 BRPM | WEIGHT: 244 LBS | BODY MASS INDEX: 40.65 KG/M2 | HEIGHT: 65 IN | DIASTOLIC BLOOD PRESSURE: 80 MMHG | SYSTOLIC BLOOD PRESSURE: 110 MMHG | OXYGEN SATURATION: 97 %

## 2025-04-07 DIAGNOSIS — R80.9 MICROALBUMINURIA DUE TO TYPE 2 DIABETES MELLITUS  (HCC): ICD-10-CM

## 2025-04-07 DIAGNOSIS — M25.511 ACUTE PAIN OF RIGHT SHOULDER: Primary | ICD-10-CM

## 2025-04-07 DIAGNOSIS — I10 BENIGN ESSENTIAL HYPERTENSION: ICD-10-CM

## 2025-04-07 DIAGNOSIS — N18.32 STAGE 3B CHRONIC KIDNEY DISEASE (HCC): Primary | ICD-10-CM

## 2025-04-07 DIAGNOSIS — E11.29 MICROALBUMINURIA DUE TO TYPE 2 DIABETES MELLITUS  (HCC): ICD-10-CM

## 2025-04-07 DIAGNOSIS — E11.40 TYPE 2 DIABETES MELLITUS WITH DIABETIC NEUROPATHY, WITHOUT LONG-TERM CURRENT USE OF INSULIN (HCC): ICD-10-CM

## 2025-04-07 DIAGNOSIS — M25.511 ACUTE PAIN OF RIGHT SHOULDER: ICD-10-CM

## 2025-04-07 DIAGNOSIS — Z23 ENCOUNTER FOR IMMUNIZATION: ICD-10-CM

## 2025-04-07 DIAGNOSIS — R79.89 INCREASE IN SERUM CREATININE FROM PRIOR MEASUREMENT: ICD-10-CM

## 2025-04-07 DIAGNOSIS — R79.89 ELEVATED SERUM CREATININE: ICD-10-CM

## 2025-04-07 PROCEDURE — 90750 HZV VACC RECOMBINANT IM: CPT

## 2025-04-07 PROCEDURE — 90471 IMMUNIZATION ADMIN: CPT

## 2025-04-07 PROCEDURE — 73030 X-RAY EXAM OF SHOULDER: CPT

## 2025-04-07 PROCEDURE — 99213 OFFICE O/P EST LOW 20 MIN: CPT

## 2025-04-07 NOTE — ASSESSMENT & PLAN NOTE
Prior Xray R shoulder:  Three projections of the right shoulder were obtained. There is a   small calcification which could be in the rotator cuff. There is   additional calcification which may be along the biceps tendon. There   are no other degenerative changes. There is no fracture.     Findings consistent with right shoulder rotator cuff tendinopathy, prior findings of calcific tendinosis on x-ray.  Patient unwilling to do physical therapy for this issue  - Follow-up plan for subacromial injection  Orders:    XR shoulder 2+ vw right; Future

## 2025-04-07 NOTE — PROGRESS NOTES
Name: Lynda Boyd      : 1960      MRN: [id number]  Encounter Provider: Elias Schoen, MD  Encounter Date: 2025   Encounter department: [facility]  :  Assessment & Plan  Acute pain of right shoulder  Prior Xray R shoulder:  Three projections of the right shoulder were obtained. There is a   small calcification which could be in the rotator cuff. There is   additional calcification which may be along the biceps tendon. There   are no other degenerative changes. There is no fracture.     Findings consistent with right shoulder rotator cuff tendinopathy, prior findings of calcific tendinosis on x-ray.  Patient unwilling to do physical therapy for this issue  - Follow-up plan for subacromial injection  Orders:    XR shoulder 2+ vw right; Future    Elevated serum creatinine  Patient with recent BMP showing bump in creatinine, unclear cause but may be related to patient's chronic use of NSAIDs on top of CKD stage IIIa.  - Requesting patient hold NSAIDs until resolved and demonstrated resolution of bump in creatinine  - If creatinine still elevated will refer to nephrology for evaluation and further management with presumed progression of CKD  Orders:    Basic metabolic panel; Future    Benign essential hypertension  For further evaluation of renal function getting microalbumin due to diabetes and chronic hypertension, currently well-controlled       Type 2 diabetes mellitus with diabetic neuropathy, without long-term current use of insulin (HCC)  Diabetes currently well-controlled, risk and benefits discussed regarding increasing Ozempic dosing to 2 mg q. weekly.  Due to patient having some persistent side effects as well as fatigue and weakness which she associates with this medication we will forego increasing Ozempic at this time.  - Refill of medication provided as below  Lab Results   Component Value Date    HGBA1C 7.0 (A) 2025       Orders:    semaglutide, 1  "mg/dose, (Ozempic) 4 mg/3 mL injection pen; Inject 0.75 mL (1 mg total) under the skin once a week    Encounter for immunization    Orders:    Zoster Vaccine Recombinant IM           History of Present Illness   Patient is a 65-year-old female here for follow-up of multiple medical issues as well as acute shoulder pain as below    Patient reports taking her medications as prescribed.  She was unaware of her bump in creatinine on her  last blood work.  She does not currently have a nephrologist but would be willing to follow up with 1 if her kidney function continues to be elevated    Shoulder Pain   The pain is present in the right shoulder. This is a recurrent problem. The current episode started 1 to 4 weeks ago. There has been no history of extremity trauma. The problem occurs intermittently. The quality of the pain is described as sharp. The pain is moderate. Associated symptoms include a limited range of motion. Pertinent negatives include no fever, joint swelling, numbness, stiffness or tingling. The symptoms are aggravated by activity. She has tried NSAIDS for the symptoms. The treatment provided mild relief. Her past medical history is significant for diabetes.     Review of Systems   Constitutional:  Positive for fatigue. Negative for chills and fever.   HENT:  Negative for sore throat.    Respiratory:  Negative for shortness of breath.    Cardiovascular:  Negative for palpitations.   Gastrointestinal:  Negative for abdominal pain.   Genitourinary:  Negative for dysuria.   Musculoskeletal:  Negative for myalgias and stiffness.   Neurological:  Negative for tingling, weakness and numbness.       Objective   /80 (BP Location: Left arm, Patient Position: Sitting, Cuff Size: Large)   Pulse 105   Temp 98 °F (36.7 °C) (Temporal)   Resp 16   Ht 5' 5\" (1.651 m)   Wt 111 kg (244 lb)   SpO2 97%   BMI 40.60 kg/m²      Physical Exam  Constitutional:       General: She is not in acute distress.     " Appearance: Normal appearance.   Cardiovascular:      Rate and Rhythm: Normal rate.   Pulmonary:      Effort: No respiratory distress.   Musculoskeletal:      Cervical back: Normal range of motion.   Neurological:      General: No focal deficit present.      Mental Status: She is alert and oriented to person, place, and time.

## 2025-04-07 NOTE — ASSESSMENT & PLAN NOTE
For further evaluation of renal function getting microalbumin due to diabetes and chronic hypertension, currently well-controlled

## 2025-04-07 NOTE — ASSESSMENT & PLAN NOTE
Diabetes currently well-controlled, risk and benefits discussed regarding increasing Ozempic dosing to 2 mg q. weekly.  Due to patient having some persistent side effects as well as fatigue and weakness which she associates with this medication we will forego increasing Ozempic at this time.  - Refill of medication provided as below  Lab Results   Component Value Date    HGBA1C 7.0 (A) 02/07/2025       Orders:    semaglutide, 1 mg/dose, (Ozempic) 4 mg/3 mL injection pen; Inject 0.75 mL (1 mg total) under the skin once a week

## 2025-04-14 ENCOUNTER — PROCEDURE VISIT (OUTPATIENT)
Dept: FAMILY MEDICINE CLINIC | Facility: CLINIC | Age: 65
End: 2025-04-14

## 2025-04-14 VITALS
SYSTOLIC BLOOD PRESSURE: 100 MMHG | DIASTOLIC BLOOD PRESSURE: 70 MMHG | RESPIRATION RATE: 18 BRPM | WEIGHT: 238 LBS | OXYGEN SATURATION: 96 % | BODY MASS INDEX: 39.65 KG/M2 | HEIGHT: 65 IN | HEART RATE: 94 BPM | TEMPERATURE: 98 F

## 2025-04-14 DIAGNOSIS — M12.811 ROTATOR CUFF ARTHROPATHY, RIGHT: Primary | ICD-10-CM

## 2025-04-14 RX ADMIN — TRIAMCINOLONE ACETONIDE 40 MG: 40 INJECTION, SUSPENSION INTRA-ARTICULAR; INTRAMUSCULAR at 13:20

## 2025-04-14 RX ADMIN — LIDOCAINE HYDROCHLORIDE 4 ML: 10 INJECTION, SOLUTION EPIDURAL; INFILTRATION; INTRACAUDAL; PERINEURAL at 13:20

## 2025-04-14 NOTE — PROGRESS NOTES
Large joint arthrocentesis: R subacromial bursa  Universal Protocol:  Consent: Verbal consent obtained.  Consent given by: patient  Patient understanding: patient states understanding of the procedure being performed  Patient consent: the patient's understanding of the procedure matches consent given  Procedure consent: procedure consent matches procedure scheduled  Relevant documents: relevant documents present and verified  Site marked: the operative site was marked  Radiology Images displayed and confirmed. If images not available, report reviewed: imaging studies available  Patient identity confirmed: verbally with patient  Supporting Documentation  Indications: pain   Procedure Details  Location: shoulder - R subacromial bursa  Needle size: 25 G  Ultrasound guidance: no  Approach: posterolateral  Medications administered: 40 mg triamcinolone acetonide 40 mg/mL; 4 mL lidocaine (PF) 1 %    Patient tolerance: patient tolerated the procedure well with no immediate complications  Dressing:  Sterile dressing applied

## 2025-04-15 PROBLEM — M12.811 ROTATOR CUFF ARTHROPATHY, RIGHT: Status: ACTIVE | Noted: 2025-04-07

## 2025-04-15 RX ORDER — TRIAMCINOLONE ACETONIDE 40 MG/ML
40 INJECTION, SUSPENSION INTRA-ARTICULAR; INTRAMUSCULAR
Status: COMPLETED | OUTPATIENT
Start: 2025-04-14 | End: 2025-04-14

## 2025-04-15 RX ORDER — LIDOCAINE HYDROCHLORIDE 10 MG/ML
4 INJECTION, SOLUTION EPIDURAL; INFILTRATION; INTRACAUDAL; PERINEURAL
Status: COMPLETED | OUTPATIENT
Start: 2025-04-14 | End: 2025-04-14

## 2025-04-30 ENCOUNTER — APPOINTMENT (OUTPATIENT)
Dept: LAB | Facility: HOSPITAL | Age: 65
End: 2025-04-30
Payer: COMMERCIAL

## 2025-04-30 DIAGNOSIS — R79.89 ELEVATED SERUM CREATININE: ICD-10-CM

## 2025-04-30 LAB
ANION GAP SERPL CALCULATED.3IONS-SCNC: 7 MMOL/L (ref 4–13)
BUN SERPL-MCNC: 53 MG/DL (ref 5–25)
CALCIUM SERPL-MCNC: 10 MG/DL (ref 8.4–10.2)
CHLORIDE SERPL-SCNC: 99 MMOL/L (ref 96–108)
CO2 SERPL-SCNC: 27 MMOL/L (ref 21–32)
CREAT SERPL-MCNC: 1.82 MG/DL (ref 0.6–1.3)
GFR SERPL CREATININE-BSD FRML MDRD: 29 ML/MIN/1.73SQ M
GLUCOSE P FAST SERPL-MCNC: 138 MG/DL (ref 65–99)
POTASSIUM SERPL-SCNC: 4.8 MMOL/L (ref 3.5–5.3)
SODIUM SERPL-SCNC: 133 MMOL/L (ref 135–147)

## 2025-04-30 PROCEDURE — 80048 BASIC METABOLIC PNL TOTAL CA: CPT

## 2025-04-30 PROCEDURE — 36415 COLL VENOUS BLD VENIPUNCTURE: CPT

## 2025-05-14 ENCOUNTER — OFFICE VISIT (OUTPATIENT)
Dept: FAMILY MEDICINE CLINIC | Facility: CLINIC | Age: 65
End: 2025-05-14

## 2025-05-14 VITALS
RESPIRATION RATE: 16 BRPM | WEIGHT: 234.7 LBS | HEIGHT: 65 IN | HEART RATE: 90 BPM | DIASTOLIC BLOOD PRESSURE: 80 MMHG | BODY MASS INDEX: 39.1 KG/M2 | SYSTOLIC BLOOD PRESSURE: 116 MMHG | OXYGEN SATURATION: 100 % | TEMPERATURE: 97.6 F

## 2025-05-14 DIAGNOSIS — E11.40 TYPE 2 DIABETES MELLITUS WITH DIABETIC NEUROPATHY, WITHOUT LONG-TERM CURRENT USE OF INSULIN (HCC): Primary | ICD-10-CM

## 2025-05-14 DIAGNOSIS — E66.813 CLASS 3 SEVERE OBESITY WITHOUT SERIOUS COMORBIDITY IN ADULT: ICD-10-CM

## 2025-05-14 DIAGNOSIS — F17.210 SMOKING GREATER THAN 20 PACK YEARS: ICD-10-CM

## 2025-05-14 DIAGNOSIS — E11.29 MICROALBUMINURIA DUE TO TYPE 2 DIABETES MELLITUS  (HCC): ICD-10-CM

## 2025-05-14 DIAGNOSIS — N18.4 CKD (CHRONIC KIDNEY DISEASE) STAGE 4, GFR 15-29 ML/MIN (HCC): ICD-10-CM

## 2025-05-14 DIAGNOSIS — I10 BENIGN ESSENTIAL HYPERTENSION: ICD-10-CM

## 2025-05-14 DIAGNOSIS — R80.9 MICROALBUMINURIA DUE TO TYPE 2 DIABETES MELLITUS  (HCC): ICD-10-CM

## 2025-05-14 PROBLEM — J96.01 ACUTE RESPIRATORY FAILURE WITH HYPOXIA (HCC): Status: RESOLVED | Noted: 2022-11-04 | Resolved: 2025-05-14

## 2025-05-14 LAB — SL AMB POCT HEMOGLOBIN AIC: 7.1 (ref ?–6.5)

## 2025-05-14 PROCEDURE — 83036 HEMOGLOBIN GLYCOSYLATED A1C: CPT | Performed by: STUDENT IN AN ORGANIZED HEALTH CARE EDUCATION/TRAINING PROGRAM

## 2025-05-14 PROCEDURE — 99214 OFFICE O/P EST MOD 30 MIN: CPT | Performed by: STUDENT IN AN ORGANIZED HEALTH CARE EDUCATION/TRAINING PROGRAM

## 2025-05-14 NOTE — ASSESSMENT & PLAN NOTE
Lab Results   Component Value Date    EGFR 29 04/30/2025    EGFR 38 03/19/2025    EGFR 62 10/28/2024    CREATININE 1.82 (H) 04/30/2025    CREATININE 1.45 (H) 03/19/2025    CREATININE 0.98 10/28/2024   Acutely worsening kidney injury however electrolytes within normal limits except sodium of 133.  Patient euvolemic and blood pressure within normal limits. Creatinine clearance over 50  - Hold renally insulting medications at this time [lisinopril, hydrochlorothiazide] will continue on Jardiance for now and will hold on starting metformin until renal function reassessed if GFR better than 30.  - Monitor home blood pressure with goal less than 150/90  - Ensure fluid intake of at least 2 to 2.5 L/day  - BMP, urine microscopy and microalbuminuria in 2 days.  Will then assess need and safety for reinstatement of above medications.  - Has follow-up planned with nephrology July 31.  Will follow-up with renal ultrasound if creatinine and GFR continues to worsen.    Orders:    Basic metabolic panel; Future

## 2025-05-14 NOTE — ASSESSMENT & PLAN NOTE
Prior Authorization Clinical Questions for Weight Management Pharmacotherapy    1. Does the patient have a contrainidcation to medication prescribed for weight management?: No  2. Does the patient have a diagnosis of obesity, confirmed by a BMI greater than or equal to 30 kg/m^2?: Yes  3. Does the patient have a BMI of greater than or equal to 27 kg/m^2 with at least one weight-related comorbidity/risk factor/complication (e.g. diabetes, dyslipidemia, coronary artery disease)?: Yes  4. Weight-related co-morbidities/risk factors: type 2 diabetes, dyslipidemia, hypertension, cardiovascular disease, obstructive sleep apnea (MARIA ELENA)  5. WEGOVY CVA Indication: Does patient have established documented cardiovascular disease (history of a prior heart attack (myocardial infarction), stroke, or symptomatic peripheral arterial disease (PAD)?: Yes  6. ZEPBOUND MARIA ELENA Indication: Does patient have documented MARIA ELENA diagnosed via sleep study (insurance will require copy of sleep study results for approval)?: Yes  8. Has the patient completed a comprehensive weight loss program (ie, Weight Watchers, Noom, Bariatrics, other norma on phone)? If so, what?: No  9. Does the patient have a history of type 2 diabetes?: Yes  10. Has the member tried and failed other weight loss medication within the past 12 months?: No  11. Will the member use requested medication in combination with another GLP agonist or weight loss drug?: No  12. Is the medication a controlled substance?: No  For renewals: Has the patient had a positive outcome with current weight management medication (i.e., change in body weight of at least 4-5% after 12-16 weeks on maximally tolerated dose)?: Yes     Baseline weight (in pounds): 290 lbs  Current weight (in pounds): 234.7 lbs  Weight loss percentage: -19.07%

## 2025-05-14 NOTE — ASSESSMENT & PLAN NOTE
Lab Results   Component Value Date    EGFR 29 04/30/2025    EGFR 38 03/19/2025    EGFR 62 10/28/2024    CREATININE 1.82 (H) 04/30/2025    CREATININE 1.45 (H) 03/19/2025    CREATININE 0.98 10/28/2024   Acutely worsening kidney injury however electrolytes within normal limits except sodium of 133.  Patient euvolemic and blood pressure within normal limits. Creatinine clearance over 50  - Hold renally insulting medications at this time [lisinopril, hydrochlorothiazide] will continue on Jardiance for now and will hold on starting metformin until renal function reassessed if GFR better than 30.  - Monitor home blood pressure with goal less than 150/90  - Ensure fluid intake of at least 2 to 2.5 L/day  - BMP, urine microscopy and microalbuminuria in 2 days.  Will then assess need and safety for reinstatement of above medications.  - Has follow-up planned with nephrology July 31.  Will follow-up with renal ultrasound if creatinine and GFR continues to worsen.    Orders:    Albumin / creatinine urine ratio; Future    Basic metabolic panel; Future    Urinalysis with microscopic; Future     Post-Care Instructions: I reviewed with the patient in detail post-care instructions. Patient should avoid sun exposure and wear sun protection. Detail Level: Zone Beta Salicylic Acid %: 20% Time (Mins): 5 Treatment Number: 0 Prep: The treated area was degreased with pre-peel cleanser, and vaseline was applied for protection of mucous membranes. Post Peel Care: The peel self-neutralized. After the procedure, the treatment area was washed with soap and water, and a post-peel cream was applied. Sun protection and post-care instructions were reviewed with the patient. Consent: Prior to the procedure, written consent was obtained and risks were reviewed, including but not limited to: redness, peeling, blistering, pigmentary change, scarring, infection, and pain.

## 2025-05-14 NOTE — ASSESSMENT & PLAN NOTE
Blood Pressure: 116/80   Continue current regimen of Lisinopril/HCTZ 20-12.5 daily, diltiazem 120 mg daily, and Metoprolol tartrate 100 mg every 12 hours.  Worsening kidney function on acute versus acute on chronic.  Advised to hold renally insulting medications [lisinopril and HCTZ] for 2 days and repeat labs to assess trend of creatinine levels.  Minimal salt and copious hydration advised.  Continue home BP monitoring with goal of less than 150/90.    Orders:    Urinalysis with microscopic; Future

## 2025-05-14 NOTE — ASSESSMENT & PLAN NOTE
Strongly encouraged for smoking cessation.  Counseling today for 3 minutes.  Continue on Wellbutrin 150 twice daily and encouraged additional therapies to assist with cravings however patient denies use for patch and gums.  Encouraged to continue company of friends in which patient reports does not smoke around at those times.  Will continue to follow-up.

## 2025-05-14 NOTE — PATIENT INSTRUCTIONS
DO NOT TAKE YOU LISINOPRIL/HYDROCHLOROTHIAZIDE X 2 DAYS.  Check your blood pressure daily, and stay off these medications as long as blood pressure is under 150/90. Minimize your salt intake and increase fresh fruit and vegetables intake.   Ensure to drink > 2 litres daily.   Do your labs on Friday.

## 2025-05-14 NOTE — ASSESSMENT & PLAN NOTE
Lab Results   Component Value Date    HGBA1C 7.1 (A) 05/14/2025   Discontinued off of Janumet since January 2025 due to starting Ozempic.  Successful weight loss of 9 pounds since last visit.  A1c not at goal and would consider restarting metformin however recent repeat kidney function demonstrates worsening impairment - unsure if acute versus acute on chronic.  Will reassess kidney function in 2 days and thereafter decide options of therapy for tighter diabetes management.  Hesitant to increase on Ozempic medication due to tolerance of side effects.  Kidney function previously stable on Jardiance 10 mg and therefore we will continue at this time while observing creatinine trend on repeat labs.    Orders:    POCT hemoglobin A1c    Albumin / creatinine urine ratio; Future    Diabetic foot exam; Future    Urinalysis with microscopic; Future

## 2025-05-14 NOTE — PROGRESS NOTES
:  Assessment & Plan  Type 2 diabetes mellitus with diabetic neuropathy, without long-term current use of insulin ([facility])    Lab Results   Component Value Date    HGBA1C 7.1 (A) 05/14/2025   Discontinued off of Janumet since January 2025 due to starting Ozempic.  Successful weight loss of 9 pounds since last visit.  A1c not at goal and would consider restarting metformin however recent repeat kidney function demonstrates worsening impairment - unsure if acute versus acute on chronic.  Will reassess kidney function in 2 days and thereafter decide options of therapy for tighter diabetes management.  Hesitant to increase on Ozempic medication due to tolerance of side effects.  Kidney function previously stable on Jardiance 10 mg and therefore we will continue at this time while observing creatinine trend on repeat labs.    Orders:    POCT hemoglobin A1c    Albumin / creatinine urine ratio; Future    Diabetic foot exam; Future    Urinalysis with microscopic; Future    Class 3 severe obesity without serious comorbidity in adult  Prior Authorization Clinical Questions for Weight Management Pharmacotherapy    1. Does the patient have a contrainidcation to medication prescribed for weight management?: No  2. Does the patient have a diagnosis of obesity, confirmed by a BMI greater than or equal to 30 kg/m^2?: Yes  3. Does the patient have a BMI of greater than or equal to 27 kg/m^2 with at least one weight-related comorbidity/risk factor/complication (e.g. diabetes, dyslipidemia, coronary artery disease)?: Yes  4. Weight-related co-morbidities/risk factors: type 2 diabetes, dyslipidemia, hypertension, cardiovascular disease, obstructive sleep apnea (MARIA ELENA)  5. WEGOVY CVA Indication: Does patient have established documented cardiovascular disease (history of a prior heart attack (myocardial infarction), stroke, or symptomatic peripheral arterial disease (PAD)?: Yes  6. ZEPBOUND MARIA ELENA Indication: Does patient have documented MARIA ELENA  diagnosed via sleep study (insurance will require copy of sleep study results for approval)?: Yes  8. Has the patient completed a comprehensive weight loss program (ie, Weight Watchers, Noom, Bariatrics, other nroma on phone)? If so, what?: No  9. Does the patient have a history of type 2 diabetes?: Yes  10. Has the member tried and failed other weight loss medication within the past 12 months?: No  11. Will the member use requested medication in combination with another GLP agonist or weight loss drug?: No  12. Is the medication a controlled substance?: No  For renewals: Has the patient had a positive outcome with current weight management medication (i.e., change in body weight of at least 4-5% after 12-16 weeks on maximally tolerated dose)?: Yes     Baseline weight (in pounds): 290 lbs  Current weight (in pounds): 234.7 lbs  Weight loss percentage: -19.07%          Microalbuminuria due to type 2 diabetes mellitus  ([facility])    Lab Results   Component Value Date    HGBA1C 7.1 (A) 05/14/2025     Last completed in 2022 with levels over 300.  Container given in office today and patient to have sample dropped off on Friday along with other renal function tests completed.  Orders:    Urinalysis with microscopic; Future    Benign essential hypertension  Blood Pressure: 116/80   Continue current regimen of Lisinopril/HCTZ 20-12.5 daily, diltiazem 120 mg daily, and Metoprolol tartrate 100 mg every 12 hours.  Worsening kidney function on acute versus acute on chronic.  Advised to hold renally insulting medications [lisinopril and HCTZ] for 2 days and repeat labs to assess trend of creatinine levels.  Minimal salt and copious hydration advised.  Continue home BP monitoring with goal of less than 150/90.    Orders:    Urinalysis with microscopic; Future    CKD (chronic kidney disease) stage 4, GFR 15-29 ml/min ([facility])  Lab Results   Component Value Date    EGFR 29 04/30/2025    EGFR 38 03/19/2025    EGFR 62 10/28/2024    CREATININE  1.82 (H) 04/30/2025    CREATININE 1.45 (H) 03/19/2025    CREATININE 0.98 10/28/2024   Acutely worsening kidney injury however electrolytes within normal limits except sodium of 133.  Patient euvolemic and blood pressure within normal limits. Creatinine clearance over 50  - Hold renally insulting medications at this time [lisinopril, hydrochlorothiazide] will continue on Jardiance for now and will hold on starting metformin until renal function reassessed if GFR better than 30.  - Monitor home blood pressure with goal less than 150/90  - Ensure fluid intake of at least 2 to 2.5 L/day  - BMP, urine microscopy and microalbuminuria in 2 days.  Will then assess need and safety for reinstatement of above medications.  - Has follow-up planned with nephrology July 31.  Will follow-up with renal ultrasound if creatinine and GFR continues to worsen.    Orders:    Albumin / creatinine urine ratio; Future    Basic metabolic panel; Future    Urinalysis with microscopic; Future    Smoking greater than 20 pack years  Strongly encouraged for smoking cessation.  Counseling today for 3 minutes.  Continue on Wellbutrin 150 twice daily and encouraged additional therapies to assist with cravings however patient denies use for patch and gums.  Encouraged to continue company of friends in which patient reports does not smoke around at those times.  Will continue to follow-up.           History of Present Illness     Lynda Boyd is a 65 y.o. female   Lynda Boyd is a very pleasant 64 y.o. female who has a PMH of CVA, DM, HTN, HLD, persistent atrial fibrillation on DOAC anticoagulation, pancreatitis, obesity, anxiety and vit D deficiency and presents today to review chronic diseases and recent lab work. Patient's chronic medical conditions are stable unless noted otherwise above. Patient reports compliance on medications prescribed. This patient has had no admissions to hospital or medical emergencies since the last visit to our office.  "Patient has no further complaints other than what is mentioned below and in the ROS.        Review of Systems   Respiratory:  Negative for cough, chest tightness, shortness of breath and wheezing.    Cardiovascular:  Negative for chest pain, palpitations and leg swelling.   Gastrointestinal:  Negative for abdominal pain, constipation, diarrhea, nausea and vomiting.   Genitourinary:  Negative for dysuria and hematuria.   Skin:  Negative for rash.   Neurological:  Negative for dizziness, syncope and headaches.     Objective   /80 (BP Location: Right arm, Patient Position: Sitting, Cuff Size: Large)   Pulse 90   Temp 97.6 °F (36.4 °C) (Temporal)   Resp 16   Ht 5' 5\" (1.651 m)   Wt 106 kg (234 lb 11.2 oz)   SpO2 100%   BMI 39.06 kg/m²      Physical Exam  Vitals reviewed.   Constitutional:       Appearance: She is obese.   HENT:      Head: Normocephalic and atraumatic.      Right Ear: External ear normal.      Left Ear: External ear normal.      Nose: Nose normal.      Mouth/Throat:      Mouth: Mucous membranes are moist.     Eyes:      Conjunctiva/sclera: Conjunctivae normal.       Cardiovascular:      Rate and Rhythm: Normal rate.      Pulses: Normal pulses. no weak pulses.           Dorsalis pedis pulses are 2+ on the right side and 2+ on the left side.        Posterior tibial pulses are 2+ on the right side and 2+ on the left side.      Heart sounds: Normal heart sounds. No murmur heard.  Pulmonary:      Effort: Pulmonary effort is normal.      Breath sounds: No wheezing.     Musculoskeletal:         General: Normal range of motion.      Cervical back: Normal range of motion.      Right lower leg: No edema.      Left lower leg: No edema.   Feet:      Right foot:      Skin integrity: No ulcer, skin breakdown, erythema, warmth, callus or dry skin.      Left foot:      Skin integrity: No ulcer, skin breakdown, erythema, warmth, callus or dry skin.   Lymphadenopathy:      Cervical: No cervical adenopathy. "     Skin:     General: Skin is warm and dry.      Findings: No rash.     Neurological:      Mental Status: She is alert. Mental status is at baseline.     Psychiatric:         Mood and Affect: Mood normal.         Behavior: Behavior normal.       Patient's shoes and socks removed.    Right Foot/Ankle   Right Foot Inspection  Skin Exam: skin normal and skin intact. No dry skin, no warmth, no callus, no erythema, no maceration, no abnormal color, no pre-ulcer, no ulcer and no callus.     Toe Exam: ROM and strength within normal limits.     Sensory   Proprioception: intact  Monofilament testing: intact    Vascular  Capillary refills: < 3 seconds  The right DP pulse is 2+. The right PT pulse is 2+.     Right Toe  - Comprehensive Exam  Ecchymosis: none  Arch: normal  Hammertoes: absent  Claw Toes: absent  Swelling: none   Tenderness: none       Left Foot/Ankle  Left Foot Inspection  Skin Exam: skin normal and skin intact. No dry skin, no warmth, no erythema, no maceration, normal color, no pre-ulcer, no ulcer and no callus.     Toe Exam: ROM and strength within normal limits.     Sensory   Proprioception: intact  Monofilament testing: intact    Vascular  Capillary refills: < 3 seconds  The left DP pulse is 2+. The left PT pulse is 2+.     Left Toe  - Comprehensive Exam  Ecchymosis: none  Arch: normal  Hammertoes: absent  Claw toes: absent  Swelling: none   Tenderness: none       Assign Risk Category  No deformity present  No loss of protective sensation  No weak pulses  Risk: 0

## 2025-05-14 NOTE — ASSESSMENT & PLAN NOTE
Lab Results   Component Value Date    HGBA1C 7.1 (A) 05/14/2025     Last completed in 2022 with levels over 300.  Container given in office today and patient to have sample dropped off on Friday along with other renal function tests completed.  Orders:    Urinalysis with microscopic; Future

## 2025-05-16 ENCOUNTER — APPOINTMENT (OUTPATIENT)
Dept: LAB | Facility: HOSPITAL | Age: 65
End: 2025-05-16
Payer: COMMERCIAL

## 2025-05-16 ENCOUNTER — RESULTS FOLLOW-UP (OUTPATIENT)
Dept: FAMILY MEDICINE CLINIC | Facility: CLINIC | Age: 65
End: 2025-05-16

## 2025-05-16 DIAGNOSIS — N17.9 AKI (ACUTE KIDNEY INJURY) (HCC): ICD-10-CM

## 2025-05-16 DIAGNOSIS — I10 BENIGN ESSENTIAL HYPERTENSION: ICD-10-CM

## 2025-05-16 DIAGNOSIS — E11.29 MICROALBUMINURIA DUE TO TYPE 2 DIABETES MELLITUS  (HCC): ICD-10-CM

## 2025-05-16 DIAGNOSIS — R80.9 MICROALBUMINURIA DUE TO TYPE 2 DIABETES MELLITUS  (HCC): ICD-10-CM

## 2025-05-16 DIAGNOSIS — R82.81 PYURIA, STERILE: ICD-10-CM

## 2025-05-16 DIAGNOSIS — E11.40 TYPE 2 DIABETES MELLITUS WITH DIABETIC NEUROPATHY, WITHOUT LONG-TERM CURRENT USE OF INSULIN (HCC): ICD-10-CM

## 2025-05-16 DIAGNOSIS — N18.4 CKD (CHRONIC KIDNEY DISEASE) STAGE 4, GFR 15-29 ML/MIN (HCC): ICD-10-CM

## 2025-05-16 DIAGNOSIS — N18.4 CKD (CHRONIC KIDNEY DISEASE) STAGE 4, GFR 15-29 ML/MIN (HCC): Primary | ICD-10-CM

## 2025-05-16 LAB
ANION GAP SERPL CALCULATED.3IONS-SCNC: 9 MMOL/L (ref 4–13)
BACTERIA UR QL AUTO: ABNORMAL /HPF
BILIRUB UR QL STRIP: NEGATIVE
BUN SERPL-MCNC: 28 MG/DL (ref 5–25)
CALCIUM SERPL-MCNC: 10.4 MG/DL (ref 8.4–10.2)
CHLORIDE SERPL-SCNC: 99 MMOL/L (ref 96–108)
CLARITY UR: CLEAR
CO2 SERPL-SCNC: 27 MMOL/L (ref 21–32)
COLOR UR: ABNORMAL
CREAT SERPL-MCNC: 1.4 MG/DL (ref 0.6–1.3)
CREAT UR-MCNC: 127.9 MG/DL
GFR SERPL CREATININE-BSD FRML MDRD: 40 ML/MIN/1.73SQ M
GLUCOSE P FAST SERPL-MCNC: 141 MG/DL (ref 65–99)
GLUCOSE UR STRIP-MCNC: ABNORMAL MG/DL
HGB UR QL STRIP.AUTO: NEGATIVE
KETONES UR STRIP-MCNC: NEGATIVE MG/DL
LEUKOCYTE ESTERASE UR QL STRIP: ABNORMAL
MICROALBUMIN UR-MCNC: <7 MG/L
MUCOUS THREADS UR QL AUTO: ABNORMAL
NITRITE UR QL STRIP: NEGATIVE
NON-SQ EPI CELLS URNS QL MICRO: ABNORMAL /HPF
PH UR STRIP.AUTO: 6 [PH]
POTASSIUM SERPL-SCNC: 4.9 MMOL/L (ref 3.5–5.3)
PROT UR STRIP-MCNC: NEGATIVE MG/DL
RBC #/AREA URNS AUTO: ABNORMAL /HPF
SODIUM SERPL-SCNC: 135 MMOL/L (ref 135–147)
SP GR UR STRIP.AUTO: 1.02 (ref 1–1.03)
UROBILINOGEN UR STRIP-ACNC: <2 MG/DL
WBC #/AREA URNS AUTO: ABNORMAL /HPF

## 2025-05-16 PROCEDURE — 80048 BASIC METABOLIC PNL TOTAL CA: CPT

## 2025-05-16 PROCEDURE — 82043 UR ALBUMIN QUANTITATIVE: CPT

## 2025-05-16 PROCEDURE — 36415 COLL VENOUS BLD VENIPUNCTURE: CPT

## 2025-05-16 PROCEDURE — 82570 ASSAY OF URINE CREATININE: CPT

## 2025-05-16 PROCEDURE — 81001 URINALYSIS AUTO W/SCOPE: CPT

## 2025-05-20 ENCOUNTER — APPOINTMENT (OUTPATIENT)
Dept: LAB | Facility: HOSPITAL | Age: 65
End: 2025-05-20
Payer: COMMERCIAL

## 2025-05-20 DIAGNOSIS — N18.4 CKD (CHRONIC KIDNEY DISEASE) STAGE 4, GFR 15-29 ML/MIN (HCC): ICD-10-CM

## 2025-05-20 DIAGNOSIS — R82.81 PYURIA, STERILE: ICD-10-CM

## 2025-05-20 DIAGNOSIS — N17.9 AKI (ACUTE KIDNEY INJURY) (HCC): ICD-10-CM

## 2025-05-20 LAB
ANION GAP SERPL CALCULATED.3IONS-SCNC: 9 MMOL/L (ref 4–13)
BUN SERPL-MCNC: 25 MG/DL (ref 5–25)
CALCIUM SERPL-MCNC: 9.9 MG/DL (ref 8.4–10.2)
CHLORIDE SERPL-SCNC: 98 MMOL/L (ref 96–108)
CO2 SERPL-SCNC: 26 MMOL/L (ref 21–32)
CREAT SERPL-MCNC: 1.26 MG/DL (ref 0.6–1.3)
GFR SERPL CREATININE-BSD FRML MDRD: 45 ML/MIN/1.73SQ M
GLUCOSE P FAST SERPL-MCNC: 131 MG/DL (ref 65–99)
POTASSIUM SERPL-SCNC: 4.6 MMOL/L (ref 3.5–5.3)
SODIUM SERPL-SCNC: 133 MMOL/L (ref 135–147)

## 2025-05-20 PROCEDURE — 36415 COLL VENOUS BLD VENIPUNCTURE: CPT

## 2025-05-20 PROCEDURE — 80048 BASIC METABOLIC PNL TOTAL CA: CPT

## 2025-05-22 NOTE — TELEPHONE ENCOUNTER
NOB today  LMP:02/16/2021  Last pap: unsure  Phone # [phone]  Pharmacy confirmed  On PNV  C/o Pt states no concerns      Patient called and stated that her blood pressure has gone up. Yesterday her bp 180/100, the day prior 175/100. She states it has been gradually going up. She is having no symptoms. She wants to know if she should start taking medication or not.

## 2025-05-23 ENCOUNTER — TELEPHONE (OUTPATIENT)
Dept: FAMILY MEDICINE CLINIC | Facility: CLINIC | Age: 65
End: 2025-05-23

## 2025-05-23 DIAGNOSIS — N18.4 CKD (CHRONIC KIDNEY DISEASE) STAGE 4, GFR 15-29 ML/MIN (HCC): Primary | ICD-10-CM

## 2025-05-23 DIAGNOSIS — I10 BENIGN ESSENTIAL HYPERTENSION: ICD-10-CM

## 2025-05-23 RX ORDER — LISINOPRIL 20 MG/1
20 TABLET ORAL DAILY
Qty: 30 TABLET | Refills: 2 | Status: SHIPPED | OUTPATIENT
Start: 2025-05-23 | End: 2025-08-21

## 2025-05-23 RX ORDER — LISINOPRIL AND HYDROCHLOROTHIAZIDE 12.5; 2 MG/1; MG/1
1 TABLET ORAL DAILY
Qty: 90 TABLET | Refills: 1 | Status: SHIPPED | OUTPATIENT
Start: 2025-05-23 | End: 2025-05-23 | Stop reason: ALTCHOICE

## 2025-05-23 NOTE — TELEPHONE ENCOUNTER
Patient called yesterday 5/22/25 to discuss lab results demonstrating improved renal function while off lisinopril/hctz. However Home BP readings now elevated in -170's. Denies any related symptoms and therefore will restart medication of lisinopril 20mg (previously doing well and better renal function on lisinopril 40mg) and recheck BMP in 1 week from restarting medications. Still taking empagliflozin and ozempic with fasting blood sugars within goal range of < 126.   - Patient advised to take single dose of lisinopril/hctz yesterday to reduce BP, ensure adequate fluid intake and then start lisinopril asap when received from pharmacy today.   - Patient expressed understanding and all questions asked were answered.

## 2025-05-23 NOTE — TELEPHONE ENCOUNTER
CERTIFICATE OF WORK    3/26/2018      Regarding: Alyssa Banuelos   [address] [id number]      This is to certify that Alyssa Banuelos has been under my care from 3/26/2018 and can return to regular work on 3/28/2018. May return sooner if able.            SIGNATURE:___________________________________________,     STEVE Roa        [address] [address]  [phone]   Patient called requesting this medication to be refill:    lisinopril-hydrochlorothiazide (PRINZIDE,ZESTORETIC) 20-12.5 MG     Patient states she will needs this medication asap, due she is running out of it. Thanks.

## 2025-06-02 ENCOUNTER — APPOINTMENT (OUTPATIENT)
Dept: LAB | Facility: HOSPITAL | Age: 65
End: 2025-06-02
Payer: COMMERCIAL

## 2025-06-02 DIAGNOSIS — N18.4 CKD (CHRONIC KIDNEY DISEASE) STAGE 4, GFR 15-29 ML/MIN (HCC): ICD-10-CM

## 2025-06-02 DIAGNOSIS — I10 BENIGN ESSENTIAL HYPERTENSION: ICD-10-CM

## 2025-06-02 LAB
ANION GAP SERPL CALCULATED.3IONS-SCNC: 6 MMOL/L (ref 4–13)
BACTERIA UR QL AUTO: ABNORMAL /HPF
BILIRUB UR QL STRIP: NEGATIVE
BUN SERPL-MCNC: 22 MG/DL (ref 5–25)
CALCIUM SERPL-MCNC: 10.4 MG/DL (ref 8.4–10.2)
CHLORIDE SERPL-SCNC: 100 MMOL/L (ref 96–108)
CLARITY UR: CLEAR
CO2 SERPL-SCNC: 28 MMOL/L (ref 21–32)
COLOR UR: ABNORMAL
CREAT SERPL-MCNC: 1.14 MG/DL (ref 0.6–1.3)
GFR SERPL CREATININE-BSD FRML MDRD: 51 ML/MIN/1.73SQ M
GLUCOSE SERPL-MCNC: 122 MG/DL (ref 65–140)
GLUCOSE UR STRIP-MCNC: ABNORMAL MG/DL
HGB UR QL STRIP.AUTO: NEGATIVE
KETONES UR STRIP-MCNC: NEGATIVE MG/DL
LEUKOCYTE ESTERASE UR QL STRIP: NEGATIVE
NITRITE UR QL STRIP: NEGATIVE
NON-SQ EPI CELLS URNS QL MICRO: ABNORMAL /HPF
PH UR STRIP.AUTO: 6 [PH]
POTASSIUM SERPL-SCNC: 5.1 MMOL/L (ref 3.5–5.3)
PROT UR STRIP-MCNC: NEGATIVE MG/DL
RBC #/AREA URNS AUTO: ABNORMAL /HPF
SODIUM SERPL-SCNC: 134 MMOL/L (ref 135–147)
SP GR UR STRIP.AUTO: 1.02 (ref 1–1.03)
UROBILINOGEN UR STRIP-ACNC: <2 MG/DL
WBC #/AREA URNS AUTO: ABNORMAL /HPF

## 2025-06-02 PROCEDURE — 80048 BASIC METABOLIC PNL TOTAL CA: CPT

## 2025-06-02 PROCEDURE — 36415 COLL VENOUS BLD VENIPUNCTURE: CPT

## 2025-06-02 PROCEDURE — 81001 URINALYSIS AUTO W/SCOPE: CPT

## 2025-06-04 ENCOUNTER — RESULTS FOLLOW-UP (OUTPATIENT)
Dept: FAMILY MEDICINE CLINIC | Facility: CLINIC | Age: 65
End: 2025-06-04

## 2025-06-09 DIAGNOSIS — I48.91 NEW ONSET ATRIAL FIBRILLATION (HCC): ICD-10-CM

## 2025-06-09 NOTE — TELEPHONE ENCOUNTER
Reason for call:   [x] Refill   [] Prior Auth  [] Other:     Office:   [] PCP/Provider -   [x] Specialty/Provider -     apixaban (Eliquis) 5 mg 5 mg, Oral, 2 times daily       Quantity: 90    Pharmacy: [facility]   Does the patient have enough for 3 days?   [] Yes   [x] No - Send as HP to POD    Mail Away Pharmacy   Does the patient have enough for 10 days?   [] Yes   [] No - Send as HP to POD

## 2025-07-31 ENCOUNTER — TELEPHONE (OUTPATIENT)
Dept: NEPHROLOGY | Facility: CLINIC | Age: 65
End: 2025-07-31

## 2025-07-31 ENCOUNTER — CONSULT (OUTPATIENT)
Dept: NEPHROLOGY | Facility: CLINIC | Age: 65
End: 2025-07-31
Payer: COMMERCIAL

## 2025-07-31 VITALS
BODY MASS INDEX: 37.65 KG/M2 | WEIGHT: 226 LBS | DIASTOLIC BLOOD PRESSURE: 76 MMHG | SYSTOLIC BLOOD PRESSURE: 132 MMHG | HEIGHT: 65 IN

## 2025-07-31 DIAGNOSIS — R79.89 INCREASE IN SERUM CREATININE FROM PRIOR MEASUREMENT: ICD-10-CM

## 2025-07-31 DIAGNOSIS — E11.22 TYPE 2 DM WITH CKD STAGE 3 AND HYPERTENSION (HCC): Primary | ICD-10-CM

## 2025-07-31 DIAGNOSIS — E66.813 CLASS 3 SEVERE OBESITY WITHOUT SERIOUS COMORBIDITY IN ADULT: ICD-10-CM

## 2025-07-31 DIAGNOSIS — Z86.73 HISTORY OF CVA (CEREBROVASCULAR ACCIDENT): ICD-10-CM

## 2025-07-31 DIAGNOSIS — F17.210 SMOKING GREATER THAN 20 PACK YEARS: ICD-10-CM

## 2025-07-31 DIAGNOSIS — N18.30 TYPE 2 DM WITH CKD STAGE 3 AND HYPERTENSION (HCC): Primary | ICD-10-CM

## 2025-07-31 DIAGNOSIS — E78.00 PURE HYPERCHOLESTEROLEMIA: ICD-10-CM

## 2025-07-31 DIAGNOSIS — I12.9 TYPE 2 DM WITH CKD STAGE 3 AND HYPERTENSION (HCC): Primary | ICD-10-CM

## 2025-07-31 DIAGNOSIS — E11.29 MICROALBUMINURIA DUE TO TYPE 2 DIABETES MELLITUS  (HCC): ICD-10-CM

## 2025-07-31 DIAGNOSIS — N18.32 STAGE 3B CHRONIC KIDNEY DISEASE (HCC): ICD-10-CM

## 2025-07-31 DIAGNOSIS — E11.40 TYPE 2 DIABETES MELLITUS WITH DIABETIC NEUROPATHY, WITHOUT LONG-TERM CURRENT USE OF INSULIN (HCC): ICD-10-CM

## 2025-07-31 DIAGNOSIS — R80.9 MICROALBUMINURIA DUE TO TYPE 2 DIABETES MELLITUS  (HCC): ICD-10-CM

## 2025-07-31 DIAGNOSIS — I10 BENIGN ESSENTIAL HYPERTENSION: ICD-10-CM

## 2025-07-31 DIAGNOSIS — K70.30 ALCOHOLIC CIRRHOSIS OF LIVER WITHOUT ASCITES (HCC): ICD-10-CM

## 2025-07-31 PROCEDURE — 99204 OFFICE O/P NEW MOD 45 MIN: CPT | Performed by: INTERNAL MEDICINE

## 2025-08-04 DIAGNOSIS — E78.2 MIXED HYPERLIPIDEMIA: ICD-10-CM

## 2025-08-04 DIAGNOSIS — E11.40 TYPE 2 DIABETES MELLITUS WITH DIABETIC NEUROPATHY, WITHOUT LONG-TERM CURRENT USE OF INSULIN (HCC): ICD-10-CM

## 2025-08-05 ENCOUNTER — OFFICE VISIT (OUTPATIENT)
Dept: CARDIOLOGY CLINIC | Facility: CLINIC | Age: 65
End: 2025-08-05
Payer: COMMERCIAL

## 2025-08-05 VITALS
DIASTOLIC BLOOD PRESSURE: 66 MMHG | OXYGEN SATURATION: 98 % | WEIGHT: 226 LBS | HEIGHT: 65 IN | SYSTOLIC BLOOD PRESSURE: 120 MMHG | HEART RATE: 91 BPM | BODY MASS INDEX: 37.65 KG/M2

## 2025-08-05 DIAGNOSIS — Z86.73 HISTORY OF CVA (CEREBROVASCULAR ACCIDENT): ICD-10-CM

## 2025-08-05 DIAGNOSIS — I48.19 PERSISTENT ATRIAL FIBRILLATION (HCC): Primary | ICD-10-CM

## 2025-08-05 DIAGNOSIS — K70.30 ALCOHOLIC CIRRHOSIS OF LIVER WITHOUT ASCITES (HCC): ICD-10-CM

## 2025-08-05 DIAGNOSIS — E78.2 MIXED HYPERLIPIDEMIA: ICD-10-CM

## 2025-08-05 DIAGNOSIS — I10 BENIGN ESSENTIAL HYPERTENSION: ICD-10-CM

## 2025-08-05 DIAGNOSIS — E66.813 CLASS 3 SEVERE OBESITY DUE TO EXCESS CALORIES WITHOUT SERIOUS COMORBIDITY WITH BODY MASS INDEX (BMI) OF 40.0 TO 44.9 IN ADULT: ICD-10-CM

## 2025-08-05 DIAGNOSIS — F17.210 SMOKING GREATER THAN 20 PACK YEARS: ICD-10-CM

## 2025-08-05 PROCEDURE — 99214 OFFICE O/P EST MOD 30 MIN: CPT | Performed by: STUDENT IN AN ORGANIZED HEALTH CARE EDUCATION/TRAINING PROGRAM

## 2025-08-05 RX ORDER — ATORVASTATIN CALCIUM 80 MG/1
80 TABLET, FILM COATED ORAL EVERY EVENING
Qty: 90 TABLET | Refills: 1 | Status: SHIPPED | OUTPATIENT
Start: 2025-08-05

## 2025-08-05 RX ORDER — GABAPENTIN 300 MG/1
300 CAPSULE ORAL 2 TIMES DAILY
Qty: 180 CAPSULE | Refills: 1 | Status: SHIPPED | OUTPATIENT
Start: 2025-08-05

## 2025-08-19 DIAGNOSIS — I10 BENIGN ESSENTIAL HYPERTENSION: ICD-10-CM

## 2025-08-20 ENCOUNTER — TELEPHONE (OUTPATIENT)
Dept: FAMILY MEDICINE CLINIC | Facility: CLINIC | Age: 65
End: 2025-08-20

## 2025-08-20 DIAGNOSIS — E11.40 TYPE 2 DIABETES MELLITUS WITH DIABETIC NEUROPATHY, WITHOUT LONG-TERM CURRENT USE OF INSULIN (HCC): Primary | ICD-10-CM

## 2025-08-20 RX ORDER — LISINOPRIL 20 MG/1
20 TABLET ORAL DAILY
Qty: 30 TABLET | Refills: 0 | Status: SHIPPED | OUTPATIENT
Start: 2025-08-20

## 2025-08-22 DIAGNOSIS — E11.9 TYPE 2 DIABETES MELLITUS WITHOUT COMPLICATION, WITHOUT LONG-TERM CURRENT USE OF INSULIN (HCC): ICD-10-CM
